# Patient Record
Sex: FEMALE | Race: WHITE | Employment: OTHER | URBAN - METROPOLITAN AREA
[De-identification: names, ages, dates, MRNs, and addresses within clinical notes are randomized per-mention and may not be internally consistent; named-entity substitution may affect disease eponyms.]

---

## 2021-01-01 ENCOUNTER — APPOINTMENT (OUTPATIENT)
Dept: GENERAL RADIOLOGY | Age: 71
End: 2021-01-01
Payer: MEDICARE

## 2021-01-01 ENCOUNTER — APPOINTMENT (OUTPATIENT)
Dept: CT IMAGING | Age: 71
DRG: 853 | End: 2021-01-01
Payer: MEDICARE

## 2021-01-01 ENCOUNTER — APPOINTMENT (OUTPATIENT)
Dept: GENERAL RADIOLOGY | Age: 71
DRG: 853 | End: 2021-01-01
Payer: MEDICARE

## 2021-01-01 ENCOUNTER — APPOINTMENT (OUTPATIENT)
Dept: CT IMAGING | Age: 71
End: 2021-01-01
Payer: MEDICARE

## 2021-01-01 ENCOUNTER — HOSPITAL ENCOUNTER (INPATIENT)
Age: 71
LOS: 6 days | Discharge: ANOTHER HOSPITAL | DRG: 872 | End: 2021-05-25
Attending: EMERGENCY MEDICINE | Admitting: INTERNAL MEDICINE
Payer: MEDICARE

## 2021-01-01 ENCOUNTER — HOSPITAL ENCOUNTER (INPATIENT)
Age: 71
LOS: 18 days | DRG: 853 | End: 2021-07-05
Attending: EMERGENCY MEDICINE | Admitting: SURGERY
Payer: MEDICARE

## 2021-01-01 ENCOUNTER — HOSPITAL ENCOUNTER (EMERGENCY)
Age: 71
Discharge: NURSING, REHAB OR LONG-TERM CARE | End: 2021-05-26
Attending: EMERGENCY MEDICINE
Payer: MEDICARE

## 2021-01-01 ENCOUNTER — APPOINTMENT (OUTPATIENT)
Dept: GENERAL RADIOLOGY | Age: 71
DRG: 872 | End: 2021-01-01
Payer: MEDICARE

## 2021-01-01 ENCOUNTER — HOSPITAL ENCOUNTER (EMERGENCY)
Age: 71
Discharge: HOME | End: 2021-05-26
Payer: MEDICARE

## 2021-01-01 ENCOUNTER — HOSPITAL ENCOUNTER (INPATIENT)
Age: 71
LOS: 7 days | Discharge: NURSING, REHAB OR LONG-TERM CARE | DRG: 871 | End: 2021-05-05
Attending: EMERGENCY MEDICINE | Admitting: INTERNAL MEDICINE
Payer: MEDICARE

## 2021-01-01 ENCOUNTER — ANESTHESIA (OUTPATIENT)
Dept: OPERATING ROOM | Age: 71
DRG: 853 | End: 2021-01-01
Payer: MEDICARE

## 2021-01-01 ENCOUNTER — APPOINTMENT (OUTPATIENT)
Dept: CT IMAGING | Age: 71
DRG: 871 | End: 2021-01-01
Payer: MEDICARE

## 2021-01-01 ENCOUNTER — ANESTHESIA EVENT (OUTPATIENT)
Dept: OPERATING ROOM | Age: 71
DRG: 853 | End: 2021-01-01
Payer: MEDICARE

## 2021-01-01 VITALS
OXYGEN SATURATION: 95 % | WEIGHT: 260 LBS | HEIGHT: 66 IN | RESPIRATION RATE: 26 BRPM | HEART RATE: 112 BPM | SYSTOLIC BLOOD PRESSURE: 138 MMHG | BODY MASS INDEX: 41.78 KG/M2 | TEMPERATURE: 98.4 F | DIASTOLIC BLOOD PRESSURE: 60 MMHG

## 2021-01-01 VITALS
OXYGEN SATURATION: 98 % | HEART RATE: 80 BPM | WEIGHT: 283.95 LBS | DIASTOLIC BLOOD PRESSURE: 62 MMHG | HEIGHT: 67 IN | SYSTOLIC BLOOD PRESSURE: 104 MMHG | TEMPERATURE: 98 F | RESPIRATION RATE: 16 BRPM | BODY MASS INDEX: 44.57 KG/M2

## 2021-01-01 VITALS
BODY MASS INDEX: 46 KG/M2 | TEMPERATURE: 98 F | SYSTOLIC BLOOD PRESSURE: 149 MMHG | RESPIRATION RATE: 16 BRPM | WEIGHT: 285 LBS | HEART RATE: 92 BPM | DIASTOLIC BLOOD PRESSURE: 77 MMHG | OXYGEN SATURATION: 98 %

## 2021-01-01 VITALS
OXYGEN SATURATION: 92 % | RESPIRATION RATE: 16 BRPM | HEART RATE: 84 BPM | WEIGHT: 267 LBS | HEIGHT: 67 IN | BODY MASS INDEX: 41.91 KG/M2 | SYSTOLIC BLOOD PRESSURE: 132 MMHG | TEMPERATURE: 98 F | DIASTOLIC BLOOD PRESSURE: 83 MMHG

## 2021-01-01 VITALS — SYSTOLIC BLOOD PRESSURE: 138 MMHG | DIASTOLIC BLOOD PRESSURE: 65 MMHG | TEMPERATURE: 99 F | OXYGEN SATURATION: 97 %

## 2021-01-01 DIAGNOSIS — K63.1 BOWEL PERFORATION (HCC): Primary | ICD-10-CM

## 2021-01-01 DIAGNOSIS — R65.20 SEVERE SEPSIS WITHOUT SEPTIC SHOCK (HCC): ICD-10-CM

## 2021-01-01 DIAGNOSIS — M54.6 ACUTE RIGHT-SIDED THORACIC BACK PAIN: ICD-10-CM

## 2021-01-01 DIAGNOSIS — Z76.5 DRUG-SEEKING BEHAVIOR: ICD-10-CM

## 2021-01-01 DIAGNOSIS — L03.116 CELLULITIS OF LEFT LOWER EXTREMITY: Primary | ICD-10-CM

## 2021-01-01 DIAGNOSIS — K56.609 SMALL BOWEL OBSTRUCTION (HCC): ICD-10-CM

## 2021-01-01 DIAGNOSIS — R07.89 CHEST WALL PAIN: Primary | ICD-10-CM

## 2021-01-01 DIAGNOSIS — Z76.0 ENCOUNTER FOR MEDICATION REFILL: Primary | ICD-10-CM

## 2021-01-01 DIAGNOSIS — R65.21 SEPTIC SHOCK (HCC): ICD-10-CM

## 2021-01-01 DIAGNOSIS — N17.9 ACUTE RENAL FAILURE, UNSPECIFIED ACUTE RENAL FAILURE TYPE (HCC): ICD-10-CM

## 2021-01-01 DIAGNOSIS — A41.9 SEPTIC SHOCK (HCC): ICD-10-CM

## 2021-01-01 DIAGNOSIS — A41.9 SEVERE SEPSIS WITHOUT SEPTIC SHOCK (HCC): ICD-10-CM

## 2021-01-01 LAB
A/G RATIO: 0.5 (ref 1.1–2.2)
A/G RATIO: 0.9 (ref 1.1–2.2)
ABO/RH: NORMAL
ABO/RH: NORMAL
ALBUMIN SERPL-MCNC: 1.6 G/DL (ref 3.4–5)
ALBUMIN SERPL-MCNC: 1.8 G/DL (ref 3.4–5)
ALBUMIN SERPL-MCNC: 1.9 G/DL (ref 3.4–5)
ALBUMIN SERPL-MCNC: 2 G/DL (ref 3.4–5)
ALBUMIN SERPL-MCNC: 2.1 G/DL (ref 3.4–5)
ALBUMIN SERPL-MCNC: 2.2 G/DL (ref 3.4–5)
ALBUMIN SERPL-MCNC: 2.3 G/DL (ref 3.4–5)
ALBUMIN SERPL-MCNC: 2.4 G/DL (ref 3.4–5)
ALBUMIN SERPL-MCNC: 2.5 G/DL (ref 3.4–5)
ALBUMIN SERPL-MCNC: 2.5 G/DL (ref 3.4–5)
ALBUMIN SERPL-MCNC: 2.6 G/DL (ref 3.4–5)
ALBUMIN SERPL-MCNC: 2.6 G/DL (ref 3.4–5)
ALBUMIN SERPL-MCNC: 2.7 G/DL (ref 3.4–5)
ALBUMIN SERPL-MCNC: 2.8 G/DL (ref 3.4–5)
ALP BLD-CCNC: 103 U/L (ref 40–129)
ALP BLD-CCNC: 104 U/L (ref 40–129)
ALP BLD-CCNC: 108 U/L (ref 40–129)
ALP BLD-CCNC: 111 U/L (ref 40–129)
ALP BLD-CCNC: 114 U/L (ref 40–129)
ALP BLD-CCNC: 116 U/L (ref 40–129)
ALP BLD-CCNC: 121 U/L (ref 40–129)
ALP BLD-CCNC: 121 U/L (ref 40–129)
ALP BLD-CCNC: 127 U/L (ref 40–129)
ALP BLD-CCNC: 131 U/L (ref 40–129)
ALP BLD-CCNC: 131 U/L (ref 40–129)
ALP BLD-CCNC: 132 U/L (ref 40–129)
ALP BLD-CCNC: 139 U/L (ref 40–129)
ALP BLD-CCNC: 164 U/L (ref 40–129)
ALP BLD-CCNC: 177 U/L (ref 40–129)
ALP BLD-CCNC: 232 U/L (ref 40–129)
ALP BLD-CCNC: 280 U/L (ref 40–129)
ALP BLD-CCNC: 87 U/L (ref 40–129)
ALT SERPL-CCNC: 109 U/L (ref 10–40)
ALT SERPL-CCNC: 11 U/L (ref 10–40)
ALT SERPL-CCNC: 115 U/L (ref 10–40)
ALT SERPL-CCNC: 14 U/L (ref 10–40)
ALT SERPL-CCNC: 15 U/L (ref 10–40)
ALT SERPL-CCNC: 15 U/L (ref 10–40)
ALT SERPL-CCNC: 17 U/L (ref 10–40)
ALT SERPL-CCNC: 18 U/L (ref 10–40)
ALT SERPL-CCNC: 22 U/L (ref 10–40)
ALT SERPL-CCNC: 22 U/L (ref 10–40)
ALT SERPL-CCNC: 24 U/L (ref 10–40)
ALT SERPL-CCNC: 26 U/L (ref 10–40)
ALT SERPL-CCNC: 34 U/L (ref 10–40)
ALT SERPL-CCNC: 66 U/L (ref 10–40)
ALT SERPL-CCNC: 71 U/L (ref 10–40)
ALT SERPL-CCNC: 71 U/L (ref 10–40)
ALT SERPL-CCNC: 91 U/L (ref 10–40)
ALT SERPL-CCNC: 97 U/L (ref 10–40)
AMMONIA: 40 UMOL/L (ref 11–51)
AMMONIA: 62 UMOL/L (ref 11–51)
ANION GAP SERPL CALCULATED.3IONS-SCNC: 10 MMOL/L (ref 3–16)
ANION GAP SERPL CALCULATED.3IONS-SCNC: 10 MMOL/L (ref 3–16)
ANION GAP SERPL CALCULATED.3IONS-SCNC: 11 MMOL/L (ref 3–16)
ANION GAP SERPL CALCULATED.3IONS-SCNC: 12 MMOL/L (ref 3–16)
ANION GAP SERPL CALCULATED.3IONS-SCNC: 13 MMOL/L (ref 3–16)
ANION GAP SERPL CALCULATED.3IONS-SCNC: 14 MMOL/L (ref 3–16)
ANION GAP SERPL CALCULATED.3IONS-SCNC: 15 MMOL/L (ref 3–16)
ANION GAP SERPL CALCULATED.3IONS-SCNC: 16 MMOL/L (ref 3–16)
ANION GAP SERPL CALCULATED.3IONS-SCNC: 17 MMOL/L (ref 3–16)
ANION GAP SERPL CALCULATED.3IONS-SCNC: 21 MMOL/L (ref 3–16)
ANION GAP SERPL CALCULATED.3IONS-SCNC: 21 MMOL/L (ref 3–16)
ANION GAP SERPL CALCULATED.3IONS-SCNC: 22 MMOL/L (ref 3–16)
ANION GAP SERPL CALCULATED.3IONS-SCNC: 5 MMOL/L (ref 3–16)
ANION GAP SERPL CALCULATED.3IONS-SCNC: 6 MMOL/L (ref 3–16)
ANION GAP SERPL CALCULATED.3IONS-SCNC: 7 MMOL/L (ref 3–16)
ANION GAP SERPL CALCULATED.3IONS-SCNC: 8 MMOL/L (ref 3–16)
ANION GAP SERPL CALCULATED.3IONS-SCNC: 9 MMOL/L (ref 3–16)
ANISOCYTOSIS: ABNORMAL
ANTIBODY SCREEN: NORMAL
ANTIBODY SCREEN: NORMAL
APTT: 35.8 SEC (ref 24.2–36.2)
APTT: 46.6 SEC (ref 26.2–38.6)
APTT: 53.8 SEC (ref 26.2–38.6)
APTT: 60.2 SEC (ref 26.2–38.6)
APTT: 61.6 SEC (ref 26.2–38.6)
APTT: 62.8 SEC (ref 24.2–36.2)
APTT: 68.1 SEC (ref 26.2–38.6)
AST SERPL-CCNC: 108 U/L (ref 15–37)
AST SERPL-CCNC: 133 U/L (ref 15–37)
AST SERPL-CCNC: 133 U/L (ref 15–37)
AST SERPL-CCNC: 135 U/L (ref 15–37)
AST SERPL-CCNC: 174 U/L (ref 15–37)
AST SERPL-CCNC: 181 U/L (ref 15–37)
AST SERPL-CCNC: 209 U/L (ref 15–37)
AST SERPL-CCNC: 268 U/L (ref 15–37)
AST SERPL-CCNC: 38 U/L (ref 15–37)
AST SERPL-CCNC: 48 U/L (ref 15–37)
AST SERPL-CCNC: 49 U/L (ref 15–37)
AST SERPL-CCNC: 50 U/L (ref 15–37)
AST SERPL-CCNC: 56 U/L (ref 15–37)
AST SERPL-CCNC: 64 U/L (ref 15–37)
AST SERPL-CCNC: 64 U/L (ref 15–37)
AST SERPL-CCNC: 65 U/L (ref 15–37)
AST SERPL-CCNC: 77 U/L (ref 15–37)
AST SERPL-CCNC: 88 U/L (ref 15–37)
ATYPICAL LYMPHOCYTE RELATIVE PERCENT: 2 % (ref 0–6)
ATYPICAL LYMPHOCYTE RELATIVE PERCENT: 3 % (ref 0–6)
BACTERIA: ABNORMAL /HPF
BACTERIA: ABNORMAL /HPF
BANDED NEUTROPHILS RELATIVE PERCENT: 1 % (ref 0–7)
BANDED NEUTROPHILS RELATIVE PERCENT: 10 % (ref 0–7)
BANDED NEUTROPHILS RELATIVE PERCENT: 14 % (ref 0–7)
BANDED NEUTROPHILS RELATIVE PERCENT: 17 % (ref 0–7)
BANDED NEUTROPHILS RELATIVE PERCENT: 2 % (ref 0–7)
BANDED NEUTROPHILS RELATIVE PERCENT: 2 % (ref 0–7)
BANDED NEUTROPHILS RELATIVE PERCENT: 3 % (ref 0–7)
BANDED NEUTROPHILS RELATIVE PERCENT: 5 % (ref 0–7)
BANDED NEUTROPHILS RELATIVE PERCENT: 5 % (ref 0–7)
BANDED NEUTROPHILS RELATIVE PERCENT: 6 % (ref 0–7)
BANDED NEUTROPHILS RELATIVE PERCENT: 6 % (ref 0–7)
BANDED NEUTROPHILS RELATIVE PERCENT: 7 % (ref 0–7)
BANDED NEUTROPHILS RELATIVE PERCENT: 8 % (ref 0–7)
BASE EXCESS ARTERIAL: -2 (ref -3–3)
BASE EXCESS ARTERIAL: -3 (ref -3–3)
BASE EXCESS ARTERIAL: -4 (ref -3–3)
BASE EXCESS ARTERIAL: -7.8 MMOL/L (ref -3–3)
BASE EXCESS VENOUS: -2.1 MMOL/L (ref -2–3)
BASOPHILS ABSOLUTE: 0 K/UL (ref 0–0.2)
BASOPHILS ABSOLUTE: 0.1 K/UL (ref 0–0.2)
BASOPHILS RELATIVE PERCENT: 0 %
BASOPHILS RELATIVE PERCENT: 0.1 %
BASOPHILS RELATIVE PERCENT: 0.3 %
BASOPHILS RELATIVE PERCENT: 0.4 %
BASOPHILS RELATIVE PERCENT: 0.5 %
BASOPHILS RELATIVE PERCENT: 0.5 %
BILIRUB SERPL-MCNC: 1.5 MG/DL (ref 0–1)
BILIRUB SERPL-MCNC: 1.7 MG/DL (ref 0–1)
BILIRUB SERPL-MCNC: 1.7 MG/DL (ref 0–1)
BILIRUB SERPL-MCNC: 1.8 MG/DL (ref 0–1)
BILIRUB SERPL-MCNC: 2.3 MG/DL (ref 0–1)
BILIRUB SERPL-MCNC: 2.3 MG/DL (ref 0–1)
BILIRUB SERPL-MCNC: 2.5 MG/DL (ref 0–1)
BILIRUB SERPL-MCNC: 2.5 MG/DL (ref 0–1)
BILIRUB SERPL-MCNC: 2.6 MG/DL (ref 0–1)
BILIRUB SERPL-MCNC: 2.6 MG/DL (ref 0–1)
BILIRUB SERPL-MCNC: 3 MG/DL (ref 0–1)
BILIRUB SERPL-MCNC: 3.1 MG/DL (ref 0–1)
BILIRUB SERPL-MCNC: 3.3 MG/DL (ref 0–1)
BILIRUB SERPL-MCNC: 3.9 MG/DL (ref 0–1)
BILIRUB SERPL-MCNC: 4.2 MG/DL (ref 0–1)
BILIRUB SERPL-MCNC: 5.6 MG/DL (ref 0–1)
BILIRUB SERPL-MCNC: 6.2 MG/DL (ref 0–1)
BILIRUB SERPL-MCNC: 6.7 MG/DL (ref 0–1)
BILIRUBIN DIRECT: 0.7 MG/DL (ref 0–0.3)
BILIRUBIN DIRECT: 0.7 MG/DL (ref 0–0.3)
BILIRUBIN DIRECT: 0.9 MG/DL (ref 0–0.3)
BILIRUBIN DIRECT: 1 MG/DL (ref 0–0.3)
BILIRUBIN DIRECT: 1.2 MG/DL (ref 0–0.3)
BILIRUBIN DIRECT: 1.2 MG/DL (ref 0–0.3)
BILIRUBIN DIRECT: 1.3 MG/DL (ref 0–0.3)
BILIRUBIN DIRECT: 1.4 MG/DL (ref 0–0.3)
BILIRUBIN DIRECT: 1.4 MG/DL (ref 0–0.3)
BILIRUBIN DIRECT: 1.5 MG/DL (ref 0–0.3)
BILIRUBIN DIRECT: 1.6 MG/DL (ref 0–0.3)
BILIRUBIN DIRECT: 1.7 MG/DL (ref 0–0.3)
BILIRUBIN DIRECT: 2.4 MG/DL (ref 0–0.3)
BILIRUBIN DIRECT: 3.4 MG/DL (ref 0–0.3)
BILIRUBIN DIRECT: 3.9 MG/DL (ref 0–0.3)
BILIRUBIN DIRECT: 4.2 MG/DL (ref 0–0.3)
BILIRUBIN URINE: ABNORMAL
BILIRUBIN URINE: NEGATIVE
BILIRUBIN, INDIRECT: 0.7 MG/DL (ref 0–1)
BILIRUBIN, INDIRECT: 0.8 MG/DL (ref 0–1)
BILIRUBIN, INDIRECT: 0.9 MG/DL (ref 0–1)
BILIRUBIN, INDIRECT: 0.9 MG/DL (ref 0–1)
BILIRUBIN, INDIRECT: 1 MG/DL (ref 0–1)
BILIRUBIN, INDIRECT: 1.1 MG/DL (ref 0–1)
BILIRUBIN, INDIRECT: 1.1 MG/DL (ref 0–1)
BILIRUBIN, INDIRECT: 1.3 MG/DL (ref 0–1)
BILIRUBIN, INDIRECT: 1.3 MG/DL (ref 0–1)
BILIRUBIN, INDIRECT: 1.8 MG/DL (ref 0–1)
BILIRUBIN, INDIRECT: 1.9 MG/DL (ref 0–1)
BILIRUBIN, INDIRECT: 2.2 MG/DL (ref 0–1)
BILIRUBIN, INDIRECT: 2.3 MG/DL (ref 0–1)
BILIRUBIN, INDIRECT: 2.5 MG/DL (ref 0–1)
BLOOD BANK DISPENSE STATUS: NORMAL
BLOOD BANK PRODUCT CODE: NORMAL
BLOOD CULTURE, ROUTINE: ABNORMAL
BLOOD CULTURE, ROUTINE: NORMAL
BLOOD CULTURE, ROUTINE: NORMAL
BLOOD, URINE: ABNORMAL
BLOOD, URINE: ABNORMAL
BPU ID: NORMAL
BUN BLDV-MCNC: 10 MG/DL (ref 7–20)
BUN BLDV-MCNC: 10 MG/DL (ref 7–20)
BUN BLDV-MCNC: 12 MG/DL (ref 7–20)
BUN BLDV-MCNC: 14 MG/DL (ref 7–20)
BUN BLDV-MCNC: 16 MG/DL (ref 7–20)
BUN BLDV-MCNC: 17 MG/DL (ref 7–20)
BUN BLDV-MCNC: 18 MG/DL (ref 7–20)
BUN BLDV-MCNC: 18 MG/DL (ref 7–20)
BUN BLDV-MCNC: 19 MG/DL (ref 7–20)
BUN BLDV-MCNC: 20 MG/DL (ref 7–20)
BUN BLDV-MCNC: 22 MG/DL (ref 7–20)
BUN BLDV-MCNC: 23 MG/DL (ref 7–20)
BUN BLDV-MCNC: 24 MG/DL (ref 7–20)
BUN BLDV-MCNC: 26 MG/DL (ref 7–20)
BUN BLDV-MCNC: 27 MG/DL (ref 7–20)
BUN BLDV-MCNC: 27 MG/DL (ref 7–20)
BUN BLDV-MCNC: 28 MG/DL (ref 7–20)
BUN BLDV-MCNC: 29 MG/DL (ref 7–20)
BUN BLDV-MCNC: 34 MG/DL (ref 7–20)
BUN BLDV-MCNC: 34 MG/DL (ref 7–20)
BUN BLDV-MCNC: 39 MG/DL (ref 7–20)
BUN BLDV-MCNC: 39 MG/DL (ref 7–20)
BUN BLDV-MCNC: 42 MG/DL (ref 7–20)
BUN BLDV-MCNC: 43 MG/DL (ref 7–20)
BUN BLDV-MCNC: 44 MG/DL (ref 7–20)
BUN BLDV-MCNC: 46 MG/DL (ref 7–20)
BUN BLDV-MCNC: 48 MG/DL (ref 7–20)
BUN BLDV-MCNC: 49 MG/DL (ref 7–20)
BUN BLDV-MCNC: 50 MG/DL (ref 7–20)
BUN BLDV-MCNC: 51 MG/DL (ref 7–20)
BUN BLDV-MCNC: 52 MG/DL (ref 7–20)
BUN BLDV-MCNC: 52 MG/DL (ref 7–20)
BUN BLDV-MCNC: 54 MG/DL (ref 7–20)
BUN BLDV-MCNC: 55 MG/DL (ref 7–20)
BUN BLDV-MCNC: 55 MG/DL (ref 7–20)
BUN BLDV-MCNC: 56 MG/DL (ref 7–20)
BUN BLDV-MCNC: 56 MG/DL (ref 7–20)
BUN BLDV-MCNC: 57 MG/DL (ref 7–20)
BUN BLDV-MCNC: 58 MG/DL (ref 7–20)
BUN BLDV-MCNC: 62 MG/DL (ref 7–20)
BUN BLDV-MCNC: 64 MG/DL (ref 7–20)
BUN BLDV-MCNC: 72 MG/DL (ref 7–20)
BUN BLDV-MCNC: 74 MG/DL (ref 7–20)
BUN BLDV-MCNC: 75 MG/DL (ref 7–20)
BUN BLDV-MCNC: 77 MG/DL (ref 7–20)
C DIFF TOXIN/ANTIGEN: NORMAL
C-REACTIVE PROTEIN: 17.6 MG/L (ref 0–5.1)
C. DIFFICILE TOXIN MOLECULAR: ABNORMAL
CALCIUM IONIZED: 1.04 MMOL/L (ref 1.12–1.32)
CALCIUM IONIZED: 1.06 MMOL/L (ref 1.12–1.32)
CALCIUM IONIZED: 1.06 MMOL/L (ref 1.12–1.32)
CALCIUM IONIZED: 1.07 MMOL/L (ref 1.12–1.32)
CALCIUM IONIZED: 1.08 MMOL/L (ref 1.12–1.32)
CALCIUM IONIZED: 1.09 MMOL/L (ref 1.12–1.32)
CALCIUM IONIZED: 1.09 MMOL/L (ref 1.12–1.32)
CALCIUM IONIZED: 1.1 MMOL/L (ref 1.12–1.32)
CALCIUM IONIZED: 1.11 MMOL/L (ref 1.12–1.32)
CALCIUM IONIZED: 1.22 MMOL/L (ref 1.12–1.32)
CALCIUM IONIZED: 1.25 MMOL/L (ref 1.12–1.32)
CALCIUM SERPL-MCNC: 6.9 MG/DL (ref 8.3–10.6)
CALCIUM SERPL-MCNC: 7.1 MG/DL (ref 8.3–10.6)
CALCIUM SERPL-MCNC: 7.2 MG/DL (ref 8.3–10.6)
CALCIUM SERPL-MCNC: 7.2 MG/DL (ref 8.3–10.6)
CALCIUM SERPL-MCNC: 7.3 MG/DL (ref 8.3–10.6)
CALCIUM SERPL-MCNC: 7.4 MG/DL (ref 8.3–10.6)
CALCIUM SERPL-MCNC: 7.5 MG/DL (ref 8.3–10.6)
CALCIUM SERPL-MCNC: 7.5 MG/DL (ref 8.3–10.6)
CALCIUM SERPL-MCNC: 7.6 MG/DL (ref 8.3–10.6)
CALCIUM SERPL-MCNC: 7.7 MG/DL (ref 8.3–10.6)
CALCIUM SERPL-MCNC: 7.7 MG/DL (ref 8.3–10.6)
CALCIUM SERPL-MCNC: 7.8 MG/DL (ref 8.3–10.6)
CALCIUM SERPL-MCNC: 7.9 MG/DL (ref 8.3–10.6)
CALCIUM SERPL-MCNC: 8 MG/DL (ref 8.3–10.6)
CALCIUM SERPL-MCNC: 8.1 MG/DL (ref 8.3–10.6)
CALCIUM SERPL-MCNC: 8.2 MG/DL (ref 8.3–10.6)
CALCIUM SERPL-MCNC: 8.3 MG/DL (ref 8.3–10.6)
CALCIUM SERPL-MCNC: 8.7 MG/DL (ref 8.3–10.6)
CALCIUM SERPL-MCNC: 8.8 MG/DL (ref 8.3–10.6)
CALCIUM SERPL-MCNC: 9.2 MG/DL (ref 8.3–10.6)
CARBOXYHEMOGLOBIN ARTERIAL: 1.5 % (ref 0–1.5)
CARBOXYHEMOGLOBIN: 1.1 % (ref 0–1.5)
CHLORIDE BLD-SCNC: 100 MMOL/L (ref 99–110)
CHLORIDE BLD-SCNC: 101 MMOL/L (ref 99–110)
CHLORIDE BLD-SCNC: 102 MMOL/L (ref 99–110)
CHLORIDE BLD-SCNC: 103 MMOL/L (ref 99–110)
CHLORIDE BLD-SCNC: 104 MMOL/L (ref 99–110)
CHLORIDE BLD-SCNC: 104 MMOL/L (ref 99–110)
CHLORIDE BLD-SCNC: 105 MMOL/L (ref 99–110)
CHLORIDE BLD-SCNC: 106 MMOL/L (ref 99–110)
CHLORIDE BLD-SCNC: 107 MMOL/L (ref 99–110)
CHLORIDE BLD-SCNC: 108 MMOL/L (ref 99–110)
CHLORIDE BLD-SCNC: 110 MMOL/L (ref 99–110)
CHLORIDE BLD-SCNC: 110 MMOL/L (ref 99–110)
CHLORIDE BLD-SCNC: 91 MMOL/L (ref 99–110)
CHLORIDE BLD-SCNC: 93 MMOL/L (ref 99–110)
CHLORIDE BLD-SCNC: 93 MMOL/L (ref 99–110)
CHLORIDE BLD-SCNC: 94 MMOL/L (ref 99–110)
CHLORIDE BLD-SCNC: 95 MMOL/L (ref 99–110)
CHLORIDE BLD-SCNC: 96 MMOL/L (ref 99–110)
CHLORIDE BLD-SCNC: 97 MMOL/L (ref 99–110)
CHLORIDE BLD-SCNC: 97 MMOL/L (ref 99–110)
CHLORIDE BLD-SCNC: 98 MMOL/L (ref 99–110)
CHLORIDE BLD-SCNC: 98 MMOL/L (ref 99–110)
CHLORIDE BLD-SCNC: 99 MMOL/L (ref 99–110)
CHLORIDE URINE RANDOM: <20 MMOL/L
CLARITY: CLEAR
CLARITY: CLEAR
CO2: 14 MMOL/L (ref 21–32)
CO2: 15 MMOL/L (ref 21–32)
CO2: 16 MMOL/L (ref 21–32)
CO2: 17 MMOL/L (ref 21–32)
CO2: 18 MMOL/L (ref 21–32)
CO2: 19 MMOL/L (ref 21–32)
CO2: 20 MMOL/L (ref 21–32)
CO2: 21 MMOL/L (ref 21–32)
CO2: 22 MMOL/L (ref 21–32)
CO2: 23 MMOL/L (ref 21–32)
CO2: 24 MMOL/L (ref 21–32)
CO2: 25 MMOL/L (ref 21–32)
CO2: 25 MMOL/L (ref 21–32)
COLOR: YELLOW
COLOR: YELLOW
CORTISOL TOTAL: 20.8 UG/DL
CREAT SERPL-MCNC: 0.5 MG/DL (ref 0.6–1.2)
CREAT SERPL-MCNC: 0.6 MG/DL (ref 0.6–1.2)
CREAT SERPL-MCNC: 0.6 MG/DL (ref 0.6–1.2)
CREAT SERPL-MCNC: 0.7 MG/DL (ref 0.6–1.2)
CREAT SERPL-MCNC: 0.8 MG/DL (ref 0.6–1.2)
CREAT SERPL-MCNC: 0.9 MG/DL (ref 0.6–1.2)
CREAT SERPL-MCNC: 1 MG/DL (ref 0.6–1.2)
CREAT SERPL-MCNC: 1.1 MG/DL (ref 0.6–1.2)
CREAT SERPL-MCNC: 1.2 MG/DL (ref 0.6–1.2)
CREAT SERPL-MCNC: 1.3 MG/DL (ref 0.6–1.2)
CREAT SERPL-MCNC: 1.3 MG/DL (ref 0.6–1.2)
CREAT SERPL-MCNC: 1.4 MG/DL (ref 0.6–1.2)
CREAT SERPL-MCNC: 1.5 MG/DL (ref 0.6–1.2)
CREAT SERPL-MCNC: 1.5 MG/DL (ref 0.6–1.2)
CREAT SERPL-MCNC: 1.6 MG/DL (ref 0.6–1.2)
CREAT SERPL-MCNC: 1.7 MG/DL (ref 0.6–1.2)
CREAT SERPL-MCNC: 1.8 MG/DL (ref 0.6–1.2)
CREAT SERPL-MCNC: 1.8 MG/DL (ref 0.6–1.2)
CREAT SERPL-MCNC: 2 MG/DL (ref 0.6–1.2)
CREAT SERPL-MCNC: 2.2 MG/DL (ref 0.6–1.2)
CREAT SERPL-MCNC: 2.3 MG/DL (ref 0.6–1.2)
CREAT SERPL-MCNC: 2.3 MG/DL (ref 0.6–1.2)
CREAT SERPL-MCNC: 2.4 MG/DL (ref 0.6–1.2)
CREAT SERPL-MCNC: 2.5 MG/DL (ref 0.6–1.2)
CREAT SERPL-MCNC: 2.6 MG/DL (ref 0.6–1.2)
CREAT SERPL-MCNC: 2.6 MG/DL (ref 0.6–1.2)
CREAT SERPL-MCNC: 2.8 MG/DL (ref 0.6–1.2)
CREAT SERPL-MCNC: 2.9 MG/DL (ref 0.6–1.2)
CREAT SERPL-MCNC: 3 MG/DL (ref 0.6–1.2)
CREAT SERPL-MCNC: 3.1 MG/DL (ref 0.6–1.2)
CREAT SERPL-MCNC: 3.9 MG/DL (ref 0.6–1.2)
CREAT SERPL-MCNC: <0.5 MG/DL (ref 0.6–1.2)
CREAT SERPL-MCNC: <0.5 MG/DL (ref 0.6–1.2)
CREATININE URINE: 121.8 MG/DL (ref 28–259)
CREATININE URINE: 154.9 MG/DL (ref 28–259)
CREATININE URINE: 81.1 MG/DL (ref 28–259)
CREATININE URINE: 95.5 MG/DL (ref 28–259)
CRYOGLOBULIN, QUALITATIVE: ABNORMAL
CULTURE, BLOOD 2: ABNORMAL
CULTURE, BLOOD 2: ABNORMAL
CULTURE, BLOOD 2: NORMAL
CULTURE, BLOOD 2: NORMAL
D DIMER: 1588 NG/ML DDU (ref 0–229)
D DIMER: 2466 NG/ML DDU (ref 0–229)
D DIMER: 2775 NG/ML DDU (ref 0–229)
DESCRIPTION BLOOD BANK: NORMAL
DOHLE BODIES: PRESENT
EKG ATRIAL RATE: 103 BPM
EKG ATRIAL RATE: 110 BPM
EKG ATRIAL RATE: 141 BPM
EKG ATRIAL RATE: 143 BPM
EKG ATRIAL RATE: 96 BPM
EKG DIAGNOSIS: NORMAL
EKG P AXIS: 62 DEGREES
EKG P AXIS: 70 DEGREES
EKG P AXIS: 77 DEGREES
EKG P-R INTERVAL: 128 MS
EKG P-R INTERVAL: 128 MS
EKG P-R INTERVAL: 140 MS
EKG P-R INTERVAL: 144 MS
EKG P-R INTERVAL: 148 MS
EKG Q-T INTERVAL: 302 MS
EKG Q-T INTERVAL: 314 MS
EKG Q-T INTERVAL: 356 MS
EKG Q-T INTERVAL: 368 MS
EKG Q-T INTERVAL: 408 MS
EKG QRS DURATION: 80 MS
EKG QRS DURATION: 84 MS
EKG QRS DURATION: 96 MS
EKG QTC CALCULATION (BAZETT): 449 MS
EKG QTC CALCULATION (BAZETT): 466 MS
EKG QTC CALCULATION (BAZETT): 480 MS
EKG QTC CALCULATION (BAZETT): 498 MS
EKG QTC CALCULATION (BAZETT): 534 MS
EKG R AXIS: 12 DEGREES
EKG R AXIS: 13 DEGREES
EKG R AXIS: 23 DEGREES
EKG R AXIS: 3 DEGREES
EKG R AXIS: 4 DEGREES
EKG T AXIS: -35 DEGREES
EKG T AXIS: -40 DEGREES
EKG T AXIS: 38 DEGREES
EKG T AXIS: 51 DEGREES
EKG T AXIS: 9 DEGREES
EKG VENTRICULAR RATE: 103 BPM
EKG VENTRICULAR RATE: 110 BPM
EKG VENTRICULAR RATE: 141 BPM
EKG VENTRICULAR RATE: 143 BPM
EKG VENTRICULAR RATE: 96 BPM
EOSINOPHILS ABSOLUTE: 0 K/UL (ref 0–0.6)
EOSINOPHILS ABSOLUTE: 0.1 K/UL (ref 0–0.6)
EOSINOPHILS ABSOLUTE: 0.2 K/UL (ref 0–0.6)
EOSINOPHILS ABSOLUTE: 0.3 K/UL (ref 0–0.6)
EOSINOPHILS ABSOLUTE: 0.5 K/UL (ref 0–0.6)
EOSINOPHILS ABSOLUTE: 0.6 K/UL (ref 0–0.6)
EOSINOPHILS ABSOLUTE: 0.6 K/UL (ref 0–0.6)
EOSINOPHILS RELATIVE PERCENT: 0 %
EOSINOPHILS RELATIVE PERCENT: 0.1 %
EOSINOPHILS RELATIVE PERCENT: 0.2 %
EOSINOPHILS RELATIVE PERCENT: 0.2 %
EOSINOPHILS RELATIVE PERCENT: 0.3 %
EOSINOPHILS RELATIVE PERCENT: 0.4 %
EOSINOPHILS RELATIVE PERCENT: 0.7 %
EOSINOPHILS RELATIVE PERCENT: 1 %
EOSINOPHILS RELATIVE PERCENT: 2 %
EOSINOPHILS RELATIVE PERCENT: 2.2 %
EOSINOPHILS RELATIVE PERCENT: 2.5 %
EOSINOPHILS RELATIVE PERCENT: 3 %
EOSINOPHILS RELATIVE PERCENT: 4 %
EPITHELIAL CELLS, UA: ABNORMAL /HPF (ref 0–5)
EPITHELIAL CELLS, UA: ABNORMAL /HPF (ref 0–5)
ESTIMATED AVERAGE GLUCOSE: 105.4 MG/DL
ESTIMATED AVERAGE GLUCOSE: 134.1 MG/DL
FACTOR V ACTIVITY: 17 % (ref 62–140)
FACTOR V ACTIVITY: 19 % (ref 62–140)
FACTOR VII ACTIVITY: 8 % (ref 80–181)
FACTOR VIII ACTIVITY: 438 % (ref 50–150)
FACTOR X ACTIVITY: 47 % (ref 81–157)
FACTOR X ACTIVITY: 49 % (ref 81–157)
FIBRINOGEN: 170 MG/DL (ref 200–397)
FIBRINOGEN: 172 MG/DL (ref 200–397)
FIBRINOGEN: 201 MG/DL (ref 200–397)
GFR AFRICAN AMERICAN: 14
GFR AFRICAN AMERICAN: 18
GFR AFRICAN AMERICAN: 19
GFR AFRICAN AMERICAN: 20
GFR AFRICAN AMERICAN: 22
GFR AFRICAN AMERICAN: 22
GFR AFRICAN AMERICAN: 23
GFR AFRICAN AMERICAN: 24
GFR AFRICAN AMERICAN: 25
GFR AFRICAN AMERICAN: 25
GFR AFRICAN AMERICAN: 27
GFR AFRICAN AMERICAN: 30
GFR AFRICAN AMERICAN: 33
GFR AFRICAN AMERICAN: 33
GFR AFRICAN AMERICAN: 36
GFR AFRICAN AMERICAN: 38
GFR AFRICAN AMERICAN: 41
GFR AFRICAN AMERICAN: 41
GFR AFRICAN AMERICAN: 45
GFR AFRICAN AMERICAN: 49
GFR AFRICAN AMERICAN: 49
GFR AFRICAN AMERICAN: 53
GFR AFRICAN AMERICAN: 59
GFR AFRICAN AMERICAN: >60
GFR NON-AFRICAN AMERICAN: 11
GFR NON-AFRICAN AMERICAN: 15
GFR NON-AFRICAN AMERICAN: 15
GFR NON-AFRICAN AMERICAN: 16
GFR NON-AFRICAN AMERICAN: 17
GFR NON-AFRICAN AMERICAN: 18
GFR NON-AFRICAN AMERICAN: 18
GFR NON-AFRICAN AMERICAN: 19
GFR NON-AFRICAN AMERICAN: 20
GFR NON-AFRICAN AMERICAN: 21
GFR NON-AFRICAN AMERICAN: 21
GFR NON-AFRICAN AMERICAN: 22
GFR NON-AFRICAN AMERICAN: 24
GFR NON-AFRICAN AMERICAN: 28
GFR NON-AFRICAN AMERICAN: 28
GFR NON-AFRICAN AMERICAN: 29
GFR NON-AFRICAN AMERICAN: 32
GFR NON-AFRICAN AMERICAN: 34
GFR NON-AFRICAN AMERICAN: 34
GFR NON-AFRICAN AMERICAN: 37
GFR NON-AFRICAN AMERICAN: 40
GFR NON-AFRICAN AMERICAN: 40
GFR NON-AFRICAN AMERICAN: 44
GFR NON-AFRICAN AMERICAN: 49
GFR NON-AFRICAN AMERICAN: 54
GFR NON-AFRICAN AMERICAN: >60
GLOBULIN: 2.7 G/DL
GLOBULIN: 5.3 G/DL
GLUCOSE BLD-MCNC: 106 MG/DL (ref 70–99)
GLUCOSE BLD-MCNC: 107 MG/DL (ref 70–99)
GLUCOSE BLD-MCNC: 113 MG/DL (ref 70–99)
GLUCOSE BLD-MCNC: 113 MG/DL (ref 70–99)
GLUCOSE BLD-MCNC: 118 MG/DL (ref 70–99)
GLUCOSE BLD-MCNC: 120 MG/DL (ref 70–99)
GLUCOSE BLD-MCNC: 121 MG/DL (ref 70–99)
GLUCOSE BLD-MCNC: 121 MG/DL (ref 70–99)
GLUCOSE BLD-MCNC: 125 MG/DL (ref 70–99)
GLUCOSE BLD-MCNC: 129 MG/DL (ref 70–99)
GLUCOSE BLD-MCNC: 130 MG/DL (ref 70–99)
GLUCOSE BLD-MCNC: 131 MG/DL (ref 70–99)
GLUCOSE BLD-MCNC: 133 MG/DL (ref 70–99)
GLUCOSE BLD-MCNC: 133 MG/DL (ref 70–99)
GLUCOSE BLD-MCNC: 135 MG/DL (ref 70–99)
GLUCOSE BLD-MCNC: 136 MG/DL (ref 70–99)
GLUCOSE BLD-MCNC: 137 MG/DL (ref 70–99)
GLUCOSE BLD-MCNC: 139 MG/DL (ref 70–99)
GLUCOSE BLD-MCNC: 139 MG/DL (ref 70–99)
GLUCOSE BLD-MCNC: 140 MG/DL (ref 70–99)
GLUCOSE BLD-MCNC: 140 MG/DL (ref 70–99)
GLUCOSE BLD-MCNC: 141 MG/DL (ref 70–99)
GLUCOSE BLD-MCNC: 141 MG/DL (ref 70–99)
GLUCOSE BLD-MCNC: 143 MG/DL (ref 70–99)
GLUCOSE BLD-MCNC: 143 MG/DL (ref 70–99)
GLUCOSE BLD-MCNC: 146 MG/DL (ref 70–99)
GLUCOSE BLD-MCNC: 147 MG/DL (ref 70–99)
GLUCOSE BLD-MCNC: 148 MG/DL (ref 70–99)
GLUCOSE BLD-MCNC: 149 MG/DL (ref 70–99)
GLUCOSE BLD-MCNC: 149 MG/DL (ref 70–99)
GLUCOSE BLD-MCNC: 150 MG/DL (ref 70–99)
GLUCOSE BLD-MCNC: 151 MG/DL (ref 70–99)
GLUCOSE BLD-MCNC: 152 MG/DL (ref 70–99)
GLUCOSE BLD-MCNC: 152 MG/DL (ref 70–99)
GLUCOSE BLD-MCNC: 153 MG/DL (ref 70–99)
GLUCOSE BLD-MCNC: 157 MG/DL (ref 70–99)
GLUCOSE BLD-MCNC: 158 MG/DL (ref 70–99)
GLUCOSE BLD-MCNC: 159 MG/DL (ref 70–99)
GLUCOSE BLD-MCNC: 160 MG/DL (ref 70–99)
GLUCOSE BLD-MCNC: 160 MG/DL (ref 70–99)
GLUCOSE BLD-MCNC: 161 MG/DL (ref 70–99)
GLUCOSE BLD-MCNC: 162 MG/DL (ref 70–99)
GLUCOSE BLD-MCNC: 163 MG/DL (ref 70–99)
GLUCOSE BLD-MCNC: 163 MG/DL (ref 70–99)
GLUCOSE BLD-MCNC: 164 MG/DL (ref 70–99)
GLUCOSE BLD-MCNC: 165 MG/DL (ref 70–99)
GLUCOSE BLD-MCNC: 166 MG/DL (ref 70–99)
GLUCOSE BLD-MCNC: 167 MG/DL (ref 70–99)
GLUCOSE BLD-MCNC: 167 MG/DL (ref 70–99)
GLUCOSE BLD-MCNC: 169 MG/DL (ref 70–99)
GLUCOSE BLD-MCNC: 169 MG/DL (ref 70–99)
GLUCOSE BLD-MCNC: 170 MG/DL (ref 70–99)
GLUCOSE BLD-MCNC: 171 MG/DL (ref 70–99)
GLUCOSE BLD-MCNC: 175 MG/DL (ref 70–99)
GLUCOSE BLD-MCNC: 176 MG/DL (ref 70–99)
GLUCOSE BLD-MCNC: 177 MG/DL (ref 70–99)
GLUCOSE BLD-MCNC: 178 MG/DL (ref 70–99)
GLUCOSE BLD-MCNC: 178 MG/DL (ref 70–99)
GLUCOSE BLD-MCNC: 179 MG/DL (ref 70–99)
GLUCOSE BLD-MCNC: 179 MG/DL (ref 70–99)
GLUCOSE BLD-MCNC: 180 MG/DL (ref 70–99)
GLUCOSE BLD-MCNC: 180 MG/DL (ref 70–99)
GLUCOSE BLD-MCNC: 182 MG/DL (ref 70–99)
GLUCOSE BLD-MCNC: 183 MG/DL (ref 70–99)
GLUCOSE BLD-MCNC: 187 MG/DL (ref 70–99)
GLUCOSE BLD-MCNC: 190 MG/DL (ref 70–99)
GLUCOSE BLD-MCNC: 193 MG/DL (ref 70–99)
GLUCOSE BLD-MCNC: 193 MG/DL (ref 70–99)
GLUCOSE BLD-MCNC: 194 MG/DL (ref 70–99)
GLUCOSE BLD-MCNC: 195 MG/DL (ref 70–99)
GLUCOSE BLD-MCNC: 202 MG/DL (ref 70–99)
GLUCOSE BLD-MCNC: 203 MG/DL (ref 70–99)
GLUCOSE BLD-MCNC: 205 MG/DL (ref 70–99)
GLUCOSE BLD-MCNC: 207 MG/DL (ref 70–99)
GLUCOSE BLD-MCNC: 208 MG/DL (ref 70–99)
GLUCOSE BLD-MCNC: 208 MG/DL (ref 70–99)
GLUCOSE BLD-MCNC: 209 MG/DL (ref 70–99)
GLUCOSE BLD-MCNC: 211 MG/DL (ref 70–99)
GLUCOSE BLD-MCNC: 213 MG/DL (ref 70–99)
GLUCOSE BLD-MCNC: 217 MG/DL (ref 70–99)
GLUCOSE BLD-MCNC: 218 MG/DL (ref 70–99)
GLUCOSE BLD-MCNC: 219 MG/DL (ref 70–99)
GLUCOSE BLD-MCNC: 221 MG/DL (ref 70–99)
GLUCOSE BLD-MCNC: 225 MG/DL (ref 70–99)
GLUCOSE BLD-MCNC: 226 MG/DL (ref 70–99)
GLUCOSE BLD-MCNC: 230 MG/DL (ref 70–99)
GLUCOSE BLD-MCNC: 230 MG/DL (ref 70–99)
GLUCOSE BLD-MCNC: 232 MG/DL (ref 70–99)
GLUCOSE BLD-MCNC: 233 MG/DL (ref 70–99)
GLUCOSE BLD-MCNC: 234 MG/DL (ref 70–99)
GLUCOSE BLD-MCNC: 236 MG/DL (ref 70–99)
GLUCOSE BLD-MCNC: 239 MG/DL (ref 70–99)
GLUCOSE BLD-MCNC: 241 MG/DL (ref 70–99)
GLUCOSE BLD-MCNC: 245 MG/DL (ref 70–99)
GLUCOSE BLD-MCNC: 246 MG/DL (ref 70–99)
GLUCOSE BLD-MCNC: 249 MG/DL (ref 70–99)
GLUCOSE BLD-MCNC: 251 MG/DL (ref 70–99)
GLUCOSE BLD-MCNC: 254 MG/DL (ref 70–99)
GLUCOSE BLD-MCNC: 258 MG/DL (ref 70–99)
GLUCOSE BLD-MCNC: 262 MG/DL (ref 70–99)
GLUCOSE BLD-MCNC: 267 MG/DL (ref 70–99)
GLUCOSE BLD-MCNC: 268 MG/DL (ref 70–99)
GLUCOSE BLD-MCNC: 291 MG/DL (ref 70–99)
GLUCOSE BLD-MCNC: 301 MG/DL (ref 70–99)
GLUCOSE BLD-MCNC: 95 MG/DL (ref 70–99)
GLUCOSE URINE: NEGATIVE MG/DL
GLUCOSE URINE: NEGATIVE MG/DL
GRAM STAIN RESULT: ABNORMAL
GRAM STAIN RESULT: ABNORMAL
HBA1C MFR BLD: 5.3 %
HBA1C MFR BLD: 6.3 %
HCO3 ARTERIAL: 15 MMOL/L (ref 21–29)
HCO3 ARTERIAL: 20.5 MMOL/L (ref 21–29)
HCO3 ARTERIAL: 21.5 MMOL/L (ref 21–29)
HCO3 ARTERIAL: 21.6 MMOL/L (ref 21–29)
HCO3 VENOUS: 23.6 MMOL/L (ref 24–28)
HCT VFR BLD CALC: 25.4 % (ref 36–48)
HCT VFR BLD CALC: 25.7 % (ref 36–48)
HCT VFR BLD CALC: 26.1 % (ref 36–48)
HCT VFR BLD CALC: 26.2 % (ref 36–48)
HCT VFR BLD CALC: 26.6 % (ref 36–48)
HCT VFR BLD CALC: 26.6 % (ref 36–48)
HCT VFR BLD CALC: 26.7 % (ref 36–48)
HCT VFR BLD CALC: 26.8 % (ref 36–48)
HCT VFR BLD CALC: 27.3 % (ref 36–48)
HCT VFR BLD CALC: 27.6 % (ref 36–48)
HCT VFR BLD CALC: 27.7 % (ref 36–48)
HCT VFR BLD CALC: 27.8 % (ref 36–48)
HCT VFR BLD CALC: 28.6 % (ref 36–48)
HCT VFR BLD CALC: 28.7 % (ref 36–48)
HCT VFR BLD CALC: 28.8 % (ref 36–48)
HCT VFR BLD CALC: 29 % (ref 36–48)
HCT VFR BLD CALC: 29.6 % (ref 36–48)
HCT VFR BLD CALC: 29.9 % (ref 36–48)
HCT VFR BLD CALC: 30.4 % (ref 36–48)
HCT VFR BLD CALC: 30.5 % (ref 36–48)
HCT VFR BLD CALC: 30.6 % (ref 36–48)
HCT VFR BLD CALC: 30.8 % (ref 36–48)
HCT VFR BLD CALC: 31.7 % (ref 36–48)
HCT VFR BLD CALC: 32.3 % (ref 36–48)
HCT VFR BLD CALC: 32.8 % (ref 36–48)
HCT VFR BLD CALC: 33.6 % (ref 36–48)
HCT VFR BLD CALC: 33.8 % (ref 36–48)
HCT VFR BLD CALC: 34.1 % (ref 36–48)
HCT VFR BLD CALC: 34.7 % (ref 36–48)
HCT VFR BLD CALC: 35.3 % (ref 36–48)
HCT VFR BLD CALC: 35.5 % (ref 36–48)
HCT VFR BLD CALC: 36.1 % (ref 36–48)
HCT VFR BLD CALC: 36.2 % (ref 36–48)
HCT VFR BLD CALC: 36.2 % (ref 36–48)
HCT VFR BLD CALC: 43 % (ref 36–48)
HCT VFR BLD CALC: 46.4 % (ref 36–48)
HCV QNT BY NAAT IU/ML: ABNORMAL
HCV QNT BY NAAT LOG IU/ML: 5.66 LOG IU/ML
HEMOGLOBIN, ART, EXTENDED: 11.4 G/DL
HEMOGLOBIN, VEN, REDUCED: 55.6 %
HEMOGLOBIN: 10.2 G/DL (ref 12–16)
HEMOGLOBIN: 10.4 G/DL (ref 12–16)
HEMOGLOBIN: 10.4 G/DL (ref 12–16)
HEMOGLOBIN: 10.5 G/DL (ref 12–16)
HEMOGLOBIN: 10.9 G/DL (ref 12–16)
HEMOGLOBIN: 11 G/DL (ref 12–16)
HEMOGLOBIN: 11.4 G/DL (ref 12–16)
HEMOGLOBIN: 11.4 G/DL (ref 12–16)
HEMOGLOBIN: 11.5 G/DL (ref 12–16)
HEMOGLOBIN: 11.7 G/DL (ref 12–16)
HEMOGLOBIN: 11.8 G/DL (ref 12–16)
HEMOGLOBIN: 12 G/DL (ref 12–16)
HEMOGLOBIN: 12.3 G/DL (ref 12–16)
HEMOGLOBIN: 14.6 G/DL (ref 12–16)
HEMOGLOBIN: 15.3 G/DL (ref 12–16)
HEMOGLOBIN: 8.5 G/DL (ref 12–16)
HEMOGLOBIN: 8.7 G/DL (ref 12–16)
HEMOGLOBIN: 8.8 G/DL (ref 12–16)
HEMOGLOBIN: 8.9 G/DL (ref 12–16)
HEMOGLOBIN: 8.9 G/DL (ref 12–16)
HEMOGLOBIN: 9 G/DL (ref 12–16)
HEMOGLOBIN: 9.1 G/DL (ref 12–16)
HEMOGLOBIN: 9.2 G/DL (ref 12–16)
HEMOGLOBIN: 9.2 G/DL (ref 12–16)
HEMOGLOBIN: 9.3 G/DL (ref 12–16)
HEMOGLOBIN: 9.5 G/DL (ref 12–16)
HEMOGLOBIN: 9.8 G/DL (ref 12–16)
HEMOGLOBIN: 9.8 G/DL (ref 12–16)
HEMOGLOBIN: 9.9 G/DL (ref 12–16)
HEMOGLOBIN: 9.9 G/DL (ref 12–16)
HEPARIN INDUCED PLATELET ANTIBODY: NEGATIVE
HIV AG/AB: NORMAL
HIV ANTIGEN: NORMAL
HIV-1 ANTIBODY: NORMAL
HIV-2 AB: NORMAL
HYALINE CASTS: ABNORMAL /LPF (ref 0–2)
HYPOCHROMIA: ABNORMAL
INR BLD: 1.4 (ref 0.86–1.14)
INR BLD: 1.81 (ref 0.88–1.12)
INR BLD: 1.87 (ref 0.86–1.14)
INR BLD: 1.88 (ref 0.86–1.14)
INR BLD: 2.05 (ref 0.86–1.14)
INR BLD: 2.06 (ref 0.86–1.14)
INR BLD: 2.11 (ref 0.86–1.14)
INR BLD: 2.44 (ref 0.88–1.12)
INR BLD: 2.5 (ref 0.88–1.12)
INR BLD: 2.55 (ref 0.88–1.12)
INR BLD: 2.97 (ref 0.86–1.14)
INR BLD: 3.33 (ref 0.88–1.12)
INR BLD: 3.4 (ref 0.88–1.12)
INR BLD: 3.62 (ref 0.86–1.14)
INTERPRETATION: DETECTED
KETONES, URINE: NEGATIVE MG/DL
KETONES, URINE: NEGATIVE MG/DL
LACTATE: 2.51 MMOL/L (ref 0.4–2)
LACTATE: 5.29 MMOL/L (ref 0.4–2)
LACTATE: 6.04 MMOL/L (ref 0.4–2)
LACTIC ACID, SEPSIS: 2.9 MMOL/L (ref 0.4–1.9)
LACTIC ACID: 1.8 MMOL/L (ref 0.4–2)
LACTIC ACID: 1.9 MMOL/L (ref 0.4–2)
LACTIC ACID: 10.7 MMOL/L (ref 0.4–2)
LACTIC ACID: 2 MMOL/L (ref 0.4–2)
LACTIC ACID: 2 MMOL/L (ref 0.4–2)
LACTIC ACID: 2.2 MMOL/L (ref 0.4–2)
LACTIC ACID: 2.3 MMOL/L (ref 0.4–2)
LACTIC ACID: 2.5 MMOL/L (ref 0.4–2)
LACTIC ACID: 2.6 MMOL/L (ref 0.4–2)
LACTIC ACID: 2.6 MMOL/L (ref 0.4–2)
LACTIC ACID: 2.9 MMOL/L (ref 0.4–2)
LACTIC ACID: 3 MMOL/L (ref 0.4–2)
LACTIC ACID: 3.3 MMOL/L (ref 0.4–2)
LACTIC ACID: 3.3 MMOL/L (ref 0.4–2)
LACTIC ACID: 3.6 MMOL/L (ref 0.4–2)
LACTIC ACID: 3.9 MMOL/L (ref 0.4–2)
LACTIC ACID: 4.1 MMOL/L (ref 0.4–2)
LACTIC ACID: 4.2 MMOL/L (ref 0.4–2)
LACTIC ACID: 4.2 MMOL/L (ref 0.4–2)
LACTIC ACID: 4.3 MMOL/L (ref 0.4–2)
LACTIC ACID: 4.3 MMOL/L (ref 0.4–2)
LACTIC ACID: 4.4 MMOL/L (ref 0.4–2)
LACTIC ACID: 4.7 MMOL/L (ref 0.4–2)
LACTIC ACID: 4.9 MMOL/L (ref 0.4–2)
LACTIC ACID: 5 MMOL/L (ref 0.4–2)
LACTIC ACID: 5.1 MMOL/L (ref 0.4–2)
LACTIC ACID: 5.5 MMOL/L (ref 0.4–2)
LACTIC ACID: 5.6 MMOL/L (ref 0.4–2)
LACTIC ACID: 5.7 MMOL/L (ref 0.4–2)
LACTIC ACID: 5.7 MMOL/L (ref 0.4–2)
LACTIC ACID: 6 MMOL/L (ref 0.4–2)
LACTIC ACID: 6.2 MMOL/L (ref 0.4–2)
LACTIC ACID: 6.6 MMOL/L (ref 0.4–2)
LACTIC ACID: 7.4 MMOL/L (ref 0.4–2)
LACTIC ACID: 7.7 MMOL/L (ref 0.4–2)
LACTIC ACID: 7.8 MMOL/L (ref 0.4–2)
LACTIC ACID: 7.9 MMOL/L (ref 0.4–2)
LACTIC ACID: 8 MMOL/L (ref 0.4–2)
LEUKOCYTE ESTERASE, URINE: ABNORMAL
LEUKOCYTE ESTERASE, URINE: ABNORMAL
LIPASE: 48 U/L (ref 13–60)
LV EF: 65 %
LVEF MODALITY: NORMAL
LYMPHOCYTES ABSOLUTE: 0 K/UL (ref 1–5.1)
LYMPHOCYTES ABSOLUTE: 0.4 K/UL (ref 1–5.1)
LYMPHOCYTES ABSOLUTE: 0.4 K/UL (ref 1–5.1)
LYMPHOCYTES ABSOLUTE: 0.6 K/UL (ref 1–5.1)
LYMPHOCYTES ABSOLUTE: 0.7 K/UL (ref 1–5.1)
LYMPHOCYTES ABSOLUTE: 0.8 K/UL (ref 1–5.1)
LYMPHOCYTES ABSOLUTE: 0.9 K/UL (ref 1–5.1)
LYMPHOCYTES ABSOLUTE: 1 K/UL (ref 1–5.1)
LYMPHOCYTES ABSOLUTE: 1.1 K/UL (ref 1–5.1)
LYMPHOCYTES ABSOLUTE: 1.2 K/UL (ref 1–5.1)
LYMPHOCYTES ABSOLUTE: 1.2 K/UL (ref 1–5.1)
LYMPHOCYTES ABSOLUTE: 1.3 K/UL (ref 1–5.1)
LYMPHOCYTES ABSOLUTE: 1.4 K/UL (ref 1–5.1)
LYMPHOCYTES ABSOLUTE: 1.5 K/UL (ref 1–5.1)
LYMPHOCYTES ABSOLUTE: 1.7 K/UL (ref 1–5.1)
LYMPHOCYTES ABSOLUTE: 1.9 K/UL (ref 1–5.1)
LYMPHOCYTES ABSOLUTE: 2 K/UL (ref 1–5.1)
LYMPHOCYTES ABSOLUTE: 2.1 K/UL (ref 1–5.1)
LYMPHOCYTES ABSOLUTE: 2.1 K/UL (ref 1–5.1)
LYMPHOCYTES ABSOLUTE: 2.3 K/UL (ref 1–5.1)
LYMPHOCYTES ABSOLUTE: 2.5 K/UL (ref 1–5.1)
LYMPHOCYTES ABSOLUTE: 2.5 K/UL (ref 1–5.1)
LYMPHOCYTES ABSOLUTE: 2.6 K/UL (ref 1–5.1)
LYMPHOCYTES ABSOLUTE: 3.1 K/UL (ref 1–5.1)
LYMPHOCYTES ABSOLUTE: 5 K/UL (ref 1–5.1)
LYMPHOCYTES RELATIVE PERCENT: 0 %
LYMPHOCYTES RELATIVE PERCENT: 1 %
LYMPHOCYTES RELATIVE PERCENT: 10 %
LYMPHOCYTES RELATIVE PERCENT: 10.1 %
LYMPHOCYTES RELATIVE PERCENT: 11.7 %
LYMPHOCYTES RELATIVE PERCENT: 12.5 %
LYMPHOCYTES RELATIVE PERCENT: 14 %
LYMPHOCYTES RELATIVE PERCENT: 14.5 %
LYMPHOCYTES RELATIVE PERCENT: 15 %
LYMPHOCYTES RELATIVE PERCENT: 16 %
LYMPHOCYTES RELATIVE PERCENT: 16.2 %
LYMPHOCYTES RELATIVE PERCENT: 17 %
LYMPHOCYTES RELATIVE PERCENT: 18 %
LYMPHOCYTES RELATIVE PERCENT: 2 %
LYMPHOCYTES RELATIVE PERCENT: 2 %
LYMPHOCYTES RELATIVE PERCENT: 20.5 %
LYMPHOCYTES RELATIVE PERCENT: 3 %
LYMPHOCYTES RELATIVE PERCENT: 3 %
LYMPHOCYTES RELATIVE PERCENT: 3.1 %
LYMPHOCYTES RELATIVE PERCENT: 4 %
LYMPHOCYTES RELATIVE PERCENT: 4.9 %
LYMPHOCYTES RELATIVE PERCENT: 5 %
LYMPHOCYTES RELATIVE PERCENT: 5 %
LYMPHOCYTES RELATIVE PERCENT: 5.5 %
LYMPHOCYTES RELATIVE PERCENT: 6 %
LYMPHOCYTES RELATIVE PERCENT: 7 %
LYMPHOCYTES RELATIVE PERCENT: 7 %
LYMPHOCYTES RELATIVE PERCENT: 7.9 %
LYMPHOCYTES RELATIVE PERCENT: 9 %
Lab: NORMAL
MACROCYTES: ABNORMAL
MAGNESIUM: 1.5 MG/DL (ref 1.8–2.4)
MAGNESIUM: 1.8 MG/DL (ref 1.8–2.4)
MAGNESIUM: 1.8 MG/DL (ref 1.8–2.4)
MAGNESIUM: 2 MG/DL (ref 1.8–2.4)
MAGNESIUM: 2.1 MG/DL (ref 1.8–2.4)
MAGNESIUM: 2.1 MG/DL (ref 1.8–2.4)
MAGNESIUM: 2.2 MG/DL (ref 1.8–2.4)
MAGNESIUM: 2.3 MG/DL (ref 1.8–2.4)
MAGNESIUM: 2.4 MG/DL (ref 1.8–2.4)
MAGNESIUM: 2.5 MG/DL (ref 1.8–2.4)
MCH RBC QN AUTO: 29.8 PG (ref 26–34)
MCH RBC QN AUTO: 30.1 PG (ref 26–34)
MCH RBC QN AUTO: 30.2 PG (ref 26–34)
MCH RBC QN AUTO: 30.3 PG (ref 26–34)
MCH RBC QN AUTO: 30.3 PG (ref 26–34)
MCH RBC QN AUTO: 30.4 PG (ref 26–34)
MCH RBC QN AUTO: 30.6 PG (ref 26–34)
MCH RBC QN AUTO: 30.7 PG (ref 26–34)
MCH RBC QN AUTO: 30.8 PG (ref 26–34)
MCH RBC QN AUTO: 30.9 PG (ref 26–34)
MCH RBC QN AUTO: 31 PG (ref 26–34)
MCH RBC QN AUTO: 31.1 PG (ref 26–34)
MCH RBC QN AUTO: 31.3 PG (ref 26–34)
MCH RBC QN AUTO: 31.4 PG (ref 26–34)
MCHC RBC AUTO-ENTMCNC: 32.3 G/DL (ref 31–36)
MCHC RBC AUTO-ENTMCNC: 32.4 G/DL (ref 31–36)
MCHC RBC AUTO-ENTMCNC: 32.5 G/DL (ref 31–36)
MCHC RBC AUTO-ENTMCNC: 32.6 G/DL (ref 31–36)
MCHC RBC AUTO-ENTMCNC: 32.6 G/DL (ref 31–36)
MCHC RBC AUTO-ENTMCNC: 32.9 G/DL (ref 31–36)
MCHC RBC AUTO-ENTMCNC: 32.9 G/DL (ref 31–36)
MCHC RBC AUTO-ENTMCNC: 33 G/DL (ref 31–36)
MCHC RBC AUTO-ENTMCNC: 33.1 G/DL (ref 31–36)
MCHC RBC AUTO-ENTMCNC: 33.1 G/DL (ref 31–36)
MCHC RBC AUTO-ENTMCNC: 33.2 G/DL (ref 31–36)
MCHC RBC AUTO-ENTMCNC: 33.3 G/DL (ref 31–36)
MCHC RBC AUTO-ENTMCNC: 33.3 G/DL (ref 31–36)
MCHC RBC AUTO-ENTMCNC: 33.4 G/DL (ref 31–36)
MCHC RBC AUTO-ENTMCNC: 33.5 G/DL (ref 31–36)
MCHC RBC AUTO-ENTMCNC: 33.5 G/DL (ref 31–36)
MCHC RBC AUTO-ENTMCNC: 33.6 G/DL (ref 31–36)
MCHC RBC AUTO-ENTMCNC: 33.7 G/DL (ref 31–36)
MCHC RBC AUTO-ENTMCNC: 33.9 G/DL (ref 31–36)
MCHC RBC AUTO-ENTMCNC: 34 G/DL (ref 31–36)
MCHC RBC AUTO-ENTMCNC: 34.1 G/DL (ref 31–36)
MCHC RBC AUTO-ENTMCNC: 34.2 G/DL (ref 31–36)
MCHC RBC AUTO-ENTMCNC: 34.2 G/DL (ref 31–36)
MCHC RBC AUTO-ENTMCNC: 34.4 G/DL (ref 31–36)
MCHC RBC AUTO-ENTMCNC: 34.5 G/DL (ref 31–36)
MCHC RBC AUTO-ENTMCNC: 34.6 G/DL (ref 31–36)
MCHC RBC AUTO-ENTMCNC: 34.6 G/DL (ref 31–36)
MCV RBC AUTO: 88.6 FL (ref 80–100)
MCV RBC AUTO: 89 FL (ref 80–100)
MCV RBC AUTO: 89.5 FL (ref 80–100)
MCV RBC AUTO: 89.6 FL (ref 80–100)
MCV RBC AUTO: 89.8 FL (ref 80–100)
MCV RBC AUTO: 90.4 FL (ref 80–100)
MCV RBC AUTO: 90.6 FL (ref 80–100)
MCV RBC AUTO: 90.8 FL (ref 80–100)
MCV RBC AUTO: 90.9 FL (ref 80–100)
MCV RBC AUTO: 91 FL (ref 80–100)
MCV RBC AUTO: 91.1 FL (ref 80–100)
MCV RBC AUTO: 91.3 FL (ref 80–100)
MCV RBC AUTO: 91.4 FL (ref 80–100)
MCV RBC AUTO: 91.6 FL (ref 80–100)
MCV RBC AUTO: 91.8 FL (ref 80–100)
MCV RBC AUTO: 91.9 FL (ref 80–100)
MCV RBC AUTO: 91.9 FL (ref 80–100)
MCV RBC AUTO: 92 FL (ref 80–100)
MCV RBC AUTO: 92.1 FL (ref 80–100)
MCV RBC AUTO: 92.3 FL (ref 80–100)
MCV RBC AUTO: 92.4 FL (ref 80–100)
MCV RBC AUTO: 92.6 FL (ref 80–100)
MCV RBC AUTO: 92.7 FL (ref 80–100)
MCV RBC AUTO: 92.8 FL (ref 80–100)
MCV RBC AUTO: 92.9 FL (ref 80–100)
MCV RBC AUTO: 93 FL (ref 80–100)
MCV RBC AUTO: 93 FL (ref 80–100)
MCV RBC AUTO: 93.1 FL (ref 80–100)
MCV RBC AUTO: 93.3 FL (ref 80–100)
MCV RBC AUTO: 93.5 FL (ref 80–100)
MCV RBC AUTO: 93.8 FL (ref 80–100)
MCV RBC AUTO: 93.9 FL (ref 80–100)
MCV RBC AUTO: 94 FL (ref 80–100)
MCV RBC AUTO: 94.1 FL (ref 80–100)
METAMYELOCYTES RELATIVE PERCENT: 1 %
METAMYELOCYTES RELATIVE PERCENT: 3 %
METAMYELOCYTES RELATIVE PERCENT: 4 %
METAMYELOCYTES RELATIVE PERCENT: 4 %
METHEMOGLOBIN ARTERIAL: 0 % (ref 0–1.4)
METHEMOGLOBIN VENOUS: 0.2 % (ref 0–1.5)
MICROCYTES: ABNORMAL
MICROCYTES: ABNORMAL
MICROSCOPIC EXAMINATION: YES
MICROSCOPIC EXAMINATION: YES
MONOCYTES ABSOLUTE: 0.5 K/UL (ref 0–1.3)
MONOCYTES ABSOLUTE: 0.6 K/UL (ref 0–1.3)
MONOCYTES ABSOLUTE: 0.8 K/UL (ref 0–1.3)
MONOCYTES ABSOLUTE: 0.9 K/UL (ref 0–1.3)
MONOCYTES ABSOLUTE: 0.9 K/UL (ref 0–1.3)
MONOCYTES ABSOLUTE: 1 K/UL (ref 0–1.3)
MONOCYTES ABSOLUTE: 1 K/UL (ref 0–1.3)
MONOCYTES ABSOLUTE: 1.1 K/UL (ref 0–1.3)
MONOCYTES ABSOLUTE: 1.2 K/UL (ref 0–1.3)
MONOCYTES ABSOLUTE: 1.3 K/UL (ref 0–1.3)
MONOCYTES ABSOLUTE: 1.4 K/UL (ref 0–1.3)
MONOCYTES ABSOLUTE: 1.4 K/UL (ref 0–1.3)
MONOCYTES ABSOLUTE: 1.5 K/UL (ref 0–1.3)
MONOCYTES ABSOLUTE: 1.5 K/UL (ref 0–1.3)
MONOCYTES ABSOLUTE: 1.7 K/UL (ref 0–1.3)
MONOCYTES ABSOLUTE: 1.7 K/UL (ref 0–1.3)
MONOCYTES ABSOLUTE: 1.8 K/UL (ref 0–1.3)
MONOCYTES ABSOLUTE: 1.9 K/UL (ref 0–1.3)
MONOCYTES ABSOLUTE: 2.3 K/UL (ref 0–1.3)
MONOCYTES ABSOLUTE: 2.3 K/UL (ref 0–1.3)
MONOCYTES ABSOLUTE: 2.4 K/UL (ref 0–1.3)
MONOCYTES ABSOLUTE: 2.5 K/UL (ref 0–1.3)
MONOCYTES ABSOLUTE: 2.5 K/UL (ref 0–1.3)
MONOCYTES ABSOLUTE: 2.6 K/UL (ref 0–1.3)
MONOCYTES ABSOLUTE: 2.7 K/UL (ref 0–1.3)
MONOCYTES ABSOLUTE: 3.2 K/UL (ref 0–1.3)
MONOCYTES ABSOLUTE: 3.2 K/UL (ref 0–1.3)
MONOCYTES ABSOLUTE: 3.5 K/UL (ref 0–1.3)
MONOCYTES ABSOLUTE: 4.3 K/UL (ref 0–1.3)
MONOCYTES ABSOLUTE: 4.6 K/UL (ref 0–1.3)
MONOCYTES ABSOLUTE: 5.6 K/UL (ref 0–1.3)
MONOCYTES ABSOLUTE: 5.7 K/UL (ref 0–1.3)
MONOCYTES RELATIVE PERCENT: 10 %
MONOCYTES RELATIVE PERCENT: 10 %
MONOCYTES RELATIVE PERCENT: 11 %
MONOCYTES RELATIVE PERCENT: 12 %
MONOCYTES RELATIVE PERCENT: 13 %
MONOCYTES RELATIVE PERCENT: 13 %
MONOCYTES RELATIVE PERCENT: 15 %
MONOCYTES RELATIVE PERCENT: 15.1 %
MONOCYTES RELATIVE PERCENT: 16 %
MONOCYTES RELATIVE PERCENT: 17 %
MONOCYTES RELATIVE PERCENT: 17.3 %
MONOCYTES RELATIVE PERCENT: 18.8 %
MONOCYTES RELATIVE PERCENT: 19.9 %
MONOCYTES RELATIVE PERCENT: 22 %
MONOCYTES RELATIVE PERCENT: 3 %
MONOCYTES RELATIVE PERCENT: 4 %
MONOCYTES RELATIVE PERCENT: 5 %
MONOCYTES RELATIVE PERCENT: 6 %
MONOCYTES RELATIVE PERCENT: 6.1 %
MONOCYTES RELATIVE PERCENT: 7 %
MONOCYTES RELATIVE PERCENT: 7 %
MONOCYTES RELATIVE PERCENT: 7.3 %
MONOCYTES RELATIVE PERCENT: 7.5 %
MONOCYTES RELATIVE PERCENT: 7.7 %
MONOCYTES RELATIVE PERCENT: 7.7 %
MONOCYTES RELATIVE PERCENT: 9 %
MONOCYTES RELATIVE PERCENT: 9 %
MONOCYTES RELATIVE PERCENT: 9.2 %
MYELOCYTE PERCENT: 1 %
MYELOCYTE PERCENT: 2 %
NEUTROPHILS ABSOLUTE: 10.6 K/UL (ref 1.7–7.7)
NEUTROPHILS ABSOLUTE: 11.5 K/UL (ref 1.7–7.7)
NEUTROPHILS ABSOLUTE: 12.3 K/UL (ref 1.7–7.7)
NEUTROPHILS ABSOLUTE: 12.6 K/UL (ref 1.7–7.7)
NEUTROPHILS ABSOLUTE: 12.6 K/UL (ref 1.7–7.7)
NEUTROPHILS ABSOLUTE: 12.7 K/UL (ref 1.7–7.7)
NEUTROPHILS ABSOLUTE: 12.7 K/UL (ref 1.7–7.7)
NEUTROPHILS ABSOLUTE: 12.8 K/UL (ref 1.7–7.7)
NEUTROPHILS ABSOLUTE: 12.8 K/UL (ref 1.7–7.7)
NEUTROPHILS ABSOLUTE: 13.2 K/UL (ref 1.7–7.7)
NEUTROPHILS ABSOLUTE: 14.5 K/UL (ref 1.7–7.7)
NEUTROPHILS ABSOLUTE: 17.1 K/UL (ref 1.7–7.7)
NEUTROPHILS ABSOLUTE: 17.2 K/UL (ref 1.7–7.7)
NEUTROPHILS ABSOLUTE: 17.2 K/UL (ref 1.7–7.7)
NEUTROPHILS ABSOLUTE: 17.7 K/UL (ref 1.7–7.7)
NEUTROPHILS ABSOLUTE: 20.5 K/UL (ref 1.7–7.7)
NEUTROPHILS ABSOLUTE: 23.6 K/UL (ref 1.7–7.7)
NEUTROPHILS ABSOLUTE: 26.1 K/UL (ref 1.7–7.7)
NEUTROPHILS ABSOLUTE: 27.1 K/UL (ref 1.7–7.7)
NEUTROPHILS ABSOLUTE: 27.3 K/UL (ref 1.7–7.7)
NEUTROPHILS ABSOLUTE: 27.9 K/UL (ref 1.7–7.7)
NEUTROPHILS ABSOLUTE: 29.4 K/UL (ref 1.7–7.7)
NEUTROPHILS ABSOLUTE: 29.8 K/UL (ref 1.7–7.7)
NEUTROPHILS ABSOLUTE: 29.8 K/UL (ref 1.7–7.7)
NEUTROPHILS ABSOLUTE: 3.2 K/UL (ref 1.7–7.7)
NEUTROPHILS ABSOLUTE: 3.5 K/UL (ref 1.7–7.7)
NEUTROPHILS ABSOLUTE: 3.9 K/UL (ref 1.7–7.7)
NEUTROPHILS ABSOLUTE: 32 K/UL (ref 1.7–7.7)
NEUTROPHILS ABSOLUTE: 34.1 K/UL (ref 1.7–7.7)
NEUTROPHILS ABSOLUTE: 35.2 K/UL (ref 1.7–7.7)
NEUTROPHILS ABSOLUTE: 5.5 K/UL (ref 1.7–7.7)
NEUTROPHILS ABSOLUTE: 6.2 K/UL (ref 1.7–7.7)
NEUTROPHILS ABSOLUTE: 7.2 K/UL (ref 1.7–7.7)
NEUTROPHILS ABSOLUTE: 7.3 K/UL (ref 1.7–7.7)
NEUTROPHILS ABSOLUTE: 7.7 K/UL (ref 1.7–7.7)
NEUTROPHILS ABSOLUTE: 8.6 K/UL (ref 1.7–7.7)
NEUTROPHILS ABSOLUTE: 9.6 K/UL (ref 1.7–7.7)
NEUTROPHILS RELATIVE PERCENT: 56.9 %
NEUTROPHILS RELATIVE PERCENT: 59 %
NEUTROPHILS RELATIVE PERCENT: 60 %
NEUTROPHILS RELATIVE PERCENT: 62.9 %
NEUTROPHILS RELATIVE PERCENT: 66.2 %
NEUTROPHILS RELATIVE PERCENT: 67 %
NEUTROPHILS RELATIVE PERCENT: 68 %
NEUTROPHILS RELATIVE PERCENT: 69 %
NEUTROPHILS RELATIVE PERCENT: 72 %
NEUTROPHILS RELATIVE PERCENT: 73 %
NEUTROPHILS RELATIVE PERCENT: 74 %
NEUTROPHILS RELATIVE PERCENT: 74.5 %
NEUTROPHILS RELATIVE PERCENT: 75 %
NEUTROPHILS RELATIVE PERCENT: 75.7 %
NEUTROPHILS RELATIVE PERCENT: 77 %
NEUTROPHILS RELATIVE PERCENT: 79 %
NEUTROPHILS RELATIVE PERCENT: 79.2 %
NEUTROPHILS RELATIVE PERCENT: 79.4 %
NEUTROPHILS RELATIVE PERCENT: 79.8 %
NEUTROPHILS RELATIVE PERCENT: 80 %
NEUTROPHILS RELATIVE PERCENT: 82 %
NEUTROPHILS RELATIVE PERCENT: 83 %
NEUTROPHILS RELATIVE PERCENT: 83 %
NEUTROPHILS RELATIVE PERCENT: 84 %
NEUTROPHILS RELATIVE PERCENT: 84 %
NEUTROPHILS RELATIVE PERCENT: 85.9 %
NEUTROPHILS RELATIVE PERCENT: 86 %
NEUTROPHILS RELATIVE PERCENT: 86 %
NEUTROPHILS RELATIVE PERCENT: 87 %
NEUTROPHILS RELATIVE PERCENT: 87.1 %
NEUTROPHILS RELATIVE PERCENT: 88 %
NITRITE, URINE: NEGATIVE
NITRITE, URINE: NEGATIVE
NUCLEATED RED BLOOD CELLS: 1 /100 WBC
O2 SAT, ARTERIAL: 92 % (ref 93–100)
O2 SAT, ARTERIAL: 94 % (ref 93–100)
O2 SAT, ARTERIAL: 94 % (ref 93–100)
O2 SAT, ARTERIAL: 96 % (ref 93–100)
O2 SAT, VEN: 44 %
ORGANISM: ABNORMAL
OSMOLALITY URINE: 341 MOSM/KG (ref 390–1070)
OSMOLALITY URINE: 423 MOSM/KG (ref 390–1070)
OSMOLALITY: 293 MOSM/KG (ref 278–305)
OVALOCYTES: ABNORMAL
PCO2 ARTERIAL: 24.2 MMHG (ref 35–45)
PCO2 ARTERIAL: 30.4 MM HG (ref 35–45)
PCO2 ARTERIAL: 31 MM HG (ref 35–45)
PCO2 ARTERIAL: 35.1 MM HG (ref 35–45)
PCO2, VEN: 42.9 MMHG (ref 41–51)
PDW BLD-RTO: 14.4 % (ref 12.4–15.4)
PDW BLD-RTO: 14.6 % (ref 12.4–15.4)
PDW BLD-RTO: 15.4 % (ref 12.4–15.4)
PDW BLD-RTO: 15.5 % (ref 12.4–15.4)
PDW BLD-RTO: 15.6 % (ref 12.4–15.4)
PDW BLD-RTO: 15.6 % (ref 12.4–15.4)
PDW BLD-RTO: 15.7 % (ref 12.4–15.4)
PDW BLD-RTO: 15.8 % (ref 12.4–15.4)
PDW BLD-RTO: 15.9 % (ref 12.4–15.4)
PDW BLD-RTO: 15.9 % (ref 12.4–15.4)
PDW BLD-RTO: 16 % (ref 12.4–15.4)
PDW BLD-RTO: 16 % (ref 12.4–15.4)
PDW BLD-RTO: 16.1 % (ref 12.4–15.4)
PDW BLD-RTO: 16.2 % (ref 12.4–15.4)
PDW BLD-RTO: 16.3 % (ref 12.4–15.4)
PDW BLD-RTO: 16.3 % (ref 12.4–15.4)
PDW BLD-RTO: 16.4 % (ref 12.4–15.4)
PDW BLD-RTO: 16.5 % (ref 12.4–15.4)
PDW BLD-RTO: 16.6 % (ref 12.4–15.4)
PDW BLD-RTO: 16.7 % (ref 12.4–15.4)
PDW BLD-RTO: 16.7 % (ref 12.4–15.4)
PDW BLD-RTO: 17 % (ref 12.4–15.4)
PERFORMED ON: ABNORMAL
PERFORMED ON: NORMAL
PH ARTERIAL: 7.39 (ref 7.35–7.45)
PH ARTERIAL: 7.41 (ref 7.35–7.45)
PH ARTERIAL: 7.43 (ref 7.35–7.45)
PH ARTERIAL: 7.46 (ref 7.35–7.45)
PH UA: 6 (ref 5–8)
PH UA: 6 (ref 5–8)
PH VENOUS: 7.35 (ref 7.35–7.45)
PH VENOUS: 7.4 (ref 7.35–7.45)
PH VENOUS: 7.41 (ref 7.35–7.45)
PH VENOUS: 7.43 (ref 7.35–7.45)
PH VENOUS: 7.44 (ref 7.35–7.45)
PH VENOUS: 7.45 (ref 7.35–7.45)
PH VENOUS: 7.45 (ref 7.35–7.45)
PH VENOUS: 7.5 (ref 7.35–7.45)
PHOSPHORUS: 1.9 MG/DL (ref 2.5–4.9)
PHOSPHORUS: 2.1 MG/DL (ref 2.5–4.9)
PHOSPHORUS: 2.3 MG/DL (ref 2.5–4.9)
PHOSPHORUS: 2.4 MG/DL (ref 2.5–4.9)
PHOSPHORUS: 2.7 MG/DL (ref 2.5–4.9)
PHOSPHORUS: 2.7 MG/DL (ref 2.5–4.9)
PHOSPHORUS: 2.8 MG/DL (ref 2.5–4.9)
PHOSPHORUS: 3 MG/DL (ref 2.5–4.9)
PHOSPHORUS: 3 MG/DL (ref 2.5–4.9)
PHOSPHORUS: 3.1 MG/DL (ref 2.5–4.9)
PHOSPHORUS: 3.2 MG/DL (ref 2.5–4.9)
PHOSPHORUS: 3.2 MG/DL (ref 2.5–4.9)
PHOSPHORUS: 3.3 MG/DL (ref 2.5–4.9)
PHOSPHORUS: 3.3 MG/DL (ref 2.5–4.9)
PHOSPHORUS: 3.4 MG/DL (ref 2.5–4.9)
PHOSPHORUS: 3.4 MG/DL (ref 2.5–4.9)
PHOSPHORUS: 3.5 MG/DL (ref 2.5–4.9)
PHOSPHORUS: 3.6 MG/DL (ref 2.5–4.9)
PHOSPHORUS: 3.7 MG/DL (ref 2.5–4.9)
PHOSPHORUS: 3.8 MG/DL (ref 2.5–4.9)
PHOSPHORUS: 3.8 MG/DL (ref 2.5–4.9)
PHOSPHORUS: 3.9 MG/DL (ref 2.5–4.9)
PHOSPHORUS: 3.9 MG/DL (ref 2.5–4.9)
PHOSPHORUS: 4 MG/DL (ref 2.5–4.9)
PHOSPHORUS: 4 MG/DL (ref 2.5–4.9)
PHOSPHORUS: 4.2 MG/DL (ref 2.5–4.9)
PHOSPHORUS: 4.2 MG/DL (ref 2.5–4.9)
PHOSPHORUS: 4.3 MG/DL (ref 2.5–4.9)
PHOSPHORUS: 4.4 MG/DL (ref 2.5–4.9)
PHOSPHORUS: 4.5 MG/DL (ref 2.5–4.9)
PHOSPHORUS: 4.6 MG/DL (ref 2.5–4.9)
PHOSPHORUS: 4.7 MG/DL (ref 2.5–4.9)
PHOSPHORUS: 4.8 MG/DL (ref 2.5–4.9)
PHOSPHORUS: 4.8 MG/DL (ref 2.5–4.9)
PHOSPHORUS: 5 MG/DL (ref 2.5–4.9)
PHOSPHORUS: 5.3 MG/DL (ref 2.5–4.9)
PHOSPHORUS: 5.3 MG/DL (ref 2.5–4.9)
PHOSPHORUS: 5.4 MG/DL (ref 2.5–4.9)
PLATELET # BLD: 101 K/UL (ref 135–450)
PLATELET # BLD: 104 K/UL (ref 135–450)
PLATELET # BLD: 129 K/UL (ref 135–450)
PLATELET # BLD: 139 K/UL (ref 135–450)
PLATELET # BLD: 145 K/UL (ref 135–450)
PLATELET # BLD: 147 K/UL (ref 135–450)
PLATELET # BLD: 150 K/UL (ref 135–450)
PLATELET # BLD: 154 K/UL (ref 135–450)
PLATELET # BLD: 157 K/UL (ref 135–450)
PLATELET # BLD: 158 K/UL (ref 135–450)
PLATELET # BLD: 159 K/UL (ref 135–450)
PLATELET # BLD: 160 K/UL (ref 135–450)
PLATELET # BLD: 164 K/UL (ref 135–450)
PLATELET # BLD: 164 K/UL (ref 135–450)
PLATELET # BLD: 169 K/UL (ref 135–450)
PLATELET # BLD: 173 K/UL (ref 135–450)
PLATELET # BLD: 174 K/UL (ref 135–450)
PLATELET # BLD: 175 K/UL (ref 135–450)
PLATELET # BLD: 179 K/UL (ref 135–450)
PLATELET # BLD: 182 K/UL (ref 135–450)
PLATELET # BLD: 199 K/UL (ref 135–450)
PLATELET # BLD: 204 K/UL (ref 135–450)
PLATELET # BLD: 205 K/UL (ref 135–450)
PLATELET # BLD: 214 K/UL (ref 135–450)
PLATELET # BLD: 215 K/UL (ref 135–450)
PLATELET # BLD: 216 K/UL (ref 135–450)
PLATELET # BLD: 216 K/UL (ref 135–450)
PLATELET # BLD: 219 K/UL (ref 135–450)
PLATELET # BLD: 227 K/UL (ref 135–450)
PLATELET # BLD: 237 K/UL (ref 135–450)
PLATELET # BLD: 327 K/UL (ref 135–450)
PLATELET # BLD: 330 K/UL (ref 135–450)
PLATELET # BLD: 347 K/UL (ref 135–450)
PLATELET # BLD: 69 K/UL (ref 135–450)
PLATELET # BLD: 76 K/UL (ref 135–450)
PLATELET # BLD: 77 K/UL (ref 135–450)
PLATELET # BLD: 77 K/UL (ref 135–450)
PLATELET # BLD: 82 K/UL (ref 135–450)
PLATELET SLIDE REVIEW: ABNORMAL
PLATELET SLIDE REVIEW: ADEQUATE
PMV BLD AUTO: 7.4 FL (ref 5–10.5)
PMV BLD AUTO: 7.4 FL (ref 5–10.5)
PMV BLD AUTO: 7.5 FL (ref 5–10.5)
PMV BLD AUTO: 7.5 FL (ref 5–10.5)
PMV BLD AUTO: 7.6 FL (ref 5–10.5)
PMV BLD AUTO: 7.7 FL (ref 5–10.5)
PMV BLD AUTO: 7.8 FL (ref 5–10.5)
PMV BLD AUTO: 7.9 FL (ref 5–10.5)
PMV BLD AUTO: 8 FL (ref 5–10.5)
PMV BLD AUTO: 8.1 FL (ref 5–10.5)
PMV BLD AUTO: 8.2 FL (ref 5–10.5)
PMV BLD AUTO: 8.3 FL (ref 5–10.5)
PMV BLD AUTO: 8.9 FL (ref 5–10.5)
PMV BLD AUTO: 8.9 FL (ref 5–10.5)
PMV BLD AUTO: 9.1 FL (ref 5–10.5)
PMV BLD AUTO: 9.4 FL (ref 5–10.5)
PMV BLD AUTO: 9.4 FL (ref 5–10.5)
PMV BLD AUTO: 9.5 FL (ref 5–10.5)
PMV BLD AUTO: 9.6 FL (ref 5–10.5)
PO2 ARTERIAL: 63.8 MM HG (ref 75–108)
PO2 ARTERIAL: 65.4 MMHG (ref 75–108)
PO2 ARTERIAL: 67.5 MM HG (ref 75–108)
PO2 ARTERIAL: 82.5 MM HG (ref 75–108)
PO2, VEN: ABNORMAL MMHG (ref 25–40)
POC POTASSIUM: 3.5 MMOL/L (ref 3.5–5.1)
POC POTASSIUM: 4.6 MMOL/L (ref 3.5–5.1)
POC POTASSIUM: 4.8 MMOL/L (ref 3.5–5.1)
POC SAMPLE TYPE: ABNORMAL
POC SODIUM: 132 MMOL/L (ref 136–145)
POC SODIUM: 137 MMOL/L (ref 136–145)
POC SODIUM: 138 MMOL/L (ref 136–145)
POLYCHROMASIA: ABNORMAL
POTASSIUM REFLEX MAGNESIUM: 3.5 MMOL/L (ref 3.5–5.1)
POTASSIUM REFLEX MAGNESIUM: 4 MMOL/L (ref 3.5–5.1)
POTASSIUM REFLEX MAGNESIUM: 4.2 MMOL/L (ref 3.5–5.1)
POTASSIUM REFLEX MAGNESIUM: 4.3 MMOL/L (ref 3.5–5.1)
POTASSIUM REFLEX MAGNESIUM: 4.4 MMOL/L (ref 3.5–5.1)
POTASSIUM REFLEX MAGNESIUM: 4.5 MMOL/L (ref 3.5–5.1)
POTASSIUM REFLEX MAGNESIUM: 4.6 MMOL/L (ref 3.5–5.1)
POTASSIUM REFLEX MAGNESIUM: 4.6 MMOL/L (ref 3.5–5.1)
POTASSIUM REFLEX MAGNESIUM: 4.7 MMOL/L (ref 3.5–5.1)
POTASSIUM SERPL-SCNC: 3.3 MMOL/L (ref 3.5–5.1)
POTASSIUM SERPL-SCNC: 3.5 MMOL/L (ref 3.5–5.1)
POTASSIUM SERPL-SCNC: 3.6 MMOL/L (ref 3.5–5.1)
POTASSIUM SERPL-SCNC: 3.7 MMOL/L (ref 3.5–5.1)
POTASSIUM SERPL-SCNC: 3.8 MMOL/L (ref 3.5–5.1)
POTASSIUM SERPL-SCNC: 3.9 MMOL/L (ref 3.5–5.1)
POTASSIUM SERPL-SCNC: 3.9 MMOL/L (ref 3.5–5.1)
POTASSIUM SERPL-SCNC: 4 MMOL/L (ref 3.5–5.1)
POTASSIUM SERPL-SCNC: 4.1 MMOL/L (ref 3.5–5.1)
POTASSIUM SERPL-SCNC: 4.2 MMOL/L (ref 3.5–5.1)
POTASSIUM SERPL-SCNC: 4.3 MMOL/L (ref 3.5–5.1)
POTASSIUM SERPL-SCNC: 4.3 MMOL/L (ref 3.5–5.1)
POTASSIUM SERPL-SCNC: 4.4 MMOL/L (ref 3.5–5.1)
POTASSIUM SERPL-SCNC: 4.5 MMOL/L (ref 3.5–5.1)
POTASSIUM SERPL-SCNC: 4.5 MMOL/L (ref 3.5–5.1)
POTASSIUM SERPL-SCNC: 4.6 MMOL/L (ref 3.5–5.1)
POTASSIUM SERPL-SCNC: 4.7 MMOL/L (ref 3.5–5.1)
POTASSIUM SERPL-SCNC: 4.8 MMOL/L (ref 3.5–5.1)
POTASSIUM SERPL-SCNC: 4.8 MMOL/L (ref 3.5–5.1)
POTASSIUM SERPL-SCNC: 4.9 MMOL/L (ref 3.5–5.1)
POTASSIUM SERPL-SCNC: 5 MMOL/L (ref 3.5–5.1)
POTASSIUM, UR: 45.4 MMOL/L
PREALBUMIN: 3.6 MG/DL (ref 20–40)
PREALBUMIN: 5.5 MG/DL (ref 20–40)
PRO-BNP: 313 PG/ML (ref 0–124)
PRO-BNP: 471 PG/ML (ref 0–124)
PRO-BNP: 480 PG/ML (ref 0–124)
PRO-BNP: 557 PG/ML (ref 0–124)
PROCALCITONIN: 0.78 NG/ML (ref 0–0.15)
PROMYELOCYTES PERCENT: 1 %
PROTEIN PROTEIN: 20.9 MG/DL
PROTEIN PROTEIN: 27 MG/DL
PROTEIN UA: ABNORMAL MG/DL
PROTEIN UA: NEGATIVE MG/DL
PROTEIN/CREAT RATIO: 0.2 MG/DL
PROTHROMBIN TIME: 16.3 SEC (ref 10–13.2)
PROTHROMBIN TIME: 21 SEC (ref 9.9–12.7)
PROTHROMBIN TIME: 21.8 SEC (ref 10–13.2)
PROTHROMBIN TIME: 21.9 SEC (ref 10–13.2)
PROTHROMBIN TIME: 24 SEC (ref 10–13.2)
PROTHROMBIN TIME: 24.1 SEC (ref 10–13.2)
PROTHROMBIN TIME: 24.7 SEC (ref 10–13.2)
PROTHROMBIN TIME: 28.6 SEC (ref 9.9–12.7)
PROTHROMBIN TIME: 29.4 SEC (ref 9.9–12.7)
PROTHROMBIN TIME: 30 SEC (ref 9.9–12.7)
PROTHROMBIN TIME: 34.8 SEC (ref 10–13.2)
PROTHROMBIN TIME: 39.6 SEC (ref 9.9–12.7)
PROTHROMBIN TIME: 40.4 SEC (ref 9.9–12.7)
PROTHROMBIN TIME: 42.6 SEC (ref 10–13.2)
RBC # BLD: 2.77 M/UL (ref 4–5.2)
RBC # BLD: 2.79 M/UL (ref 4–5.2)
RBC # BLD: 2.83 M/UL (ref 4–5.2)
RBC # BLD: 2.83 M/UL (ref 4–5.2)
RBC # BLD: 2.88 M/UL (ref 4–5.2)
RBC # BLD: 2.9 M/UL (ref 4–5.2)
RBC # BLD: 2.92 M/UL (ref 4–5.2)
RBC # BLD: 2.92 M/UL (ref 4–5.2)
RBC # BLD: 2.94 M/UL (ref 4–5.2)
RBC # BLD: 2.95 M/UL (ref 4–5.2)
RBC # BLD: 2.95 M/UL (ref 4–5.2)
RBC # BLD: 2.96 M/UL (ref 4–5.2)
RBC # BLD: 2.97 M/UL (ref 4–5.2)
RBC # BLD: 2.98 M/UL (ref 4–5.2)
RBC # BLD: 3.05 M/UL (ref 4–5.2)
RBC # BLD: 3.1 M/UL (ref 4–5.2)
RBC # BLD: 3.13 M/UL (ref 4–5.2)
RBC # BLD: 3.19 M/UL (ref 4–5.2)
RBC # BLD: 3.22 M/UL (ref 4–5.2)
RBC # BLD: 3.25 M/UL (ref 4–5.2)
RBC # BLD: 3.3 M/UL (ref 4–5.2)
RBC # BLD: 3.35 M/UL (ref 4–5.2)
RBC # BLD: 3.37 M/UL (ref 4–5.2)
RBC # BLD: 3.39 M/UL (ref 4–5.2)
RBC # BLD: 3.51 M/UL (ref 4–5.2)
RBC # BLD: 3.52 M/UL (ref 4–5.2)
RBC # BLD: 3.7 M/UL (ref 4–5.2)
RBC # BLD: 3.71 M/UL (ref 4–5.2)
RBC # BLD: 3.72 M/UL (ref 4–5.2)
RBC # BLD: 3.74 M/UL (ref 4–5.2)
RBC # BLD: 3.79 M/UL (ref 4–5.2)
RBC # BLD: 3.81 M/UL (ref 4–5.2)
RBC # BLD: 3.81 M/UL (ref 4–5.2)
RBC # BLD: 3.89 M/UL (ref 4–5.2)
RBC # BLD: 4.03 M/UL (ref 4–5.2)
RBC # BLD: 4.03 M/UL (ref 4–5.2)
RBC # BLD: 4.83 M/UL (ref 4–5.2)
RBC # BLD: 5.05 M/UL (ref 4–5.2)
RBC # BLD: NORMAL 10*6/UL
RBC UA: ABNORMAL /HPF (ref 0–4)
RBC UA: ABNORMAL /HPF (ref 0–4)
REPORT: NORMAL
RHEUMATOID FACTOR: 35 IU/ML
SARS-COV-2: NOT DETECTED
SCHISTOCYTES: ABNORMAL
SCHISTOCYTES: ABNORMAL
SEDIMENTATION RATE, ERYTHROCYTE: 70 MM/HR (ref 0–30)
SLIDE REVIEW: ABNORMAL
SMUDGE CELLS: PRESENT
SODIUM BLD-SCNC: 127 MMOL/L (ref 136–145)
SODIUM BLD-SCNC: 128 MMOL/L (ref 136–145)
SODIUM BLD-SCNC: 129 MMOL/L (ref 136–145)
SODIUM BLD-SCNC: 130 MMOL/L (ref 136–145)
SODIUM BLD-SCNC: 131 MMOL/L (ref 136–145)
SODIUM BLD-SCNC: 132 MMOL/L (ref 136–145)
SODIUM BLD-SCNC: 133 MMOL/L (ref 136–145)
SODIUM BLD-SCNC: 134 MMOL/L (ref 136–145)
SODIUM BLD-SCNC: 135 MMOL/L (ref 136–145)
SODIUM BLD-SCNC: 136 MMOL/L (ref 136–145)
SODIUM BLD-SCNC: 136 MMOL/L (ref 136–145)
SODIUM BLD-SCNC: 137 MMOL/L (ref 136–145)
SODIUM BLD-SCNC: 138 MMOL/L (ref 136–145)
SODIUM BLD-SCNC: 138 MMOL/L (ref 136–145)
SODIUM BLD-SCNC: 139 MMOL/L (ref 136–145)
SODIUM URINE: 53 MMOL/L
SODIUM URINE: <20 MMOL/L
SODIUM URINE: <20 MMOL/L
SPECIFIC GRAVITY UA: 1.01 (ref 1–1.03)
SPECIFIC GRAVITY UA: >=1.03 (ref 1–1.03)
STOMATOCYTES: ABNORMAL
TCO2 ARTERIAL: 16 MMOL/L
TCO2 ARTERIAL: 22 MMOL/L
TCO2 ARTERIAL: 23 MMOL/L
TCO2 ARTERIAL: 23 MMOL/L
TCO2 CALC VENOUS: 25 MMOL/L
TEAR DROP CELLS: ABNORMAL
TEAR DROP CELLS: ABNORMAL
THIS TEST SENT TO: NORMAL
THROMBIN TIME: 17.7 SECS (ref 12.1–17.5)
TOTAL PROTEIN: 4.5 G/DL (ref 6.4–8.2)
TOTAL PROTEIN: 4.6 G/DL (ref 6.4–8.2)
TOTAL PROTEIN: 4.6 G/DL (ref 6.4–8.2)
TOTAL PROTEIN: 4.7 G/DL (ref 6.4–8.2)
TOTAL PROTEIN: 4.8 G/DL (ref 6.4–8.2)
TOTAL PROTEIN: 4.9 G/DL (ref 6.4–8.2)
TOTAL PROTEIN: 4.9 G/DL (ref 6.4–8.2)
TOTAL PROTEIN: 5.1 G/DL (ref 6.4–8.2)
TOTAL PROTEIN: 5.1 G/DL (ref 6.4–8.2)
TOTAL PROTEIN: 5.8 G/DL (ref 6.4–8.2)
TOTAL PROTEIN: 6.1 G/DL (ref 6.4–8.2)
TOTAL PROTEIN: 6.1 G/DL (ref 6.4–8.2)
TOTAL PROTEIN: 6.5 G/DL (ref 6.4–8.2)
TOTAL PROTEIN: 6.8 G/DL (ref 6.4–8.2)
TOTAL PROTEIN: 7.3 G/DL (ref 6.4–8.2)
TOTAL PROTEIN: 8.1 G/DL (ref 6.4–8.2)
TOXIC GRANULATION: PRESENT
TRIGL SERPL-MCNC: 113 MG/DL (ref 0–150)
TROPONIN: 0.01 NG/ML
TROPONIN: 0.01 NG/ML
TROPONIN: <0.01 NG/ML
TSH REFLEX: 1.48 UIU/ML (ref 0.27–4.2)
TSH REFLEX: 3.44 UIU/ML (ref 0.27–4.2)
URINE CULTURE, ROUTINE: ABNORMAL
URINE REFLEX TO CULTURE: ABNORMAL
URINE REFLEX TO CULTURE: YES
URINE TYPE: ABNORMAL
URINE TYPE: ABNORMAL
UROBILINOGEN, URINE: 0.2 E.U./DL
UROBILINOGEN, URINE: 1 E.U./DL
VACUOLATED NEUTROPHILS: PRESENT
VACUOLATED NEUTROPHILS: PRESENT
VANCOMYCIN TROUGH: 8.9 UG/ML (ref 10–20)
VON WILLEBRAND AG: 587 % (ref 52–214)
WBC # BLD: 10 K/UL (ref 4–11)
WBC # BLD: 10.3 K/UL (ref 4–11)
WBC # BLD: 11.7 K/UL (ref 4–11)
WBC # BLD: 12 K/UL (ref 4–11)
WBC # BLD: 13.2 K/UL (ref 4–11)
WBC # BLD: 14.1 K/UL (ref 4–11)
WBC # BLD: 14.1 K/UL (ref 4–11)
WBC # BLD: 14.5 K/UL (ref 4–11)
WBC # BLD: 14.8 K/UL (ref 4–11)
WBC # BLD: 15.1 K/UL (ref 4–11)
WBC # BLD: 15.8 K/UL (ref 4–11)
WBC # BLD: 16 K/UL (ref 4–11)
WBC # BLD: 16.1 K/UL (ref 4–11)
WBC # BLD: 16.7 K/UL (ref 4–11)
WBC # BLD: 16.9 K/UL (ref 4–11)
WBC # BLD: 17.2 K/UL (ref 4–11)
WBC # BLD: 20.1 K/UL (ref 4–11)
WBC # BLD: 20.6 K/UL (ref 4–11)
WBC # BLD: 21.5 K/UL (ref 4–11)
WBC # BLD: 21.6 K/UL (ref 4–11)
WBC # BLD: 24.4 K/UL (ref 4–11)
WBC # BLD: 28.4 K/UL (ref 4–11)
WBC # BLD: 31.6 K/UL (ref 4–11)
WBC # BLD: 33 K/UL (ref 4–11)
WBC # BLD: 33.7 K/UL (ref 4–11)
WBC # BLD: 34.7 K/UL (ref 4–11)
WBC # BLD: 34.8 K/UL (ref 4–11)
WBC # BLD: 35 K/UL (ref 4–11)
WBC # BLD: 35.5 K/UL (ref 4–11)
WBC # BLD: 35.8 K/UL (ref 4–11)
WBC # BLD: 37.1 K/UL (ref 4–11)
WBC # BLD: 37.8 K/UL (ref 4–11)
WBC # BLD: 5 K/UL (ref 4–11)
WBC # BLD: 5.4 K/UL (ref 4–11)
WBC # BLD: 6.8 K/UL (ref 4–11)
WBC # BLD: 8 K/UL (ref 4–11)
WBC # BLD: 8.8 K/UL (ref 4–11)
WBC # BLD: 9 K/UL (ref 4–11)
WBC UA: ABNORMAL /HPF (ref 0–5)
WBC UA: ABNORMAL /HPF (ref 0–5)
WOUND/ABSCESS: ABNORMAL

## 2021-01-01 PROCEDURE — 97530 THERAPEUTIC ACTIVITIES: CPT

## 2021-01-01 PROCEDURE — 99232 SBSQ HOSP IP/OBS MODERATE 35: CPT | Performed by: INTERNAL MEDICINE

## 2021-01-01 PROCEDURE — 6360000002 HC RX W HCPCS: Performed by: INTERNAL MEDICINE

## 2021-01-01 PROCEDURE — 80069 RENAL FUNCTION PANEL: CPT

## 2021-01-01 PROCEDURE — 80048 BASIC METABOLIC PNL TOTAL CA: CPT

## 2021-01-01 PROCEDURE — 36415 COLL VENOUS BLD VENIPUNCTURE: CPT

## 2021-01-01 PROCEDURE — 82595 ASSAY OF CRYOGLOBULIN: CPT

## 2021-01-01 PROCEDURE — 2500000003 HC RX 250 WO HCPCS: Performed by: SURGERY

## 2021-01-01 PROCEDURE — 6360000002 HC RX W HCPCS: Performed by: STUDENT IN AN ORGANIZED HEALTH CARE EDUCATION/TRAINING PROGRAM

## 2021-01-01 PROCEDURE — 2580000003 HC RX 258: Performed by: INTERNAL MEDICINE

## 2021-01-01 PROCEDURE — 6370000000 HC RX 637 (ALT 250 FOR IP): Performed by: INTERNAL MEDICINE

## 2021-01-01 PROCEDURE — 87522 HEPATITIS C REVRS TRNSCRPJ: CPT

## 2021-01-01 PROCEDURE — 2720000010 HC SURG SUPPLY STERILE: Performed by: SURGERY

## 2021-01-01 PROCEDURE — 99222 1ST HOSP IP/OBS MODERATE 55: CPT | Performed by: INTERNAL MEDICINE

## 2021-01-01 PROCEDURE — 85025 COMPLETE CBC W/AUTO DIFF WBC: CPT

## 2021-01-01 PROCEDURE — 36592 COLLECT BLOOD FROM PICC: CPT

## 2021-01-01 PROCEDURE — 90945 DIALYSIS ONE EVALUATION: CPT

## 2021-01-01 PROCEDURE — 1200000000 HC SEMI PRIVATE

## 2021-01-01 PROCEDURE — 87186 SC STD MICRODIL/AGAR DIL: CPT

## 2021-01-01 PROCEDURE — 02HV33Z INSERTION OF INFUSION DEVICE INTO SUPERIOR VENA CAVA, PERCUTANEOUS APPROACH: ICD-10-PCS | Performed by: STUDENT IN AN ORGANIZED HEALTH CARE EDUCATION/TRAINING PROGRAM

## 2021-01-01 PROCEDURE — 6370000000 HC RX 637 (ALT 250 FOR IP): Performed by: STUDENT IN AN ORGANIZED HEALTH CARE EDUCATION/TRAINING PROGRAM

## 2021-01-01 PROCEDURE — 2580000003 HC RX 258: Performed by: STUDENT IN AN ORGANIZED HEALTH CARE EDUCATION/TRAINING PROGRAM

## 2021-01-01 PROCEDURE — 82570 ASSAY OF URINE CREATININE: CPT

## 2021-01-01 PROCEDURE — 6360000004 HC RX CONTRAST MEDICATION: Performed by: STUDENT IN AN ORGANIZED HEALTH CARE EDUCATION/TRAINING PROGRAM

## 2021-01-01 PROCEDURE — 2500000003 HC RX 250 WO HCPCS: Performed by: STUDENT IN AN ORGANIZED HEALTH CARE EDUCATION/TRAINING PROGRAM

## 2021-01-01 PROCEDURE — 82803 BLOOD GASES ANY COMBINATION: CPT

## 2021-01-01 PROCEDURE — C9113 INJ PANTOPRAZOLE SODIUM, VIA: HCPCS | Performed by: SURGERY

## 2021-01-01 PROCEDURE — 83880 ASSAY OF NATRIURETIC PEPTIDE: CPT

## 2021-01-01 PROCEDURE — 86140 C-REACTIVE PROTEIN: CPT

## 2021-01-01 PROCEDURE — 80076 HEPATIC FUNCTION PANEL: CPT

## 2021-01-01 PROCEDURE — 94669 MECHANICAL CHEST WALL OSCILL: CPT

## 2021-01-01 PROCEDURE — 94640 AIRWAY INHALATION TREATMENT: CPT

## 2021-01-01 PROCEDURE — 87184 SC STD DISK METHOD PER PLATE: CPT

## 2021-01-01 PROCEDURE — C9113 INJ PANTOPRAZOLE SODIUM, VIA: HCPCS | Performed by: STUDENT IN AN ORGANIZED HEALTH CARE EDUCATION/TRAINING PROGRAM

## 2021-01-01 PROCEDURE — 0D1N0Z4 BYPASS SIGMOID COLON TO CUTANEOUS, OPEN APPROACH: ICD-10-PCS | Performed by: SURGERY

## 2021-01-01 PROCEDURE — 2580000003 HC RX 258: Performed by: EMERGENCY MEDICINE

## 2021-01-01 PROCEDURE — 2709999900 HC NON-CHARGEABLE SUPPLY: Performed by: SURGERY

## 2021-01-01 PROCEDURE — 83735 ASSAY OF MAGNESIUM: CPT

## 2021-01-01 PROCEDURE — 85730 THROMBOPLASTIN TIME PARTIAL: CPT

## 2021-01-01 PROCEDURE — 99233 SBSQ HOSP IP/OBS HIGH 50: CPT | Performed by: INTERNAL MEDICINE

## 2021-01-01 PROCEDURE — 2580000003 HC RX 258: Performed by: NURSE PRACTITIONER

## 2021-01-01 PROCEDURE — 85240 CLOT FACTOR VIII AHG 1 STAGE: CPT

## 2021-01-01 PROCEDURE — 6360000002 HC RX W HCPCS: Performed by: SURGERY

## 2021-01-01 PROCEDURE — 84156 ASSAY OF PROTEIN URINE: CPT

## 2021-01-01 PROCEDURE — 2700000000 HC OXYGEN THERAPY PER DAY

## 2021-01-01 PROCEDURE — 71045 X-RAY EXAM CHEST 1 VIEW: CPT

## 2021-01-01 PROCEDURE — 99223 1ST HOSP IP/OBS HIGH 75: CPT | Performed by: INTERNAL MEDICINE

## 2021-01-01 PROCEDURE — 87449 NOS EACH ORGANISM AG IA: CPT

## 2021-01-01 PROCEDURE — 85610 PROTHROMBIN TIME: CPT

## 2021-01-01 PROCEDURE — 87077 CULTURE AEROBIC IDENTIFY: CPT

## 2021-01-01 PROCEDURE — 94761 N-INVAS EAR/PLS OXIMETRY MLT: CPT

## 2021-01-01 PROCEDURE — 73630 X-RAY EXAM OF FOOT: CPT

## 2021-01-01 PROCEDURE — 6370000000 HC RX 637 (ALT 250 FOR IP): Performed by: SURGERY

## 2021-01-01 PROCEDURE — 73700 CT LOWER EXTREMITY W/O DYE: CPT

## 2021-01-01 PROCEDURE — 83605 ASSAY OF LACTIC ACID: CPT

## 2021-01-01 PROCEDURE — 2500000003 HC RX 250 WO HCPCS: Performed by: INTERNAL MEDICINE

## 2021-01-01 PROCEDURE — 71260 CT THORAX DX C+: CPT

## 2021-01-01 PROCEDURE — U0003 INFECTIOUS AGENT DETECTION BY NUCLEIC ACID (DNA OR RNA); SEVERE ACUTE RESPIRATORY SYNDROME CORONAVIRUS 2 (SARS-COV-2) (CORONAVIRUS DISEASE [COVID-19]), AMPLIFIED PROBE TECHNIQUE, MAKING USE OF HIGH THROUGHPUT TECHNOLOGIES AS DESCRIBED BY CMS-2020-01-R: HCPCS

## 2021-01-01 PROCEDURE — 6360000002 HC RX W HCPCS

## 2021-01-01 PROCEDURE — 80202 ASSAY OF VANCOMYCIN: CPT

## 2021-01-01 PROCEDURE — 6360000004 HC RX CONTRAST MEDICATION: Performed by: PHYSICIAN ASSISTANT

## 2021-01-01 PROCEDURE — 85379 FIBRIN DEGRADATION QUANT: CPT

## 2021-01-01 PROCEDURE — 93010 ELECTROCARDIOGRAM REPORT: CPT | Performed by: INTERNAL MEDICINE

## 2021-01-01 PROCEDURE — 84478 ASSAY OF TRIGLYCERIDES: CPT

## 2021-01-01 PROCEDURE — 86900 BLOOD TYPING SEROLOGIC ABO: CPT

## 2021-01-01 PROCEDURE — 99024 POSTOP FOLLOW-UP VISIT: CPT | Performed by: SURGERY

## 2021-01-01 PROCEDURE — 2000000000 HC ICU R&B

## 2021-01-01 PROCEDURE — 85260 CLOT FACTOR X STUART-POWER: CPT

## 2021-01-01 PROCEDURE — 87324 CLOSTRIDIUM AG IA: CPT

## 2021-01-01 PROCEDURE — 2580000003 HC RX 258: Performed by: SURGERY

## 2021-01-01 PROCEDURE — 37799 UNLISTED PX VASCULAR SURGERY: CPT

## 2021-01-01 PROCEDURE — 97166 OT EVAL MOD COMPLEX 45 MIN: CPT

## 2021-01-01 PROCEDURE — 84484 ASSAY OF TROPONIN QUANT: CPT

## 2021-01-01 PROCEDURE — 84132 ASSAY OF SERUM POTASSIUM: CPT

## 2021-01-01 PROCEDURE — 36556 INSERT NON-TUNNEL CV CATH: CPT

## 2021-01-01 PROCEDURE — 2500000003 HC RX 250 WO HCPCS

## 2021-01-01 PROCEDURE — 87040 BLOOD CULTURE FOR BACTERIA: CPT

## 2021-01-01 PROCEDURE — 2500000003 HC RX 250 WO HCPCS: Performed by: ANESTHESIOLOGY

## 2021-01-01 PROCEDURE — 3700000000 HC ANESTHESIA ATTENDED CARE: Performed by: SURGERY

## 2021-01-01 PROCEDURE — 99291 CRITICAL CARE FIRST HOUR: CPT | Performed by: INTERNAL MEDICINE

## 2021-01-01 PROCEDURE — 6360000004 HC RX CONTRAST MEDICATION: Performed by: EMERGENCY MEDICINE

## 2021-01-01 PROCEDURE — C8929 TTE W OR WO FOL WCON,DOPPLER: HCPCS

## 2021-01-01 PROCEDURE — 36569 INSJ PICC 5 YR+ W/O IMAGING: CPT

## 2021-01-01 PROCEDURE — 86022 PLATELET ANTIBODIES: CPT

## 2021-01-01 PROCEDURE — 84300 ASSAY OF URINE SODIUM: CPT

## 2021-01-01 PROCEDURE — 85210 CLOT FACTOR II PROTHROM SPEC: CPT

## 2021-01-01 PROCEDURE — 85230 CLOT FACTOR VII PROCONVERTIN: CPT

## 2021-01-01 PROCEDURE — P9047 ALBUMIN (HUMAN), 25%, 50ML: HCPCS | Performed by: STUDENT IN AN ORGANIZED HEALTH CARE EDUCATION/TRAINING PROGRAM

## 2021-01-01 PROCEDURE — 86850 RBC ANTIBODY SCREEN: CPT

## 2021-01-01 PROCEDURE — 94150 VITAL CAPACITY TEST: CPT

## 2021-01-01 PROCEDURE — 97162 PT EVAL MOD COMPLEX 30 MIN: CPT

## 2021-01-01 PROCEDURE — 99284 EMERGENCY DEPT VISIT MOD MDM: CPT

## 2021-01-01 PROCEDURE — 82330 ASSAY OF CALCIUM: CPT

## 2021-01-01 PROCEDURE — 87205 SMEAR GRAM STAIN: CPT

## 2021-01-01 PROCEDURE — 3700000001 HC ADD 15 MINUTES (ANESTHESIA): Performed by: SURGERY

## 2021-01-01 PROCEDURE — 36600 WITHDRAWAL OF ARTERIAL BLOOD: CPT

## 2021-01-01 PROCEDURE — 73590 X-RAY EXAM OF LOWER LEG: CPT

## 2021-01-01 PROCEDURE — 84443 ASSAY THYROID STIM HORMONE: CPT

## 2021-01-01 PROCEDURE — 36620 INSERTION CATHETER ARTERY: CPT

## 2021-01-01 PROCEDURE — C1751 CATH, INF, PER/CENT/MIDLINE: HCPCS

## 2021-01-01 PROCEDURE — 74176 CT ABD & PELVIS W/O CONTRAST: CPT

## 2021-01-01 PROCEDURE — 86923 COMPATIBILITY TEST ELECTRIC: CPT

## 2021-01-01 PROCEDURE — 0DTN0ZZ RESECTION OF SIGMOID COLON, OPEN APPROACH: ICD-10-PCS | Performed by: SURGERY

## 2021-01-01 PROCEDURE — 96376 TX/PRO/DX INJ SAME DRUG ADON: CPT

## 2021-01-01 PROCEDURE — 80053 COMPREHEN METABOLIC PANEL: CPT

## 2021-01-01 PROCEDURE — 7100000000 HC PACU RECOVERY - FIRST 15 MIN: Performed by: SURGERY

## 2021-01-01 PROCEDURE — 6360000002 HC RX W HCPCS: Performed by: ANESTHESIOLOGY

## 2021-01-01 PROCEDURE — 93005 ELECTROCARDIOGRAM TRACING: CPT | Performed by: PHYSICIAN ASSISTANT

## 2021-01-01 PROCEDURE — 82140 ASSAY OF AMMONIA: CPT

## 2021-01-01 PROCEDURE — 87390 HIV-1 AG IA: CPT

## 2021-01-01 PROCEDURE — 5A1D70Z PERFORMANCE OF URINARY FILTRATION, INTERMITTENT, LESS THAN 6 HOURS PER DAY: ICD-10-PCS | Performed by: STUDENT IN AN ORGANIZED HEALTH CARE EDUCATION/TRAINING PROGRAM

## 2021-01-01 PROCEDURE — 99223 1ST HOSP IP/OBS HIGH 75: CPT | Performed by: SURGERY

## 2021-01-01 PROCEDURE — 88307 TISSUE EXAM BY PATHOLOGIST: CPT

## 2021-01-01 PROCEDURE — 0DJD0ZZ INSPECTION OF LOWER INTESTINAL TRACT, OPEN APPROACH: ICD-10-PCS | Performed by: SURGERY

## 2021-01-01 PROCEDURE — 84295 ASSAY OF SERUM SODIUM: CPT

## 2021-01-01 PROCEDURE — 74177 CT ABD & PELVIS W/CONTRAST: CPT

## 2021-01-01 PROCEDURE — 83036 HEMOGLOBIN GLYCOSYLATED A1C: CPT

## 2021-01-01 PROCEDURE — 83935 ASSAY OF URINE OSMOLALITY: CPT

## 2021-01-01 PROCEDURE — 82947 ASSAY GLUCOSE BLOOD QUANT: CPT

## 2021-01-01 PROCEDURE — 90947 DIALYSIS REPEATED EVAL: CPT | Performed by: INTERNAL MEDICINE

## 2021-01-01 PROCEDURE — 83690 ASSAY OF LIPASE: CPT

## 2021-01-01 PROCEDURE — 85652 RBC SED RATE AUTOMATED: CPT

## 2021-01-01 PROCEDURE — 3600000014 HC SURGERY LEVEL 4 ADDTL 15MIN: Performed by: SURGERY

## 2021-01-01 PROCEDURE — 85246 CLOT FACTOR VIII VW ANTIGEN: CPT

## 2021-01-01 PROCEDURE — 6360000002 HC RX W HCPCS: Performed by: EMERGENCY MEDICINE

## 2021-01-01 PROCEDURE — 96375 TX/PRO/DX INJ NEW DRUG ADDON: CPT

## 2021-01-01 PROCEDURE — 2580000003 HC RX 258: Performed by: ANESTHESIOLOGY

## 2021-01-01 PROCEDURE — 87086 URINE CULTURE/COLONY COUNT: CPT

## 2021-01-01 PROCEDURE — 85670 THROMBIN TIME PLASMA: CPT

## 2021-01-01 PROCEDURE — 84145 PROCALCITONIN (PCT): CPT

## 2021-01-01 PROCEDURE — 85027 COMPLETE CBC AUTOMATED: CPT

## 2021-01-01 PROCEDURE — 74018 RADEX ABDOMEN 1 VIEW: CPT

## 2021-01-01 PROCEDURE — 84134 ASSAY OF PREALBUMIN: CPT

## 2021-01-01 PROCEDURE — 3600000004 HC SURGERY LEVEL 4 BASE: Performed by: SURGERY

## 2021-01-01 PROCEDURE — 81001 URINALYSIS AUTO W/SCOPE: CPT

## 2021-01-01 PROCEDURE — P9041 ALBUMIN (HUMAN),5%, 50ML: HCPCS | Performed by: SURGERY

## 2021-01-01 PROCEDURE — 86901 BLOOD TYPING SEROLOGIC RH(D): CPT

## 2021-01-01 PROCEDURE — 6360000002 HC RX W HCPCS: Performed by: PHYSICIAN ASSISTANT

## 2021-01-01 PROCEDURE — 87493 C DIFF AMPLIFIED PROBE: CPT

## 2021-01-01 PROCEDURE — 96361 HYDRATE IV INFUSION ADD-ON: CPT

## 2021-01-01 PROCEDURE — 93005 ELECTROCARDIOGRAM TRACING: CPT | Performed by: STUDENT IN AN ORGANIZED HEALTH CARE EDUCATION/TRAINING PROGRAM

## 2021-01-01 PROCEDURE — 36430 TRANSFUSION BLD/BLD COMPNT: CPT

## 2021-01-01 PROCEDURE — 87150 DNA/RNA AMPLIFIED PROBE: CPT

## 2021-01-01 PROCEDURE — 82436 ASSAY OF URINE CHLORIDE: CPT

## 2021-01-01 PROCEDURE — 87181 SC STD AGAR DILUTION PER AGT: CPT

## 2021-01-01 PROCEDURE — 83930 ASSAY OF BLOOD OSMOLALITY: CPT

## 2021-01-01 PROCEDURE — 82533 TOTAL CORTISOL: CPT

## 2021-01-01 PROCEDURE — 85384 FIBRINOGEN ACTIVITY: CPT

## 2021-01-01 PROCEDURE — 87070 CULTURE OTHR SPECIMN AEROBIC: CPT

## 2021-01-01 PROCEDURE — 93005 ELECTROCARDIOGRAM TRACING: CPT | Performed by: SURGERY

## 2021-01-01 PROCEDURE — 86701 HIV-1ANTIBODY: CPT

## 2021-01-01 PROCEDURE — 2500000003 HC RX 250 WO HCPCS: Performed by: NURSE PRACTITIONER

## 2021-01-01 PROCEDURE — 96365 THER/PROPH/DIAG IV INF INIT: CPT

## 2021-01-01 PROCEDURE — P9047 ALBUMIN (HUMAN), 25%, 50ML: HCPCS | Performed by: INTERNAL MEDICINE

## 2021-01-01 PROCEDURE — U0005 INFEC AGEN DETEC AMPLI PROBE: HCPCS

## 2021-01-01 PROCEDURE — 99222 1ST HOSP IP/OBS MODERATE 55: CPT | Performed by: NURSE PRACTITIONER

## 2021-01-01 PROCEDURE — 99282 EMERGENCY DEPT VISIT SF MDM: CPT

## 2021-01-01 PROCEDURE — 84100 ASSAY OF PHOSPHORUS: CPT

## 2021-01-01 PROCEDURE — 2580000003 HC RX 258: Performed by: PHYSICIAN ASSISTANT

## 2021-01-01 PROCEDURE — P9017 PLASMA 1 DONOR FRZ W/IN 8 HR: HCPCS

## 2021-01-01 PROCEDURE — 96367 TX/PROPH/DG ADDL SEQ IV INF: CPT

## 2021-01-01 PROCEDURE — 96372 THER/PROPH/DIAG INJ SC/IM: CPT

## 2021-01-01 PROCEDURE — 86431 RHEUMATOID FACTOR QUANT: CPT

## 2021-01-01 PROCEDURE — 84133 ASSAY OF URINE POTASSIUM: CPT

## 2021-01-01 PROCEDURE — 7100000001 HC PACU RECOVERY - ADDTL 15 MIN: Performed by: SURGERY

## 2021-01-01 PROCEDURE — 85220 BLOOC CLOT FACTOR V TEST: CPT

## 2021-01-01 PROCEDURE — 44143 PARTIAL REMOVAL OF COLON: CPT | Performed by: SURGERY

## 2021-01-01 PROCEDURE — 86702 HIV-2 ANTIBODY: CPT

## 2021-01-01 PROCEDURE — 82040 ASSAY OF SERUM ALBUMIN: CPT

## 2021-01-01 PROCEDURE — 6370000000 HC RX 637 (ALT 250 FOR IP): Performed by: EMERGENCY MEDICINE

## 2021-01-01 RX ORDER — ALBUMIN (HUMAN) 12.5 G/50ML
25 SOLUTION INTRAVENOUS ONCE
Status: COMPLETED | OUTPATIENT
Start: 2021-01-01 | End: 2021-01-01

## 2021-01-01 RX ORDER — HEPARIN SODIUM 1000 [USP'U]/ML
1000 INJECTION, SOLUTION INTRAVENOUS; SUBCUTANEOUS ONCE
Status: DISCONTINUED | OUTPATIENT
Start: 2021-01-01 | End: 2021-07-06 | Stop reason: HOSPADM

## 2021-01-01 RX ORDER — LIDOCAINE HCL/PF 100 MG/5ML
SYRINGE (ML) INJECTION PRN
Status: DISCONTINUED | OUTPATIENT
Start: 2021-01-01 | End: 2021-01-01 | Stop reason: SDUPTHER

## 2021-01-01 RX ORDER — LACTULOSE 10 G/15ML
30 SOLUTION ORAL DAILY
Status: DISCONTINUED | OUTPATIENT
Start: 2021-01-01 | End: 2021-01-01 | Stop reason: HOSPADM

## 2021-01-01 RX ORDER — SODIUM CHLORIDE, SODIUM GLUCONATE, SODIUM ACETATE, POTASSIUM CHLORIDE AND MAGNESIUM CHLORIDE 526; 502; 368; 37; 30 MG/100ML; MG/100ML; MG/100ML; MG/100ML; MG/100ML
INJECTION, SOLUTION INTRAVENOUS ONCE
Status: COMPLETED | OUTPATIENT
Start: 2021-01-01 | End: 2021-01-01

## 2021-01-01 RX ORDER — SODIUM CHLORIDE 9 MG/ML
INJECTION, SOLUTION INTRAVENOUS CONTINUOUS
Status: DISCONTINUED | OUTPATIENT
Start: 2021-01-01 | End: 2021-01-01

## 2021-01-01 RX ORDER — SODIUM CHLORIDE 9 MG/ML
INJECTION, SOLUTION INTRAVENOUS CONTINUOUS PRN
Status: DISCONTINUED | OUTPATIENT
Start: 2021-01-01 | End: 2021-01-01 | Stop reason: SDUPTHER

## 2021-01-01 RX ORDER — 0.9 % SODIUM CHLORIDE 0.9 %
500 INTRAVENOUS SOLUTION INTRAVENOUS ONCE
Status: COMPLETED | OUTPATIENT
Start: 2021-01-01 | End: 2021-01-01

## 2021-01-01 RX ORDER — FAMOTIDINE 20 MG/1
40 TABLET, FILM COATED ORAL DAILY
Status: DISCONTINUED | OUTPATIENT
Start: 2021-01-01 | End: 2021-01-01 | Stop reason: HOSPADM

## 2021-01-01 RX ORDER — ONDANSETRON 2 MG/ML
4 INJECTION INTRAMUSCULAR; INTRAVENOUS ONCE
Status: COMPLETED | OUTPATIENT
Start: 2021-01-01 | End: 2021-01-01

## 2021-01-01 RX ORDER — POLYETHYLENE GLYCOL 3350 17 G/17G
17 POWDER, FOR SOLUTION ORAL
Status: ON HOLD | COMMUNITY
End: 2021-01-01 | Stop reason: HOSPADM

## 2021-01-01 RX ORDER — TRAMADOL HYDROCHLORIDE 50 MG/1
50 TABLET ORAL 4 TIMES DAILY
Qty: 12 TABLET | Refills: 0 | Status: SHIPPED | OUTPATIENT
Start: 2021-01-01 | End: 2021-01-01

## 2021-01-01 RX ORDER — LACTULOSE 10 G/15ML
30 SOLUTION ORAL DAILY
Status: ON HOLD | COMMUNITY
End: 2021-01-01 | Stop reason: HOSPADM

## 2021-01-01 RX ORDER — NOREPINEPHRINE BITARTRATE 1 MG/ML
INJECTION, SOLUTION INTRAVENOUS
Status: COMPLETED
Start: 2021-01-01 | End: 2021-01-01

## 2021-01-01 RX ORDER — POTASSIUM CHLORIDE 7.45 MG/ML
10 INJECTION INTRAVENOUS
Status: COMPLETED | OUTPATIENT
Start: 2021-01-01 | End: 2021-01-01

## 2021-01-01 RX ORDER — SODIUM CHLORIDE 9 MG/ML
INJECTION, SOLUTION INTRAVENOUS PRN
Status: DISCONTINUED | OUTPATIENT
Start: 2021-01-01 | End: 2021-01-01

## 2021-01-01 RX ORDER — LANOLIN ALCOHOL/MO/W.PET/CERES
1000 CREAM (GRAM) TOPICAL DAILY
Status: DISCONTINUED | OUTPATIENT
Start: 2021-01-01 | End: 2021-01-01 | Stop reason: HOSPADM

## 2021-01-01 RX ORDER — MORPHINE SULFATE 4 MG/ML
4 INJECTION, SOLUTION INTRAMUSCULAR; INTRAVENOUS
Status: COMPLETED | OUTPATIENT
Start: 2021-01-01 | End: 2021-01-01

## 2021-01-01 RX ORDER — METOPROLOL TARTRATE 5 MG/5ML
5 INJECTION INTRAVENOUS ONCE
Status: COMPLETED | OUTPATIENT
Start: 2021-01-01 | End: 2021-01-01

## 2021-01-01 RX ORDER — HEPARIN SODIUM 5000 [USP'U]/ML
5000 INJECTION, SOLUTION INTRAVENOUS; SUBCUTANEOUS EVERY 8 HOURS SCHEDULED
Status: DISCONTINUED | OUTPATIENT
Start: 2021-01-01 | End: 2021-01-01 | Stop reason: HOSPADM

## 2021-01-01 RX ORDER — SODIUM CHLORIDE 0.9 % (FLUSH) 0.9 %
5-40 SYRINGE (ML) INJECTION PRN
Status: DISCONTINUED | OUTPATIENT
Start: 2021-01-01 | End: 2021-07-06 | Stop reason: HOSPADM

## 2021-01-01 RX ORDER — NICOTINE POLACRILEX 4 MG
15 LOZENGE BUCCAL PRN
Status: DISCONTINUED | OUTPATIENT
Start: 2021-01-01 | End: 2021-01-01

## 2021-01-01 RX ORDER — OXYCODONE HYDROCHLORIDE AND ACETAMINOPHEN 5; 325 MG/1; MG/1
1 TABLET ORAL EVERY 6 HOURS PRN
Status: DISCONTINUED | OUTPATIENT
Start: 2021-01-01 | End: 2021-01-01 | Stop reason: HOSPADM

## 2021-01-01 RX ORDER — TRAMADOL HYDROCHLORIDE 50 MG/1
50 TABLET ORAL 4 TIMES DAILY
Status: ON HOLD | COMMUNITY
End: 2021-01-01 | Stop reason: HOSPADM

## 2021-01-01 RX ORDER — HEPARIN SODIUM 5000 [USP'U]/ML
5000 INJECTION, SOLUTION INTRAVENOUS; SUBCUTANEOUS EVERY 8 HOURS SCHEDULED
Status: DISCONTINUED | OUTPATIENT
Start: 2021-01-01 | End: 2021-01-01

## 2021-01-01 RX ORDER — MORPHINE SULFATE 4 MG/ML
4 INJECTION, SOLUTION INTRAMUSCULAR; INTRAVENOUS ONCE
Status: COMPLETED | OUTPATIENT
Start: 2021-01-01 | End: 2021-01-01

## 2021-01-01 RX ORDER — SENNA AND DOCUSATE SODIUM 50; 8.6 MG/1; MG/1
1 TABLET, FILM COATED ORAL DAILY
Status: DISCONTINUED | OUTPATIENT
Start: 2021-01-01 | End: 2021-01-01 | Stop reason: HOSPADM

## 2021-01-01 RX ORDER — TRAMADOL HYDROCHLORIDE 50 MG/1
50 TABLET ORAL 4 TIMES DAILY
Status: ON HOLD | COMMUNITY
End: 2021-01-01 | Stop reason: SDUPTHER

## 2021-01-01 RX ORDER — CIPROFLOXACIN 500 MG/1
500 TABLET, FILM COATED ORAL 2 TIMES DAILY
Qty: 20 TABLET | Refills: 0 | Status: SHIPPED | OUTPATIENT
Start: 2021-01-01 | End: 2021-01-01 | Stop reason: SDUPTHER

## 2021-01-01 RX ORDER — PHYTONADIONE 5 MG/1
5 TABLET ORAL DAILY
Status: COMPLETED | OUTPATIENT
Start: 2021-01-01 | End: 2021-01-01

## 2021-01-01 RX ORDER — AMOXICILLIN 250 MG
2 CAPSULE ORAL DAILY PRN
Status: ON HOLD | COMMUNITY
End: 2021-01-01 | Stop reason: HOSPADM

## 2021-01-01 RX ORDER — SODIUM CHLORIDE, SODIUM GLUCONATE, SODIUM ACETATE, POTASSIUM CHLORIDE AND MAGNESIUM CHLORIDE 526; 502; 368; 37; 30 MG/100ML; MG/100ML; MG/100ML; MG/100ML; MG/100ML
INJECTION, SOLUTION INTRAVENOUS CONTINUOUS
Status: DISPENSED | OUTPATIENT
Start: 2021-01-01 | End: 2021-01-01

## 2021-01-01 RX ORDER — POTASSIUM CHLORIDE 29.8 MG/ML
20 INJECTION INTRAVENOUS
Status: COMPLETED | OUTPATIENT
Start: 2021-01-01 | End: 2021-01-01

## 2021-01-01 RX ORDER — PROMETHAZINE HYDROCHLORIDE 12.5 MG/1
12.5 TABLET ORAL EVERY 6 HOURS PRN
Status: DISCONTINUED | OUTPATIENT
Start: 2021-01-01 | End: 2021-01-01 | Stop reason: HOSPADM

## 2021-01-01 RX ORDER — METOPROLOL TARTRATE 5 MG/5ML
5 INJECTION INTRAVENOUS EVERY 6 HOURS
Status: DISCONTINUED | OUTPATIENT
Start: 2021-01-01 | End: 2021-01-01

## 2021-01-01 RX ORDER — ATROPINE SULFATE 10 MG/ML
2 SOLUTION/ DROPS OPHTHALMIC EVERY 4 HOURS PRN
Status: DISCONTINUED | OUTPATIENT
Start: 2021-01-01 | End: 2021-07-06 | Stop reason: HOSPADM

## 2021-01-01 RX ORDER — CHOLECALCIFEROL (VITAMIN D3) 1250 MCG
1 CAPSULE ORAL
Status: ON HOLD | COMMUNITY
End: 2021-01-01 | Stop reason: HOSPADM

## 2021-01-01 RX ORDER — HYDROCODONE BITARTRATE AND ACETAMINOPHEN 5; 325 MG/1; MG/1
1 TABLET ORAL ONCE
Status: COMPLETED | OUTPATIENT
Start: 2021-01-01 | End: 2021-01-01

## 2021-01-01 RX ORDER — IPRATROPIUM BROMIDE AND ALBUTEROL SULFATE 2.5; .5 MG/3ML; MG/3ML
1 SOLUTION RESPIRATORY (INHALATION) 2 TIMES DAILY
Status: DISCONTINUED | OUTPATIENT
Start: 2021-01-01 | End: 2021-01-01

## 2021-01-01 RX ORDER — POTASSIUM CHLORIDE 20 MEQ/1
1 TABLET, EXTENDED RELEASE ORAL DAILY
Status: ON HOLD | COMMUNITY
Start: 2021-01-01 | End: 2021-01-01 | Stop reason: HOSPADM

## 2021-01-01 RX ORDER — INSULIN LISPRO 100 [IU]/ML
0-3 INJECTION, SOLUTION INTRAVENOUS; SUBCUTANEOUS NIGHTLY
Status: DISCONTINUED | OUTPATIENT
Start: 2021-01-01 | End: 2021-01-01 | Stop reason: HOSPADM

## 2021-01-01 RX ORDER — SODIUM CHLORIDE 9 MG/ML
25 INJECTION, SOLUTION INTRAVENOUS PRN
Status: DISCONTINUED | OUTPATIENT
Start: 2021-01-01 | End: 2021-01-01 | Stop reason: HOSPADM

## 2021-01-01 RX ORDER — ONDANSETRON 2 MG/ML
INJECTION INTRAMUSCULAR; INTRAVENOUS
Status: COMPLETED
Start: 2021-01-01 | End: 2021-01-01

## 2021-01-01 RX ORDER — INSULIN LISPRO 100 [IU]/ML
0-6 INJECTION, SOLUTION INTRAVENOUS; SUBCUTANEOUS
Status: DISCONTINUED | OUTPATIENT
Start: 2021-01-01 | End: 2021-01-01 | Stop reason: HOSPADM

## 2021-01-01 RX ORDER — M-VIT,TX,IRON,MINS/CALC/FOLIC 27MG-0.4MG
1 TABLET ORAL DAILY
Status: DISCONTINUED | OUTPATIENT
Start: 2021-01-01 | End: 2021-01-01 | Stop reason: HOSPADM

## 2021-01-01 RX ORDER — LORAZEPAM 2 MG/ML
2 INJECTION INTRAMUSCULAR
Status: DISCONTINUED | OUTPATIENT
Start: 2021-01-01 | End: 2021-07-06 | Stop reason: HOSPADM

## 2021-01-01 RX ORDER — ONDANSETRON 2 MG/ML
4 INJECTION INTRAMUSCULAR; INTRAVENOUS EVERY 6 HOURS PRN
Status: DISCONTINUED | OUTPATIENT
Start: 2021-01-01 | End: 2021-01-01 | Stop reason: HOSPADM

## 2021-01-01 RX ORDER — SPIRONOLACTONE 50 MG/1
50 TABLET, FILM COATED ORAL DAILY
Status: ON HOLD | COMMUNITY
End: 2021-01-01 | Stop reason: HOSPADM

## 2021-01-01 RX ORDER — MORPHINE SULFATE 2 MG/ML
2 INJECTION, SOLUTION INTRAMUSCULAR; INTRAVENOUS ONCE
Status: COMPLETED | OUTPATIENT
Start: 2021-01-01 | End: 2021-01-01

## 2021-01-01 RX ORDER — IPRATROPIUM BROMIDE AND ALBUTEROL SULFATE 2.5; .5 MG/3ML; MG/3ML
1 SOLUTION RESPIRATORY (INHALATION) EVERY 4 HOURS PRN
Status: DISCONTINUED | OUTPATIENT
Start: 2021-01-01 | End: 2021-01-01

## 2021-01-01 RX ORDER — CIPROFLOXACIN 500 MG/1
500 TABLET, FILM COATED ORAL 2 TIMES DAILY
Qty: 20 TABLET | Refills: 0 | Status: SHIPPED | OUTPATIENT
Start: 2021-01-01 | End: 2021-01-01

## 2021-01-01 RX ORDER — FUROSEMIDE 10 MG/ML
40 INJECTION INTRAMUSCULAR; INTRAVENOUS ONCE
Status: COMPLETED | OUTPATIENT
Start: 2021-01-01 | End: 2021-01-01

## 2021-01-01 RX ORDER — MORPHINE SULFATE 2 MG/ML
2 INJECTION, SOLUTION INTRAMUSCULAR; INTRAVENOUS
Status: DISCONTINUED | OUTPATIENT
Start: 2021-01-01 | End: 2021-07-06 | Stop reason: HOSPADM

## 2021-01-01 RX ORDER — OXYCODONE HYDROCHLORIDE 5 MG/1
10 TABLET ORAL EVERY 4 HOURS PRN
Status: DISCONTINUED | OUTPATIENT
Start: 2021-01-01 | End: 2021-01-01

## 2021-01-01 RX ORDER — SODIUM CHLORIDE, SODIUM GLUCONATE, SODIUM ACETATE, POTASSIUM CHLORIDE AND MAGNESIUM CHLORIDE 526; 502; 368; 37; 30 MG/100ML; MG/100ML; MG/100ML; MG/100ML; MG/100ML
INJECTION, SOLUTION INTRAVENOUS ONCE
Status: DISCONTINUED | OUTPATIENT
Start: 2021-01-01 | End: 2021-01-01

## 2021-01-01 RX ORDER — CHOLECALCIFEROL (VITAMIN D3) 125 MCG
5 CAPSULE ORAL NIGHTLY PRN
Status: ON HOLD | COMMUNITY
End: 2021-01-01 | Stop reason: HOSPADM

## 2021-01-01 RX ORDER — DEXTROSE MONOHYDRATE 50 MG/ML
100 INJECTION, SOLUTION INTRAVENOUS PRN
Status: DISCONTINUED | OUTPATIENT
Start: 2021-01-01 | End: 2021-01-01 | Stop reason: HOSPADM

## 2021-01-01 RX ORDER — ALBUMIN, HUMAN INJ 5% 5 %
25 SOLUTION INTRAVENOUS ONCE
Status: COMPLETED | OUTPATIENT
Start: 2021-01-01 | End: 2021-01-01

## 2021-01-01 RX ORDER — FUROSEMIDE 40 MG/1
40 TABLET ORAL 2 TIMES DAILY
Status: ON HOLD | COMMUNITY
End: 2021-01-01 | Stop reason: HOSPADM

## 2021-01-01 RX ORDER — PROMETHAZINE HYDROCHLORIDE 25 MG/1
12.5 TABLET ORAL EVERY 6 HOURS PRN
Status: DISCONTINUED | OUTPATIENT
Start: 2021-01-01 | End: 2021-01-01 | Stop reason: HOSPADM

## 2021-01-01 RX ORDER — SODIUM CHLORIDE 0.9 % (FLUSH) 0.9 %
5-40 SYRINGE (ML) INJECTION PRN
Status: DISCONTINUED | OUTPATIENT
Start: 2021-01-01 | End: 2021-01-01 | Stop reason: HOSPADM

## 2021-01-01 RX ORDER — LANOLIN ALCOHOL/MO/W.PET/CERES
1000 CREAM (GRAM) TOPICAL DAILY
Status: ON HOLD | COMMUNITY
End: 2021-01-01

## 2021-01-01 RX ORDER — LIDOCAINE HYDROCHLORIDE 10 MG/ML
5 INJECTION, SOLUTION EPIDURAL; INFILTRATION; INTRACAUDAL; PERINEURAL ONCE
Status: COMPLETED | OUTPATIENT
Start: 2021-01-01 | End: 2021-01-01

## 2021-01-01 RX ORDER — DEXAMETHASONE SODIUM PHOSPHATE 4 MG/ML
INJECTION, SOLUTION INTRA-ARTICULAR; INTRALESIONAL; INTRAMUSCULAR; INTRAVENOUS; SOFT TISSUE PRN
Status: DISCONTINUED | OUTPATIENT
Start: 2021-01-01 | End: 2021-01-01 | Stop reason: SDUPTHER

## 2021-01-01 RX ORDER — SODIUM CHLORIDE, SODIUM LACTATE, POTASSIUM CHLORIDE, AND CALCIUM CHLORIDE .6; .31; .03; .02 G/100ML; G/100ML; G/100ML; G/100ML
1000 INJECTION, SOLUTION INTRAVENOUS ONCE
Status: COMPLETED | OUTPATIENT
Start: 2021-01-01 | End: 2021-01-01

## 2021-01-01 RX ORDER — SENNA PLUS 8.6 MG/1
1 TABLET ORAL DAILY PRN
Status: DISCONTINUED | OUTPATIENT
Start: 2021-01-01 | End: 2021-01-01 | Stop reason: HOSPADM

## 2021-01-01 RX ORDER — POLYETHYLENE GLYCOL 3350 17 G/17G
17 POWDER, FOR SOLUTION ORAL DAILY PRN
Status: DISCONTINUED | OUTPATIENT
Start: 2021-01-01 | End: 2021-01-01 | Stop reason: HOSPADM

## 2021-01-01 RX ORDER — SODIUM CHLORIDE, SODIUM GLUCONATE, SODIUM ACETATE, POTASSIUM CHLORIDE AND MAGNESIUM CHLORIDE 526; 502; 368; 37; 30 MG/100ML; MG/100ML; MG/100ML; MG/100ML; MG/100ML
INJECTION, SOLUTION INTRAVENOUS CONTINUOUS
Status: DISCONTINUED | OUTPATIENT
Start: 2021-01-01 | End: 2021-01-01

## 2021-01-01 RX ORDER — HYDROMORPHONE HCL 110MG/55ML
PATIENT CONTROLLED ANALGESIA SYRINGE INTRAVENOUS PRN
Status: DISCONTINUED | OUTPATIENT
Start: 2021-01-01 | End: 2021-01-01 | Stop reason: SDUPTHER

## 2021-01-01 RX ORDER — OXYCODONE HYDROCHLORIDE AND ACETAMINOPHEN 5; 325 MG/1; MG/1
1 TABLET ORAL EVERY 6 HOURS PRN
Qty: 12 TABLET | Refills: 0 | Status: SHIPPED | OUTPATIENT
Start: 2021-01-01 | End: 2021-01-01

## 2021-01-01 RX ORDER — NICOTINE 21 MG/24HR
1 PATCH, TRANSDERMAL 24 HOURS TRANSDERMAL DAILY
Status: DISCONTINUED | OUTPATIENT
Start: 2021-01-01 | End: 2021-01-01 | Stop reason: HOSPADM

## 2021-01-01 RX ORDER — 0.9 % SODIUM CHLORIDE 0.9 %
1000 INTRAVENOUS SOLUTION INTRAVENOUS ONCE
Status: COMPLETED | OUTPATIENT
Start: 2021-01-01 | End: 2021-01-01

## 2021-01-01 RX ORDER — M-VIT,TX,IRON,MINS/CALC/FOLIC 27MG-0.4MG
1 TABLET ORAL DAILY
Status: ON HOLD | COMMUNITY
End: 2021-01-01 | Stop reason: HOSPADM

## 2021-01-01 RX ORDER — NALOXONE HYDROCHLORIDE 0.4 MG/ML
0.4 INJECTION, SOLUTION INTRAMUSCULAR; INTRAVENOUS; SUBCUTANEOUS PRN
Status: DISCONTINUED | OUTPATIENT
Start: 2021-01-01 | End: 2021-01-01

## 2021-01-01 RX ORDER — POTASSIUM CHLORIDE 29.8 MG/ML
20 INJECTION INTRAVENOUS PRN
Status: DISCONTINUED | OUTPATIENT
Start: 2021-01-01 | End: 2021-01-01 | Stop reason: SDUPTHER

## 2021-01-01 RX ORDER — POTASSIUM CHLORIDE 20 MEQ/1
20 TABLET, EXTENDED RELEASE ORAL ONCE
Status: COMPLETED | OUTPATIENT
Start: 2021-01-01 | End: 2021-01-01

## 2021-01-01 RX ORDER — CEFAZOLIN SODIUM 2 G/50ML
2000 SOLUTION INTRAVENOUS EVERY 12 HOURS
Status: DISCONTINUED | OUTPATIENT
Start: 2021-01-01 | End: 2021-01-01

## 2021-01-01 RX ORDER — SODIUM CHLORIDE 9 MG/ML
25 INJECTION, SOLUTION INTRAVENOUS PRN
Status: DISCONTINUED | OUTPATIENT
Start: 2021-01-01 | End: 2021-01-01 | Stop reason: SDUPTHER

## 2021-01-01 RX ORDER — HYDROXYZINE HYDROCHLORIDE 25 MG/1
25 TABLET, FILM COATED ORAL DAILY
Status: DISCONTINUED | OUTPATIENT
Start: 2021-01-01 | End: 2021-01-01

## 2021-01-01 RX ORDER — SODIUM CHLORIDE 0.9 % (FLUSH) 0.9 %
5-40 SYRINGE (ML) INJECTION EVERY 12 HOURS SCHEDULED
Status: DISCONTINUED | OUTPATIENT
Start: 2021-01-01 | End: 2021-01-01 | Stop reason: HOSPADM

## 2021-01-01 RX ORDER — HYDROXYZINE HYDROCHLORIDE 25 MG/1
25 TABLET, FILM COATED ORAL DAILY
Status: ON HOLD | COMMUNITY
End: 2021-01-01 | Stop reason: HOSPADM

## 2021-01-01 RX ORDER — METOPROLOL TARTRATE 5 MG/5ML
INJECTION INTRAVENOUS PRN
Status: DISCONTINUED | OUTPATIENT
Start: 2021-01-01 | End: 2021-01-01 | Stop reason: SDUPTHER

## 2021-01-01 RX ORDER — LORAZEPAM 2 MG/ML
1 INJECTION INTRAMUSCULAR
Status: DISCONTINUED | OUTPATIENT
Start: 2021-01-01 | End: 2021-01-01

## 2021-01-01 RX ORDER — SODIUM CHLORIDE 9 MG/ML
25 INJECTION, SOLUTION INTRAVENOUS PRN
Status: DISCONTINUED | OUTPATIENT
Start: 2021-01-01 | End: 2021-07-06 | Stop reason: HOSPADM

## 2021-01-01 RX ORDER — 0.9 % SODIUM CHLORIDE 0.9 %
1000 INTRAVENOUS SOLUTION INTRAVENOUS ONCE
Status: DISCONTINUED | OUTPATIENT
Start: 2021-01-01 | End: 2021-01-01

## 2021-01-01 RX ORDER — PANTOPRAZOLE SODIUM 40 MG/1
40 TABLET, DELAYED RELEASE ORAL
Status: DISCONTINUED | OUTPATIENT
Start: 2021-01-01 | End: 2021-01-01 | Stop reason: HOSPADM

## 2021-01-01 RX ORDER — CARBOXYMETHYLCELLULOSE SODIUM 5 MG/ML
1 SOLUTION/ DROPS OPHTHALMIC EVERY 6 HOURS PRN
Status: ON HOLD | COMMUNITY
End: 2021-01-01 | Stop reason: HOSPADM

## 2021-01-01 RX ORDER — SENNA AND DOCUSATE SODIUM 50; 8.6 MG/1; MG/1
2 TABLET, FILM COATED ORAL DAILY
Status: DISCONTINUED | OUTPATIENT
Start: 2021-01-01 | End: 2021-01-01

## 2021-01-01 RX ORDER — DICYCLOMINE HYDROCHLORIDE 10 MG/ML
20 INJECTION INTRAMUSCULAR ONCE
Status: COMPLETED | OUTPATIENT
Start: 2021-01-01 | End: 2021-01-01

## 2021-01-01 RX ORDER — LACTULOSE 10 G/15ML
30 SOLUTION ORAL DAILY
Status: DISCONTINUED | OUTPATIENT
Start: 2021-01-01 | End: 2021-01-01

## 2021-01-01 RX ORDER — PANTOPRAZOLE SODIUM 40 MG/10ML
40 INJECTION, POWDER, LYOPHILIZED, FOR SOLUTION INTRAVENOUS DAILY
Status: DISCONTINUED | OUTPATIENT
Start: 2021-01-01 | End: 2021-01-01

## 2021-01-01 RX ORDER — FUROSEMIDE 10 MG/ML
80 INJECTION INTRAMUSCULAR; INTRAVENOUS ONCE
Status: COMPLETED | OUTPATIENT
Start: 2021-01-01 | End: 2021-01-01

## 2021-01-01 RX ORDER — OLANZAPINE 5 MG/1
5 TABLET ORAL NIGHTLY
Status: DISCONTINUED | OUTPATIENT
Start: 2021-01-01 | End: 2021-01-01 | Stop reason: HOSPADM

## 2021-01-01 RX ORDER — POTASSIUM CHLORIDE 7.45 MG/ML
10 INJECTION INTRAVENOUS
Status: DISCONTINUED | OUTPATIENT
Start: 2021-01-01 | End: 2021-01-01

## 2021-01-01 RX ORDER — MAGNESIUM SULFATE IN WATER 40 MG/ML
2000 INJECTION, SOLUTION INTRAVENOUS ONCE
Status: COMPLETED | OUTPATIENT
Start: 2021-01-01 | End: 2021-01-01

## 2021-01-01 RX ORDER — OSTOMY ADHESIVE
2 STRIP MISCELLANEOUS ONCE
Status: DISCONTINUED | OUTPATIENT
Start: 2021-01-01 | End: 2021-01-01

## 2021-01-01 RX ORDER — CEFAZOLIN SODIUM 2 G/50ML
2000 SOLUTION INTRAVENOUS EVERY 8 HOURS
Status: DISCONTINUED | OUTPATIENT
Start: 2021-01-01 | End: 2021-01-01 | Stop reason: HOSPADM

## 2021-01-01 RX ORDER — CEFAZOLIN SODIUM 2 G/50ML
2000 SOLUTION INTRAVENOUS EVERY 8 HOURS
Status: DISCONTINUED | OUTPATIENT
Start: 2021-01-01 | End: 2021-01-01

## 2021-01-01 RX ORDER — SENNA PLUS 8.6 MG/1
1 TABLET ORAL 2 TIMES DAILY
Status: DISCONTINUED | OUTPATIENT
Start: 2021-01-01 | End: 2021-01-01 | Stop reason: HOSPADM

## 2021-01-01 RX ORDER — GABAPENTIN 100 MG/1
100 CAPSULE ORAL 3 TIMES DAILY
Status: ON HOLD | COMMUNITY
End: 2021-01-01 | Stop reason: HOSPADM

## 2021-01-01 RX ORDER — SUCCINYLCHOLINE/SOD CL,ISO/PF 100 MG/5ML
SYRINGE (ML) INTRAVENOUS PRN
Status: DISCONTINUED | OUTPATIENT
Start: 2021-01-01 | End: 2021-01-01 | Stop reason: SDUPTHER

## 2021-01-01 RX ORDER — ALBUMIN, HUMAN INJ 5% 5 %
25 SOLUTION INTRAVENOUS ONCE
Status: DISCONTINUED | OUTPATIENT
Start: 2021-01-01 | End: 2021-01-01

## 2021-01-01 RX ORDER — SODIUM CHLORIDE 0.9 % (FLUSH) 0.9 %
5-40 SYRINGE (ML) INJECTION EVERY 12 HOURS SCHEDULED
Status: DISCONTINUED | OUTPATIENT
Start: 2021-01-01 | End: 2021-07-06 | Stop reason: HOSPADM

## 2021-01-01 RX ORDER — TRAMADOL HYDROCHLORIDE 50 MG/1
25 TABLET ORAL EVERY 6 HOURS PRN
Status: DISCONTINUED | OUTPATIENT
Start: 2021-01-01 | End: 2021-01-01

## 2021-01-01 RX ORDER — CIPROFLOXACIN 2 MG/ML
400 INJECTION, SOLUTION INTRAVENOUS EVERY 12 HOURS
Status: DISCONTINUED | OUTPATIENT
Start: 2021-01-01 | End: 2021-01-01

## 2021-01-01 RX ORDER — MECOBALAMIN 5000 MCG
5 TABLET,DISINTEGRATING ORAL NIGHTLY
Status: DISCONTINUED | OUTPATIENT
Start: 2021-01-01 | End: 2021-01-01

## 2021-01-01 RX ORDER — FUROSEMIDE 20 MG/1
20 TABLET ORAL DAILY
Status: DISCONTINUED | OUTPATIENT
Start: 2021-01-01 | End: 2021-01-01

## 2021-01-01 RX ORDER — GABAPENTIN 100 MG/1
100 CAPSULE ORAL 3 TIMES DAILY
Status: DISCONTINUED | OUTPATIENT
Start: 2021-01-01 | End: 2021-01-01

## 2021-01-01 RX ORDER — 0.9 % SODIUM CHLORIDE 0.9 %
2000 INTRAVENOUS SOLUTION INTRAVENOUS ONCE
Status: COMPLETED | OUTPATIENT
Start: 2021-01-01 | End: 2021-01-01

## 2021-01-01 RX ORDER — CEPHALEXIN 500 MG/1
1000 CAPSULE ORAL 3 TIMES DAILY
Qty: 60 CAPSULE | Refills: 0 | Status: SHIPPED | OUTPATIENT
Start: 2021-01-01 | End: 2021-01-01

## 2021-01-01 RX ORDER — FAMOTIDINE 20 MG/1
40 TABLET, FILM COATED ORAL DAILY
Status: ON HOLD | COMMUNITY
End: 2021-01-01 | Stop reason: HOSPADM

## 2021-01-01 RX ORDER — OXYCODONE HYDROCHLORIDE 5 MG/1
5 TABLET ORAL EVERY 4 HOURS PRN
Status: DISCONTINUED | OUTPATIENT
Start: 2021-01-01 | End: 2021-01-01

## 2021-01-01 RX ORDER — FUROSEMIDE 20 MG/1
20 TABLET ORAL DAILY
Qty: 30 TABLET | Refills: 0 | Status: SHIPPED | OUTPATIENT
Start: 2021-01-01 | End: 2021-01-01 | Stop reason: DRUGHIGH

## 2021-01-01 RX ORDER — MORPHINE SULFATE 4 MG/ML
4 INJECTION, SOLUTION INTRAMUSCULAR; INTRAVENOUS
Status: DISCONTINUED | OUTPATIENT
Start: 2021-01-01 | End: 2021-07-06 | Stop reason: HOSPADM

## 2021-01-01 RX ORDER — FENTANYL CITRATE 50 UG/ML
INJECTION, SOLUTION INTRAMUSCULAR; INTRAVENOUS PRN
Status: DISCONTINUED | OUTPATIENT
Start: 2021-01-01 | End: 2021-01-01 | Stop reason: SDUPTHER

## 2021-01-01 RX ORDER — OLANZAPINE 5 MG/1
5 TABLET ORAL NIGHTLY
Status: ON HOLD | COMMUNITY
End: 2021-01-01 | Stop reason: HOSPADM

## 2021-01-01 RX ORDER — MECOBALAMIN 5000 MCG
5 TABLET,DISINTEGRATING ORAL NIGHTLY PRN
Status: DISCONTINUED | OUTPATIENT
Start: 2021-01-01 | End: 2021-01-01 | Stop reason: HOSPADM

## 2021-01-01 RX ORDER — FAMOTIDINE 20 MG/1
40 TABLET, FILM COATED ORAL DAILY
Status: DISCONTINUED | OUTPATIENT
Start: 2021-01-01 | End: 2021-01-01

## 2021-01-01 RX ORDER — TRAMADOL HYDROCHLORIDE 50 MG/1
50 TABLET ORAL EVERY 6 HOURS PRN
Status: DISCONTINUED | OUTPATIENT
Start: 2021-01-01 | End: 2021-01-01 | Stop reason: HOSPADM

## 2021-01-01 RX ORDER — SODIUM CHLORIDE, SODIUM LACTATE, POTASSIUM CHLORIDE, AND CALCIUM CHLORIDE .6; .31; .03; .02 G/100ML; G/100ML; G/100ML; G/100ML
30 INJECTION, SOLUTION INTRAVENOUS ONCE
Status: COMPLETED | OUTPATIENT
Start: 2021-01-01 | End: 2021-01-01

## 2021-01-01 RX ORDER — SPIRONOLACTONE 50 MG/1
50 TABLET, FILM COATED ORAL DAILY
Status: DISCONTINUED | OUTPATIENT
Start: 2021-01-01 | End: 2021-01-01

## 2021-01-01 RX ORDER — DEXTROSE MONOHYDRATE 25 G/50ML
12.5 INJECTION, SOLUTION INTRAVENOUS PRN
Status: DISCONTINUED | OUTPATIENT
Start: 2021-01-01 | End: 2021-01-01

## 2021-01-01 RX ORDER — PANTOPRAZOLE SODIUM 40 MG/10ML
40 INJECTION, POWDER, LYOPHILIZED, FOR SOLUTION INTRAVENOUS 2 TIMES DAILY
Status: DISCONTINUED | OUTPATIENT
Start: 2021-01-01 | End: 2021-01-01

## 2021-01-01 RX ORDER — POLYVINYL ALCOHOL 14 MG/ML
1 SOLUTION/ DROPS OPHTHALMIC EVERY 6 HOURS PRN
Status: DISCONTINUED | OUTPATIENT
Start: 2021-01-01 | End: 2021-01-01 | Stop reason: HOSPADM

## 2021-01-01 RX ORDER — TRAMADOL HYDROCHLORIDE 50 MG/1
50 TABLET ORAL EVERY 6 HOURS
Status: ON HOLD | COMMUNITY
End: 2021-01-01 | Stop reason: HOSPADM

## 2021-01-01 RX ORDER — DEXTROSE MONOHYDRATE 50 MG/ML
100 INJECTION, SOLUTION INTRAVENOUS PRN
Status: DISCONTINUED | OUTPATIENT
Start: 2021-01-01 | End: 2021-01-01

## 2021-01-01 RX ORDER — TRAMADOL HYDROCHLORIDE 50 MG/1
50 TABLET ORAL EVERY 6 HOURS PRN
Status: DISCONTINUED | OUTPATIENT
Start: 2021-01-01 | End: 2021-01-01

## 2021-01-01 RX ORDER — FUROSEMIDE 20 MG/1
20 TABLET ORAL DAILY
Status: DISCONTINUED | OUTPATIENT
Start: 2021-01-01 | End: 2021-01-01 | Stop reason: HOSPADM

## 2021-01-01 RX ORDER — OSTOMY ADHESIVE
2 STRIP MISCELLANEOUS ONCE
Status: DISCONTINUED | OUTPATIENT
Start: 2021-01-01 | End: 2021-01-01 | Stop reason: HOSPADM

## 2021-01-01 RX ORDER — PROPOFOL 10 MG/ML
INJECTION, EMULSION INTRAVENOUS PRN
Status: DISCONTINUED | OUTPATIENT
Start: 2021-01-01 | End: 2021-01-01 | Stop reason: SDUPTHER

## 2021-01-01 RX ORDER — BISACODYL 10 MG
10 SUPPOSITORY, RECTAL RECTAL DAILY PRN
Status: DISCONTINUED | OUTPATIENT
Start: 2021-01-01 | End: 2021-01-01 | Stop reason: HOSPADM

## 2021-01-01 RX ORDER — TRAMADOL HYDROCHLORIDE 50 MG/1
50 TABLET ORAL EVERY 6 HOURS
Status: DISCONTINUED | OUTPATIENT
Start: 2021-01-01 | End: 2021-01-01

## 2021-01-01 RX ORDER — OLANZAPINE 5 MG/1
5 TABLET, ORALLY DISINTEGRATING ORAL NIGHTLY
Status: DISCONTINUED | OUTPATIENT
Start: 2021-01-01 | End: 2021-07-06 | Stop reason: HOSPADM

## 2021-01-01 RX ORDER — MAGNESIUM SULFATE IN WATER 40 MG/ML
2000 INJECTION, SOLUTION INTRAVENOUS ONCE
Status: DISCONTINUED | OUTPATIENT
Start: 2021-01-01 | End: 2021-01-01 | Stop reason: ALTCHOICE

## 2021-01-01 RX ORDER — POTASSIUM CHLORIDE 1500 MG/1
20 TABLET, FILM COATED, EXTENDED RELEASE ORAL
Status: ON HOLD | COMMUNITY
End: 2021-01-01 | Stop reason: HOSPADM

## 2021-01-01 RX ORDER — ONDANSETRON 2 MG/ML
4 INJECTION INTRAMUSCULAR; INTRAVENOUS EVERY 6 HOURS PRN
Status: DISCONTINUED | OUTPATIENT
Start: 2021-01-01 | End: 2021-07-06 | Stop reason: HOSPADM

## 2021-01-01 RX ORDER — ROCURONIUM BROMIDE 10 MG/ML
INJECTION, SOLUTION INTRAVENOUS PRN
Status: DISCONTINUED | OUTPATIENT
Start: 2021-01-01 | End: 2021-01-01 | Stop reason: SDUPTHER

## 2021-01-01 RX ORDER — MAGNESIUM SULFATE 1 G/100ML
1000 INJECTION INTRAVENOUS ONCE
Status: COMPLETED | OUTPATIENT
Start: 2021-01-01 | End: 2021-01-01

## 2021-01-01 RX ORDER — ACETAMINOPHEN 325 MG/1
650 TABLET ORAL EVERY 4 HOURS PRN
Status: DISCONTINUED | OUTPATIENT
Start: 2021-01-01 | End: 2021-01-01

## 2021-01-01 RX ORDER — DEXTROSE MONOHYDRATE 25 G/50ML
12.5 INJECTION, SOLUTION INTRAVENOUS PRN
Status: DISCONTINUED | OUTPATIENT
Start: 2021-01-01 | End: 2021-01-01 | Stop reason: HOSPADM

## 2021-01-01 RX ORDER — CASTOR OIL AND BALSAM, PERU 788; 87 MG/G; MG/G
OINTMENT TOPICAL 2 TIMES DAILY
Status: DISCONTINUED | OUTPATIENT
Start: 2021-01-01 | End: 2021-01-01 | Stop reason: HOSPADM

## 2021-01-01 RX ORDER — CASTOR OIL AND BALSAM, PERU 788; 87 MG/G; MG/G
OINTMENT TOPICAL 2 TIMES DAILY
Qty: 1 TUBE | Refills: 0 | Status: ON HOLD | OUTPATIENT
Start: 2021-01-01 | End: 2021-01-01 | Stop reason: HOSPADM

## 2021-01-01 RX ORDER — MAGNESIUM HYDROXIDE 1200 MG/15ML
LIQUID ORAL CONTINUOUS PRN
Status: COMPLETED | OUTPATIENT
Start: 2021-01-01 | End: 2021-01-01

## 2021-01-01 RX ORDER — ONDANSETRON 2 MG/ML
INJECTION INTRAMUSCULAR; INTRAVENOUS PRN
Status: DISCONTINUED | OUTPATIENT
Start: 2021-01-01 | End: 2021-01-01 | Stop reason: SDUPTHER

## 2021-01-01 RX ORDER — FUROSEMIDE 80 MG
80 TABLET ORAL DAILY
Status: ON HOLD | COMMUNITY
End: 2021-01-01 | Stop reason: SDUPTHER

## 2021-01-01 RX ORDER — NICOTINE POLACRILEX 4 MG
15 LOZENGE BUCCAL PRN
Status: DISCONTINUED | OUTPATIENT
Start: 2021-01-01 | End: 2021-01-01 | Stop reason: HOSPADM

## 2021-01-01 RX ORDER — ONDANSETRON 4 MG/1
4 TABLET, ORALLY DISINTEGRATING ORAL EVERY 8 HOURS PRN
Status: DISCONTINUED | OUTPATIENT
Start: 2021-01-01 | End: 2021-07-06 | Stop reason: HOSPADM

## 2021-01-01 RX ORDER — SPIRONOLACTONE 50 MG/1
50 TABLET, FILM COATED ORAL DAILY
Status: DISCONTINUED | OUTPATIENT
Start: 2021-01-01 | End: 2021-01-01 | Stop reason: HOSPADM

## 2021-01-01 RX ADMIN — SODIUM CHLORIDE: 9 INJECTION, SOLUTION INTRAVENOUS at 10:42

## 2021-01-01 RX ADMIN — CEFEPIME HYDROCHLORIDE 2000 MG: 2 INJECTION, POWDER, FOR SOLUTION INTRAVENOUS at 11:19

## 2021-01-01 RX ADMIN — CEFAZOLIN SODIUM 2000 MG: 2 SOLUTION INTRAVENOUS at 21:28

## 2021-01-01 RX ADMIN — HYDROMORPHONE HYDROCHLORIDE 1 MG: 1 INJECTION, SOLUTION INTRAMUSCULAR; INTRAVENOUS; SUBCUTANEOUS at 12:09

## 2021-01-01 RX ADMIN — CEFEPIME HYDROCHLORIDE 2000 MG: 2 INJECTION, POWDER, FOR SOLUTION INTRAVENOUS at 19:59

## 2021-01-01 RX ADMIN — CIPROFLOXACIN 400 MG: 2 INJECTION, SOLUTION INTRAVENOUS at 22:52

## 2021-01-01 RX ADMIN — OXYCODONE HYDROCHLORIDE AND ACETAMINOPHEN 1 TABLET: 5; 325 TABLET ORAL at 16:54

## 2021-01-01 RX ADMIN — Medication 10 ML: at 20:39

## 2021-01-01 RX ADMIN — Medication 10 ML: at 08:38

## 2021-01-01 RX ADMIN — Medication 10 ML: at 09:09

## 2021-01-01 RX ADMIN — Medication: at 04:33

## 2021-01-01 RX ADMIN — INSULIN HUMAN 2 UNITS: 100 INJECTION, SOLUTION PARENTERAL at 05:37

## 2021-01-01 RX ADMIN — METRONIDAZOLE 500 MG: 500 INJECTION, SOLUTION INTRAVENOUS at 23:07

## 2021-01-01 RX ADMIN — POTASSIUM CHLORIDE 20 MEQ: 1500 TABLET, EXTENDED RELEASE ORAL at 08:27

## 2021-01-01 RX ADMIN — Medication 10 ML: at 20:00

## 2021-01-01 RX ADMIN — VANCOMYCIN HYDROCHLORIDE 500 MG: 10 INJECTION, POWDER, LYOPHILIZED, FOR SOLUTION INTRAVENOUS at 18:00

## 2021-01-01 RX ADMIN — CALCIUM GLUCONATE 1000 MG: 98 INJECTION, SOLUTION INTRAVENOUS at 18:18

## 2021-01-01 RX ADMIN — HEPARIN SODIUM 5000 UNITS: 5000 INJECTION INTRAVENOUS; SUBCUTANEOUS at 05:50

## 2021-01-01 RX ADMIN — CIPROFLOXACIN 400 MG: 2 INJECTION, SOLUTION INTRAVENOUS at 00:07

## 2021-01-01 RX ADMIN — SODIUM CHLORIDE, POTASSIUM CHLORIDE, SODIUM LACTATE AND CALCIUM CHLORIDE 1000 ML: 600; 310; 30; 20 INJECTION, SOLUTION INTRAVENOUS at 05:46

## 2021-01-01 RX ADMIN — PHENYLEPHRINE HYDROCHLORIDE 100 MCG: 10 INJECTION, SOLUTION INTRAMUSCULAR; INTRAVENOUS; SUBCUTANEOUS at 23:59

## 2021-01-01 RX ADMIN — Medication 10 ML: at 09:30

## 2021-01-01 RX ADMIN — POTASSIUM CHLORIDE 10 MEQ: 10 INJECTION, SOLUTION INTRAVENOUS at 10:16

## 2021-01-01 RX ADMIN — CIPROFLOXACIN 400 MG: 2 INJECTION, SOLUTION INTRAVENOUS at 11:23

## 2021-01-01 RX ADMIN — CEFTAZIDIME, AVIBACTAM 0.94 G: 2; .5 POWDER, FOR SOLUTION INTRAVENOUS at 04:29

## 2021-01-01 RX ADMIN — INSULIN HUMAN 9 UNITS: 100 INJECTION, SOLUTION PARENTERAL at 22:07

## 2021-01-01 RX ADMIN — CYANOCOBALAMIN TAB 1000 MCG 1000 MCG: 1000 TAB at 08:35

## 2021-01-01 RX ADMIN — ROCURONIUM BROMIDE 30 MG: 10 INJECTION INTRAVENOUS at 00:48

## 2021-01-01 RX ADMIN — HYDROMORPHONE HYDROCHLORIDE 1 MG: 1 INJECTION, SOLUTION INTRAMUSCULAR; INTRAVENOUS; SUBCUTANEOUS at 05:18

## 2021-01-01 RX ADMIN — HYDROMORPHONE HYDROCHLORIDE 0.5 MG: 1 INJECTION, SOLUTION INTRAMUSCULAR; INTRAVENOUS; SUBCUTANEOUS at 18:31

## 2021-01-01 RX ADMIN — Medication 5 MG: at 20:58

## 2021-01-01 RX ADMIN — Medication: at 17:07

## 2021-01-01 RX ADMIN — METOPROLOL TARTRATE 12.5 MG: 25 TABLET, FILM COATED ORAL at 09:09

## 2021-01-01 RX ADMIN — METHOCARBAMOL 750 MG: 100 INJECTION, SOLUTION INTRAMUSCULAR; INTRAVENOUS at 15:47

## 2021-01-01 RX ADMIN — INSULIN HUMAN 5 UNITS: 100 INJECTION, SOLUTION PARENTERAL at 10:24

## 2021-01-01 RX ADMIN — Medication 10 ML: at 08:10

## 2021-01-01 RX ADMIN — HYDROMORPHONE HYDROCHLORIDE 0.25 MG: 1 INJECTION, SOLUTION INTRAMUSCULAR; INTRAVENOUS; SUBCUTANEOUS at 22:31

## 2021-01-01 RX ADMIN — BISACODYL 10 MG: 10 SUPPOSITORY RECTAL at 10:47

## 2021-01-01 RX ADMIN — Medication 5 MG: at 20:39

## 2021-01-01 RX ADMIN — SODIUM CHLORIDE 100 MG: 9 INJECTION, SOLUTION INTRAVENOUS at 09:15

## 2021-01-01 RX ADMIN — CIPROFLOXACIN 400 MG: 2 INJECTION, SOLUTION INTRAVENOUS at 23:43

## 2021-01-01 RX ADMIN — HEPARIN SODIUM 5000 UNITS: 5000 INJECTION INTRAVENOUS; SUBCUTANEOUS at 14:09

## 2021-01-01 RX ADMIN — CALCIUM GLUCONATE 1000 MG: 98 INJECTION, SOLUTION INTRAVENOUS at 17:58

## 2021-01-01 RX ADMIN — TRAMADOL HYDROCHLORIDE 50 MG: 50 TABLET ORAL at 00:32

## 2021-01-01 RX ADMIN — HYDROMORPHONE HYDROCHLORIDE 0.5 MG: 1 INJECTION, SOLUTION INTRAMUSCULAR; INTRAVENOUS; SUBCUTANEOUS at 14:19

## 2021-01-01 RX ADMIN — Medication: at 19:32

## 2021-01-01 RX ADMIN — METOPROLOL TARTRATE 12.5 MG: 25 TABLET, FILM COATED ORAL at 21:11

## 2021-01-01 RX ADMIN — TRAMADOL HYDROCHLORIDE 50 MG: 50 TABLET ORAL at 12:47

## 2021-01-01 RX ADMIN — Medication 5 MG: at 22:20

## 2021-01-01 RX ADMIN — Medication 10 ML: at 20:36

## 2021-01-01 RX ADMIN — CEFAZOLIN SODIUM 2000 MG: 2 SOLUTION INTRAVENOUS at 01:02

## 2021-01-01 RX ADMIN — OLANZAPINE 5 MG: 5 TABLET, FILM COATED ORAL at 20:28

## 2021-01-01 RX ADMIN — TRAMADOL HYDROCHLORIDE 50 MG: 50 TABLET ORAL at 17:13

## 2021-01-01 RX ADMIN — POTASSIUM PHOSPHATE, MONOBASIC AND POTASSIUM PHOSPHATE, DIBASIC 15 MMOL: 224; 236 INJECTION, SOLUTION, CONCENTRATE INTRAVENOUS at 10:50

## 2021-01-01 RX ADMIN — METOPROLOL TARTRATE 12.5 MG: 25 TABLET, FILM COATED ORAL at 08:51

## 2021-01-01 RX ADMIN — ONDANSETRON 4 MG: 2 INJECTION INTRAMUSCULAR; INTRAVENOUS at 15:41

## 2021-01-01 RX ADMIN — CEFAZOLIN SODIUM 2000 MG: 2 SOLUTION INTRAVENOUS at 19:57

## 2021-01-01 RX ADMIN — TRAMADOL HYDROCHLORIDE 50 MG: 50 TABLET ORAL at 18:46

## 2021-01-01 RX ADMIN — SODIUM CHLORIDE 1000 ML: 9 INJECTION, SOLUTION INTRAVENOUS at 22:20

## 2021-01-01 RX ADMIN — CYANOCOBALAMIN TAB 1000 MCG 1000 MCG: 1000 TAB at 09:15

## 2021-01-01 RX ADMIN — Medication: at 07:40

## 2021-01-01 RX ADMIN — INSULIN HUMAN 2 UNITS: 100 INJECTION, SOLUTION PARENTERAL at 22:09

## 2021-01-01 RX ADMIN — SODIUM CHLORIDE, SODIUM GLUCONATE, SODIUM ACETATE, POTASSIUM CHLORIDE AND MAGNESIUM CHLORIDE: 526; 502; 368; 37; 30 INJECTION, SOLUTION INTRAVENOUS at 21:32

## 2021-01-01 RX ADMIN — ENOXAPARIN SODIUM 40 MG: 40 INJECTION SUBCUTANEOUS at 09:03

## 2021-01-01 RX ADMIN — METRONIDAZOLE 500 MG: 500 INJECTION, SOLUTION INTRAVENOUS at 08:57

## 2021-01-01 RX ADMIN — Medication: at 20:12

## 2021-01-01 RX ADMIN — DEXAMETHASONE SODIUM PHOSPHATE 4 MG: 4 INJECTION, SOLUTION INTRAMUSCULAR; INTRAVENOUS at 01:50

## 2021-01-01 RX ADMIN — HYDROMORPHONE HYDROCHLORIDE 0.25 MG: 1 INJECTION, SOLUTION INTRAMUSCULAR; INTRAVENOUS; SUBCUTANEOUS at 02:38

## 2021-01-01 RX ADMIN — SPIRONOLACTONE 50 MG: 50 TABLET ORAL at 09:26

## 2021-01-01 RX ADMIN — TRAMADOL HYDROCHLORIDE 25 MG: 50 TABLET, FILM COATED ORAL at 09:22

## 2021-01-01 RX ADMIN — Medication: at 09:48

## 2021-01-01 RX ADMIN — HYDROMORPHONE HYDROCHLORIDE 0.5 MG: 1 INJECTION, SOLUTION INTRAMUSCULAR; INTRAVENOUS; SUBCUTANEOUS at 09:06

## 2021-01-01 RX ADMIN — VANCOMYCIN HYDROCHLORIDE 1000 MG: 1 INJECTION, POWDER, LYOPHILIZED, FOR SOLUTION INTRAVENOUS at 03:37

## 2021-01-01 RX ADMIN — FUROSEMIDE 20 MG: 20 TABLET ORAL at 13:01

## 2021-01-01 RX ADMIN — METHOCARBAMOL 500 MG: 100 INJECTION, SOLUTION INTRAMUSCULAR; INTRAVENOUS at 22:23

## 2021-01-01 RX ADMIN — Medication 10 ML: at 08:01

## 2021-01-01 RX ADMIN — HYDROMORPHONE HYDROCHLORIDE 0.5 MG: 1 INJECTION, SOLUTION INTRAMUSCULAR; INTRAVENOUS; SUBCUTANEOUS at 09:36

## 2021-01-01 RX ADMIN — Medication 10 ML: at 09:47

## 2021-01-01 RX ADMIN — ROCURONIUM BROMIDE 20 MG: 10 INJECTION INTRAVENOUS at 01:38

## 2021-01-01 RX ADMIN — PANTOPRAZOLE SODIUM 40 MG: 40 TABLET, DELAYED RELEASE ORAL at 05:50

## 2021-01-01 RX ADMIN — Medication 10 ML: at 08:53

## 2021-01-01 RX ADMIN — TRAMADOL HYDROCHLORIDE 50 MG: 50 TABLET ORAL at 10:58

## 2021-01-01 RX ADMIN — HYDROMORPHONE HYDROCHLORIDE 1 MG: 1 INJECTION, SOLUTION INTRAMUSCULAR; INTRAVENOUS; SUBCUTANEOUS at 11:33

## 2021-01-01 RX ADMIN — FAMOTIDINE 40 MG: 20 TABLET ORAL at 07:54

## 2021-01-01 RX ADMIN — ASCORBIC ACID, VITAMIN A PALMITATE, CHOLECALCIFEROL, THIAMINE HYDROCHLORIDE, RIBOFLAVIN-5 PHOSPHATE SODIUM, PYRIDOXINE HYDROCHLORIDE, NIACINAMIDE, DEXPANTHENOL, ALPHA-TOCOPHEROL ACETATE, VITAMIN K1, FOLIC ACID, BIOTIN, CYANOCOBALAMIN: 200; 3300; 200; 6; 3.6; 6; 40; 15; 10; 150; 600; 60; 5 INJECTION, SOLUTION INTRAVENOUS at 17:57

## 2021-01-01 RX ADMIN — POLYETHYLENE GLYCOL 3350 17 G: 17 POWDER, FOR SOLUTION ORAL at 08:56

## 2021-01-01 RX ADMIN — HEPARIN SODIUM 5000 UNITS: 5000 INJECTION INTRAVENOUS; SUBCUTANEOUS at 14:48

## 2021-01-01 RX ADMIN — NOREPINEPHRINE BITARTRATE 10 MCG/MIN: 1 INJECTION, SOLUTION, CONCENTRATE INTRAVENOUS at 18:32

## 2021-01-01 RX ADMIN — Medication 10 ML: at 09:02

## 2021-01-01 RX ADMIN — FAMOTIDINE 40 MG: 20 TABLET ORAL at 09:09

## 2021-01-01 RX ADMIN — OXYCODONE HYDROCHLORIDE AND ACETAMINOPHEN 1 TABLET: 5; 325 TABLET ORAL at 04:32

## 2021-01-01 RX ADMIN — CEFAZOLIN SODIUM 2000 MG: 2 SOLUTION INTRAVENOUS at 11:45

## 2021-01-01 RX ADMIN — NOREPINEPHRINE BITARTRATE 28 MCG/MIN: 1 INJECTION, SOLUTION, CONCENTRATE INTRAVENOUS at 10:08

## 2021-01-01 RX ADMIN — ENOXAPARIN SODIUM 40 MG: 40 INJECTION SUBCUTANEOUS at 09:54

## 2021-01-01 RX ADMIN — TRAMADOL HYDROCHLORIDE 50 MG: 50 TABLET ORAL at 01:14

## 2021-01-01 RX ADMIN — ALBUMIN (HUMAN) 25 G: 0.25 INJECTION, SOLUTION INTRAVENOUS at 18:07

## 2021-01-01 RX ADMIN — METOPROLOL TARTRATE 12.5 MG: 25 TABLET, FILM COATED ORAL at 07:55

## 2021-01-01 RX ADMIN — TRAMADOL HYDROCHLORIDE 50 MG: 50 TABLET ORAL at 06:53

## 2021-01-01 RX ADMIN — GABAPENTIN 100 MG: 100 CAPSULE ORAL at 07:54

## 2021-01-01 RX ADMIN — OLANZAPINE 5 MG: 5 TABLET, ORALLY DISINTEGRATING ORAL at 00:33

## 2021-01-01 RX ADMIN — OXYCODONE HYDROCHLORIDE AND ACETAMINOPHEN 1 TABLET: 5; 325 TABLET ORAL at 23:05

## 2021-01-01 RX ADMIN — SENNOSIDES 8.6 MG: 8.6 TABLET, FILM COATED ORAL at 15:35

## 2021-01-01 RX ADMIN — METRONIDAZOLE 500 MG: 500 INJECTION, SOLUTION INTRAVENOUS at 00:04

## 2021-01-01 RX ADMIN — METRONIDAZOLE 500 MG: 500 INJECTION, SOLUTION INTRAVENOUS at 08:28

## 2021-01-01 RX ADMIN — TRACE ELEMENTS INJECTION 4: 7.4; .75; 98; 151 INJECTION, SOLUTION INTRAVENOUS at 18:17

## 2021-01-01 RX ADMIN — HYDROMORPHONE HYDROCHLORIDE 0.25 MG: 1 INJECTION, SOLUTION INTRAMUSCULAR; INTRAVENOUS; SUBCUTANEOUS at 10:26

## 2021-01-01 RX ADMIN — INSULIN LISPRO 1 UNITS: 100 INJECTION, SOLUTION INTRAVENOUS; SUBCUTANEOUS at 17:53

## 2021-01-01 RX ADMIN — OXYCODONE HYDROCHLORIDE AND ACETAMINOPHEN 1 TABLET: 5; 325 TABLET ORAL at 23:11

## 2021-01-01 RX ADMIN — OXYCODONE 10 MG: 5 TABLET ORAL at 04:34

## 2021-01-01 RX ADMIN — HYDROMORPHONE HYDROCHLORIDE 1 MG: 1 INJECTION, SOLUTION INTRAMUSCULAR; INTRAVENOUS; SUBCUTANEOUS at 03:16

## 2021-01-01 RX ADMIN — IOHEXOL 50 ML: 240 INJECTION, SOLUTION INTRATHECAL; INTRAVASCULAR; INTRAVENOUS; ORAL at 16:06

## 2021-01-01 RX ADMIN — CALCIUM GLUCONATE 1000 MG: 98 INJECTION, SOLUTION INTRAVENOUS at 06:57

## 2021-01-01 RX ADMIN — METOPROLOL TARTRATE 25 MG: 25 TABLET, FILM COATED ORAL at 19:47

## 2021-01-01 RX ADMIN — HYDROMORPHONE HYDROCHLORIDE 0.25 MG: 1 INJECTION, SOLUTION INTRAMUSCULAR; INTRAVENOUS; SUBCUTANEOUS at 00:16

## 2021-01-01 RX ADMIN — FUROSEMIDE 20 MG: 20 TABLET ORAL at 08:35

## 2021-01-01 RX ADMIN — METRONIDAZOLE 500 MG: 500 INJECTION, SOLUTION INTRAVENOUS at 08:00

## 2021-01-01 RX ADMIN — CIPROFLOXACIN 400 MG: 2 INJECTION, SOLUTION INTRAVENOUS at 22:20

## 2021-01-01 RX ADMIN — Medication 10 ML: at 22:02

## 2021-01-01 RX ADMIN — Medication: at 14:31

## 2021-01-01 RX ADMIN — HEPARIN SODIUM 5000 UNITS: 5000 INJECTION INTRAVENOUS; SUBCUTANEOUS at 13:54

## 2021-01-01 RX ADMIN — SODIUM CHLORIDE 100 MG: 9 INJECTION, SOLUTION INTRAVENOUS at 15:02

## 2021-01-01 RX ADMIN — HYDROMORPHONE HYDROCHLORIDE 1 MG: 1 INJECTION, SOLUTION INTRAMUSCULAR; INTRAVENOUS; SUBCUTANEOUS at 00:14

## 2021-01-01 RX ADMIN — TRAMADOL HYDROCHLORIDE 50 MG: 50 TABLET ORAL at 07:54

## 2021-01-01 RX ADMIN — TRAMADOL HYDROCHLORIDE 50 MG: 50 TABLET ORAL at 19:43

## 2021-01-01 RX ADMIN — METOPROLOL TARTRATE 5 MG: 5 INJECTION INTRAVENOUS at 09:49

## 2021-01-01 RX ADMIN — INSULIN LISPRO 1 UNITS: 100 INJECTION, SOLUTION INTRAVENOUS; SUBCUTANEOUS at 21:53

## 2021-01-01 RX ADMIN — OLANZAPINE 5 MG: 5 TABLET, ORALLY DISINTEGRATING ORAL at 22:03

## 2021-01-01 RX ADMIN — Medication 10 ML: at 20:47

## 2021-01-01 RX ADMIN — INSULIN HUMAN 9 UNITS: 100 INJECTION, SOLUTION PARENTERAL at 02:18

## 2021-01-01 RX ADMIN — SODIUM CHLORIDE: 9 INJECTION, SOLUTION INTRAVENOUS at 13:34

## 2021-01-01 RX ADMIN — TRAMADOL HYDROCHLORIDE 50 MG: 50 TABLET ORAL at 18:58

## 2021-01-01 RX ADMIN — POTASSIUM CHLORIDE 10 MEQ: 10 INJECTION, SOLUTION INTRAVENOUS at 09:10

## 2021-01-01 RX ADMIN — CEFEPIME HYDROCHLORIDE 2000 MG: 2 INJECTION, POWDER, FOR SOLUTION INTRAVENOUS at 13:06

## 2021-01-01 RX ADMIN — TRAMADOL HYDROCHLORIDE 50 MG: 50 TABLET ORAL at 02:35

## 2021-01-01 RX ADMIN — NOREPINEPHRINE BITARTRATE 38 MCG/MIN: 1 INJECTION, SOLUTION, CONCENTRATE INTRAVENOUS at 11:57

## 2021-01-01 RX ADMIN — INSULIN HUMAN 5 UNITS: 100 INJECTION, SOLUTION PARENTERAL at 02:28

## 2021-01-01 RX ADMIN — METOPROLOL TARTRATE 5 MG: 5 INJECTION INTRAVENOUS at 13:04

## 2021-01-01 RX ADMIN — SODIUM CHLORIDE 100 MG: 9 INJECTION, SOLUTION INTRAVENOUS at 09:47

## 2021-01-01 RX ADMIN — Medication 10 ML: at 09:34

## 2021-01-01 RX ADMIN — Medication 10 ML: at 08:25

## 2021-01-01 RX ADMIN — SODIUM CHLORIDE, SODIUM GLUCONATE, SODIUM ACETATE, POTASSIUM CHLORIDE AND MAGNESIUM CHLORIDE: 526; 502; 368; 37; 30 INJECTION, SOLUTION INTRAVENOUS at 22:50

## 2021-01-01 RX ADMIN — PANTOPRAZOLE SODIUM 40 MG: 40 INJECTION, POWDER, FOR SOLUTION INTRAVENOUS at 08:32

## 2021-01-01 RX ADMIN — HYDROMORPHONE HYDROCHLORIDE 0.5 MG: 1 INJECTION, SOLUTION INTRAMUSCULAR; INTRAVENOUS; SUBCUTANEOUS at 21:03

## 2021-01-01 RX ADMIN — Medication: at 13:21

## 2021-01-01 RX ADMIN — HEPARIN SODIUM 5000 UNITS: 5000 INJECTION INTRAVENOUS; SUBCUTANEOUS at 13:01

## 2021-01-01 RX ADMIN — TRAMADOL HYDROCHLORIDE 50 MG: 50 TABLET ORAL at 17:26

## 2021-01-01 RX ADMIN — FAMOTIDINE 40 MG: 20 TABLET ORAL at 09:25

## 2021-01-01 RX ADMIN — Medication: at 18:35

## 2021-01-01 RX ADMIN — CYANOCOBALAMIN TAB 1000 MCG 1000 MCG: 1000 TAB at 07:40

## 2021-01-01 RX ADMIN — METRONIDAZOLE 500 MG: 500 INJECTION, SOLUTION INTRAVENOUS at 07:51

## 2021-01-01 RX ADMIN — SODIUM CHLORIDE, SODIUM GLUCONATE, SODIUM ACETATE, POTASSIUM CHLORIDE AND MAGNESIUM CHLORIDE: 526; 502; 368; 37; 30 INJECTION, SOLUTION INTRAVENOUS at 13:23

## 2021-01-01 RX ADMIN — CEFTAZIDIME, AVIBACTAM 0.94 G: 2; .5 POWDER, FOR SOLUTION INTRAVENOUS at 04:19

## 2021-01-01 RX ADMIN — METHOCARBAMOL 500 MG: 100 INJECTION, SOLUTION INTRAMUSCULAR; INTRAVENOUS at 20:12

## 2021-01-01 RX ADMIN — LACTULOSE 20 G: 10 SOLUTION ORAL at 09:07

## 2021-01-01 RX ADMIN — INSULIN HUMAN 12 UNITS: 100 INJECTION, SOLUTION PARENTERAL at 12:33

## 2021-01-01 RX ADMIN — SODIUM PHOSPHATE, MONOBASIC, MONOHYDRATE AND SODIUM PHOSPHATE, DIBASIC, ANHYDROUS 20 MMOL: 276; 142 INJECTION, SOLUTION INTRAVENOUS at 17:18

## 2021-01-01 RX ADMIN — LORAZEPAM 2 MG: 2 INJECTION INTRAMUSCULAR; INTRAVENOUS at 21:01

## 2021-01-01 RX ADMIN — Medication: at 00:39

## 2021-01-01 RX ADMIN — INSULIN LISPRO 1 UNITS: 100 INJECTION, SOLUTION INTRAVENOUS; SUBCUTANEOUS at 16:37

## 2021-01-01 RX ADMIN — HYDROXYZINE HYDROCHLORIDE 25 MG: 25 TABLET ORAL at 08:28

## 2021-01-01 RX ADMIN — METOPROLOL TARTRATE 25 MG: 25 TABLET, FILM COATED ORAL at 08:37

## 2021-01-01 RX ADMIN — TRAMADOL HYDROCHLORIDE 50 MG: 50 TABLET ORAL at 01:19

## 2021-01-01 RX ADMIN — I.V. FAT EMULSION 250 ML: 20 EMULSION INTRAVENOUS at 18:18

## 2021-01-01 RX ADMIN — PANTOPRAZOLE SODIUM 40 MG: 40 INJECTION, POWDER, FOR SOLUTION INTRAVENOUS at 22:01

## 2021-01-01 RX ADMIN — FENTANYL CITRATE 100 MCG: 50 INJECTION, SOLUTION INTRAMUSCULAR; INTRAVENOUS at 23:53

## 2021-01-01 RX ADMIN — HYDROMORPHONE HYDROCHLORIDE 0.25 MG: 1 INJECTION, SOLUTION INTRAMUSCULAR; INTRAVENOUS; SUBCUTANEOUS at 16:44

## 2021-01-01 RX ADMIN — METHOCARBAMOL 500 MG: 100 INJECTION, SOLUTION INTRAMUSCULAR; INTRAVENOUS at 06:00

## 2021-01-01 RX ADMIN — Medication 10 ML: at 21:11

## 2021-01-01 RX ADMIN — TRAMADOL HYDROCHLORIDE 50 MG: 50 TABLET ORAL at 23:39

## 2021-01-01 RX ADMIN — MORPHINE SULFATE 4 MG: 4 INJECTION INTRAVENOUS at 20:12

## 2021-01-01 RX ADMIN — METRONIDAZOLE 500 MG: 500 INJECTION, SOLUTION INTRAVENOUS at 08:02

## 2021-01-01 RX ADMIN — OLANZAPINE 5 MG: 5 TABLET, FILM COATED ORAL at 20:00

## 2021-01-01 RX ADMIN — DOCUSATE SODIUM 50 MG AND SENNOSIDES 8.6 MG 1 TABLET: 8.6; 5 TABLET, FILM COATED ORAL at 08:41

## 2021-01-01 RX ADMIN — PHYTONADIONE 5 MG: 5 TABLET ORAL at 14:23

## 2021-01-01 RX ADMIN — METOPROLOL TARTRATE 25 MG: 25 TABLET, FILM COATED ORAL at 09:35

## 2021-01-01 RX ADMIN — METRONIDAZOLE 500 MG: 500 INJECTION, SOLUTION INTRAVENOUS at 15:03

## 2021-01-01 RX ADMIN — OLANZAPINE 5 MG: 5 TABLET, FILM COATED ORAL at 20:47

## 2021-01-01 RX ADMIN — EPINEPHRINE 0.3 MCG/KG/MIN: 1 INJECTION INTRAMUSCULAR; INTRAVENOUS; SUBCUTANEOUS at 09:14

## 2021-01-01 RX ADMIN — ONDANSETRON 4 MG: 2 INJECTION INTRAMUSCULAR; INTRAVENOUS at 22:20

## 2021-01-01 RX ADMIN — METHOCARBAMOL 750 MG: 100 INJECTION, SOLUTION INTRAMUSCULAR; INTRAVENOUS at 14:09

## 2021-01-01 RX ADMIN — Medication 10 ML: at 08:58

## 2021-01-01 RX ADMIN — CEFAZOLIN SODIUM 2000 MG: 2 SOLUTION INTRAVENOUS at 23:39

## 2021-01-01 RX ADMIN — HEPARIN SODIUM 5000 UNITS: 5000 INJECTION INTRAVENOUS; SUBCUTANEOUS at 14:22

## 2021-01-01 RX ADMIN — HEPARIN SODIUM 5000 UNITS: 5000 INJECTION INTRAVENOUS; SUBCUTANEOUS at 05:14

## 2021-01-01 RX ADMIN — TRAMADOL HYDROCHLORIDE 50 MG: 50 TABLET ORAL at 23:20

## 2021-01-01 RX ADMIN — SPIRONOLACTONE 50 MG: 50 TABLET ORAL at 09:54

## 2021-01-01 RX ADMIN — DOCUSATE SODIUM 50 MG AND SENNOSIDES 8.6 MG 1 TABLET: 8.6; 5 TABLET, FILM COATED ORAL at 08:30

## 2021-01-01 RX ADMIN — ENOXAPARIN SODIUM 30 MG: 30 INJECTION SUBCUTANEOUS at 08:37

## 2021-01-01 RX ADMIN — CEFTAZIDIME, AVIBACTAM 0.94 G: 2; .5 POWDER, FOR SOLUTION INTRAVENOUS at 16:47

## 2021-01-01 RX ADMIN — VASOPRESSIN 0.03 UNITS/MIN: 20 INJECTION INTRAVENOUS at 12:10

## 2021-01-01 RX ADMIN — POTASSIUM CHLORIDE 20 MEQ: 400 INJECTION, SOLUTION INTRAVENOUS at 13:39

## 2021-01-01 RX ADMIN — PANTOPRAZOLE SODIUM 40 MG: 40 INJECTION, POWDER, FOR SOLUTION INTRAVENOUS at 20:24

## 2021-01-01 RX ADMIN — INSULIN HUMAN 9 UNITS: 100 INJECTION, SOLUTION PARENTERAL at 08:10

## 2021-01-01 RX ADMIN — HEPARIN SODIUM 5000 UNITS: 5000 INJECTION INTRAVENOUS; SUBCUTANEOUS at 20:10

## 2021-01-01 RX ADMIN — SODIUM CHLORIDE: 9 INJECTION INTRAMUSCULAR; INTRAVENOUS; SUBCUTANEOUS at 04:01

## 2021-01-01 RX ADMIN — CEFAZOLIN SODIUM 2000 MG: 2 SOLUTION INTRAVENOUS at 23:55

## 2021-01-01 RX ADMIN — OXYCODONE 10 MG: 5 TABLET ORAL at 22:58

## 2021-01-01 RX ADMIN — FENTANYL CITRATE 100 MCG: 50 INJECTION, SOLUTION INTRAMUSCULAR; INTRAVENOUS at 00:17

## 2021-01-01 RX ADMIN — HYDROMORPHONE HYDROCHLORIDE 0.5 MG: 1 INJECTION, SOLUTION INTRAMUSCULAR; INTRAVENOUS; SUBCUTANEOUS at 09:16

## 2021-01-01 RX ADMIN — METOPROLOL TARTRATE 12.5 MG: 25 TABLET, FILM COATED ORAL at 20:44

## 2021-01-01 RX ADMIN — METOPROLOL TARTRATE 12.5 MG: 25 TABLET, FILM COATED ORAL at 20:58

## 2021-01-01 RX ADMIN — Medication: at 08:28

## 2021-01-01 RX ADMIN — HYDROMORPHONE HYDROCHLORIDE 1 MG: 1 INJECTION, SOLUTION INTRAMUSCULAR; INTRAVENOUS; SUBCUTANEOUS at 19:56

## 2021-01-01 RX ADMIN — POTASSIUM CHLORIDE 20 MEQ: 29.8 INJECTION, SOLUTION INTRAVENOUS at 06:54

## 2021-01-01 RX ADMIN — CEFEPIME HYDROCHLORIDE 1000 MG: 1 INJECTION, POWDER, FOR SOLUTION INTRAMUSCULAR; INTRAVENOUS at 23:37

## 2021-01-01 RX ADMIN — INSULIN HUMAN 6 UNITS: 100 INJECTION, SOLUTION PARENTERAL at 03:58

## 2021-01-01 RX ADMIN — METRONIDAZOLE 500 MG: 500 INJECTION, SOLUTION INTRAVENOUS at 15:54

## 2021-01-01 RX ADMIN — INSULIN HUMAN 5 UNITS: 100 INJECTION, SOLUTION PARENTERAL at 19:13

## 2021-01-01 RX ADMIN — ENOXAPARIN SODIUM 40 MG: 40 INJECTION SUBCUTANEOUS at 09:07

## 2021-01-01 RX ADMIN — Medication 250 MG: at 20:10

## 2021-01-01 RX ADMIN — TRACE ELEMENTS INJECTION 4: 7.4; .75; 98; 151 INJECTION, SOLUTION INTRAVENOUS at 18:18

## 2021-01-01 RX ADMIN — TRAMADOL HYDROCHLORIDE 50 MG: 50 TABLET ORAL at 06:45

## 2021-01-01 RX ADMIN — Medication 5 MG: at 21:11

## 2021-01-01 RX ADMIN — TRAMADOL HYDROCHLORIDE 50 MG: 50 TABLET ORAL at 06:28

## 2021-01-01 RX ADMIN — PANTOPRAZOLE SODIUM 40 MG: 40 TABLET, DELAYED RELEASE ORAL at 06:45

## 2021-01-01 RX ADMIN — Medication: at 19:51

## 2021-01-01 RX ADMIN — CYANOCOBALAMIN TAB 1000 MCG 1000 MCG: 1000 TAB at 08:56

## 2021-01-01 RX ADMIN — OLANZAPINE 5 MG: 5 TABLET, FILM COATED ORAL at 00:59

## 2021-01-01 RX ADMIN — Medication 10 ML: at 08:29

## 2021-01-01 RX ADMIN — CEFEPIME HYDROCHLORIDE 2000 MG: 2 INJECTION, POWDER, FOR SOLUTION INTRAVENOUS at 23:07

## 2021-01-01 RX ADMIN — INSULIN HUMAN 5 UNITS: 100 INJECTION, SOLUTION PARENTERAL at 11:06

## 2021-01-01 RX ADMIN — GABAPENTIN 100 MG: 100 CAPSULE ORAL at 14:23

## 2021-01-01 RX ADMIN — METRONIDAZOLE 500 MG: 500 INJECTION, SOLUTION INTRAVENOUS at 00:22

## 2021-01-01 RX ADMIN — METOPROLOL TARTRATE 12.5 MG: 25 TABLET, FILM COATED ORAL at 08:41

## 2021-01-01 RX ADMIN — HYDROMORPHONE HYDROCHLORIDE 1 MG: 1 INJECTION, SOLUTION INTRAMUSCULAR; INTRAVENOUS; SUBCUTANEOUS at 22:21

## 2021-01-01 RX ADMIN — CEFEPIME HYDROCHLORIDE 2000 MG: 2 INJECTION, POWDER, FOR SOLUTION INTRAVENOUS at 10:55

## 2021-01-01 RX ADMIN — Medication: at 13:17

## 2021-01-01 RX ADMIN — HEPARIN SODIUM 5000 UNITS: 5000 INJECTION INTRAVENOUS; SUBCUTANEOUS at 05:42

## 2021-01-01 RX ADMIN — NOREPINEPHRINE BITARTRATE 38 MCG/MIN: 1 INJECTION, SOLUTION, CONCENTRATE INTRAVENOUS at 18:08

## 2021-01-01 RX ADMIN — INSULIN HUMAN 8 UNITS: 100 INJECTION, SOLUTION PARENTERAL at 16:45

## 2021-01-01 RX ADMIN — OLANZAPINE 5 MG: 5 TABLET, FILM COATED ORAL at 19:59

## 2021-01-01 RX ADMIN — METRONIDAZOLE 500 MG: 500 INJECTION, SOLUTION INTRAVENOUS at 15:00

## 2021-01-01 RX ADMIN — ENOXAPARIN SODIUM 40 MG: 40 INJECTION SUBCUTANEOUS at 08:36

## 2021-01-01 RX ADMIN — SODIUM BICARBONATE: 84 INJECTION, SOLUTION INTRAVENOUS at 09:24

## 2021-01-01 RX ADMIN — HEPARIN SODIUM 5000 UNITS: 5000 INJECTION INTRAVENOUS; SUBCUTANEOUS at 20:14

## 2021-01-01 RX ADMIN — CIPROFLOXACIN 400 MG: 2 INJECTION, SOLUTION INTRAVENOUS at 12:43

## 2021-01-01 RX ADMIN — Medication: at 08:56

## 2021-01-01 RX ADMIN — METRONIDAZOLE 500 MG: 500 INJECTION, SOLUTION INTRAVENOUS at 23:58

## 2021-01-01 RX ADMIN — METOPROLOL TARTRATE 12.5 MG: 25 TABLET, FILM COATED ORAL at 09:30

## 2021-01-01 RX ADMIN — ENOXAPARIN SODIUM 40 MG: 40 INJECTION SUBCUTANEOUS at 08:51

## 2021-01-01 RX ADMIN — Medication: at 18:33

## 2021-01-01 RX ADMIN — HEPARIN SODIUM 5000 UNITS: 5000 INJECTION INTRAVENOUS; SUBCUTANEOUS at 22:02

## 2021-01-01 RX ADMIN — SODIUM CHLORIDE 1000 ML: 9 INJECTION, SOLUTION INTRAVENOUS at 15:12

## 2021-01-01 RX ADMIN — INSULIN LISPRO 1 UNITS: 100 INJECTION, SOLUTION INTRAVENOUS; SUBCUTANEOUS at 16:50

## 2021-01-01 RX ADMIN — OXYCODONE HYDROCHLORIDE AND ACETAMINOPHEN 1 TABLET: 5; 325 TABLET ORAL at 05:41

## 2021-01-01 RX ADMIN — TRAMADOL HYDROCHLORIDE 50 MG: 50 TABLET ORAL at 09:30

## 2021-01-01 RX ADMIN — INSULIN HUMAN 8 UNITS: 100 INJECTION, SOLUTION PARENTERAL at 20:39

## 2021-01-01 RX ADMIN — EPINEPHRINE 0.3 MCG/KG/MIN: 1 INJECTION INTRAMUSCULAR; INTRAVENOUS; SUBCUTANEOUS at 11:17

## 2021-01-01 RX ADMIN — SODIUM CHLORIDE, SODIUM GLUCONATE, SODIUM ACETATE, POTASSIUM CHLORIDE AND MAGNESIUM CHLORIDE: 526; 502; 368; 37; 30 INJECTION, SOLUTION INTRAVENOUS at 08:43

## 2021-01-01 RX ADMIN — CYANOCOBALAMIN TAB 1000 MCG 1000 MCG: 1000 TAB at 09:46

## 2021-01-01 RX ADMIN — Medication 10 ML: at 09:15

## 2021-01-01 RX ADMIN — CIPROFLOXACIN 400 MG: 2 INJECTION, SOLUTION INTRAVENOUS at 11:36

## 2021-01-01 RX ADMIN — Medication 10 ML: at 20:20

## 2021-01-01 RX ADMIN — ENOXAPARIN SODIUM 40 MG: 40 INJECTION SUBCUTANEOUS at 08:25

## 2021-01-01 RX ADMIN — HEPARIN SODIUM 5000 UNITS: 5000 INJECTION INTRAVENOUS; SUBCUTANEOUS at 14:19

## 2021-01-01 RX ADMIN — Medication 10 ML: at 20:11

## 2021-01-01 RX ADMIN — Medication 10 ML: at 22:53

## 2021-01-01 RX ADMIN — METRONIDAZOLE 500 MG: 500 INJECTION, SOLUTION INTRAVENOUS at 16:47

## 2021-01-01 RX ADMIN — OLANZAPINE 5 MG: 5 TABLET, ORALLY DISINTEGRATING ORAL at 22:23

## 2021-01-01 RX ADMIN — CIPROFLOXACIN 400 MG: 2 INJECTION, SOLUTION INTRAVENOUS at 00:52

## 2021-01-01 RX ADMIN — HEPARIN SODIUM 5000 UNITS: 5000 INJECTION INTRAVENOUS; SUBCUTANEOUS at 20:28

## 2021-01-01 RX ADMIN — CEFAZOLIN SODIUM 2000 MG: 2 SOLUTION INTRAVENOUS at 12:05

## 2021-01-01 RX ADMIN — CIPROFLOXACIN 400 MG: 2 INJECTION, SOLUTION INTRAVENOUS at 00:04

## 2021-01-01 RX ADMIN — METOPROLOL TARTRATE 12.5 MG: 25 TABLET, FILM COATED ORAL at 09:54

## 2021-01-01 RX ADMIN — Medication 10 ML: at 22:01

## 2021-01-01 RX ADMIN — HYDROMORPHONE HYDROCHLORIDE 0.75 MG: 2 INJECTION, SOLUTION INTRAMUSCULAR; INTRAVENOUS; SUBCUTANEOUS at 02:46

## 2021-01-01 RX ADMIN — HYDROMORPHONE HYDROCHLORIDE 0.5 MG: 1 INJECTION, SOLUTION INTRAMUSCULAR; INTRAVENOUS; SUBCUTANEOUS at 12:55

## 2021-01-01 RX ADMIN — METOPROLOL TARTRATE 12.5 MG: 25 TABLET, FILM COATED ORAL at 01:38

## 2021-01-01 RX ADMIN — HEPARIN SODIUM 5000 UNITS: 5000 INJECTION INTRAVENOUS; SUBCUTANEOUS at 14:21

## 2021-01-01 RX ADMIN — Medication 1 TABLET: at 08:35

## 2021-01-01 RX ADMIN — HYDROMORPHONE HYDROCHLORIDE 0.25 MG: 1 INJECTION, SOLUTION INTRAMUSCULAR; INTRAVENOUS; SUBCUTANEOUS at 03:56

## 2021-01-01 RX ADMIN — Medication 10 ML: at 08:32

## 2021-01-01 RX ADMIN — HEPARIN SODIUM 5000 UNITS: 5000 INJECTION INTRAVENOUS; SUBCUTANEOUS at 05:57

## 2021-01-01 RX ADMIN — INSULIN LISPRO 1 UNITS: 100 INJECTION, SOLUTION INTRAVENOUS; SUBCUTANEOUS at 20:47

## 2021-01-01 RX ADMIN — Medication 10 ML: at 20:37

## 2021-01-01 RX ADMIN — Medication 10 ML: at 21:44

## 2021-01-01 RX ADMIN — HEPARIN SODIUM 5000 UNITS: 5000 INJECTION INTRAVENOUS; SUBCUTANEOUS at 06:26

## 2021-01-01 RX ADMIN — INSULIN LISPRO 1 UNITS: 100 INJECTION, SOLUTION INTRAVENOUS; SUBCUTANEOUS at 09:10

## 2021-01-01 RX ADMIN — INSULIN HUMAN 9 UNITS: 100 INJECTION, SOLUTION PARENTERAL at 18:36

## 2021-01-01 RX ADMIN — Medication: at 10:16

## 2021-01-01 RX ADMIN — HEPARIN SODIUM 5000 UNITS: 5000 INJECTION INTRAVENOUS; SUBCUTANEOUS at 05:07

## 2021-01-01 RX ADMIN — MEROPENEM 1000 MG: 1 INJECTION, POWDER, FOR SOLUTION INTRAVENOUS at 10:44

## 2021-01-01 RX ADMIN — HYDROMORPHONE HYDROCHLORIDE 1 MG: 1 INJECTION, SOLUTION INTRAMUSCULAR; INTRAVENOUS; SUBCUTANEOUS at 20:51

## 2021-01-01 RX ADMIN — Medication: at 03:12

## 2021-01-01 RX ADMIN — HYDROMORPHONE HYDROCHLORIDE 1 MG: 1 INJECTION, SOLUTION INTRAMUSCULAR; INTRAVENOUS; SUBCUTANEOUS at 17:27

## 2021-01-01 RX ADMIN — PANTOPRAZOLE SODIUM 40 MG: 40 INJECTION, POWDER, FOR SOLUTION INTRAVENOUS at 20:37

## 2021-01-01 RX ADMIN — Medication: at 12:13

## 2021-01-01 RX ADMIN — ASCORBIC ACID, VITAMIN A PALMITATE, CHOLECALCIFEROL, THIAMINE HYDROCHLORIDE, RIBOFLAVIN-5 PHOSPHATE SODIUM, PYRIDOXINE HYDROCHLORIDE, NIACINAMIDE, DEXPANTHENOL, ALPHA-TOCOPHEROL ACETATE, VITAMIN K1, FOLIC ACID, BIOTIN, CYANOCOBALAMIN: 200; 3300; 200; 6; 3.6; 6; 40; 15; 10; 150; 600; 60; 5 INJECTION, SOLUTION INTRAVENOUS at 18:52

## 2021-01-01 RX ADMIN — ALBUMIN (HUMAN) 25 G: 0.25 INJECTION, SOLUTION INTRAVENOUS at 11:26

## 2021-01-01 RX ADMIN — METRONIDAZOLE 500 MG: 500 INJECTION, SOLUTION INTRAVENOUS at 00:07

## 2021-01-01 RX ADMIN — OLANZAPINE 5 MG: 5 TABLET, FILM COATED ORAL at 21:34

## 2021-01-01 RX ADMIN — ENOXAPARIN SODIUM 40 MG: 40 INJECTION SUBCUTANEOUS at 08:26

## 2021-01-01 RX ADMIN — CIPROFLOXACIN 400 MG: 2 INJECTION, SOLUTION INTRAVENOUS at 11:46

## 2021-01-01 RX ADMIN — SODIUM BICARBONATE 50 MEQ: 84 INJECTION, SOLUTION INTRAVENOUS at 17:13

## 2021-01-01 RX ADMIN — CEFEPIME HYDROCHLORIDE 2000 MG: 2 INJECTION, POWDER, FOR SOLUTION INTRAVENOUS at 10:13

## 2021-01-01 RX ADMIN — SODIUM CHLORIDE 1000 ML: 0.9 INJECTION, SOLUTION INTRAVENOUS at 05:13

## 2021-01-01 RX ADMIN — HYDROMORPHONE HYDROCHLORIDE 0.5 MG: 1 INJECTION, SOLUTION INTRAMUSCULAR; INTRAVENOUS; SUBCUTANEOUS at 00:51

## 2021-01-01 RX ADMIN — METOPROLOL TARTRATE 25 MG: 25 TABLET, FILM COATED ORAL at 20:06

## 2021-01-01 RX ADMIN — METHOCARBAMOL 500 MG: 100 INJECTION, SOLUTION INTRAMUSCULAR; INTRAVENOUS at 04:57

## 2021-01-01 RX ADMIN — INSULIN HUMAN 9 UNITS: 100 INJECTION, SOLUTION PARENTERAL at 16:57

## 2021-01-01 RX ADMIN — POTASSIUM PHOSPHATE, MONOBASIC AND POTASSIUM PHOSPHATE, DIBASIC 20 MMOL: 224; 236 INJECTION, SOLUTION, CONCENTRATE INTRAVENOUS at 12:38

## 2021-01-01 RX ADMIN — METRONIDAZOLE 500 MG: 500 INJECTION, SOLUTION INTRAVENOUS at 16:26

## 2021-01-01 RX ADMIN — METOPROLOL TARTRATE 12.5 MG: 25 TABLET, FILM COATED ORAL at 08:27

## 2021-01-01 RX ADMIN — POTASSIUM CHLORIDE 20 MEQ: 400 INJECTION, SOLUTION INTRAVENOUS at 11:53

## 2021-01-01 RX ADMIN — DEXTROSE MONOHYDRATE 100 MG: 50 INJECTION, SOLUTION INTRAVENOUS at 11:55

## 2021-01-01 RX ADMIN — HEPARIN SODIUM 5000 UNITS: 5000 INJECTION INTRAVENOUS; SUBCUTANEOUS at 15:29

## 2021-01-01 RX ADMIN — METHOCARBAMOL 500 MG: 100 INJECTION, SOLUTION INTRAMUSCULAR; INTRAVENOUS at 20:34

## 2021-01-01 RX ADMIN — CEFEPIME HYDROCHLORIDE 1000 MG: 1 INJECTION, POWDER, FOR SOLUTION INTRAMUSCULAR; INTRAVENOUS at 15:25

## 2021-01-01 RX ADMIN — SODIUM CHLORIDE, SODIUM GLUCONATE, SODIUM ACETATE, POTASSIUM CHLORIDE AND MAGNESIUM CHLORIDE: 526; 502; 368; 37; 30 INJECTION, SOLUTION INTRAVENOUS at 17:31

## 2021-01-01 RX ADMIN — METRONIDAZOLE 500 MG: 500 INJECTION, SOLUTION INTRAVENOUS at 01:01

## 2021-01-01 RX ADMIN — CALCIUM GLUCONATE 1000 MG: 98 INJECTION, SOLUTION INTRAVENOUS at 21:51

## 2021-01-01 RX ADMIN — HEPARIN SODIUM 5000 UNITS: 5000 INJECTION INTRAVENOUS; SUBCUTANEOUS at 22:34

## 2021-01-01 RX ADMIN — HYDROMORPHONE HYDROCHLORIDE 1 MG: 1 INJECTION, SOLUTION INTRAMUSCULAR; INTRAVENOUS; SUBCUTANEOUS at 20:31

## 2021-01-01 RX ADMIN — ONDANSETRON 4 MG: 2 INJECTION INTRAMUSCULAR; INTRAVENOUS at 10:52

## 2021-01-01 RX ADMIN — CEFTAZIDIME, AVIBACTAM 0.94 G: 2; .5 POWDER, FOR SOLUTION INTRAVENOUS at 03:46

## 2021-01-01 RX ADMIN — HYDROMORPHONE HYDROCHLORIDE 1 MG: 1 INJECTION, SOLUTION INTRAMUSCULAR; INTRAVENOUS; SUBCUTANEOUS at 05:59

## 2021-01-01 RX ADMIN — Medication: at 20:28

## 2021-01-01 RX ADMIN — HYDROMORPHONE HYDROCHLORIDE 0.5 MG: 1 INJECTION, SOLUTION INTRAMUSCULAR; INTRAVENOUS; SUBCUTANEOUS at 06:59

## 2021-01-01 RX ADMIN — HYDROMORPHONE HYDROCHLORIDE 0.25 MG: 1 INJECTION, SOLUTION INTRAMUSCULAR; INTRAVENOUS; SUBCUTANEOUS at 12:54

## 2021-01-01 RX ADMIN — Medication: at 08:02

## 2021-01-01 RX ADMIN — PHYTONADIONE 5 MG: 5 TABLET ORAL at 08:26

## 2021-01-01 RX ADMIN — CEFEPIME HYDROCHLORIDE 2000 MG: 2 INJECTION, POWDER, FOR SOLUTION INTRAVENOUS at 11:50

## 2021-01-01 RX ADMIN — METHOCARBAMOL 500 MG: 100 INJECTION, SOLUTION INTRAMUSCULAR; INTRAVENOUS at 14:18

## 2021-01-01 RX ADMIN — TRAMADOL HYDROCHLORIDE 50 MG: 50 TABLET ORAL at 15:01

## 2021-01-01 RX ADMIN — SPIRONOLACTONE 50 MG: 50 TABLET ORAL at 09:30

## 2021-01-01 RX ADMIN — METRONIDAZOLE 500 MG: 500 INJECTION, SOLUTION INTRAVENOUS at 14:20

## 2021-01-01 RX ADMIN — SODIUM PHOSPHATE, MONOBASIC, MONOHYDRATE 30 MMOL: 276; 142 INJECTION, SOLUTION INTRAVENOUS at 07:54

## 2021-01-01 RX ADMIN — Medication 10 ML: at 22:03

## 2021-01-01 RX ADMIN — LACTULOSE 20 G: 20 SOLUTION ORAL at 13:01

## 2021-01-01 RX ADMIN — CIPROFLOXACIN 400 MG: 2 INJECTION, SOLUTION INTRAVENOUS at 12:36

## 2021-01-01 RX ADMIN — INSULIN HUMAN 2 UNITS: 100 INJECTION, SOLUTION PARENTERAL at 07:16

## 2021-01-01 RX ADMIN — INSULIN HUMAN 9 UNITS: 100 INJECTION, SOLUTION PARENTERAL at 05:50

## 2021-01-01 RX ADMIN — TRAMADOL HYDROCHLORIDE 50 MG: 50 TABLET ORAL at 14:00

## 2021-01-01 RX ADMIN — PANTOPRAZOLE SODIUM 40 MG: 40 INJECTION, POWDER, FOR SOLUTION INTRAVENOUS at 07:52

## 2021-01-01 RX ADMIN — Medication 10 ML: at 19:56

## 2021-01-01 RX ADMIN — METRONIDAZOLE 500 MG: 500 INJECTION, SOLUTION INTRAVENOUS at 16:05

## 2021-01-01 RX ADMIN — PANTOPRAZOLE SODIUM 40 MG: 40 INJECTION, POWDER, FOR SOLUTION INTRAVENOUS at 08:13

## 2021-01-01 RX ADMIN — HYDROMORPHONE HYDROCHLORIDE 1 MG: 1 INJECTION, SOLUTION INTRAMUSCULAR; INTRAVENOUS; SUBCUTANEOUS at 11:11

## 2021-01-01 RX ADMIN — CALCIUM GLUCONATE 1000 MG: 98 INJECTION, SOLUTION INTRAVENOUS at 02:13

## 2021-01-01 RX ADMIN — EPINEPHRINE 0.3 MCG/KG/MIN: 1 INJECTION INTRAMUSCULAR; INTRAVENOUS; SUBCUTANEOUS at 14:46

## 2021-01-01 RX ADMIN — INSULIN HUMAN 5 UNITS: 100 INJECTION, SOLUTION PARENTERAL at 04:26

## 2021-01-01 RX ADMIN — LACTULOSE 20 G: 10 SOLUTION ORAL at 07:54

## 2021-01-01 RX ADMIN — HEPARIN SODIUM 5000 UNITS: 5000 INJECTION INTRAVENOUS; SUBCUTANEOUS at 22:07

## 2021-01-01 RX ADMIN — CEFAZOLIN SODIUM 2000 MG: 2 SOLUTION INTRAVENOUS at 03:07

## 2021-01-01 RX ADMIN — METRONIDAZOLE 500 MG: 500 INJECTION, SOLUTION INTRAVENOUS at 16:12

## 2021-01-01 RX ADMIN — Medication: at 08:20

## 2021-01-01 RX ADMIN — GABAPENTIN 100 MG: 100 CAPSULE ORAL at 13:55

## 2021-01-01 RX ADMIN — ALBUMIN (HUMAN) 25 G: 0.25 INJECTION, SOLUTION INTRAVENOUS at 05:29

## 2021-01-01 RX ADMIN — INSULIN HUMAN 2 UNITS: 100 INJECTION, SOLUTION PARENTERAL at 22:03

## 2021-01-01 RX ADMIN — Medication 1 TABLET: at 07:40

## 2021-01-01 RX ADMIN — SODIUM CHLORIDE: 9 INJECTION, SOLUTION INTRAVENOUS at 10:22

## 2021-01-01 RX ADMIN — METHOCARBAMOL 500 MG: 100 INJECTION, SOLUTION INTRAMUSCULAR; INTRAVENOUS at 14:05

## 2021-01-01 RX ADMIN — TRAMADOL HYDROCHLORIDE 50 MG: 50 TABLET ORAL at 12:54

## 2021-01-01 RX ADMIN — DEXTROSE MONOHYDRATE 100 MG: 50 INJECTION, SOLUTION INTRAVENOUS at 12:01

## 2021-01-01 RX ADMIN — Medication 100 MG: at 23:53

## 2021-01-01 RX ADMIN — METRONIDAZOLE 500 MG: 500 INJECTION, SOLUTION INTRAVENOUS at 08:15

## 2021-01-01 RX ADMIN — Medication 10 ML: at 08:28

## 2021-01-01 RX ADMIN — INSULIN HUMAN 5 UNITS: 100 INJECTION, SOLUTION PARENTERAL at 16:05

## 2021-01-01 RX ADMIN — CEFTAZIDIME, AVIBACTAM 0.94 G: 2; .5 POWDER, FOR SOLUTION INTRAVENOUS at 04:11

## 2021-01-01 RX ADMIN — CEFAZOLIN SODIUM 2000 MG: 2 SOLUTION INTRAVENOUS at 12:00

## 2021-01-01 RX ADMIN — HYDROXYZINE HYDROCHLORIDE 25 MG: 25 TABLET ORAL at 08:54

## 2021-01-01 RX ADMIN — ONDANSETRON 4 MG: 2 INJECTION INTRAMUSCULAR; INTRAVENOUS at 19:48

## 2021-01-01 RX ADMIN — HYDROMORPHONE HYDROCHLORIDE 0.25 MG: 1 INJECTION, SOLUTION INTRAMUSCULAR; INTRAVENOUS; SUBCUTANEOUS at 05:13

## 2021-01-01 RX ADMIN — POTASSIUM CHLORIDE 10 MEQ: 10 INJECTION, SOLUTION INTRAVENOUS at 11:00

## 2021-01-01 RX ADMIN — INSULIN HUMAN 5 UNITS: 100 INJECTION, SOLUTION PARENTERAL at 05:26

## 2021-01-01 RX ADMIN — Medication 10 ML: at 08:51

## 2021-01-01 RX ADMIN — HYDROMORPHONE HYDROCHLORIDE 1 MG: 1 INJECTION, SOLUTION INTRAMUSCULAR; INTRAVENOUS; SUBCUTANEOUS at 14:23

## 2021-01-01 RX ADMIN — TRAMADOL HYDROCHLORIDE 50 MG: 50 TABLET ORAL at 12:33

## 2021-01-01 RX ADMIN — Medication 500 MG: at 13:09

## 2021-01-01 RX ADMIN — MAGNESIUM SULFATE HEPTAHYDRATE 1000 MG: 1 INJECTION, SOLUTION INTRAVENOUS at 10:39

## 2021-01-01 RX ADMIN — HEPARIN SODIUM 5000 UNITS: 5000 INJECTION INTRAVENOUS; SUBCUTANEOUS at 05:41

## 2021-01-01 RX ADMIN — NOREPINEPHRINE BITARTRATE 12 MCG/MIN: 1 INJECTION, SOLUTION, CONCENTRATE INTRAVENOUS at 22:03

## 2021-01-01 RX ADMIN — HYDROMORPHONE HYDROCHLORIDE 1 MG: 1 INJECTION, SOLUTION INTRAMUSCULAR; INTRAVENOUS; SUBCUTANEOUS at 07:33

## 2021-01-01 RX ADMIN — OLANZAPINE 5 MG: 5 TABLET, ORALLY DISINTEGRATING ORAL at 22:19

## 2021-01-01 RX ADMIN — Medication 10 ML: at 09:08

## 2021-01-01 RX ADMIN — DICYCLOMINE HYDROCHLORIDE 20 MG: 20 INJECTION, SOLUTION INTRAMUSCULAR at 21:04

## 2021-01-01 RX ADMIN — NOREPINEPHRINE BITARTRATE: 1 INJECTION, SOLUTION, CONCENTRATE INTRAVENOUS at 19:17

## 2021-01-01 RX ADMIN — FAMOTIDINE 40 MG: 20 TABLET ORAL at 08:29

## 2021-01-01 RX ADMIN — Medication 10 ML: at 08:56

## 2021-01-01 RX ADMIN — INSULIN LISPRO 1 UNITS: 100 INJECTION, SOLUTION INTRAVENOUS; SUBCUTANEOUS at 12:23

## 2021-01-01 RX ADMIN — PANTOPRAZOLE SODIUM 40 MG: 40 INJECTION, POWDER, FOR SOLUTION INTRAVENOUS at 20:00

## 2021-01-01 RX ADMIN — HYDROMORPHONE HYDROCHLORIDE 0.25 MG: 1 INJECTION, SOLUTION INTRAMUSCULAR; INTRAVENOUS; SUBCUTANEOUS at 22:02

## 2021-01-01 RX ADMIN — HEPARIN SODIUM 5000 UNITS: 5000 INJECTION INTRAVENOUS; SUBCUTANEOUS at 16:32

## 2021-01-01 RX ADMIN — HYDROMORPHONE HYDROCHLORIDE 0.5 MG: 1 INJECTION, SOLUTION INTRAMUSCULAR; INTRAVENOUS; SUBCUTANEOUS at 03:03

## 2021-01-01 RX ADMIN — HYDROMORPHONE HYDROCHLORIDE 1 MG: 1 INJECTION, SOLUTION INTRAMUSCULAR; INTRAVENOUS; SUBCUTANEOUS at 15:45

## 2021-01-01 RX ADMIN — HEPARIN SODIUM 5000 UNITS: 5000 INJECTION INTRAVENOUS; SUBCUTANEOUS at 05:46

## 2021-01-01 RX ADMIN — INSULIN LISPRO 1 UNITS: 100 INJECTION, SOLUTION INTRAVENOUS; SUBCUTANEOUS at 20:07

## 2021-01-01 RX ADMIN — METRONIDAZOLE 500 MG: 500 INJECTION, SOLUTION INTRAVENOUS at 01:14

## 2021-01-01 RX ADMIN — OXYCODONE HYDROCHLORIDE AND ACETAMINOPHEN 1 TABLET: 5; 325 TABLET ORAL at 11:29

## 2021-01-01 RX ADMIN — CALCIUM GLUCONATE 1000 MG: 98 INJECTION, SOLUTION INTRAVENOUS at 01:03

## 2021-01-01 RX ADMIN — METRONIDAZOLE 500 MG: 500 INJECTION, SOLUTION INTRAVENOUS at 06:11

## 2021-01-01 RX ADMIN — Medication 10 ML: at 08:12

## 2021-01-01 RX ADMIN — INSULIN HUMAN 9 UNITS: 100 INJECTION, SOLUTION PARENTERAL at 05:57

## 2021-01-01 RX ADMIN — HEPARIN SODIUM 5000 UNITS: 5000 INJECTION INTRAVENOUS; SUBCUTANEOUS at 22:28

## 2021-01-01 RX ADMIN — INSULIN HUMAN 2 UNITS: 100 INJECTION, SOLUTION PARENTERAL at 02:25

## 2021-01-01 RX ADMIN — LACTULOSE 20 G: 10 SOLUTION ORAL at 09:30

## 2021-01-01 RX ADMIN — HYDROMORPHONE HYDROCHLORIDE 0.5 MG: 1 INJECTION, SOLUTION INTRAMUSCULAR; INTRAVENOUS; SUBCUTANEOUS at 03:46

## 2021-01-01 RX ADMIN — INSULIN HUMAN 5 UNITS: 100 INJECTION, SOLUTION PARENTERAL at 22:29

## 2021-01-01 RX ADMIN — OLANZAPINE 5 MG: 5 TABLET, FILM COATED ORAL at 21:48

## 2021-01-01 RX ADMIN — METHOCARBAMOL 500 MG: 100 INJECTION, SOLUTION INTRAMUSCULAR; INTRAVENOUS at 05:44

## 2021-01-01 RX ADMIN — CEFEPIME HYDROCHLORIDE 1000 MG: 1 INJECTION, POWDER, FOR SOLUTION INTRAMUSCULAR; INTRAVENOUS at 01:32

## 2021-01-01 RX ADMIN — ONDANSETRON 4 MG: 2 INJECTION INTRAMUSCULAR; INTRAVENOUS at 20:58

## 2021-01-01 RX ADMIN — NOREPINEPHRINE BITARTRATE 12 MCG/MIN: 1 INJECTION, SOLUTION, CONCENTRATE INTRAVENOUS at 13:04

## 2021-01-01 RX ADMIN — METOPROLOL TARTRATE 12.5 MG: 25 TABLET, FILM COATED ORAL at 21:34

## 2021-01-01 RX ADMIN — PROPOFOL 100 MG: 10 INJECTION, EMULSION INTRAVENOUS at 23:53

## 2021-01-01 RX ADMIN — HYDROMORPHONE HYDROCHLORIDE 1 MG: 1 INJECTION, SOLUTION INTRAMUSCULAR; INTRAVENOUS; SUBCUTANEOUS at 20:25

## 2021-01-01 RX ADMIN — TRACE ELEMENTS INJECTION 4: 7.4; .75; 98; 151 INJECTION, SOLUTION INTRAVENOUS at 17:49

## 2021-01-01 RX ADMIN — ALBUMIN (HUMAN) 25 G: 12.5 INJECTION, SOLUTION INTRAVENOUS at 06:18

## 2021-01-01 RX ADMIN — CEFAZOLIN SODIUM 2000 MG: 2 SOLUTION INTRAVENOUS at 14:47

## 2021-01-01 RX ADMIN — METOPROLOL TARTRATE 12.5 MG: 25 TABLET, FILM COATED ORAL at 22:07

## 2021-01-01 RX ADMIN — TRAMADOL HYDROCHLORIDE 50 MG: 50 TABLET ORAL at 18:30

## 2021-01-01 RX ADMIN — HEPARIN SODIUM 5000 UNITS: 5000 INJECTION INTRAVENOUS; SUBCUTANEOUS at 20:23

## 2021-01-01 RX ADMIN — ONDANSETRON 4 MG: 2 INJECTION INTRAMUSCULAR; INTRAVENOUS at 06:53

## 2021-01-01 RX ADMIN — CEFAZOLIN SODIUM 2000 MG: 2 SOLUTION INTRAVENOUS at 19:54

## 2021-01-01 RX ADMIN — SPIRONOLACTONE 50 MG: 50 TABLET ORAL at 08:51

## 2021-01-01 RX ADMIN — HYDROMORPHONE HYDROCHLORIDE 0.25 MG: 2 INJECTION, SOLUTION INTRAMUSCULAR; INTRAVENOUS; SUBCUTANEOUS at 01:41

## 2021-01-01 RX ADMIN — POTASSIUM CHLORIDE 10 MEQ: 7.46 INJECTION, SOLUTION INTRAVENOUS at 10:39

## 2021-01-01 RX ADMIN — METRONIDAZOLE 500 MG: 500 INJECTION, SOLUTION INTRAVENOUS at 16:13

## 2021-01-01 RX ADMIN — CEFTAZIDIME, AVIBACTAM 0.94 G: 2; .5 POWDER, FOR SOLUTION INTRAVENOUS at 17:48

## 2021-01-01 RX ADMIN — PANTOPRAZOLE SODIUM 40 MG: 40 INJECTION, POWDER, FOR SOLUTION INTRAVENOUS at 20:52

## 2021-01-01 RX ADMIN — SPIRONOLACTONE 50 MG: 50 TABLET ORAL at 09:06

## 2021-01-01 RX ADMIN — Medication 10 ML: at 20:49

## 2021-01-01 RX ADMIN — HYDROMORPHONE HYDROCHLORIDE 0.25 MG: 1 INJECTION, SOLUTION INTRAMUSCULAR; INTRAVENOUS; SUBCUTANEOUS at 03:15

## 2021-01-01 RX ADMIN — CEFEPIME HYDROCHLORIDE 2000 MG: 2 INJECTION, POWDER, FOR SOLUTION INTRAVENOUS at 22:09

## 2021-01-01 RX ADMIN — HYDROMORPHONE HYDROCHLORIDE 1 MG: 1 INJECTION, SOLUTION INTRAMUSCULAR; INTRAVENOUS; SUBCUTANEOUS at 20:38

## 2021-01-01 RX ADMIN — OLANZAPINE 5 MG: 5 TABLET, FILM COATED ORAL at 20:22

## 2021-01-01 RX ADMIN — HEPARIN SODIUM 5000 UNITS: 5000 INJECTION INTRAVENOUS; SUBCUTANEOUS at 05:00

## 2021-01-01 RX ADMIN — Medication 1 TABLET: at 08:37

## 2021-01-01 RX ADMIN — SODIUM CHLORIDE 100 MG: 9 INJECTION, SOLUTION INTRAVENOUS at 10:12

## 2021-01-01 RX ADMIN — TRAMADOL HYDROCHLORIDE 50 MG: 50 TABLET ORAL at 08:27

## 2021-01-01 RX ADMIN — HEPARIN SODIUM 5000 UNITS: 5000 INJECTION INTRAVENOUS; SUBCUTANEOUS at 05:25

## 2021-01-01 RX ADMIN — HYDROMORPHONE HYDROCHLORIDE 0.25 MG: 1 INJECTION, SOLUTION INTRAMUSCULAR; INTRAVENOUS; SUBCUTANEOUS at 11:43

## 2021-01-01 RX ADMIN — SODIUM CHLORIDE: 9 INJECTION, SOLUTION INTRAVENOUS at 23:43

## 2021-01-01 RX ADMIN — HEPARIN SODIUM 5000 UNITS: 5000 INJECTION INTRAVENOUS; SUBCUTANEOUS at 14:47

## 2021-01-01 RX ADMIN — VANCOMYCIN HYDROCHLORIDE 500 MG: 10 INJECTION, POWDER, LYOPHILIZED, FOR SOLUTION INTRAVENOUS at 23:57

## 2021-01-01 RX ADMIN — CEFAZOLIN SODIUM 2000 MG: 2 SOLUTION INTRAVENOUS at 13:08

## 2021-01-01 RX ADMIN — ONDANSETRON 4 MG: 2 INJECTION INTRAMUSCULAR; INTRAVENOUS at 04:39

## 2021-01-01 RX ADMIN — PANTOPRAZOLE SODIUM 40 MG: 40 INJECTION, POWDER, FOR SOLUTION INTRAVENOUS at 08:25

## 2021-01-01 RX ADMIN — HEPARIN SODIUM 5000 UNITS: 5000 INJECTION INTRAVENOUS; SUBCUTANEOUS at 22:18

## 2021-01-01 RX ADMIN — METRONIDAZOLE 500 MG: 500 INJECTION, SOLUTION INTRAVENOUS at 07:00

## 2021-01-01 RX ADMIN — METOPROLOL TARTRATE 12.5 MG: 25 TABLET, FILM COATED ORAL at 09:07

## 2021-01-01 RX ADMIN — LORAZEPAM 2 MG: 2 INJECTION INTRAMUSCULAR; INTRAVENOUS at 20:23

## 2021-01-01 RX ADMIN — PANTOPRAZOLE SODIUM 40 MG: 40 INJECTION, POWDER, FOR SOLUTION INTRAVENOUS at 08:10

## 2021-01-01 RX ADMIN — PHYTONADIONE 5 MG: 5 TABLET ORAL at 09:30

## 2021-01-01 RX ADMIN — HYDROCODONE BITARTRATE AND ACETAMINOPHEN 1 TABLET: 5; 325 TABLET ORAL at 19:15

## 2021-01-01 RX ADMIN — HEPARIN SODIUM 5000 UNITS: 5000 INJECTION INTRAVENOUS; SUBCUTANEOUS at 22:10

## 2021-01-01 RX ADMIN — INSULIN HUMAN 2 UNITS: 100 INJECTION, SOLUTION PARENTERAL at 18:20

## 2021-01-01 RX ADMIN — Medication 180 MG: at 23:53

## 2021-01-01 RX ADMIN — SPIRONOLACTONE 50 MG: 50 TABLET ORAL at 08:28

## 2021-01-01 RX ADMIN — Medication 250 MG: at 18:00

## 2021-01-01 RX ADMIN — CEFAZOLIN SODIUM 2000 MG: 2 SOLUTION INTRAVENOUS at 02:48

## 2021-01-01 RX ADMIN — MORPHINE SULFATE 4 MG: 4 INJECTION INTRAVENOUS at 22:02

## 2021-01-01 RX ADMIN — METRONIDAZOLE 500 MG: 500 INJECTION, SOLUTION INTRAVENOUS at 08:37

## 2021-01-01 RX ADMIN — METRONIDAZOLE 500 MG: 500 INJECTION, SOLUTION INTRAVENOUS at 23:43

## 2021-01-01 RX ADMIN — SODIUM CHLORIDE, SODIUM GLUCONATE, SODIUM ACETATE, POTASSIUM CHLORIDE AND MAGNESIUM CHLORIDE: 526; 502; 368; 37; 30 INJECTION, SOLUTION INTRAVENOUS at 02:30

## 2021-01-01 RX ADMIN — GABAPENTIN 100 MG: 100 CAPSULE ORAL at 20:22

## 2021-01-01 RX ADMIN — NOREPINEPHRINE BITARTRATE 28 MCG/MIN: 1 INJECTION, SOLUTION, CONCENTRATE INTRAVENOUS at 19:32

## 2021-01-01 RX ADMIN — SODIUM CHLORIDE, POTASSIUM CHLORIDE, SODIUM LACTATE AND CALCIUM CHLORIDE 2859 ML: 600; 310; 30; 20 INJECTION, SOLUTION INTRAVENOUS at 03:37

## 2021-01-01 RX ADMIN — OLANZAPINE 5 MG: 5 TABLET, ORALLY DISINTEGRATING ORAL at 22:01

## 2021-01-01 RX ADMIN — HYDROMORPHONE HYDROCHLORIDE 0.5 MG: 1 INJECTION, SOLUTION INTRAMUSCULAR; INTRAVENOUS; SUBCUTANEOUS at 00:03

## 2021-01-01 RX ADMIN — SODIUM CHLORIDE 100 MG: 9 INJECTION, SOLUTION INTRAVENOUS at 10:17

## 2021-01-01 RX ADMIN — OXYCODONE 10 MG: 5 TABLET ORAL at 10:44

## 2021-01-01 RX ADMIN — CEFEPIME HYDROCHLORIDE 2000 MG: 2 INJECTION, POWDER, FOR SOLUTION INTRAVENOUS at 20:00

## 2021-01-01 RX ADMIN — HEPARIN SODIUM 5000 UNITS: 5000 INJECTION INTRAVENOUS; SUBCUTANEOUS at 05:39

## 2021-01-01 RX ADMIN — CALCIUM GLUCONATE 1000 MG: 98 INJECTION, SOLUTION INTRAVENOUS at 06:19

## 2021-01-01 RX ADMIN — TRAMADOL HYDROCHLORIDE 50 MG: 50 TABLET ORAL at 07:12

## 2021-01-01 RX ADMIN — PANTOPRAZOLE SODIUM 40 MG: 40 INJECTION, POWDER, FOR SOLUTION INTRAVENOUS at 08:58

## 2021-01-01 RX ADMIN — CEFAZOLIN SODIUM 2000 MG: 2 SOLUTION INTRAVENOUS at 10:47

## 2021-01-01 RX ADMIN — Medication: at 20:25

## 2021-01-01 RX ADMIN — METOPROLOL TARTRATE 25 MG: 25 TABLET, FILM COATED ORAL at 08:59

## 2021-01-01 RX ADMIN — INSULIN HUMAN 6 UNITS: 100 INJECTION, SOLUTION PARENTERAL at 00:00

## 2021-01-01 RX ADMIN — METRONIDAZOLE 500 MG: 500 INJECTION, SOLUTION INTRAVENOUS at 09:49

## 2021-01-01 RX ADMIN — SODIUM CHLORIDE, SODIUM GLUCONATE, SODIUM ACETATE, POTASSIUM CHLORIDE AND MAGNESIUM CHLORIDE: 526; 502; 368; 37; 30 INJECTION, SOLUTION INTRAVENOUS at 10:15

## 2021-01-01 RX ADMIN — POLYETHYLENE GLYCOL 3350 17 G: 17 POWDER, FOR SOLUTION ORAL at 15:35

## 2021-01-01 RX ADMIN — HEPARIN SODIUM 5000 UNITS: 5000 INJECTION INTRAVENOUS; SUBCUTANEOUS at 14:25

## 2021-01-01 RX ADMIN — Medication 250 MG: at 00:19

## 2021-01-01 RX ADMIN — PANTOPRAZOLE SODIUM 40 MG: 40 TABLET, DELAYED RELEASE ORAL at 06:35

## 2021-01-01 RX ADMIN — Medication: at 22:12

## 2021-01-01 RX ADMIN — HYDROMORPHONE HYDROCHLORIDE 1 MG: 1 INJECTION, SOLUTION INTRAMUSCULAR; INTRAVENOUS; SUBCUTANEOUS at 04:15

## 2021-01-01 RX ADMIN — PANTOPRAZOLE SODIUM 40 MG: 40 INJECTION, POWDER, FOR SOLUTION INTRAVENOUS at 20:38

## 2021-01-01 RX ADMIN — OLANZAPINE 5 MG: 5 TABLET, ORALLY DISINTEGRATING ORAL at 20:37

## 2021-01-01 RX ADMIN — PANTOPRAZOLE SODIUM 40 MG: 40 TABLET, DELAYED RELEASE ORAL at 07:40

## 2021-01-01 RX ADMIN — HYDROMORPHONE HYDROCHLORIDE 0.25 MG: 1 INJECTION, SOLUTION INTRAMUSCULAR; INTRAVENOUS; SUBCUTANEOUS at 04:30

## 2021-01-01 RX ADMIN — MEROPENEM 1000 MG: 1 INJECTION, POWDER, FOR SOLUTION INTRAVENOUS at 21:05

## 2021-01-01 RX ADMIN — HYDROMORPHONE HYDROCHLORIDE 0.5 MG: 2 INJECTION, SOLUTION INTRAMUSCULAR; INTRAVENOUS; SUBCUTANEOUS at 00:25

## 2021-01-01 RX ADMIN — NOREPINEPHRINE BITARTRATE 8 MCG/MIN: 1 INJECTION, SOLUTION, CONCENTRATE INTRAVENOUS at 07:58

## 2021-01-01 RX ADMIN — HYDROMORPHONE HYDROCHLORIDE 1 MG: 1 INJECTION, SOLUTION INTRAMUSCULAR; INTRAVENOUS; SUBCUTANEOUS at 09:00

## 2021-01-01 RX ADMIN — OLANZAPINE 5 MG: 5 TABLET, ORALLY DISINTEGRATING ORAL at 23:39

## 2021-01-01 RX ADMIN — ENOXAPARIN SODIUM 40 MG: 40 INJECTION SUBCUTANEOUS at 09:30

## 2021-01-01 RX ADMIN — I.V. FAT EMULSION 250 ML: 20 EMULSION INTRAVENOUS at 18:57

## 2021-01-01 RX ADMIN — Medication 1 TABLET: at 08:56

## 2021-01-01 RX ADMIN — SODIUM CHLORIDE, SODIUM GLUCONATE, SODIUM ACETATE, POTASSIUM CHLORIDE AND MAGNESIUM CHLORIDE: 526; 502; 368; 37; 30 INJECTION, SOLUTION INTRAVENOUS at 01:56

## 2021-01-01 RX ADMIN — METRONIDAZOLE 500 MG: 500 INJECTION, SOLUTION INTRAVENOUS at 23:59

## 2021-01-01 RX ADMIN — HYDROMORPHONE HYDROCHLORIDE 0.5 MG: 1 INJECTION, SOLUTION INTRAMUSCULAR; INTRAVENOUS; SUBCUTANEOUS at 08:11

## 2021-01-01 RX ADMIN — Medication: at 16:40

## 2021-01-01 RX ADMIN — HEPARIN SODIUM 5000 UNITS: 5000 INJECTION INTRAVENOUS; SUBCUTANEOUS at 14:13

## 2021-01-01 RX ADMIN — Medication: at 14:19

## 2021-01-01 RX ADMIN — OXYCODONE HYDROCHLORIDE AND ACETAMINOPHEN 1 TABLET: 5; 325 TABLET ORAL at 16:36

## 2021-01-01 RX ADMIN — CYANOCOBALAMIN TAB 1000 MCG 1000 MCG: 1000 TAB at 08:59

## 2021-01-01 RX ADMIN — OLANZAPINE 5 MG: 5 TABLET, ORALLY DISINTEGRATING ORAL at 00:26

## 2021-01-01 RX ADMIN — Medication 10 ML: at 20:40

## 2021-01-01 RX ADMIN — METHOCARBAMOL 750 MG: 100 INJECTION, SOLUTION INTRAMUSCULAR; INTRAVENOUS at 22:21

## 2021-01-01 RX ADMIN — Medication 250 MG: at 06:02

## 2021-01-01 RX ADMIN — GABAPENTIN 100 MG: 100 CAPSULE ORAL at 21:41

## 2021-01-01 RX ADMIN — METRONIDAZOLE 500 MG: 500 INJECTION, SOLUTION INTRAVENOUS at 00:28

## 2021-01-01 RX ADMIN — POTASSIUM CHLORIDE 10 MEQ: 10 INJECTION, SOLUTION INTRAVENOUS at 10:00

## 2021-01-01 RX ADMIN — INSULIN LISPRO 1 UNITS: 100 INJECTION, SOLUTION INTRAVENOUS; SUBCUTANEOUS at 00:59

## 2021-01-01 RX ADMIN — HYDROXYZINE HYDROCHLORIDE 25 MG: 25 TABLET ORAL at 09:08

## 2021-01-01 RX ADMIN — NOREPINEPHRINE BITARTRATE 38 MCG/MIN: 1 INJECTION, SOLUTION, CONCENTRATE INTRAVENOUS at 05:16

## 2021-01-01 RX ADMIN — ROCURONIUM BROMIDE 40 MG: 10 INJECTION INTRAVENOUS at 00:01

## 2021-01-01 RX ADMIN — METRONIDAZOLE 500 MG: 500 INJECTION, SOLUTION INTRAVENOUS at 16:30

## 2021-01-01 RX ADMIN — MORPHINE SULFATE 2 MG: 2 INJECTION, SOLUTION INTRAMUSCULAR; INTRAVENOUS at 21:27

## 2021-01-01 RX ADMIN — CIPROFLOXACIN 400 MG: 2 INJECTION, SOLUTION INTRAVENOUS at 10:43

## 2021-01-01 RX ADMIN — VANCOMYCIN HYDROCHLORIDE 500 MG: 10 INJECTION, POWDER, LYOPHILIZED, FOR SOLUTION INTRAVENOUS at 13:16

## 2021-01-01 RX ADMIN — PANTOPRAZOLE SODIUM 40 MG: 40 INJECTION, POWDER, FOR SOLUTION INTRAVENOUS at 09:34

## 2021-01-01 RX ADMIN — CEFEPIME HYDROCHLORIDE 2000 MG: 2 INJECTION, POWDER, FOR SOLUTION INTRAVENOUS at 21:56

## 2021-01-01 RX ADMIN — SPIRONOLACTONE 50 MG: 50 TABLET ORAL at 07:54

## 2021-01-01 RX ADMIN — METOPROLOL TARTRATE 5 MG: 5 INJECTION INTRAVENOUS at 23:25

## 2021-01-01 RX ADMIN — LORAZEPAM 2 MG: 2 INJECTION INTRAMUSCULAR; INTRAVENOUS at 20:42

## 2021-01-01 RX ADMIN — INSULIN HUMAN 2 UNITS: 100 INJECTION, SOLUTION PARENTERAL at 14:14

## 2021-01-01 RX ADMIN — CIPROFLOXACIN 400 MG: 2 INJECTION, SOLUTION INTRAVENOUS at 10:51

## 2021-01-01 RX ADMIN — Medication 10 ML: at 00:51

## 2021-01-01 RX ADMIN — CALCIUM GLUCONATE 1000 MG: 98 INJECTION, SOLUTION INTRAVENOUS at 11:57

## 2021-01-01 RX ADMIN — LACTULOSE 20 G: 10 SOLUTION ORAL at 08:52

## 2021-01-01 RX ADMIN — METOPROLOL TARTRATE 25 MG: 25 TABLET, FILM COATED ORAL at 07:40

## 2021-01-01 RX ADMIN — VANCOMYCIN HYDROCHLORIDE 500 MG: 10 INJECTION, POWDER, LYOPHILIZED, FOR SOLUTION INTRAVENOUS at 18:18

## 2021-01-01 RX ADMIN — FUROSEMIDE 40 MG: 10 INJECTION, SOLUTION INTRAMUSCULAR; INTRAVENOUS at 12:19

## 2021-01-01 RX ADMIN — IOPAMIDOL 80 ML: 755 INJECTION, SOLUTION INTRAVENOUS at 21:27

## 2021-01-01 RX ADMIN — ALBUMIN (HUMAN) 25 G: 0.25 INJECTION, SOLUTION INTRAVENOUS at 14:16

## 2021-01-01 RX ADMIN — Medication 10 ML: at 08:37

## 2021-01-01 RX ADMIN — PANTOPRAZOLE SODIUM 40 MG: 40 INJECTION, POWDER, FOR SOLUTION INTRAVENOUS at 08:36

## 2021-01-01 RX ADMIN — Medication 1 TABLET: at 08:02

## 2021-01-01 RX ADMIN — SODIUM CHLORIDE 500 ML: 9 INJECTION, SOLUTION INTRAVENOUS at 15:31

## 2021-01-01 RX ADMIN — SODIUM BICARBONATE 50 MEQ: 84 INJECTION, SOLUTION INTRAVENOUS at 23:43

## 2021-01-01 RX ADMIN — CEFEPIME HYDROCHLORIDE 1000 MG: 1 INJECTION, POWDER, FOR SOLUTION INTRAMUSCULAR; INTRAVENOUS at 02:44

## 2021-01-01 RX ADMIN — MORPHINE SULFATE 2 MG: 2 INJECTION, SOLUTION INTRAMUSCULAR; INTRAVENOUS at 00:23

## 2021-01-01 RX ADMIN — I.V. FAT EMULSION 250 ML: 20 EMULSION INTRAVENOUS at 18:53

## 2021-01-01 RX ADMIN — EPINEPHRINE 0.01 MCG/KG/MIN: 1 INJECTION INTRAMUSCULAR; INTRAVENOUS; SUBCUTANEOUS at 03:13

## 2021-01-01 RX ADMIN — TRAMADOL HYDROCHLORIDE 50 MG: 50 TABLET ORAL at 01:10

## 2021-01-01 RX ADMIN — SODIUM CHLORIDE: 9 INJECTION, SOLUTION INTRAVENOUS at 08:38

## 2021-01-01 RX ADMIN — SODIUM CHLORIDE 1000 ML: 9 INJECTION, SOLUTION INTRAVENOUS at 10:28

## 2021-01-01 RX ADMIN — IOPAMIDOL 80 ML: 755 INJECTION, SOLUTION INTRAVENOUS at 12:31

## 2021-01-01 RX ADMIN — Medication: at 09:25

## 2021-01-01 RX ADMIN — Medication 10 ML: at 22:07

## 2021-01-01 RX ADMIN — ONDANSETRON 4 MG: 2 INJECTION INTRAMUSCULAR; INTRAVENOUS at 21:03

## 2021-01-01 RX ADMIN — Medication 250 MG: at 05:57

## 2021-01-01 RX ADMIN — PANTOPRAZOLE SODIUM 40 MG: 40 TABLET, DELAYED RELEASE ORAL at 06:43

## 2021-01-01 RX ADMIN — METRONIDAZOLE 500 MG: 500 INJECTION, SOLUTION INTRAVENOUS at 00:19

## 2021-01-01 RX ADMIN — METHOCARBAMOL 750 MG: 100 INJECTION, SOLUTION INTRAMUSCULAR; INTRAVENOUS at 20:38

## 2021-01-01 RX ADMIN — METOPROLOL TARTRATE 2.5 MG: 5 INJECTION INTRAVENOUS at 02:26

## 2021-01-01 RX ADMIN — OLANZAPINE 5 MG: 5 TABLET, ORALLY DISINTEGRATING ORAL at 22:07

## 2021-01-01 RX ADMIN — OXYCODONE HYDROCHLORIDE AND ACETAMINOPHEN 1 TABLET: 5; 325 TABLET ORAL at 10:26

## 2021-01-01 RX ADMIN — LACTULOSE 20 G: 10 SOLUTION ORAL at 08:28

## 2021-01-01 RX ADMIN — Medication 10 ML: at 22:08

## 2021-01-01 RX ADMIN — HYDROMORPHONE HYDROCHLORIDE 0.25 MG: 1 INJECTION, SOLUTION INTRAMUSCULAR; INTRAVENOUS; SUBCUTANEOUS at 15:32

## 2021-01-01 RX ADMIN — SUGAMMADEX 200 MG: 100 INJECTION, SOLUTION INTRAVENOUS at 02:32

## 2021-01-01 RX ADMIN — HEPARIN SODIUM 5000 UNITS: 5000 INJECTION INTRAVENOUS; SUBCUTANEOUS at 05:13

## 2021-01-01 RX ADMIN — CEFTAZIDIME, AVIBACTAM 0.94 G: 2; .5 POWDER, FOR SOLUTION INTRAVENOUS at 16:39

## 2021-01-01 RX ADMIN — HYDROMORPHONE HYDROCHLORIDE 0.25 MG: 1 INJECTION, SOLUTION INTRAMUSCULAR; INTRAVENOUS; SUBCUTANEOUS at 16:58

## 2021-01-01 RX ADMIN — CEFAZOLIN SODIUM 2000 MG: 2 SOLUTION INTRAVENOUS at 12:24

## 2021-01-01 RX ADMIN — CIPROFLOXACIN 400 MG: 2 INJECTION, SOLUTION INTRAVENOUS at 00:32

## 2021-01-01 RX ADMIN — METRONIDAZOLE 500 MG: 500 INJECTION, SOLUTION INTRAVENOUS at 05:52

## 2021-01-01 RX ADMIN — HEPARIN SODIUM 5000 UNITS: 5000 INJECTION INTRAVENOUS; SUBCUTANEOUS at 06:35

## 2021-01-01 RX ADMIN — OXYCODONE HYDROCHLORIDE AND ACETAMINOPHEN 1 TABLET: 5; 325 TABLET ORAL at 10:55

## 2021-01-01 RX ADMIN — CIPROFLOXACIN 400 MG: 2 INJECTION, SOLUTION INTRAVENOUS at 23:08

## 2021-01-01 RX ADMIN — ALBUMIN (HUMAN) 25 G: 0.25 INJECTION, SOLUTION INTRAVENOUS at 11:50

## 2021-01-01 RX ADMIN — FAMOTIDINE 40 MG: 20 TABLET ORAL at 09:54

## 2021-01-01 RX ADMIN — TRAMADOL HYDROCHLORIDE 50 MG: 50 TABLET ORAL at 18:59

## 2021-01-01 RX ADMIN — PANTOPRAZOLE SODIUM 40 MG: 40 INJECTION, POWDER, FOR SOLUTION INTRAVENOUS at 08:34

## 2021-01-01 RX ADMIN — VASOPRESSIN 0.04 UNITS/MIN: 20 INJECTION INTRAVENOUS at 16:57

## 2021-01-01 RX ADMIN — VANCOMYCIN HYDROCHLORIDE 1750 MG: 10 INJECTION, POWDER, LYOPHILIZED, FOR SOLUTION INTRAVENOUS at 01:58

## 2021-01-01 RX ADMIN — CEFEPIME HYDROCHLORIDE 2000 MG: 2 INJECTION, POWDER, FOR SOLUTION INTRAVENOUS at 13:04

## 2021-01-01 RX ADMIN — MORPHINE SULFATE 2 MG: 2 INJECTION, SOLUTION INTRAMUSCULAR; INTRAVENOUS at 17:27

## 2021-01-01 RX ADMIN — SODIUM CHLORIDE, SODIUM GLUCONATE, SODIUM ACETATE, POTASSIUM CHLORIDE AND MAGNESIUM CHLORIDE: 526; 502; 368; 37; 30 INJECTION, SOLUTION INTRAVENOUS at 04:03

## 2021-01-01 RX ADMIN — SODIUM CHLORIDE, SODIUM GLUCONATE, SODIUM ACETATE, POTASSIUM CHLORIDE AND MAGNESIUM CHLORIDE: 526; 502; 368; 37; 30 INJECTION, SOLUTION INTRAVENOUS at 10:40

## 2021-01-01 RX ADMIN — TRAMADOL HYDROCHLORIDE 50 MG: 50 TABLET ORAL at 01:47

## 2021-01-01 RX ADMIN — CYANOCOBALAMIN TAB 1000 MCG 1000 MCG: 1000 TAB at 08:02

## 2021-01-01 RX ADMIN — TRAMADOL HYDROCHLORIDE 25 MG: 50 TABLET, FILM COATED ORAL at 04:36

## 2021-01-01 RX ADMIN — Medication 10 ML: at 09:01

## 2021-01-01 RX ADMIN — INSULIN LISPRO 1 UNITS: 100 INJECTION, SOLUTION INTRAVENOUS; SUBCUTANEOUS at 13:55

## 2021-01-01 RX ADMIN — CEFEPIME HYDROCHLORIDE 2000 MG: 2 INJECTION, POWDER, FOR SOLUTION INTRAVENOUS at 10:26

## 2021-01-01 RX ADMIN — METRONIDAZOLE 500 MG: 500 INJECTION, SOLUTION INTRAVENOUS at 08:30

## 2021-01-01 RX ADMIN — HYDROMORPHONE HYDROCHLORIDE 0.25 MG: 1 INJECTION, SOLUTION INTRAMUSCULAR; INTRAVENOUS; SUBCUTANEOUS at 18:31

## 2021-01-01 RX ADMIN — VANCOMYCIN HYDROCHLORIDE 1750 MG: 10 INJECTION, POWDER, LYOPHILIZED, FOR SOLUTION INTRAVENOUS at 10:22

## 2021-01-01 RX ADMIN — IPRATROPIUM BROMIDE AND ALBUTEROL SULFATE 1 AMPULE: .5; 3 SOLUTION RESPIRATORY (INHALATION) at 14:25

## 2021-01-01 RX ADMIN — NOREPINEPHRINE BITARTRATE 18 MCG/MIN: 1 INJECTION, SOLUTION, CONCENTRATE INTRAVENOUS at 20:44

## 2021-01-01 RX ADMIN — Medication 10 ML: at 08:00

## 2021-01-01 RX ADMIN — VANCOMYCIN HYDROCHLORIDE 500 MG: 10 INJECTION, POWDER, LYOPHILIZED, FOR SOLUTION INTRAVENOUS at 05:26

## 2021-01-01 RX ADMIN — INSULIN LISPRO 1 UNITS: 100 INJECTION, SOLUTION INTRAVENOUS; SUBCUTANEOUS at 17:02

## 2021-01-01 RX ADMIN — TRAMADOL HYDROCHLORIDE 50 MG: 50 TABLET ORAL at 00:33

## 2021-01-01 RX ADMIN — TRAMADOL HYDROCHLORIDE 50 MG: 50 TABLET ORAL at 00:42

## 2021-01-01 RX ADMIN — METRONIDAZOLE 500 MG: 500 INJECTION, SOLUTION INTRAVENOUS at 15:10

## 2021-01-01 RX ADMIN — INSULIN HUMAN 8 UNITS: 100 INJECTION, SOLUTION PARENTERAL at 13:00

## 2021-01-01 RX ADMIN — HEPARIN SODIUM 5000 UNITS: 5000 INJECTION INTRAVENOUS; SUBCUTANEOUS at 06:28

## 2021-01-01 RX ADMIN — Medication 250 MG: at 05:36

## 2021-01-01 RX ADMIN — METOPROLOL TARTRATE 12.5 MG: 25 TABLET, FILM COATED ORAL at 21:48

## 2021-01-01 RX ADMIN — METRONIDAZOLE 500 MG: 500 INJECTION, SOLUTION INTRAVENOUS at 23:01

## 2021-01-01 RX ADMIN — POTASSIUM CHLORIDE 10 MEQ: 7.46 INJECTION, SOLUTION INTRAVENOUS at 08:35

## 2021-01-01 RX ADMIN — CEFAZOLIN SODIUM 2000 MG: 2 SOLUTION INTRAVENOUS at 12:33

## 2021-01-01 RX ADMIN — SODIUM BICARBONATE: 84 INJECTION, SOLUTION INTRAVENOUS at 00:41

## 2021-01-01 RX ADMIN — ENOXAPARIN SODIUM 40 MG: 40 INJECTION SUBCUTANEOUS at 09:21

## 2021-01-01 RX ADMIN — MAGNESIUM SULFATE HEPTAHYDRATE 2000 MG: 2 INJECTION, SOLUTION INTRAVENOUS at 22:02

## 2021-01-01 RX ADMIN — IOPAMIDOL 80 ML: 755 INJECTION, SOLUTION INTRAVENOUS at 05:42

## 2021-01-01 RX ADMIN — SODIUM CHLORIDE: 9 INJECTION, SOLUTION INTRAVENOUS at 20:35

## 2021-01-01 RX ADMIN — Medication: at 06:53

## 2021-01-01 RX ADMIN — Medication 5 MG: at 00:56

## 2021-01-01 RX ADMIN — THIAMINE HYDROCHLORIDE 200 MG: 100 INJECTION, SOLUTION INTRAMUSCULAR; INTRAVENOUS at 01:20

## 2021-01-01 RX ADMIN — OLANZAPINE 5 MG: 5 TABLET, ORALLY DISINTEGRATING ORAL at 21:43

## 2021-01-01 RX ADMIN — METRONIDAZOLE 500 MG: 500 INJECTION, SOLUTION INTRAVENOUS at 07:11

## 2021-01-01 RX ADMIN — MORPHINE SULFATE 4 MG: 4 INJECTION INTRAVENOUS at 02:43

## 2021-01-01 RX ADMIN — ONDANSETRON 4 MG: 2 INJECTION INTRAMUSCULAR; INTRAVENOUS at 03:00

## 2021-01-01 RX ADMIN — FUROSEMIDE 20 MG: 20 TABLET ORAL at 09:26

## 2021-01-01 RX ADMIN — INSULIN HUMAN 9 UNITS: 100 INJECTION, SOLUTION PARENTERAL at 22:18

## 2021-01-01 RX ADMIN — HYDROMORPHONE HYDROCHLORIDE 0.25 MG: 1 INJECTION, SOLUTION INTRAMUSCULAR; INTRAVENOUS; SUBCUTANEOUS at 16:47

## 2021-01-01 RX ADMIN — ENOXAPARIN SODIUM 40 MG: 40 INJECTION SUBCUTANEOUS at 07:53

## 2021-01-01 RX ADMIN — NOREPINEPHRINE BITARTRATE 24 MCG/MIN: 1 INJECTION, SOLUTION, CONCENTRATE INTRAVENOUS at 06:57

## 2021-01-01 RX ADMIN — SODIUM CHLORIDE, SODIUM GLUCONATE, SODIUM ACETATE, POTASSIUM CHLORIDE AND MAGNESIUM CHLORIDE: 526; 502; 368; 37; 30 INJECTION, SOLUTION INTRAVENOUS at 21:27

## 2021-01-01 RX ADMIN — TRAMADOL HYDROCHLORIDE 50 MG: 50 TABLET ORAL at 21:05

## 2021-01-01 RX ADMIN — HEPARIN SODIUM 5000 UNITS: 5000 INJECTION INTRAVENOUS; SUBCUTANEOUS at 05:37

## 2021-01-01 RX ADMIN — Medication 5 MG: at 20:06

## 2021-01-01 RX ADMIN — METOPROLOL TARTRATE 12.5 MG: 25 TABLET, FILM COATED ORAL at 09:23

## 2021-01-01 RX ADMIN — DOCUSATE SODIUM 50 MG AND SENNOSIDES 8.6 MG 1 TABLET: 8.6; 5 TABLET, FILM COATED ORAL at 17:43

## 2021-01-01 RX ADMIN — SODIUM CHLORIDE, SODIUM GLUCONATE, SODIUM ACETATE, POTASSIUM CHLORIDE AND MAGNESIUM CHLORIDE: 526; 502; 368; 37; 30 INJECTION, SOLUTION INTRAVENOUS at 17:06

## 2021-01-01 RX ADMIN — GABAPENTIN 100 MG: 100 CAPSULE ORAL at 15:01

## 2021-01-01 RX ADMIN — Medication 250 MG: at 12:46

## 2021-01-01 RX ADMIN — HYDROMORPHONE HYDROCHLORIDE 1 MG: 1 INJECTION, SOLUTION INTRAMUSCULAR; INTRAVENOUS; SUBCUTANEOUS at 21:11

## 2021-01-01 RX ADMIN — GABAPENTIN 100 MG: 100 CAPSULE ORAL at 20:58

## 2021-01-01 RX ADMIN — HYDROMORPHONE HYDROCHLORIDE 0.25 MG: 1 INJECTION, SOLUTION INTRAMUSCULAR; INTRAVENOUS; SUBCUTANEOUS at 11:41

## 2021-01-01 RX ADMIN — OXYCODONE HYDROCHLORIDE AND ACETAMINOPHEN 1 TABLET: 5; 325 TABLET ORAL at 11:50

## 2021-01-01 RX ADMIN — POTASSIUM CHLORIDE 20 MEQ: 400 INJECTION, SOLUTION INTRAVENOUS at 10:17

## 2021-01-01 RX ADMIN — METOPROLOL TARTRATE 25 MG: 25 TABLET, FILM COATED ORAL at 08:02

## 2021-01-01 RX ADMIN — SODIUM CHLORIDE, SODIUM GLUCONATE, SODIUM ACETATE, POTASSIUM CHLORIDE AND MAGNESIUM CHLORIDE: 526; 502; 368; 37; 30 INJECTION, SOLUTION INTRAVENOUS at 03:49

## 2021-01-01 RX ADMIN — TRAMADOL HYDROCHLORIDE 50 MG: 50 TABLET ORAL at 13:01

## 2021-01-01 RX ADMIN — METOPROLOL TARTRATE 25 MG: 25 TABLET, FILM COATED ORAL at 20:27

## 2021-01-01 RX ADMIN — OXYCODONE 10 MG: 5 TABLET ORAL at 18:38

## 2021-01-01 RX ADMIN — I.V. FAT EMULSION 250 ML: 20 EMULSION INTRAVENOUS at 18:17

## 2021-01-01 RX ADMIN — PANTOPRAZOLE SODIUM 40 MG: 40 INJECTION, POWDER, FOR SOLUTION INTRAVENOUS at 22:02

## 2021-01-01 RX ADMIN — HYDROMORPHONE HYDROCHLORIDE 0.5 MG: 1 INJECTION, SOLUTION INTRAMUSCULAR; INTRAVENOUS; SUBCUTANEOUS at 22:41

## 2021-01-01 RX ADMIN — MORPHINE SULFATE 2 MG: 2 INJECTION, SOLUTION INTRAMUSCULAR; INTRAVENOUS at 08:26

## 2021-01-01 RX ADMIN — HEPARIN SODIUM 5000 UNITS: 5000 INJECTION INTRAVENOUS; SUBCUTANEOUS at 05:36

## 2021-01-01 RX ADMIN — HEPARIN SODIUM 5000 UNITS: 5000 INJECTION INTRAVENOUS; SUBCUTANEOUS at 14:15

## 2021-01-01 RX ADMIN — TRAMADOL HYDROCHLORIDE 50 MG: 50 TABLET ORAL at 06:35

## 2021-01-01 RX ADMIN — CEFTAZIDIME, AVIBACTAM 0.94 G: 2; .5 POWDER, FOR SOLUTION INTRAVENOUS at 17:07

## 2021-01-01 RX ADMIN — INSULIN LISPRO 1 UNITS: 100 INJECTION, SOLUTION INTRAVENOUS; SUBCUTANEOUS at 12:03

## 2021-01-01 RX ADMIN — HYDROMORPHONE HYDROCHLORIDE 1 MG: 1 INJECTION, SOLUTION INTRAMUSCULAR; INTRAVENOUS; SUBCUTANEOUS at 04:53

## 2021-01-01 RX ADMIN — CEFTAZIDIME, AVIBACTAM 0.94 G: 2; .5 POWDER, FOR SOLUTION INTRAVENOUS at 04:30

## 2021-01-01 RX ADMIN — GABAPENTIN 100 MG: 100 CAPSULE ORAL at 21:11

## 2021-01-01 RX ADMIN — TRAMADOL HYDROCHLORIDE 50 MG: 50 TABLET ORAL at 13:55

## 2021-01-01 RX ADMIN — MORPHINE SULFATE 4 MG: 4 INJECTION INTRAVENOUS at 04:46

## 2021-01-01 RX ADMIN — METOPROLOL TARTRATE 25 MG: 25 TABLET, FILM COATED ORAL at 20:10

## 2021-01-01 RX ADMIN — OLANZAPINE 5 MG: 5 TABLET, ORALLY DISINTEGRATING ORAL at 21:16

## 2021-01-01 RX ADMIN — METOPROLOL TARTRATE 25 MG: 25 TABLET, FILM COATED ORAL at 08:35

## 2021-01-01 RX ADMIN — CEFTAZIDIME, AVIBACTAM 0.94 G: 2; .5 POWDER, FOR SOLUTION INTRAVENOUS at 16:34

## 2021-01-01 RX ADMIN — Medication 10 ML: at 21:38

## 2021-01-01 RX ADMIN — PANTOPRAZOLE SODIUM 40 MG: 40 INJECTION, POWDER, FOR SOLUTION INTRAVENOUS at 08:02

## 2021-01-01 RX ADMIN — Medication 10 ML: at 20:58

## 2021-01-01 RX ADMIN — Medication 5 MG: at 19:56

## 2021-01-01 RX ADMIN — HYDROMORPHONE HYDROCHLORIDE 0.5 MG: 1 INJECTION, SOLUTION INTRAMUSCULAR; INTRAVENOUS; SUBCUTANEOUS at 16:19

## 2021-01-01 RX ADMIN — TRAMADOL HYDROCHLORIDE 25 MG: 50 TABLET, FILM COATED ORAL at 21:48

## 2021-01-01 RX ADMIN — Medication 1 TABLET: at 08:59

## 2021-01-01 RX ADMIN — METHOCARBAMOL 750 MG: 100 INJECTION, SOLUTION INTRAMUSCULAR; INTRAVENOUS at 04:57

## 2021-01-01 RX ADMIN — LORAZEPAM 2 MG: 2 INJECTION INTRAMUSCULAR; INTRAVENOUS at 21:21

## 2021-01-01 RX ADMIN — VANCOMYCIN HYDROCHLORIDE 500 MG: 10 INJECTION, POWDER, LYOPHILIZED, FOR SOLUTION INTRAVENOUS at 23:32

## 2021-01-01 RX ADMIN — HYDROXYZINE HYDROCHLORIDE 25 MG: 25 TABLET ORAL at 09:30

## 2021-01-01 RX ADMIN — Medication 10 ML: at 20:21

## 2021-01-01 RX ADMIN — Medication 250 MG: at 12:24

## 2021-01-01 RX ADMIN — PANTOPRAZOLE SODIUM 40 MG: 40 TABLET, DELAYED RELEASE ORAL at 06:28

## 2021-01-01 RX ADMIN — SODIUM CHLORIDE 1000 ML: 9 INJECTION, SOLUTION INTRAVENOUS at 15:41

## 2021-01-01 RX ADMIN — OLANZAPINE 5 MG: 5 TABLET, FILM COATED ORAL at 20:06

## 2021-01-01 RX ADMIN — PERFLUTREN 1.65 MG: 6.52 INJECTION, SUSPENSION INTRAVENOUS at 09:16

## 2021-01-01 RX ADMIN — SODIUM CHLORIDE 2000 ML: 9 INJECTION, SOLUTION INTRAVENOUS at 22:09

## 2021-01-01 RX ADMIN — HEPARIN SODIUM 5000 UNITS: 5000 INJECTION INTRAVENOUS; SUBCUTANEOUS at 04:33

## 2021-01-01 RX ADMIN — POTASSIUM CHLORIDE: 2 INJECTION, SOLUTION, CONCENTRATE INTRAVENOUS at 18:06

## 2021-01-01 RX ADMIN — CEFEPIME HYDROCHLORIDE 1000 MG: 1 INJECTION, POWDER, FOR SOLUTION INTRAMUSCULAR; INTRAVENOUS at 10:57

## 2021-01-01 RX ADMIN — INSULIN HUMAN 5 UNITS: 100 INJECTION, SOLUTION PARENTERAL at 14:15

## 2021-01-01 RX ADMIN — METOPROLOL TARTRATE 25 MG: 25 TABLET, FILM COATED ORAL at 20:46

## 2021-01-01 RX ADMIN — METRONIDAZOLE 500 MG: 500 INJECTION, SOLUTION INTRAVENOUS at 23:10

## 2021-01-01 RX ADMIN — CEFEPIME HYDROCHLORIDE 2000 MG: 2 INJECTION, POWDER, FOR SOLUTION INTRAVENOUS at 22:07

## 2021-01-01 RX ADMIN — METHOCARBAMOL 500 MG: 100 INJECTION, SOLUTION INTRAMUSCULAR; INTRAVENOUS at 14:07

## 2021-01-01 RX ADMIN — Medication: at 23:04

## 2021-01-01 RX ADMIN — Medication 250 MG: at 05:50

## 2021-01-01 RX ADMIN — HYDROMORPHONE HYDROCHLORIDE 0.25 MG: 1 INJECTION, SOLUTION INTRAMUSCULAR; INTRAVENOUS; SUBCUTANEOUS at 07:26

## 2021-01-01 RX ADMIN — FUROSEMIDE 40 MG: 10 INJECTION, SOLUTION INTRAMUSCULAR; INTRAVENOUS at 23:16

## 2021-01-01 RX ADMIN — Medication 10 ML: at 09:54

## 2021-01-01 RX ADMIN — HYDROMORPHONE HYDROCHLORIDE 0.5 MG: 1 INJECTION, SOLUTION INTRAMUSCULAR; INTRAVENOUS; SUBCUTANEOUS at 10:44

## 2021-01-01 RX ADMIN — METRONIDAZOLE 500 MG: 500 INJECTION, SOLUTION INTRAVENOUS at 13:56

## 2021-01-01 RX ADMIN — METOPROLOL TARTRATE 5 MG: 5 INJECTION INTRAVENOUS at 06:57

## 2021-01-01 RX ADMIN — METOPROLOL TARTRATE 25 MG: 25 TABLET, FILM COATED ORAL at 08:55

## 2021-01-01 RX ADMIN — Medication 10 ML: at 19:55

## 2021-01-01 RX ADMIN — ENOXAPARIN SODIUM 40 MG: 40 INJECTION SUBCUTANEOUS at 08:33

## 2021-01-01 RX ADMIN — LIDOCAINE HYDROCHLORIDE 5 ML: 10 INJECTION, SOLUTION EPIDURAL; INFILTRATION; INTRACAUDAL; PERINEURAL at 16:14

## 2021-01-01 RX ADMIN — HYDROMORPHONE HYDROCHLORIDE 1 MG: 1 INJECTION, SOLUTION INTRAMUSCULAR; INTRAVENOUS; SUBCUTANEOUS at 21:44

## 2021-01-01 RX ADMIN — HYDROMORPHONE HYDROCHLORIDE 0.5 MG: 1 INJECTION, SOLUTION INTRAMUSCULAR; INTRAVENOUS; SUBCUTANEOUS at 17:42

## 2021-01-01 RX ADMIN — TRACE ELEMENTS INJECTION 4: 7.4; .75; 98; 151 INJECTION, SOLUTION INTRAVENOUS at 18:57

## 2021-01-01 RX ADMIN — TRAMADOL HYDROCHLORIDE 50 MG: 50 TABLET ORAL at 11:00

## 2021-01-01 RX ADMIN — NOREPINEPHRINE BITARTRATE 10 MCG/MIN: 1 INJECTION, SOLUTION, CONCENTRATE INTRAVENOUS at 05:31

## 2021-01-01 RX ADMIN — POTASSIUM PHOSPHATE, MONOBASIC AND POTASSIUM PHOSPHATE, DIBASIC 20 MMOL: 224; 236 INJECTION, SOLUTION, CONCENTRATE INTRAVENOUS at 20:28

## 2021-01-01 RX ADMIN — METOPROLOL TARTRATE 25 MG: 25 TABLET, FILM COATED ORAL at 09:45

## 2021-01-01 RX ADMIN — METRONIDAZOLE 500 MG: 500 INJECTION, SOLUTION INTRAVENOUS at 22:20

## 2021-01-01 RX ADMIN — CEFEPIME HYDROCHLORIDE 2000 MG: 2 INJECTION, POWDER, FOR SOLUTION INTRAVENOUS at 03:25

## 2021-01-01 RX ADMIN — PANTOPRAZOLE SODIUM 40 MG: 40 INJECTION, POWDER, FOR SOLUTION INTRAVENOUS at 20:39

## 2021-01-01 RX ADMIN — IPRATROPIUM BROMIDE AND ALBUTEROL SULFATE 1 AMPULE: .5; 3 SOLUTION RESPIRATORY (INHALATION) at 16:51

## 2021-01-01 RX ADMIN — OLANZAPINE 5 MG: 5 TABLET, ORALLY DISINTEGRATING ORAL at 19:55

## 2021-01-01 RX ADMIN — CEFEPIME HYDROCHLORIDE 2000 MG: 2 INJECTION, POWDER, FOR SOLUTION INTRAVENOUS at 11:59

## 2021-01-01 RX ADMIN — FAMOTIDINE 40 MG: 20 TABLET ORAL at 08:27

## 2021-01-01 RX ADMIN — OLANZAPINE 5 MG: 5 TABLET, FILM COATED ORAL at 20:43

## 2021-01-01 RX ADMIN — Medication 1 TABLET: at 09:45

## 2021-01-01 RX ADMIN — SODIUM CHLORIDE, SODIUM GLUCONATE, SODIUM ACETATE, POTASSIUM CHLORIDE AND MAGNESIUM CHLORIDE: 526; 502; 368; 37; 30 INJECTION, SOLUTION INTRAVENOUS at 04:34

## 2021-01-01 RX ADMIN — POTASSIUM CHLORIDE 10 MEQ: 7.46 INJECTION, SOLUTION INTRAVENOUS at 09:40

## 2021-01-01 RX ADMIN — OXYCODONE HYDROCHLORIDE AND ACETAMINOPHEN 1 TABLET: 5; 325 TABLET ORAL at 05:05

## 2021-01-01 RX ADMIN — HEPARIN SODIUM 5000 UNITS: 5000 INJECTION INTRAVENOUS; SUBCUTANEOUS at 22:52

## 2021-01-01 RX ADMIN — Medication 250 MG: at 00:14

## 2021-01-01 RX ADMIN — Medication 10 ML: at 08:30

## 2021-01-01 RX ADMIN — INSULIN HUMAN 9 UNITS: 100 INJECTION, SOLUTION PARENTERAL at 10:03

## 2021-01-01 RX ADMIN — Medication 10 ML: at 20:52

## 2021-01-01 RX ADMIN — NOREPINEPHRINE BITARTRATE 10 MCG/MIN: 1 INJECTION, SOLUTION, CONCENTRATE INTRAVENOUS at 19:16

## 2021-01-01 RX ADMIN — SODIUM CHLORIDE 500 ML: 9 INJECTION, SOLUTION INTRAVENOUS at 00:46

## 2021-01-01 RX ADMIN — PANTOPRAZOLE SODIUM 40 MG: 40 TABLET, DELAYED RELEASE ORAL at 05:37

## 2021-01-01 RX ADMIN — VANCOMYCIN HYDROCHLORIDE 500 MG: 10 INJECTION, POWDER, LYOPHILIZED, FOR SOLUTION INTRAVENOUS at 00:14

## 2021-01-01 RX ADMIN — VANCOMYCIN HYDROCHLORIDE 500 MG: 10 INJECTION, POWDER, LYOPHILIZED, FOR SOLUTION INTRAVENOUS at 06:31

## 2021-01-01 RX ADMIN — HYDROMORPHONE HYDROCHLORIDE 0.5 MG: 1 INJECTION, SOLUTION INTRAMUSCULAR; INTRAVENOUS; SUBCUTANEOUS at 13:08

## 2021-01-01 RX ADMIN — Medication: at 19:55

## 2021-01-01 RX ADMIN — SODIUM CHLORIDE 1000 ML: 9 INJECTION, SOLUTION INTRAVENOUS at 21:03

## 2021-01-01 RX ADMIN — SODIUM CHLORIDE, SODIUM GLUCONATE, SODIUM ACETATE, POTASSIUM CHLORIDE AND MAGNESIUM CHLORIDE: 526; 502; 368; 37; 30 INJECTION, SOLUTION INTRAVENOUS at 03:34

## 2021-01-01 RX ADMIN — SODIUM CHLORIDE 100 MG: 9 INJECTION, SOLUTION INTRAVENOUS at 08:35

## 2021-01-01 RX ADMIN — VANCOMYCIN HYDROCHLORIDE 750 MG: 10 INJECTION, POWDER, LYOPHILIZED, FOR SOLUTION INTRAVENOUS at 10:06

## 2021-01-01 RX ADMIN — LORAZEPAM 1 MG: 2 INJECTION INTRAMUSCULAR; INTRAVENOUS at 19:52

## 2021-01-01 RX ADMIN — I.V. FAT EMULSION 250 ML: 20 EMULSION INTRAVENOUS at 17:48

## 2021-01-01 RX ADMIN — OLANZAPINE 5 MG: 5 TABLET, ORALLY DISINTEGRATING ORAL at 22:21

## 2021-01-01 RX ADMIN — SODIUM BICARBONATE 50 MEQ: 84 INJECTION, SOLUTION INTRAVENOUS at 10:32

## 2021-01-01 RX ADMIN — HYDROMORPHONE HYDROCHLORIDE 1 MG: 1 INJECTION, SOLUTION INTRAMUSCULAR; INTRAVENOUS; SUBCUTANEOUS at 12:43

## 2021-01-01 RX ADMIN — VASOPRESSIN 0.04 UNITS/MIN: 20 INJECTION INTRAVENOUS at 08:35

## 2021-01-01 RX ADMIN — ONDANSETRON 4 MG: 2 INJECTION INTRAMUSCULAR; INTRAVENOUS at 11:44

## 2021-01-01 RX ADMIN — POTASSIUM CHLORIDE 10 MEQ: 10 INJECTION, SOLUTION INTRAVENOUS at 07:55

## 2021-01-01 RX ADMIN — GABAPENTIN 100 MG: 100 CAPSULE ORAL at 15:00

## 2021-01-01 RX ADMIN — VASOPRESSIN 0.03 UNITS/MIN: 20 INJECTION INTRAVENOUS at 23:58

## 2021-01-01 RX ADMIN — INSULIN HUMAN 2 UNITS: 100 INJECTION, SOLUTION PARENTERAL at 10:30

## 2021-01-01 RX ADMIN — POTASSIUM CHLORIDE 20 MEQ: 400 INJECTION, SOLUTION INTRAVENOUS at 05:46

## 2021-01-01 RX ADMIN — OLANZAPINE 5 MG: 5 TABLET, ORALLY DISINTEGRATING ORAL at 21:07

## 2021-01-01 RX ADMIN — Medication 250 MG: at 00:01

## 2021-01-01 RX ADMIN — VANCOMYCIN HYDROCHLORIDE 500 MG: 10 INJECTION, POWDER, LYOPHILIZED, FOR SOLUTION INTRAVENOUS at 12:25

## 2021-01-01 RX ADMIN — METOPROLOL TARTRATE 12.5 MG: 25 TABLET, FILM COATED ORAL at 20:00

## 2021-01-01 RX ADMIN — Medication 10 ML: at 08:41

## 2021-01-01 RX ADMIN — LACTULOSE 20 G: 20 SOLUTION ORAL at 08:35

## 2021-01-01 RX ADMIN — HEPARIN SODIUM 5000 UNITS: 5000 INJECTION INTRAVENOUS; SUBCUTANEOUS at 14:29

## 2021-01-01 RX ADMIN — HYDROMORPHONE HYDROCHLORIDE 0.5 MG: 1 INJECTION, SOLUTION INTRAMUSCULAR; INTRAVENOUS; SUBCUTANEOUS at 10:13

## 2021-01-01 RX ADMIN — MORPHINE SULFATE 2 MG: 2 INJECTION, SOLUTION INTRAMUSCULAR; INTRAVENOUS at 11:07

## 2021-01-01 RX ADMIN — FUROSEMIDE 80 MG: 10 INJECTION, SOLUTION INTRAMUSCULAR; INTRAVENOUS at 18:07

## 2021-01-01 RX ADMIN — CALCIUM GLUCONATE 1000 MG: 98 INJECTION, SOLUTION INTRAVENOUS at 13:11

## 2021-01-01 RX ADMIN — POTASSIUM CHLORIDE 20 MEQ: 29.8 INJECTION, SOLUTION INTRAVENOUS at 05:51

## 2021-01-01 RX ADMIN — CEFEPIME HYDROCHLORIDE 2000 MG: 2 INJECTION, POWDER, FOR SOLUTION INTRAVENOUS at 03:42

## 2021-01-01 RX ADMIN — METOPROLOL TARTRATE 25 MG: 25 TABLET, FILM COATED ORAL at 20:22

## 2021-01-01 RX ADMIN — METRONIDAZOLE 500 MG: 500 INJECTION, SOLUTION INTRAVENOUS at 22:58

## 2021-01-01 RX ADMIN — HEPARIN SODIUM 5000 UNITS: 5000 INJECTION INTRAVENOUS; SUBCUTANEOUS at 21:23

## 2021-01-01 RX ADMIN — TRAMADOL HYDROCHLORIDE 25 MG: 50 TABLET, FILM COATED ORAL at 00:38

## 2021-01-01 RX ADMIN — GABAPENTIN 100 MG: 100 CAPSULE ORAL at 08:27

## 2021-01-01 RX ADMIN — SODIUM BICARBONATE: 84 INJECTION, SOLUTION INTRAVENOUS at 22:36

## 2021-01-01 RX ADMIN — CEFEPIME HYDROCHLORIDE 2000 MG: 2 INJECTION, POWDER, FOR SOLUTION INTRAVENOUS at 01:12

## 2021-01-01 RX ADMIN — ENOXAPARIN SODIUM 40 MG: 40 INJECTION SUBCUTANEOUS at 23:55

## 2021-01-01 RX ADMIN — HEPARIN SODIUM 5000 UNITS: 5000 INJECTION INTRAVENOUS; SUBCUTANEOUS at 15:02

## 2021-01-01 RX ADMIN — HYDROMORPHONE HYDROCHLORIDE 0.25 MG/ML: 1 INJECTION, SOLUTION INTRAMUSCULAR; INTRAVENOUS; SUBCUTANEOUS at 12:30

## 2021-01-01 RX ADMIN — GABAPENTIN 100 MG: 100 CAPSULE ORAL at 08:51

## 2021-01-01 RX ADMIN — ONDANSETRON 4 MG: 2 INJECTION INTRAMUSCULAR; INTRAVENOUS at 18:58

## 2021-01-01 RX ADMIN — METHOCARBAMOL 750 MG: 100 INJECTION, SOLUTION INTRAMUSCULAR; INTRAVENOUS at 20:32

## 2021-01-01 RX ADMIN — HEPARIN SODIUM 5000 UNITS: 5000 INJECTION INTRAVENOUS; SUBCUTANEOUS at 21:43

## 2021-01-01 RX ADMIN — Medication 250 MG: at 11:54

## 2021-01-01 RX ADMIN — Medication: at 08:26

## 2021-01-01 RX ADMIN — FAMOTIDINE 40 MG: 20 TABLET ORAL at 09:06

## 2021-01-01 RX ADMIN — HEPARIN SODIUM 5000 UNITS: 5000 INJECTION INTRAVENOUS; SUBCUTANEOUS at 20:47

## 2021-01-01 RX ADMIN — SODIUM CHLORIDE, SODIUM GLUCONATE, SODIUM ACETATE, POTASSIUM CHLORIDE AND MAGNESIUM CHLORIDE: 526; 502; 368; 37; 30 INJECTION, SOLUTION INTRAVENOUS at 15:46

## 2021-01-01 RX ADMIN — HYDROMORPHONE HYDROCHLORIDE 1 MG: 1 INJECTION, SOLUTION INTRAMUSCULAR; INTRAVENOUS; SUBCUTANEOUS at 23:41

## 2021-01-01 RX ADMIN — GABAPENTIN 100 MG: 100 CAPSULE ORAL at 09:06

## 2021-01-01 RX ADMIN — METRONIDAZOLE 500 MG: 500 INJECTION, SOLUTION INTRAVENOUS at 07:13

## 2021-01-01 RX ADMIN — TRAMADOL HYDROCHLORIDE 50 MG: 50 TABLET ORAL at 19:47

## 2021-01-01 RX ADMIN — METRONIDAZOLE 500 MG: 500 INJECTION, SOLUTION INTRAVENOUS at 14:29

## 2021-01-01 RX ADMIN — Medication 10 ML: at 08:35

## 2021-01-01 RX ADMIN — HYDROMORPHONE HYDROCHLORIDE 1 MG: 1 INJECTION, SOLUTION INTRAMUSCULAR; INTRAVENOUS; SUBCUTANEOUS at 11:29

## 2021-01-01 RX ADMIN — OLANZAPINE 5 MG: 5 TABLET, FILM COATED ORAL at 20:10

## 2021-01-01 RX ADMIN — OXYCODONE HYDROCHLORIDE AND ACETAMINOPHEN 1 TABLET: 5; 325 TABLET ORAL at 22:33

## 2021-01-01 RX ADMIN — Medication 250 MG: at 14:30

## 2021-01-01 RX ADMIN — HYDROMORPHONE HYDROCHLORIDE 0.5 MG: 1 INJECTION, SOLUTION INTRAMUSCULAR; INTRAVENOUS; SUBCUTANEOUS at 05:17

## 2021-01-01 RX ADMIN — FUROSEMIDE 40 MG: 10 INJECTION, SOLUTION INTRAMUSCULAR; INTRAVENOUS at 13:53

## 2021-01-01 RX ADMIN — METRONIDAZOLE 500 MG: 500 INJECTION, SOLUTION INTRAVENOUS at 06:54

## 2021-01-01 RX ADMIN — Medication 10 ML: at 09:52

## 2021-01-01 RX ADMIN — METHOCARBAMOL 500 MG: 100 INJECTION, SOLUTION INTRAMUSCULAR; INTRAVENOUS at 05:48

## 2021-01-01 RX ADMIN — MEROPENEM 1000 MG: 1 INJECTION, POWDER, FOR SOLUTION INTRAVENOUS at 08:59

## 2021-01-01 RX ADMIN — SENNOSIDES 8.6 MG: 8.6 TABLET, FILM COATED ORAL at 13:01

## 2021-01-01 RX ADMIN — Medication: at 09:01

## 2021-01-01 RX ADMIN — HYDROMORPHONE HYDROCHLORIDE 0.5 MG: 1 INJECTION, SOLUTION INTRAMUSCULAR; INTRAVENOUS; SUBCUTANEOUS at 02:28

## 2021-01-01 RX ADMIN — SODIUM CHLORIDE, POTASSIUM CHLORIDE, SODIUM LACTATE AND CALCIUM CHLORIDE 1000 ML: 600; 310; 30; 20 INJECTION, SOLUTION INTRAVENOUS at 21:43

## 2021-01-01 RX ADMIN — SODIUM CHLORIDE, SODIUM GLUCONATE, SODIUM ACETATE, POTASSIUM CHLORIDE AND MAGNESIUM CHLORIDE: 526; 502; 368; 37; 30 INJECTION, SOLUTION INTRAVENOUS at 16:14

## 2021-01-01 RX ADMIN — Medication 10 ML: at 10:38

## 2021-01-01 RX ADMIN — CYANOCOBALAMIN TAB 1000 MCG 1000 MCG: 1000 TAB at 08:37

## 2021-01-01 RX ADMIN — HYDROMORPHONE HYDROCHLORIDE 1 MG: 1 INJECTION, SOLUTION INTRAMUSCULAR; INTRAVENOUS; SUBCUTANEOUS at 14:16

## 2021-01-01 RX ADMIN — FAMOTIDINE 40 MG: 20 TABLET ORAL at 08:41

## 2021-01-01 RX ADMIN — FUROSEMIDE 40 MG: 10 INJECTION, SOLUTION INTRAMUSCULAR; INTRAVENOUS at 11:26

## 2021-01-01 RX ADMIN — VASOPRESSIN 0.03 UNITS/MIN: 20 INJECTION INTRAVENOUS at 12:11

## 2021-01-01 RX ADMIN — METOPROLOL TARTRATE 5 MG: 5 INJECTION INTRAVENOUS at 22:12

## 2021-01-01 RX ADMIN — INSULIN HUMAN 9 UNITS: 100 INJECTION, SOLUTION PARENTERAL at 15:32

## 2021-01-01 RX ADMIN — IOHEXOL 50 ML: 240 INJECTION, SOLUTION INTRATHECAL; INTRAVASCULAR; INTRAVENOUS; ORAL at 12:31

## 2021-01-01 RX ADMIN — METOPROLOL TARTRATE 2.5 MG: 5 INJECTION INTRAVENOUS at 00:26

## 2021-01-01 RX ADMIN — TRAMADOL HYDROCHLORIDE 25 MG: 50 TABLET, FILM COATED ORAL at 15:07

## 2021-01-01 RX ADMIN — Medication: at 09:15

## 2021-01-01 RX ADMIN — Medication 250 MG: at 12:05

## 2021-01-01 RX ADMIN — VANCOMYCIN HYDROCHLORIDE 500 MG: 10 INJECTION, POWDER, LYOPHILIZED, FOR SOLUTION INTRAVENOUS at 05:36

## 2021-01-01 RX ADMIN — HYDROMORPHONE HYDROCHLORIDE 0.5 MG: 1 INJECTION, SOLUTION INTRAMUSCULAR; INTRAVENOUS; SUBCUTANEOUS at 00:52

## 2021-01-01 RX ADMIN — INSULIN LISPRO 1 UNITS: 100 INJECTION, SOLUTION INTRAVENOUS; SUBCUTANEOUS at 12:17

## 2021-01-01 RX ADMIN — Medication 10 ML: at 20:24

## 2021-01-01 RX ADMIN — HYDROXYZINE HYDROCHLORIDE 25 MG: 25 TABLET ORAL at 10:24

## 2021-01-01 RX ADMIN — POTASSIUM CHLORIDE 10 MEQ: 10 INJECTION, SOLUTION INTRAVENOUS at 12:56

## 2021-01-01 RX ADMIN — METHOCARBAMOL 750 MG: 100 INJECTION, SOLUTION INTRAMUSCULAR; INTRAVENOUS at 05:06

## 2021-01-01 RX ADMIN — OLANZAPINE 5 MG: 5 TABLET, ORALLY DISINTEGRATING ORAL at 21:49

## 2021-01-01 RX ADMIN — SODIUM CHLORIDE, SODIUM GLUCONATE, SODIUM ACETATE, POTASSIUM CHLORIDE AND MAGNESIUM CHLORIDE: 526; 502; 368; 37; 30 INJECTION, SOLUTION INTRAVENOUS at 03:23

## 2021-01-01 RX ADMIN — VASOPRESSIN 0.03 UNITS/MIN: 20 INJECTION INTRAVENOUS at 02:13

## 2021-01-01 RX ADMIN — CIPROFLOXACIN 400 MG: 2 INJECTION, SOLUTION INTRAVENOUS at 23:42

## 2021-01-01 RX ADMIN — ONDANSETRON 4 MG: 2 INJECTION INTRAMUSCULAR; INTRAVENOUS at 12:09

## 2021-01-01 RX ADMIN — METRONIDAZOLE 500 MG: 500 INJECTION, SOLUTION INTRAVENOUS at 06:48

## 2021-01-01 RX ADMIN — OLANZAPINE 5 MG: 5 TABLET, FILM COATED ORAL at 22:07

## 2021-01-01 RX ADMIN — SENNOSIDES 8.6 MG: 8.6 TABLET, FILM COATED ORAL at 08:35

## 2021-01-01 RX ADMIN — SODIUM CHLORIDE: 9 INJECTION, SOLUTION INTRAVENOUS at 19:01

## 2021-01-01 RX ADMIN — DEXTROSE MONOHYDRATE 200 MG: 50 INJECTION, SOLUTION INTRAVENOUS at 13:26

## 2021-01-01 RX ADMIN — PANTOPRAZOLE SODIUM 40 MG: 40 INJECTION, POWDER, FOR SOLUTION INTRAVENOUS at 21:43

## 2021-01-01 RX ADMIN — CIPROFLOXACIN 400 MG: 2 INJECTION, SOLUTION INTRAVENOUS at 14:11

## 2021-01-01 RX ADMIN — Medication 5 MG: at 21:41

## 2021-01-01 RX ADMIN — NOREPINEPHRINE BITARTRATE 17 MCG/MIN: 1 INJECTION, SOLUTION, CONCENTRATE INTRAVENOUS at 04:57

## 2021-01-01 RX ADMIN — METHOCARBAMOL 750 MG: 100 INJECTION, SOLUTION INTRAMUSCULAR; INTRAVENOUS at 13:45

## 2021-01-01 RX ADMIN — SENNOSIDES 8.6 MG: 8.6 TABLET, FILM COATED ORAL at 19:47

## 2021-01-01 RX ADMIN — TRAMADOL HYDROCHLORIDE 50 MG: 50 TABLET ORAL at 06:56

## 2021-01-01 RX ADMIN — Medication 10 ML: at 20:44

## 2021-01-01 RX ADMIN — HEPARIN SODIUM 5000 UNITS: 5000 INJECTION INTRAVENOUS; SUBCUTANEOUS at 21:54

## 2021-01-01 RX ADMIN — HEPARIN SODIUM 5000 UNITS: 5000 INJECTION INTRAVENOUS; SUBCUTANEOUS at 06:31

## 2021-01-01 RX ADMIN — CEFAZOLIN SODIUM 2000 MG: 2 SOLUTION INTRAVENOUS at 05:16

## 2021-01-01 RX ADMIN — METRONIDAZOLE 500 MG: 500 INJECTION, SOLUTION INTRAVENOUS at 15:01

## 2021-01-01 RX ADMIN — I.V. FAT EMULSION 250 ML: 20 EMULSION INTRAVENOUS at 18:06

## 2021-01-01 RX ADMIN — HYDROMORPHONE HYDROCHLORIDE 0.5 MG: 1 INJECTION, SOLUTION INTRAMUSCULAR; INTRAVENOUS; SUBCUTANEOUS at 15:10

## 2021-01-01 RX ADMIN — PANTOPRAZOLE SODIUM 40 MG: 40 TABLET, DELAYED RELEASE ORAL at 06:26

## 2021-01-01 RX ADMIN — OLANZAPINE 5 MG: 5 TABLET, ORALLY DISINTEGRATING ORAL at 22:15

## 2021-01-01 RX ADMIN — ROCURONIUM BROMIDE 10 MG: 10 INJECTION INTRAVENOUS at 23:53

## 2021-01-01 RX ADMIN — CEFEPIME HYDROCHLORIDE 2000 MG: 2 INJECTION, POWDER, FOR SOLUTION INTRAVENOUS at 03:48

## 2021-01-01 RX ADMIN — METRONIDAZOLE 500 MG: 500 INJECTION, SOLUTION INTRAVENOUS at 16:20

## 2021-01-01 RX ADMIN — SODIUM CHLORIDE, SODIUM GLUCONATE, SODIUM ACETATE, POTASSIUM CHLORIDE AND MAGNESIUM CHLORIDE: 526; 502; 368; 37; 30 INJECTION, SOLUTION INTRAVENOUS at 08:37

## 2021-01-01 RX ADMIN — FUROSEMIDE 20 MG: 20 TABLET ORAL at 09:54

## 2021-01-01 RX ADMIN — METRONIDAZOLE 500 MG: 500 INJECTION, SOLUTION INTRAVENOUS at 16:34

## 2021-01-01 RX ADMIN — OXYCODONE HYDROCHLORIDE AND ACETAMINOPHEN 1 TABLET: 5; 325 TABLET ORAL at 17:50

## 2021-01-01 RX ADMIN — CEFAZOLIN SODIUM 2000 MG: 2 SOLUTION INTRAVENOUS at 23:19

## 2021-01-01 RX ADMIN — SODIUM CHLORIDE: 9 INJECTION, SOLUTION INTRAVENOUS at 01:54

## 2021-01-01 RX ADMIN — Medication 10 ML: at 09:53

## 2021-01-01 RX ADMIN — ONDANSETRON 4 MG: 2 INJECTION INTRAMUSCULAR; INTRAVENOUS at 15:00

## 2021-01-01 RX ADMIN — HYDROMORPHONE HYDROCHLORIDE 0.5 MG: 1 INJECTION, SOLUTION INTRAMUSCULAR; INTRAVENOUS; SUBCUTANEOUS at 12:32

## 2021-01-01 RX ADMIN — SODIUM CHLORIDE, SODIUM GLUCONATE, SODIUM ACETATE, POTASSIUM CHLORIDE AND MAGNESIUM CHLORIDE: 526; 502; 368; 37; 30 INJECTION, SOLUTION INTRAVENOUS at 18:09

## 2021-01-01 RX ADMIN — ONDANSETRON 4 MG: 2 INJECTION INTRAMUSCULAR; INTRAVENOUS at 01:50

## 2021-01-01 RX ADMIN — TRAMADOL HYDROCHLORIDE 50 MG: 50 TABLET ORAL at 08:35

## 2021-01-01 RX ADMIN — METRONIDAZOLE 500 MG: 500 INJECTION, SOLUTION INTRAVENOUS at 00:08

## 2021-01-01 RX ADMIN — OXYCODONE HYDROCHLORIDE AND ACETAMINOPHEN 1 TABLET: 5; 325 TABLET ORAL at 17:15

## 2021-01-01 RX ADMIN — SODIUM CHLORIDE 500 ML: 900 INJECTION, SOLUTION INTRAVENOUS at 16:30

## 2021-01-01 RX ADMIN — Medication 10 ML: at 22:05

## 2021-01-01 RX ADMIN — PANTOPRAZOLE SODIUM 40 MG: 40 INJECTION, POWDER, FOR SOLUTION INTRAVENOUS at 08:00

## 2021-01-01 RX ADMIN — PANTOPRAZOLE SODIUM 40 MG: 40 INJECTION, POWDER, FOR SOLUTION INTRAVENOUS at 19:57

## 2021-01-01 RX ADMIN — SODIUM CHLORIDE, SODIUM GLUCONATE, SODIUM ACETATE, POTASSIUM CHLORIDE AND MAGNESIUM CHLORIDE: 526; 502; 368; 37; 30 INJECTION, SOLUTION INTRAVENOUS at 01:30

## 2021-01-01 RX ADMIN — HYDROMORPHONE HYDROCHLORIDE 0.25 MG: 1 INJECTION, SOLUTION INTRAMUSCULAR; INTRAVENOUS; SUBCUTANEOUS at 20:39

## 2021-01-01 RX ADMIN — INSULIN HUMAN 9 UNITS: 100 INJECTION, SOLUTION PARENTERAL at 01:57

## 2021-01-01 RX ADMIN — HYDROMORPHONE HYDROCHLORIDE 0.5 MG: 1 INJECTION, SOLUTION INTRAMUSCULAR; INTRAVENOUS; SUBCUTANEOUS at 19:31

## 2021-01-01 RX ADMIN — GABAPENTIN 100 MG: 100 CAPSULE ORAL at 09:30

## 2021-01-01 RX ADMIN — HYDROMORPHONE HYDROCHLORIDE 1 MG: 1 INJECTION, SOLUTION INTRAMUSCULAR; INTRAVENOUS; SUBCUTANEOUS at 15:00

## 2021-01-01 RX ADMIN — FAMOTIDINE 40 MG: 20 TABLET ORAL at 09:30

## 2021-01-01 RX ADMIN — Medication 1 TABLET: at 09:15

## 2021-01-01 RX ADMIN — CEFEPIME HYDROCHLORIDE 2000 MG: 2 INJECTION, POWDER, FOR SOLUTION INTRAVENOUS at 14:09

## 2021-01-01 RX ADMIN — VANCOMYCIN HYDROCHLORIDE 500 MG: 10 INJECTION, POWDER, LYOPHILIZED, FOR SOLUTION INTRAVENOUS at 13:06

## 2021-01-01 RX ADMIN — METOPROLOL TARTRATE 12.5 MG: 25 TABLET, FILM COATED ORAL at 08:29

## 2021-01-01 RX ADMIN — FAMOTIDINE 40 MG: 20 TABLET ORAL at 08:52

## 2021-01-01 RX ADMIN — TRAMADOL HYDROCHLORIDE 50 MG: 50 TABLET ORAL at 20:12

## 2021-01-01 RX ADMIN — METRONIDAZOLE 500 MG: 500 INJECTION, SOLUTION INTRAVENOUS at 00:14

## 2021-01-01 RX ADMIN — Medication 10 ML: at 08:03

## 2021-01-01 RX ADMIN — POTASSIUM CHLORIDE 10 MEQ: 7.46 INJECTION, SOLUTION INTRAVENOUS at 10:07

## 2021-01-01 RX ADMIN — POTASSIUM CHLORIDE 10 MEQ: 7.46 INJECTION, SOLUTION INTRAVENOUS at 08:37

## 2021-01-01 RX ADMIN — METRONIDAZOLE 500 MG: 500 INJECTION, SOLUTION INTRAVENOUS at 15:41

## 2021-01-01 RX ADMIN — SODIUM CHLORIDE: 9 INJECTION, SOLUTION INTRAVENOUS at 22:58

## 2021-01-01 ASSESSMENT — PULMONARY FUNCTION TESTS
PIF_VALUE: 28
PIF_VALUE: 26
PIF_VALUE: 26
PIF_VALUE: 30
PIF_VALUE: 15
PIF_VALUE: 28
PIF_VALUE: 5
PIF_VALUE: 2
PIF_VALUE: 27
PIF_VALUE: 4
PIF_VALUE: 28
PIF_VALUE: 27
PIF_VALUE: 28
PIF_VALUE: 26
PIF_VALUE: 27
PIF_VALUE: 26
PIF_VALUE: 27
PIF_VALUE: 27
PIF_VALUE: 28
PIF_VALUE: 27
PIF_VALUE: 27
PIF_VALUE: 25
PIF_VALUE: 27
PIF_VALUE: 27
PIF_VALUE: 30
PIF_VALUE: 26
PIF_VALUE: 29
PIF_VALUE: 0
PIF_VALUE: 26
PIF_VALUE: 29
PIF_VALUE: 14
PIF_VALUE: 27
PIF_VALUE: 26
PIF_VALUE: 27
PIF_VALUE: 0
PIF_VALUE: 27
PIF_VALUE: 0
PIF_VALUE: 26
PIF_VALUE: 27
PIF_VALUE: 26
PIF_VALUE: 27
PIF_VALUE: 0
PIF_VALUE: 26
PIF_VALUE: 28
PIF_VALUE: 31
PIF_VALUE: 27
PIF_VALUE: 27
PIF_VALUE: 29
PIF_VALUE: 27
PIF_VALUE: 26
PIF_VALUE: 27
PIF_VALUE: 26
PIF_VALUE: 0
PIF_VALUE: 26
PIF_VALUE: 25
PIF_VALUE: 27
PIF_VALUE: 26
PIF_VALUE: 14
PIF_VALUE: 28
PIF_VALUE: 26
PIF_VALUE: 27
PIF_VALUE: 1
PIF_VALUE: 26
PIF_VALUE: 29
PIF_VALUE: 26
PIF_VALUE: 27
PIF_VALUE: 29
PIF_VALUE: 26
PIF_VALUE: 29
PIF_VALUE: 28
PIF_VALUE: 30
PIF_VALUE: 29
PIF_VALUE: 29
PIF_VALUE: 26
PIF_VALUE: 30
PIF_VALUE: 26
PIF_VALUE: 27
PIF_VALUE: 29
PIF_VALUE: 28
PIF_VALUE: 26
PIF_VALUE: 28
PIF_VALUE: 27
PIF_VALUE: 27
PIF_VALUE: 26
PIF_VALUE: 28
PIF_VALUE: 33
PIF_VALUE: 30
PIF_VALUE: 27
PIF_VALUE: 27
PIF_VALUE: 26
PIF_VALUE: 27
PIF_VALUE: 28
PIF_VALUE: 27
PIF_VALUE: 27
PIF_VALUE: 26
PIF_VALUE: 29
PIF_VALUE: 28
PIF_VALUE: 27
PIF_VALUE: 27
PIF_VALUE: 29
PIF_VALUE: 1
PIF_VALUE: 30
PIF_VALUE: 23
PIF_VALUE: 26
PIF_VALUE: 29
PIF_VALUE: 27
PIF_VALUE: 28
PIF_VALUE: 26
PIF_VALUE: 1
PIF_VALUE: 30
PIF_VALUE: 8
PIF_VALUE: 0
PIF_VALUE: 0
PIF_VALUE: 28
PIF_VALUE: 26
PIF_VALUE: 29
PIF_VALUE: 27
PIF_VALUE: 27
PIF_VALUE: 28
PIF_VALUE: 26
PIF_VALUE: 28
PIF_VALUE: 28
PIF_VALUE: 25
PIF_VALUE: 29
PIF_VALUE: 27
PIF_VALUE: 27
PIF_VALUE: 26
PIF_VALUE: 27
PIF_VALUE: 26
PIF_VALUE: 27
PIF_VALUE: 29
PIF_VALUE: 4
PIF_VALUE: 27
PIF_VALUE: 27
PIF_VALUE: 28
PIF_VALUE: 27
PIF_VALUE: 28
PIF_VALUE: 28
PIF_VALUE: 27
PIF_VALUE: 27
PIF_VALUE: 26
PIF_VALUE: 28
PIF_VALUE: 27
PIF_VALUE: 26
PIF_VALUE: 27
PIF_VALUE: 27
PIF_VALUE: 23
PIF_VALUE: 26
PIF_VALUE: 27
PIF_VALUE: 26
PIF_VALUE: 27
PIF_VALUE: 25
PIF_VALUE: 26
PIF_VALUE: 27
PIF_VALUE: 30
PIF_VALUE: 2
PIF_VALUE: 27
PIF_VALUE: 24
PIF_VALUE: 27
PIF_VALUE: 26
PIF_VALUE: 28
PIF_VALUE: 26
PIF_VALUE: 27
PIF_VALUE: 0
PIF_VALUE: 27
PIF_VALUE: 26
PIF_VALUE: 25
PIF_VALUE: 27
PIF_VALUE: 2
PIF_VALUE: 29
PIF_VALUE: 25

## 2021-01-01 ASSESSMENT — PAIN DESCRIPTION - PROGRESSION
CLINICAL_PROGRESSION: NOT CHANGED
CLINICAL_PROGRESSION: GRADUALLY WORSENING
CLINICAL_PROGRESSION: NOT CHANGED
CLINICAL_PROGRESSION: GRADUALLY WORSENING
CLINICAL_PROGRESSION: NOT CHANGED
CLINICAL_PROGRESSION: GRADUALLY IMPROVING
CLINICAL_PROGRESSION: NOT CHANGED
CLINICAL_PROGRESSION: GRADUALLY WORSENING
CLINICAL_PROGRESSION: NOT CHANGED
CLINICAL_PROGRESSION: GRADUALLY IMPROVING
CLINICAL_PROGRESSION: NOT CHANGED
CLINICAL_PROGRESSION: GRADUALLY WORSENING
CLINICAL_PROGRESSION: NOT CHANGED
CLINICAL_PROGRESSION: GRADUALLY IMPROVING
CLINICAL_PROGRESSION: NOT CHANGED
CLINICAL_PROGRESSION: GRADUALLY WORSENING
CLINICAL_PROGRESSION: GRADUALLY IMPROVING
CLINICAL_PROGRESSION: NOT CHANGED
CLINICAL_PROGRESSION: NOT CHANGED
CLINICAL_PROGRESSION: GRADUALLY WORSENING
CLINICAL_PROGRESSION: NOT CHANGED
CLINICAL_PROGRESSION: GRADUALLY WORSENING
CLINICAL_PROGRESSION: GRADUALLY WORSENING
CLINICAL_PROGRESSION: NOT CHANGED
CLINICAL_PROGRESSION: NOT CHANGED
CLINICAL_PROGRESSION: GRADUALLY WORSENING
CLINICAL_PROGRESSION: NOT CHANGED
CLINICAL_PROGRESSION: GRADUALLY WORSENING
CLINICAL_PROGRESSION: NOT CHANGED
CLINICAL_PROGRESSION: GRADUALLY WORSENING
CLINICAL_PROGRESSION: NOT CHANGED
CLINICAL_PROGRESSION: GRADUALLY WORSENING
CLINICAL_PROGRESSION: GRADUALLY WORSENING
CLINICAL_PROGRESSION: NOT CHANGED
CLINICAL_PROGRESSION: NOT CHANGED

## 2021-01-01 ASSESSMENT — PAIN DESCRIPTION - DESCRIPTORS
DESCRIPTORS: STABBING
DESCRIPTORS: ACHING
DESCRIPTORS: ACHING
DESCRIPTORS: ACHING;CONSTANT;DISCOMFORT;SORE;SHARP
DESCRIPTORS: RADIATING
DESCRIPTORS: PATIENT UNABLE TO DESCRIBE
DESCRIPTORS: ACHING;SHARP
DESCRIPTORS: ACHING
DESCRIPTORS: SORE
DESCRIPTORS: ACHING;CONSTANT
DESCRIPTORS: ACHING;CONSTANT
DESCRIPTORS: SHARP
DESCRIPTORS: SHARP
DESCRIPTORS: ACHING;CONSTANT;SHARP
DESCRIPTORS: ACHING
DESCRIPTORS: SORE;ACHING
DESCRIPTORS: ACHING;DISCOMFORT
DESCRIPTORS: ACHING
DESCRIPTORS: ACHING
DESCRIPTORS: DISCOMFORT
DESCRIPTORS: ACHING
DESCRIPTORS: ACHING
DESCRIPTORS: PATIENT UNABLE TO DESCRIBE
DESCRIPTORS: ACHING
DESCRIPTORS: ACHING
DESCRIPTORS: SHARP
DESCRIPTORS: DISCOMFORT;ACHING
DESCRIPTORS: CONSTANT;SHARP
DESCRIPTORS: ACHING;SORE
DESCRIPTORS: THROBBING
DESCRIPTORS: PATIENT UNABLE TO DESCRIBE
DESCRIPTORS: ACHING
DESCRIPTORS: ACHING
DESCRIPTORS: ACHING;DISCOMFORT
DESCRIPTORS: ACHING;SHARP;CONSTANT
DESCRIPTORS: ACHING
DESCRIPTORS: STABBING
DESCRIPTORS: SHARP
DESCRIPTORS: ACHING
DESCRIPTORS: ACHING
DESCRIPTORS: SHARP
DESCRIPTORS: PATIENT UNABLE TO DESCRIBE
DESCRIPTORS: SHARP
DESCRIPTORS: SORE
DESCRIPTORS: PATIENT UNABLE TO DESCRIBE

## 2021-01-01 ASSESSMENT — PAIN - FUNCTIONAL ASSESSMENT
PAIN_FUNCTIONAL_ASSESSMENT: PREVENTS OR INTERFERES SOME ACTIVE ACTIVITIES AND ADLS
PAIN_FUNCTIONAL_ASSESSMENT: PREVENTS OR INTERFERES SOME ACTIVE ACTIVITIES AND ADLS
PAIN_FUNCTIONAL_ASSESSMENT: PREVENTS OR INTERFERES WITH MANY ACTIVE NOT PASSIVE ACTIVITIES
PAIN_FUNCTIONAL_ASSESSMENT: PREVENTS OR INTERFERES SOME ACTIVE ACTIVITIES AND ADLS
PAIN_FUNCTIONAL_ASSESSMENT: ACTIVITIES ARE NOT PREVENTED
PAIN_FUNCTIONAL_ASSESSMENT: PREVENTS OR INTERFERES SOME ACTIVE ACTIVITIES AND ADLS
PAIN_FUNCTIONAL_ASSESSMENT: ACTIVITIES ARE NOT PREVENTED
PAIN_FUNCTIONAL_ASSESSMENT: PREVENTS OR INTERFERES SOME ACTIVE ACTIVITIES AND ADLS
PAIN_FUNCTIONAL_ASSESSMENT: PREVENTS OR INTERFERES WITH MANY ACTIVE NOT PASSIVE ACTIVITIES
PAIN_FUNCTIONAL_ASSESSMENT: PREVENTS OR INTERFERES SOME ACTIVE ACTIVITIES AND ADLS
PAIN_FUNCTIONAL_ASSESSMENT: PREVENTS OR INTERFERES WITH MANY ACTIVE NOT PASSIVE ACTIVITIES
PAIN_FUNCTIONAL_ASSESSMENT: PREVENTS OR INTERFERES SOME ACTIVE ACTIVITIES AND ADLS
PAIN_FUNCTIONAL_ASSESSMENT: PREVENTS OR INTERFERES WITH ALL ACTIVE AND SOME PASSIVE ACTIVITIES
PAIN_FUNCTIONAL_ASSESSMENT: PREVENTS OR INTERFERES SOME ACTIVE ACTIVITIES AND ADLS
PAIN_FUNCTIONAL_ASSESSMENT: ACTIVITIES ARE NOT PREVENTED
PAIN_FUNCTIONAL_ASSESSMENT: PREVENTS OR INTERFERES SOME ACTIVE ACTIVITIES AND ADLS
PAIN_FUNCTIONAL_ASSESSMENT: PREVENTS OR INTERFERES SOME ACTIVE ACTIVITIES AND ADLS
PAIN_FUNCTIONAL_ASSESSMENT: PREVENTS OR INTERFERES WITH MANY ACTIVE NOT PASSIVE ACTIVITIES
PAIN_FUNCTIONAL_ASSESSMENT: ACTIVITIES ARE NOT PREVENTED
PAIN_FUNCTIONAL_ASSESSMENT: PREVENTS OR INTERFERES SOME ACTIVE ACTIVITIES AND ADLS
PAIN_FUNCTIONAL_ASSESSMENT: PREVENTS OR INTERFERES WITH MANY ACTIVE NOT PASSIVE ACTIVITIES
PAIN_FUNCTIONAL_ASSESSMENT: PREVENTS OR INTERFERES SOME ACTIVE ACTIVITIES AND ADLS
PAIN_FUNCTIONAL_ASSESSMENT: PREVENTS OR INTERFERES WITH MANY ACTIVE NOT PASSIVE ACTIVITIES

## 2021-01-01 ASSESSMENT — PAIN DESCRIPTION - FREQUENCY
FREQUENCY: CONTINUOUS
FREQUENCY: INTERMITTENT
FREQUENCY: INTERMITTENT
FREQUENCY: CONTINUOUS
FREQUENCY: INTERMITTENT
FREQUENCY: CONTINUOUS
FREQUENCY: CONTINUOUS
FREQUENCY: INTERMITTENT
FREQUENCY: CONTINUOUS
FREQUENCY: INTERMITTENT
FREQUENCY: CONTINUOUS
FREQUENCY: INTERMITTENT
FREQUENCY: INTERMITTENT
FREQUENCY: CONTINUOUS
FREQUENCY: INTERMITTENT
FREQUENCY: INTERMITTENT
FREQUENCY: CONTINUOUS
FREQUENCY: INTERMITTENT
FREQUENCY: CONTINUOUS
FREQUENCY: CONTINUOUS

## 2021-01-01 ASSESSMENT — PAIN DESCRIPTION - PAIN TYPE
TYPE: CHRONIC PAIN
TYPE: ACUTE PAIN;SURGICAL PAIN
TYPE: SURGICAL PAIN
TYPE: CHRONIC PAIN
TYPE: ACUTE PAIN
TYPE: SURGICAL PAIN
TYPE: SURGICAL PAIN
TYPE: ACUTE PAIN
TYPE: CHRONIC PAIN
TYPE: ACUTE PAIN
TYPE: ACUTE PAIN
TYPE: SURGICAL PAIN
TYPE: ACUTE PAIN
TYPE: SURGICAL PAIN
TYPE: SURGICAL PAIN
TYPE: ACUTE PAIN
TYPE: SURGICAL PAIN
TYPE: SURGICAL PAIN
TYPE: ACUTE PAIN
TYPE: CHRONIC PAIN
TYPE: ACUTE PAIN
TYPE: SURGICAL PAIN
TYPE: ACUTE PAIN;CHRONIC PAIN
TYPE: SURGICAL PAIN
TYPE: ACUTE PAIN
TYPE: ACUTE PAIN;SURGICAL PAIN
TYPE: ACUTE PAIN
TYPE: ACUTE PAIN;SURGICAL PAIN
TYPE: SURGICAL PAIN
TYPE: ACUTE PAIN
TYPE: SURGICAL PAIN
TYPE: ACUTE PAIN;SURGICAL PAIN
TYPE: SURGICAL PAIN
TYPE: ACUTE PAIN
TYPE: ACUTE PAIN
TYPE: SURGICAL PAIN
TYPE: ACUTE PAIN
TYPE: SURGICAL PAIN
TYPE: SURGICAL PAIN
TYPE: ACUTE PAIN;CHRONIC PAIN
TYPE: ACUTE PAIN
TYPE: SURGICAL PAIN
TYPE: ACUTE PAIN
TYPE: ACUTE PAIN
TYPE: SURGICAL PAIN
TYPE: ACUTE PAIN
TYPE: SURGICAL PAIN
TYPE: ACUTE PAIN
TYPE: SURGICAL PAIN
TYPE: ACUTE PAIN
TYPE: ACUTE PAIN;CHRONIC PAIN
TYPE: ACUTE PAIN
TYPE: SURGICAL PAIN
TYPE: ACUTE PAIN;SURGICAL PAIN
TYPE: ACUTE PAIN

## 2021-01-01 ASSESSMENT — PAIN SCALES - GENERAL
PAINLEVEL_OUTOF10: 5
PAINLEVEL_OUTOF10: 0
PAINLEVEL_OUTOF10: 0
PAINLEVEL_OUTOF10: 4
PAINLEVEL_OUTOF10: 8
PAINLEVEL_OUTOF10: 9
PAINLEVEL_OUTOF10: 4
PAINLEVEL_OUTOF10: 7
PAINLEVEL_OUTOF10: 10
PAINLEVEL_OUTOF10: 6
PAINLEVEL_OUTOF10: 7
PAINLEVEL_OUTOF10: 5
PAINLEVEL_OUTOF10: 0
PAINLEVEL_OUTOF10: 3
PAINLEVEL_OUTOF10: 8
PAINLEVEL_OUTOF10: 8
PAINLEVEL_OUTOF10: 3
PAINLEVEL_OUTOF10: 10
PAINLEVEL_OUTOF10: 8
PAINLEVEL_OUTOF10: 5
PAINLEVEL_OUTOF10: 0
PAINLEVEL_OUTOF10: 4
PAINLEVEL_OUTOF10: 8
PAINLEVEL_OUTOF10: 0
PAINLEVEL_OUTOF10: 9
PAINLEVEL_OUTOF10: 4
PAINLEVEL_OUTOF10: 6
PAINLEVEL_OUTOF10: 9
PAINLEVEL_OUTOF10: 8
PAINLEVEL_OUTOF10: 9
PAINLEVEL_OUTOF10: 0
PAINLEVEL_OUTOF10: 8
PAINLEVEL_OUTOF10: 7
PAINLEVEL_OUTOF10: 5
PAINLEVEL_OUTOF10: 8
PAINLEVEL_OUTOF10: 9
PAINLEVEL_OUTOF10: 0
PAINLEVEL_OUTOF10: 10
PAINLEVEL_OUTOF10: 8
PAINLEVEL_OUTOF10: 4
PAINLEVEL_OUTOF10: 9
PAINLEVEL_OUTOF10: 4
PAINLEVEL_OUTOF10: 4
PAINLEVEL_OUTOF10: 8
PAINLEVEL_OUTOF10: 8
PAINLEVEL_OUTOF10: 3
PAINLEVEL_OUTOF10: 0
PAINLEVEL_OUTOF10: 0
PAINLEVEL_OUTOF10: 6
PAINLEVEL_OUTOF10: 10
PAINLEVEL_OUTOF10: 9
PAINLEVEL_OUTOF10: 8
PAINLEVEL_OUTOF10: 0
PAINLEVEL_OUTOF10: 8
PAINLEVEL_OUTOF10: 8
PAINLEVEL_OUTOF10: 9
PAINLEVEL_OUTOF10: 4
PAINLEVEL_OUTOF10: 0
PAINLEVEL_OUTOF10: 9
PAINLEVEL_OUTOF10: 10
PAINLEVEL_OUTOF10: 9
PAINLEVEL_OUTOF10: 7
PAINLEVEL_OUTOF10: 0
PAINLEVEL_OUTOF10: 5
PAINLEVEL_OUTOF10: 4
PAINLEVEL_OUTOF10: 7
PAINLEVEL_OUTOF10: 0
PAINLEVEL_OUTOF10: 9
PAINLEVEL_OUTOF10: 7
PAINLEVEL_OUTOF10: 9
PAINLEVEL_OUTOF10: 8
PAINLEVEL_OUTOF10: 8
PAINLEVEL_OUTOF10: 9
PAINLEVEL_OUTOF10: 8
PAINLEVEL_OUTOF10: 3
PAINLEVEL_OUTOF10: 0
PAINLEVEL_OUTOF10: 4
PAINLEVEL_OUTOF10: 0
PAINLEVEL_OUTOF10: 9
PAINLEVEL_OUTOF10: 8
PAINLEVEL_OUTOF10: 0
PAINLEVEL_OUTOF10: 8
PAINLEVEL_OUTOF10: 3
PAINLEVEL_OUTOF10: 9
PAINLEVEL_OUTOF10: 4
PAINLEVEL_OUTOF10: 9
PAINLEVEL_OUTOF10: 0
PAINLEVEL_OUTOF10: 0
PAINLEVEL_OUTOF10: 8
PAINLEVEL_OUTOF10: 7
PAINLEVEL_OUTOF10: 0
PAINLEVEL_OUTOF10: 4
PAINLEVEL_OUTOF10: 6
PAINLEVEL_OUTOF10: 7
PAINLEVEL_OUTOF10: 8
PAINLEVEL_OUTOF10: 8
PAINLEVEL_OUTOF10: 0
PAINLEVEL_OUTOF10: 10
PAINLEVEL_OUTOF10: 3
PAINLEVEL_OUTOF10: 3
PAINLEVEL_OUTOF10: 0
PAINLEVEL_OUTOF10: 4
PAINLEVEL_OUTOF10: 0
PAINLEVEL_OUTOF10: 10
PAINLEVEL_OUTOF10: 5
PAINLEVEL_OUTOF10: 9
PAINLEVEL_OUTOF10: 10
PAINLEVEL_OUTOF10: 6
PAINLEVEL_OUTOF10: 0
PAINLEVEL_OUTOF10: 7
PAINLEVEL_OUTOF10: 8
PAINLEVEL_OUTOF10: 6
PAINLEVEL_OUTOF10: 0
PAINLEVEL_OUTOF10: 8
PAINLEVEL_OUTOF10: 5
PAINLEVEL_OUTOF10: 8
PAINLEVEL_OUTOF10: 9
PAINLEVEL_OUTOF10: 0
PAINLEVEL_OUTOF10: 9
PAINLEVEL_OUTOF10: 7
PAINLEVEL_OUTOF10: 0
PAINLEVEL_OUTOF10: 7
PAINLEVEL_OUTOF10: 9
PAINLEVEL_OUTOF10: 3
PAINLEVEL_OUTOF10: 10
PAINLEVEL_OUTOF10: 8
PAINLEVEL_OUTOF10: 4
PAINLEVEL_OUTOF10: 8
PAINLEVEL_OUTOF10: 3
PAINLEVEL_OUTOF10: 9
PAINLEVEL_OUTOF10: 6
PAINLEVEL_OUTOF10: 7
PAINLEVEL_OUTOF10: 4
PAINLEVEL_OUTOF10: 0
PAINLEVEL_OUTOF10: 9
PAINLEVEL_OUTOF10: 3
PAINLEVEL_OUTOF10: 9
PAINLEVEL_OUTOF10: 7
PAINLEVEL_OUTOF10: 9
PAINLEVEL_OUTOF10: 0
PAINLEVEL_OUTOF10: 7
PAINLEVEL_OUTOF10: 7
PAINLEVEL_OUTOF10: 0
PAINLEVEL_OUTOF10: 4
PAINLEVEL_OUTOF10: 0
PAINLEVEL_OUTOF10: 9
PAINLEVEL_OUTOF10: 8
PAINLEVEL_OUTOF10: 5
PAINLEVEL_OUTOF10: 9
PAINLEVEL_OUTOF10: 6
PAINLEVEL_OUTOF10: 9
PAINLEVEL_OUTOF10: 4
PAINLEVEL_OUTOF10: 3
PAINLEVEL_OUTOF10: 10
PAINLEVEL_OUTOF10: 7
PAINLEVEL_OUTOF10: 8
PAINLEVEL_OUTOF10: 3
PAINLEVEL_OUTOF10: 9
PAINLEVEL_OUTOF10: 8
PAINLEVEL_OUTOF10: 9
PAINLEVEL_OUTOF10: 8
PAINLEVEL_OUTOF10: 10
PAINLEVEL_OUTOF10: 5
PAINLEVEL_OUTOF10: 9
PAINLEVEL_OUTOF10: 0
PAINLEVEL_OUTOF10: 9
PAINLEVEL_OUTOF10: 8
PAINLEVEL_OUTOF10: 9
PAINLEVEL_OUTOF10: 9
PAINLEVEL_OUTOF10: 0
PAINLEVEL_OUTOF10: 9
PAINLEVEL_OUTOF10: 9
PAINLEVEL_OUTOF10: 4
PAINLEVEL_OUTOF10: 4
PAINLEVEL_OUTOF10: 9
PAINLEVEL_OUTOF10: 4
PAINLEVEL_OUTOF10: 8
PAINLEVEL_OUTOF10: 7
PAINLEVEL_OUTOF10: 10
PAINLEVEL_OUTOF10: 4
PAINLEVEL_OUTOF10: 9
PAINLEVEL_OUTOF10: 5
PAINLEVEL_OUTOF10: 6
PAINLEVEL_OUTOF10: 0
PAINLEVEL_OUTOF10: 8
PAINLEVEL_OUTOF10: 4
PAINLEVEL_OUTOF10: 10
PAINLEVEL_OUTOF10: 4
PAINLEVEL_OUTOF10: 1
PAINLEVEL_OUTOF10: 0
PAINLEVEL_OUTOF10: 9
PAINLEVEL_OUTOF10: 8
PAINLEVEL_OUTOF10: 8
PAINLEVEL_OUTOF10: 10
PAINLEVEL_OUTOF10: 8
PAINLEVEL_OUTOF10: 7
PAINLEVEL_OUTOF10: 0
PAINLEVEL_OUTOF10: 9
PAINLEVEL_OUTOF10: 10
PAINLEVEL_OUTOF10: 8
PAINLEVEL_OUTOF10: 8
PAINLEVEL_OUTOF10: 7
PAINLEVEL_OUTOF10: 0
PAINLEVEL_OUTOF10: 9
PAINLEVEL_OUTOF10: 6
PAINLEVEL_OUTOF10: 4
PAINLEVEL_OUTOF10: 0
PAINLEVEL_OUTOF10: 9
PAINLEVEL_OUTOF10: 9
PAINLEVEL_OUTOF10: 8
PAINLEVEL_OUTOF10: 9
PAINLEVEL_OUTOF10: 0
PAINLEVEL_OUTOF10: 0
PAINLEVEL_OUTOF10: 9
PAINLEVEL_OUTOF10: 8
PAINLEVEL_OUTOF10: 4
PAINLEVEL_OUTOF10: 10
PAINLEVEL_OUTOF10: 7
PAINLEVEL_OUTOF10: 9

## 2021-01-01 ASSESSMENT — PAIN DESCRIPTION - ORIENTATION
ORIENTATION: RIGHT;LEFT
ORIENTATION: MID;RIGHT;LEFT
ORIENTATION: RIGHT;LEFT
ORIENTATION: LEFT;RIGHT
ORIENTATION: LEFT
ORIENTATION: RIGHT;LEFT
ORIENTATION: RIGHT;LEFT
ORIENTATION: RIGHT
ORIENTATION: RIGHT;LEFT;LOWER
ORIENTATION: MID;RIGHT;LEFT
ORIENTATION: RIGHT;LEFT
ORIENTATION: MID
ORIENTATION: RIGHT
ORIENTATION: PROXIMAL
ORIENTATION: LEFT
ORIENTATION: MID
ORIENTATION: MID;RIGHT;LEFT
ORIENTATION: RIGHT
ORIENTATION: MID;RIGHT;LEFT
ORIENTATION: RIGHT
ORIENTATION: LEFT;RIGHT;LOWER
ORIENTATION: RIGHT;LEFT
ORIENTATION: RIGHT;LEFT
ORIENTATION: LOWER
ORIENTATION: RIGHT
ORIENTATION: OTHER (COMMENT)
ORIENTATION: LEFT
ORIENTATION: RIGHT
ORIENTATION: RIGHT;MID
ORIENTATION: LEFT
ORIENTATION: RIGHT;LOWER
ORIENTATION: RIGHT
ORIENTATION: LEFT
ORIENTATION: ANTERIOR
ORIENTATION: MID
ORIENTATION: RIGHT;LEFT
ORIENTATION: LEFT
ORIENTATION: RIGHT;LEFT
ORIENTATION: LOWER
ORIENTATION: RIGHT;LEFT
ORIENTATION: RIGHT;LEFT
ORIENTATION: MID;RIGHT;LEFT
ORIENTATION: RIGHT;LEFT;LOWER
ORIENTATION: MID
ORIENTATION: MID
ORIENTATION: RIGHT
ORIENTATION: RIGHT
ORIENTATION: LEFT
ORIENTATION: PROXIMAL
ORIENTATION: LOWER
ORIENTATION: RIGHT
ORIENTATION: RIGHT;LEFT
ORIENTATION: RIGHT

## 2021-01-01 ASSESSMENT — PAIN DESCRIPTION - LOCATION
LOCATION: ABDOMEN
LOCATION: ABDOMEN
LOCATION: LEG
LOCATION: LEG
LOCATION: ABDOMEN
LOCATION: LEG
LOCATION: CHEST
LOCATION: ABDOMEN
LOCATION: ABDOMEN
LOCATION: LEG
LOCATION: ABDOMEN
LOCATION: FOOT
LOCATION: ARM
LOCATION: ABDOMEN
LOCATION: LEG
LOCATION: LEG
LOCATION: ABDOMEN
LOCATION: LEG
LOCATION: ABDOMEN
LOCATION: ABDOMEN;HEAD
LOCATION: ABDOMEN
LOCATION: LEG
LOCATION: ABDOMEN
LOCATION: ABDOMEN
LOCATION: LEG
LOCATION: ABDOMEN
LOCATION: LEG
LOCATION: LEG
LOCATION: ABDOMEN
LOCATION: HIP
LOCATION: LEG
LOCATION: ABDOMEN
LOCATION: LEG
LOCATION: BACK
LOCATION: LEG
LOCATION: ABDOMEN
LOCATION: LEG
LOCATION: ABDOMEN
LOCATION: LEG
LOCATION: ABDOMEN

## 2021-01-01 ASSESSMENT — ENCOUNTER SYMPTOMS
ABDOMINAL PAIN: 1
DIARRHEA: 1
NAUSEA: 1
COLOR CHANGE: 1
ABDOMINAL DISTENTION: 0
SHORTNESS OF BREATH: 1
SHORTNESS OF BREATH: 1
EYES NEGATIVE: 1
EYE PAIN: 0
DIARRHEA: 0
CONSTIPATION: 1
BACK PAIN: 1
SHORTNESS OF BREATH: 0
ABDOMINAL PAIN: 0
SINUS PAIN: 0
NAUSEA: 1
COLOR CHANGE: 0
VOMITING: 1
WHEEZING: 0
ABDOMINAL DISTENTION: 0
APNEA: 0
DIARRHEA: 1
COLOR CHANGE: 0
ALLERGIC/IMMUNOLOGIC NEGATIVE: 1
CONSTIPATION: 0
RHINORRHEA: 0
COUGH: 0
COLOR CHANGE: 0
CONSTIPATION: 1
SORE THROAT: 0
EYES NEGATIVE: 1
SHORTNESS OF BREATH: 0
ABDOMINAL PAIN: 1
COLOR CHANGE: 0
EYE PAIN: 0
DIARRHEA: 1
DIARRHEA: 1
NAUSEA: 0
GASTROINTESTINAL NEGATIVE: 1
CHEST TIGHTNESS: 0
COUGH: 0
VOMITING: 0
COUGH: 0
WHEEZING: 1
COUGH: 1
ABDOMINAL DISTENTION: 0
DIARRHEA: 0
SHORTNESS OF BREATH: 0
COLOR CHANGE: 0
ABDOMINAL PAIN: 0
ABDOMINAL PAIN: 1

## 2021-01-01 ASSESSMENT — PAIN SCALES - WONG BAKER
WONGBAKER_NUMERICALRESPONSE: 0
WONGBAKER_NUMERICALRESPONSE: 4
WONGBAKER_NUMERICALRESPONSE: 0
WONGBAKER_NUMERICALRESPONSE: 4
WONGBAKER_NUMERICALRESPONSE: 0

## 2021-01-01 ASSESSMENT — PAIN DESCRIPTION - ONSET
ONSET: ON-GOING
ONSET: SUDDEN
ONSET: ON-GOING

## 2021-01-01 ASSESSMENT — PAIN DESCRIPTION - DIRECTION
RADIATING_TOWARDS: PROXIMAL
RADIATING_TOWARDS: FEET
RADIATING_TOWARDS: FEET
RADIATING_TOWARDS: PROXIMAL
RADIATING_TOWARDS: FEET

## 2021-01-01 ASSESSMENT — COPD QUESTIONNAIRES: CAT_SEVERITY: MODERATE

## 2021-01-01 ASSESSMENT — VISUAL ACUITY: OU: 1

## 2021-04-28 PROBLEM — L03.90 CELLULITIS: Status: ACTIVE | Noted: 2021-01-01

## 2021-04-28 PROBLEM — L13.9 BULLOUS DERMATITIS: Status: ACTIVE | Noted: 2021-01-01

## 2021-04-28 PROBLEM — D72.829 LEUKOCYTOSIS: Status: ACTIVE | Noted: 2021-01-01

## 2021-04-28 PROBLEM — L03.116 LEFT LEG CELLULITIS: Status: ACTIVE | Noted: 2021-01-01

## 2021-04-28 NOTE — ACP (ADVANCE CARE PLANNING)
.Advance Care Planning     Advance Care Planning Activator (Inpatient)  Conversation Note      Date of ACP Conversation: 4/28/2021     \"It has been progressively getting worse patient has become more confused. Patient is unable to provide any other review of systems question answers\"  ~ Dr. Tiffanie Davison 4/28    Conversation Conducted with:  Healthcare Decision Maker: Next of Kin by law (only applies in absence of above) (name) Zarina Mars (Son)    ACP Activator: [facility] Decision Maker:     Current Designated Health Care Decision Maker:     Primary Decision Maker: Zarina Mars - Child - [phone]  Click here to complete Healthcare Decision Makers including section of the Healthcare Decision Maker Relationship (ie \"Primary\")  Today we documented Decision Maker(s) consistent with Legal Next of Kin hierarchy. Son is also the guardian and will be providing us supporting documentation to that fact ASAP. Care Preferences    Ventilation: \"If you were in your present state of health and suddenly became very ill and were unable to breathe on your own, what would your preference be about the use of a ventilator (breathing machine) if it were available to you? \"      Would the patient desire the use of ventilator (breathing machine)?: yes    \"If your health worsens and it becomes clear that your chance of recovery is unlikely, what would your preference be about the use of a ventilator (breathing machine) if it were available to you? \"     Would the patient desire the use of ventilator (breathing machine)?: Yes      Resuscitation  \"CPR works best to restart the heart when there is a sudden event, like a heart attack, in someone who is otherwise healthy. Unfortunately, CPR does not typically restart the heart for people who have serious health conditions or who are very sick. \"    \"In the event your heart stopped as a result of an underlying serious health condition, would you want attempts to be made to restart your heart (answer \"yes\" for attempt to resuscitate) or would you prefer a natural death (answer \"no\" for do not attempt to resuscitate)? \" yes       [x] Yes   [] No   Educated Patient / Domenic Call regarding differences between Advance Directives and portable DNR orders.     Length of ACP Conversation in minutes:      Conversation Outcomes:  [x] ACP discussion completed  [] Existing advance directive reviewed with patient; no changes to patient's previously recorded wishes  [] New Advance Directive completed  [] Portable Do Not Rescitate prepared for Provider review and signature  [] POLST/POST/MOLST/MOST prepared for Provider review and signature      Follow-up plan:    [] Schedule follow-up conversation to continue planning  [] Referred individual to Provider for additional questions/concerns   [] Advised patient/agent/surrogate to review completed ACP document and update if needed with changes in condition, patient preferences or care setting    [x] This note routed to one or more involved healthcare providers

## 2021-04-28 NOTE — PLAN OF CARE
Problem: Skin Integrity:  Goal: Absence of new skin breakdown  Description: Absence of new skin breakdown  Outcome: Ongoing   Legs have silver alginate, gauze and are wrapped. Skin protected and linens cleaned. Problem: Falls - Risk of:  Goal: Will remain free from falls  Description: Will remain free from falls  Outcome: Met This Shift  Pt will remain free from accidental injury this shift. Pt has fall risk measures (fall risk bracelet, fall risk sign, fall risk cloth, non-slip socks, dome light on) in place. Pt bed is in low position, bed alarm on, bed wheels locked, call light within reach, bedside table within reach, chair wheels locked, chair alarm on. Problem: Body Temperature - Imbalanced:  Goal: Ability to maintain a body temperature in the normal range will improve  Description: Ability to maintain a body temperature in the normal range will improve  Outcome: Met This Shift   Pt afebrile through shift. Problem: Mobility - Impaired:  Goal: Mobility will improve to maximum level  Description: Mobility will improve to maximum level  Outcome: Ongoing   Mobility impaired due to BLE edema and cellulitis. Problem: Pain:  Goal: Control of chronic pain  Description: Control of chronic pain  Outcome: Ongoing   Pt pain 0 through shift while not moving and comfortable. BLE painful with movement. Problem: Mental Status - Impaired:  Goal: Mental status will improve  Description: Mental status will improve  Outcome: Ongoing   Pt confused and only oriented to person and time.

## 2021-04-28 NOTE — ED NOTES
Blood Culture #1 drawn from right ac at 0205  Blood Culture #2 drawn from right forearm at 0210  Site cleaned with Prevantics for 30 seconds and allowed to dry for 30 seconds before cultures were drawn.        Diana Mckinney RN  04/28/21 0700

## 2021-04-28 NOTE — PROGRESS NOTES
Clinical Pharmacy Progress Note  Medication History     Admit Date: 4/28/2021    List of of current medications patient is taking is complete. Home Medication list in EPIC updated to reflect changes noted below. Source of information: medication list from [facility]; list dated 4/28/21    Changes made to medication list:   Medication doses adjusted:    Famotidine - dose is 40 mg daily     Please call with any questions.   Bradley Mayer PharmD, [facility]  Wireless # [phone]  4/28/2021 8:50 AM

## 2021-04-28 NOTE — CONSULTS
Nephrology Consult Note                                                                                                                                                                                                                                                                                                                                                               Office : [phone]     Fax :[phone]              Patient's Name: Justice Blanc  10:48 AM  4/28/2021    Reason for Consult:  LAVINIA, hyponatremia  Requesting Physician:  Joan Goins      Chief Complaint:  Confusion, redness and swelling of LLE    History of Present Ilness: Justice Blanc is a 65 y.o. female with PMHx significant for Hepatitis C, dementia with behavioral disturbance, ?lung cancer and HTN who presents from nursing facility with confusion and progressive redness of her left lower extremity. Patient is a poor historian, thus history was obtained via chart review. Patient was reportedly more confused than usual and was noted to have redness and swelling over her left lower extremity which progressively got worse over a 48 hour period prompting hospital presentation. Work-up thus far revealed leukocytosis, significant LAVINIA, hyponatremia and lactic acidosis. CT imaging of LLE was obtained and was negative for soft tissue air or focal abscess. She was started on broad spectrum antibiotics, IVFs and will be admitted for treatment of cellulitis. Nephrology was consulted for evaluation of LAVINIA.      I called [facility] facility ([facility]) but was unable to get a hold of nurse for collateral information         Past Medical History:   Diagnosis Date    Anxiety     Back pain     Bronchitis     Cancer ([address]     malignant neoplasm of bronchus and lung    Chronic pain     Dementia ([address]     Drug-seeking behavior     Emphysema lung ([address]     GERD (gastroesophageal reflux disease)     Hepatitis C     HTN air, foreign body    or soft tissue abscess. Assessment/Plan   1. LAVINIA    2. HTN    3. Hyponatremia     4. Acid- base/ Electrolyte imbalance     5. Acute metabolic encephalopathy     6. Left lower extremity cellulitis     7. History of Hepatits C    Patient with significant LAVINIA, with BUN of 62 and Cr 3.9. Last known Cr 0.7 in 2015. Unable to locate any more information via care everywhere. Holding home lasix and aldactone. Follow-up urine lytes, UA. Strict I/Os. Bladder scan. Blood pressure stable. Cont Vanc and cefepime  Avoid nephrotoxic medications   Cont gentle IVFs in setting of sepsis and lactic acidosis. Monitor Na. Will place on fluid restriction diet. If no improvement in Cr following fluids, will evaluate with renal US  Has history of Hep C, will follow-up with LFTs and ammonia to rule out other causes of AMS.  Will also get cryo and RF to rule out hep c associated kidney disease        Thank you for allowing us to [facility] free to [facility]   [facility] of [address]  Office : [phone]  Fax :[phone]    Agree with plan above   IVF   abx   Cx   Ur studies    Juan Carlos Stevenson MD

## 2021-04-28 NOTE — CONSULTS
Infectious Diseases Inpatient Consult Note    Reason for Consult:   L LE cellulitis  Requesting Physician:   Dr Tai Mckeon  Primary Care Physician:  Rowena NGUYEN  History Obtained From:   Pt, EPIC    Admit Date: 4/28/2021  Hospital Day: 1    CHIEF COMPLAINT:       Chief Complaint   Patient presents with    Altered Mental Status    Cellulitis     BLE       HISTORY OF PRESENT ILLNESS:      66 yo woman with hx dementia, COPD, HTN, lung cancer, spinal stenosis / LBP, HCV  Lives in a nursing facility    Presents to ED with confusion and L LE redness. Pt had worsening L LE rednes and swelling over few days   Uncertain / poor historian - reports symptoms for 1 week, no trauma, no hx of similar problem  Denies fever / chills     In ED 4/28 - afebrile, WBC 16.9. Cr 3.9.     L leg swelling with bullae  CT ST swelling, no air    Admit,started on vancomycin, cefepime  Seen by Renal   Seen by Wound Care RN      Past Medical History:    Past Medical History:   Diagnosis Date    Anxiety     Back pain     Bronchitis     [facility]     malignant neoplasm of bronchus and lung    Chronic pain     Dementia ([facility]     Drug-seeking behavior     Emphysema lung (HCC)     GERD (gastroesophageal reflux disease)     Hepatitis C     HTN (hypertension)     Insomnia     Lumbar disc disease     Osteoporosis     Spinal stenosis        Past Surgical History:    Past Surgical History:   Procedure Laterality Date    CHOLECYSTECTOMY  2/2011    [place]       Current Medications:     metoprolol tartrate  25 mg Oral BID    therapeutic multivitamin-minerals  1 tablet Oral Daily    OLANZapine  5 mg Oral Nightly    vitamin B-12  1,000 mcg Oral Daily    sodium chloride flush  5-40 mL Intravenous 2 times per day    cefepime  1,000 mg Intravenous Q12H    pantoprazole  40 mg Oral QAM AC    vancomycin (VANCOCIN) intermittent dosing (placeholder)   Other See Admin Instructions    [START ON 4/29/2021] heparin (porcine)  5,000 Units Subcutaneous 3 times per day    Venelex   Topical BID       Allergies:  Flexeril [cyclobenzaprine], Penicillins, Bactrim [sulfamethoxazole-trimethoprim], Emerson-allergin [diphenhydramine], Codeine, Diphenhydramine hcl, Naproxen, Tylenol [acetaminophen], and Ketorolac tromethamine    Social History:    TOBACCO:    + cig  ETOH:    None   DRUGS:   None   MARITAL STATUS:      OCCUPATION:   None     Patient lives [facility] - [facility]    Family History:   No immunodeficiency    REVIEW OF SYSTEMS:    No fever / chills / sweats. No weight loss. No visual change, eye pain, eye discharge. No oral lesion, sore throat, dysphagia. Denies cough / sputum. Denies chest pain, palpitations. Denies n / v / abd pain. No diarrhea. Denies dysuria or change in urinary function. Denies joint swelling or pain. No myalgia, arthralgia.  + skin changes - L leg red / swelling   Denies focal weakness, sensory change or other neurologic symptom    Denies new / worse depression, psychiatric symptoms    PHYSICAL EXAM:      Vitals:    /65   Pulse 77   Temp 99.1 °F (37.3 °C) (Axillary)   Resp 20   Ht 5' 7\" (1.702 m)   Wt 261 lb 3.9 oz (118.5 kg)   SpO2 97%   BMI 40.92 kg/m²     GENERAL: No apparent distress.     HEENT: Membranes moist, no oral lesion, PERRL  NECK:  Supple, no lymphadenopathy  LUNGS: Clear b/l, no rales, no dullness  CARDIAC: RRR, no murmur appreciated  ABD:  + BS, soft / NT  EXT:  Bilateral LE dressing / ACE  NEURO: No focal neurologic findings  PSYCH: Orientation, sensorium, mood normal  LINES:  Peripheral iv    DATA:    Lab Results   Component Value Date    WBC 16.9 (H) 04/28/2021    HGB 14.6 04/28/2021    HCT 43.0 04/28/2021    MCV 89.0 04/28/2021     04/28/2021     Lab Results   Component Value Date    CREATININE 3.9 (H) 04/28/2021    BUN 62 (H) 04/28/2021     (L) 04/28/2021    K 4.7 04/28/2021    CL 91 (L) 04/28/2021    CO2 19 (L) 04/28/2021       Hepatic Function Panel:   Lab

## 2021-04-28 NOTE — ED PROVIDER NOTES
[facility]          ATTENDING PHYSICIAN NOTE       Date of evaluation: 4/28/2021    Chief Complaint     Altered Mental Status and Cellulitis (BLE)      History of Present Illness     Sylvia Duffy is a 65 y.o. female who presents chief complaint of altered mental status and cellulitis. Most of the history is provided from the nursing facility but the patient is also able to tell me that she has had redness and swelling of her left lower extremity for the past couple of days. Per nursing facility this has gotten progressively worse over that time course. They reported that she has been confused and she states that she has felt intermittently somewhat confused but denies any confusion at this time. Review of Systems     Review of Systems   Constitutional: Negative for chills and fever. HENT: Negative for congestion. Eyes: Negative for pain. Respiratory: Negative for cough, shortness of breath and wheezing. Cardiovascular: Negative for chest pain and leg swelling. Gastrointestinal: Negative for abdominal pain, diarrhea, nausea and vomiting. Genitourinary: Negative for dysuria. Musculoskeletal: Negative for arthralgias. Skin: Positive for color change and wound. Neurological: Negative for weakness and headaches. All other systems reviewed and are negative.       Past Medical, Surgical, Family, and Social History         Diagnosis Date    Anxiety     Back pain     Bronchitis     Cancer ([address]     malignant neoplasm of bronchus and lung    Chronic pain     Dementia ([address]     Drug-seeking behavior     Emphysema lung ([address]     GERD (gastroesophageal reflux disease)     Hepatitis C     HTN (hypertension)     Insomnia     Lumbar disc disease     Osteoporosis     Spinal stenosis          Procedure Laterality Date    CHOLECYSTECTOMY  2/2011    [place]     Her family history includes Heart Disease in her father; High Blood Pressure in her father. She reports that she has been smoking cigarettes. She has a 24.50 pack-year smoking history. She has never used smokeless tobacco. She reports that she does not drink alcohol or use drugs. Medications     Previous Medications    CARBOXYMETHYLCELLULOSE (REFRESH TEARS) 0.5 % SOLN OPHTHALMIC SOLUTION    Apply 1 drop to eye every 6 hours as needed (dry, red eyes)    CHOLECALCIFEROL (VITAMIN D3) 1.25 MG (15069 UT) CAPS    Take 1 capsule by mouth every 30 days Every Month on the 15th    FAMOTIDINE (PEPCID) 20 MG TABLET    Take 20 mg by mouth daily    FUROSEMIDE (LASIX) 80 MG TABLET    Take 80 mg by mouth daily    HYDROXYZINE (ATARAX) 25 MG TABLET    Take 25 mg by mouth daily    LACTULOSE (CHRONULAC) 10 GM/15ML SOLUTION    Take 30 mLs by mouth daily    MELATONIN 5 MG TABS TABLET    Take 5 mg by mouth nightly as needed    METOPROLOL TARTRATE (LOPRESSOR) 25 MG TABLET    Take 25 mg by mouth 2 times daily    MULTIPLE VITAMINS-MINERALS (THERAPEUTIC MULTIVITAMIN-MINERALS) TABLET    Take 1 tablet by mouth daily    OLANZAPINE (ZYPREXA) 5 MG TABLET    Take 5 mg by mouth nightly    POLYETHYLENE GLYCOL (GLYCOLAX) 17 G PACKET    Take 17 g by mouth three times a week Every Monday, Wednesday and Friday for bowel regimen    POTASSIUM CHLORIDE (KLOR-CON M) 20 MEQ TBCR EXTENDED RELEASE TABLET    Take 20 mEq by mouth daily (with breakfast)    SENNA-DOCUSATE (SENNA-PLUS) 8.6-50 MG PER TABLET    Take 2 tablets by mouth daily as needed for Constipation    SPIRONOLACTONE (ALDACTONE) 50 MG TABLET    Take 50 mg by mouth daily    TRAMADOL (ULTRAM) 50 MG TABLET    Take 50 mg by mouth 4 times daily. VITAMIN B-12 (CYANOCOBALAMIN) 1000 MCG TABLET    Take 1,000 mcg by mouth daily       Allergies     She is allergic to flexeril [cyclobenzaprine]; penicillins; bactrim [sulfamethoxazole-trimethoprim]; carolin-allergin [diphenhydramine]; codeine; diphenhydramine hcl; naproxen; tylenol [acetaminophen]; and ketorolac tromethamine.     Physical Exam ED Triage Vitals [04/28/21 0136]   Enc Vitals Group      BP (!) 145/69      Pulse 98      Resp 26      Temp 98 °F (36.7 °C)      Temp Source Oral      SpO2 99 %      Weight 210 lb (95.3 kg)      Height 5' 7\" (1.702 m)      Head Circumference       Peak Flow       Pain Score       Pain Loc       Pain Edu? Excl. in [address]? General:  Non-toxic, no acute distress, fully cooperative with my exam    HEENT:  NCAT    Neck:  Supple    Pulmonary:   No increased work of breathing; CTAB    Cardiac:  RRR, no murmur, thrill or gallop. Capillary refill <3s. 2+ distal pulses in DP bilaterally    Abdomen:  Soft, nontender, nondistended; no focal rebound or guarding    Musculoskeletal:  Grossly intact without obvious injury or deformity    Neuro:  AAOx4, no focal motor deficits    Skin: Extensive erythema with induration and very mild warmth of the left lower extremity beginning just below the hip and extending down into the foot with some chronic appearing ulcerations in the anterior tibia and a few areas of blistering. Does not extend into the perineum. No crepitance. Diagnostic Results     RADIOLOGY:  CT FEMUR LEFT WO CONTRAST   Final Result   Subcutaneous edema and skin thickening. No abnormal soft tissue air or    focal abscess. CT TIBIA FIBULA LEFT WO CONTRAST   Final Result   Subcutaneous edema and skin thickening. No soft tissue air, foreign body    or soft tissue abscess.           LABS:   Results for orders placed or performed during the hospital encounter of 04/28/21   CBC Auto Differential   Result Value Ref Range    WBC 16.9 (H) 4.0 - 11.0 K/uL    RBC 4.83 4.00 - 5.20 M/uL    Hemoglobin 14.6 12.0 - 16.0 g/dL    Hematocrit 43.0 36.0 - 48.0 %    MCV 89.0 80.0 - 100.0 fL    MCH 30.2 26.0 - 34.0 pg    MCHC 33.9 31.0 - 36.0 g/dL    RDW 14.4 12.4 - 15.4 %    Platelets 494 163 - 518 K/uL    MPV 8.2 5.0 - 10.5 fL    Neutrophils % 84.0 %    Lymphocytes % 7.0 %    Monocytes % 7.0 %    Eosinophils % 0.0 %    Basophils % 0.0 %    Neutrophils Absolute 14.5 (H) 1.7 - 7.7 K/uL    Lymphocytes Absolute 1.2 1.0 - 5.1 K/uL    Monocytes Absolute 1.2 0.0 - 1.3 K/uL    Eosinophils Absolute 0.0 0.0 - 0.6 K/uL    Basophils Absolute 0.0 0.0 - 0.2 K/uL    Bands Relative 2 0 - 7 %   Basic Metabolic Panel w/ Reflex to MG   Result Value Ref Range    Sodium 127 (L) 136 - 145 mmol/L    Potassium reflex Magnesium 4.7 3.5 - 5.1 mmol/L    Chloride 91 (L) 99 - 110 mmol/L    CO2 19 (L) 21 - 32 mmol/L    Anion Gap 17 (H) 3 - 16    Glucose 160 (H) 70 - 99 mg/dL    BUN 62 (H) 7 - 20 mg/dL    CREATININE 3.9 (H) 0.6 - 1.2 mg/dL    GFR Non- 11 (A) >60    GFR  14 (A) >60    Calcium 9.2 8.3 - 10.6 mg/dL   Lactate, Sepsis   Result Value Ref Range    Lactic Acid, Sepsis 2.9 (H) 0.4 - 1.9 mmol/L   Blood Gas, Venous   Result Value Ref Range    pH, Vaughn 7.349 (L) 7.350 - 7.450    pCO2, Vaughn 42.9 41.0 - 51.0 mmHg    pO2, Vaughn com (AA) 25 - 40 mmHg    HCO3, Venous 23.6 (L) 24.0 - 28.0 mmol/L    Base Excess, Vaughn -2.1 (L) -2.0 - 3.0 mmol/L    O2 Sat, Vaughn 44 Not established %    Carboxyhemoglobin 1.1 0.0 - 1.5 %    MetHgb, Vaughn 0.2 0.0 - 1.5 %    TC02 (Calc), Vaughn 25 mmol/L    Hemoglobin, Vaughn, Reduced 55.60 %   Protime-INR   Result Value Ref Range    Protime 16.3 (H) 10.0 - 13.2 sec    INR 1.40 (H) 0.86 - 1.14   Brain Natriuretic Peptide   Result Value Ref Range    Pro- (H) 0 - 124 pg/mL   Lactic Acid, Plasma   Result Value Ref Range    Lactic Acid 3.6 (H) 0.4 - 2.0 mmol/L     RECENT VITALS:  BP: 136/81, Temp: 98 °F (36.7 °C), Pulse: 92, Resp: 20, SpO2: 97 %     Procedures         ED Course     Nursing Notes, Past Medical Hx, Past Surgical Hx, Social Hx, Allergies, and Family Hx were reviewed.     The patient was given the following medications:  Orders Placed This Encounter   Medications    cefepime (MAXIPIME) 1000 mg IVPB minibag     Order Specific Question:   Antimicrobial Indications     Answer:   Skin and Soft infection she is felt to have severe sepsis without septic shock. Hospitalist has been called to discuss admission. Critical Care:  Due to the immediate potential for life-threatening deterioration due to severe sepsis without septic shock, I spent 45 minutes providing critical care. This time excludes time spent performing procedures but includes time spent on direct patient care, history retrieval, review of the chart, and discussions with patient, family, and consultant(s). Clinical Impression     1. Cellulitis of left lower extremity    2. Severe sepsis without septic shock ([address]    3. Acute renal failure, unspecified acute renal failure type ([address]        Disposition     PATIENT REFERRED TO:  No follow-up provider specified.     DISCHARGE MEDICATIONS:  New Prescriptions    No medications on file       [address] Sime[address] Nava MD  04/28/21 6889

## 2021-04-28 NOTE — FLOWSHEET NOTE
04/28/21 1326   Encounter Summary   Services provided to: Patient not available   Referral/Consult From: Maximiliano Chappell  [facility] SHAHBAZ, Son)   Complexity of Encounter High   Length of Encounter 45 minutes   Advance Care Planning Yes   Routine   Type Initial   Assessment Calm; Approachable; Hopeful   Intervention Active listening;Explored feelings, thoughts, concerns;Nurtured hope   Outcome Comfort;Expressed gratitude;Engaged in conversation;Expressed feelings/needs/concerns; Hopeful;Receptive   Advance Directives (For Healthcare)   Healthcare Directive No, patient does not have an advance directive for healthcare treatment  (Son states that he is the guardian )   Staff Dang Jason MA

## 2021-04-28 NOTE — CARE COORDINATION
Case Management Assessment           Initial Evaluation                Date / Time of Evaluation: 4/28/2021 10:54 AM                 Assessment Completed by: Yoel Watson    Patient Name: Raul Wang     YOB: 1950  Diagnosis: Cellulitis [L03.90]     Date / Time: 4/28/2021  1:25 AM    Patient Admission Status: Inpatient    If patient is discharged prior to next notation, then this note serves as note for discharge by case management. Current PCP: [address] [place] [address] Patient: No    Chart Reviewed: Yes  Patient/ Family Interviewed: Yes    Initial assessment completed at bedside with: patient over the phone due to COVID-19 rule out     Hospitalization in the last 30 days: No    Emergency Contacts:  Extended Emergency Contact Information  Primary Emergency Contact: [employer] [address] [place] [address] Phone: [phone]  Mobile Phone: [phone]  Relation: Child  Secondary Emergency Contact: Chelsie [address] of [address] [place] [address] Phone: [phone]  Relation: Brother/Sister    Advance Directives:   Code Status: Full [address]: No      Financial  Payor: Mya Tan / Plan: [facility] SOLUTIONS / Product Type: *No Product type* /     Pre-cert required for SNF: No    Pharmacy    [address] [facility] [phone]  [facility]  [address] [place] [address]  Phone: [phone] Fax: [phone]    [facility] [place] [phone] -  [phone]  [address] [place] [address]  Phone: [phone] Fax: [phone]      Potential assistance Purchasing Medications:    Does Patient want to participate in local refill/ meds to beds program?:      Meds To Beds General Rules:  1. Can ONLY be done Monday- Friday between 8:30am-5pm  2. Prescription(s) must be in pharmacy by 3pm to be filled same day  3. Copy of patient's insurance/ prescription drug card and patient face sheet must be sent along with the prescription(s)  4. Cost of Rx cannot be added to hospital bill. If financial assistance is needed, please contact unit  or ;  or  CANNOT provide pharmacy voucher for patients co-pays  5. Patients can then  the prescription on their way out of the hospital at discharge, or pharmacy can deliver to the bedside if staff is available. (payment due at time of pick-up or delivery - cash, check, or card accepted)     Able to afford home medications/ co-pay costs: Yes    ADLS  Support Systems:      PT AM-PAC:   /24  OT AM-PAC:   /24    [place]: From [facility]) LTC  Steps: no    Plans to RETURN to current housing: Yes  Barriers to RETURNING to current housing: medical complications    Home Care Information  Currently ACTIVE with [address]: No  Home Care Agency: Not Applicable    DISCHARGE PLAN:  Disposition: [facility] (LTC): [facility]   Phone: [phone]  Fax: [phone]    Transportation PLAN for discharge: EMS transportation     Factors facilitating achievement of predicted outcomes: Caregiver support, Cooperative, Pleasant and Has needed Durable Medical Equipment at home    Barriers to discharge: Medical complications and Medication managment    Additional Case Management Notes: CM spoke with pt and admissions over the phone. Pt can return to [address] [facility], [facility]) [address] [place] [address], no precert needed.      The Plan for Transition of Care is related to the following treatment goals of Cellulitis [L03.90]    The Patient and/or patient representative Juan [place] and her family were provided with a choice of provider and agrees with the discharge plan Yes    Freedom of choice list was provided with basic dialogue that supports the patient's individualized plan of care/goals and shares the quality data associated with the providers.  Yes    Care Transition patient: No    Chyna Agustin RN  [facility]  Case Management Department  Ph: [phone]   Fax: [phone]

## 2021-04-28 NOTE — ED TRIAGE NOTES
Pt to ED by EMS from [facility] for cellulitis and ams. Per Ems the pt is normally a&ox4 but SNF reported that she seemed confused. SNF also reports that redness has spread up her leg in the past 48 hours. Pt is oriented to self, place, situation, but is disoriented to time and president.

## 2021-04-28 NOTE — ED NOTES
Bed: A04-04  Expected date:   Expected time:   Means of arrival:   Comments:  Niurka Reeves RN  04/28/21 0126

## 2021-04-28 NOTE — ED NOTES
No electronic record of vaccination, and unable to reach ECF to confirm vaccination status.      Suly South RN  04/28/21 1653

## 2021-04-28 NOTE — PROGRESS NOTES
[facility] Wound Ostomy Continence Nurse  Follow-up Progress Note       NAME:  Justice Blanc  MEDICAL RECORD NUMBER:  [id number]  AGE:  65 y.o. GENDER:  female  :  1950  TODAY'S DATE:  2021    Subjective:   Wound Identification: BLE  Wound Type:VENOUS  Contributing Factors: morbid obesity, nictoene addiction, Hepatitis C, PVD, venous stasis, Ca, anxiety, spinal stenosis, drug seeking        Patient Goal of Care:  [x] Wound Healing  [] Odor Control  [x] Palliative Care  [] Pain Control   [] Other:     Objective:    BP (!) 116/54   Pulse 99   Temp 98.5 °F (36.9 °C) (Oral)   Resp 20   Ht 5' 7\" (1.702 m)   Wt 261 lb 3.9 oz (118.5 kg)   SpO2 96%   BMI 40.92 kg/m²   Cam Risk Score: Cam Scale Score: 15  Assessment:   Measurements:  Wound 21 Leg Left multiple open areas/ blisters from toes with surrounding cellulitis (Active)   Wound Image   21 1042   Wound Etiology Venous 21 1042   Dressing Status New dressing applied; Old drainage noted 21 1042   Wound Cleansed Irrigated with saline 21 1042   Dressing/Treatment ABD; Ace wrap;Alginate with Ag;Dry dressing 21 1042   Wound Depth (cm) 0.1 cm 21 1042   Wound Assessment Denuded;Devitalized tissue; Fluid filled blister;Pink/red;Ruptured blister;Superficial 21 1042   Drainage Amount Copious 21 1042   Drainage Description Yellow 21 1042   Odor None 21 1042   Lise-wound Assessment Edematous;Fragile; Hyperpigmented; Maceration; Non-blanchable erythema; Warm 21 1042   Margins Attached edges; Unattached edges; Undefined edges 21 1042   Wound Thickness Description not for Pressure Injury Partial thickness 21 1042   Number of days: 0       Wound 21 Leg Right; Lower Scattered open ulcers/blisters, pink lise-skin (Active)   Wound Etiology Venous 21 1042   Dressing Status New dressing applied; Old drainage noted 21 1042   Wound Cleansed Wound patient/caregiver understanding able to:   [] Indicates understanding       [x] Needs reinforcement  [] Unsuccessful      [] Verbal Understanding  [] Demonstrated understanding       [x] No evidence of learning  [] Refused teaching         [] N/A       Electronically signed by Kat Rothman RN, Jordyn Talavera on 4/28/2021 at 10:55 AM

## 2021-04-28 NOTE — H&P
HISTORY AND PHYSICAL             Date: 4/28/2021        Patient Name: Jose Mcadams     YOB: 1950      Age:  65 y.o. Chief Complaint     Chief Complaint   Patient presents with    Altered Mental Status    Cellulitis     BLE           History Obtained From   patient, electronic medical record    History of Present Illness     68-year-old female presented with a chief complaint of change in the mental status associated with the left lower extremity infection. Most of the history is obtained from electronic medical record as patient is not able to provide proper chronological history. She has redness, swelling and pain in her left lower extremity for the past couple days. It has been progressively getting worse patient has become more confused. Patient is unable to provide any other review of systems question answers    Past Medical History     Past Medical History:   Diagnosis Date    Anxiety     Back pain     Bronchitis     Cancer ([address]     malignant neoplasm of bronchus and lung    Chronic pain     Dementia ([address]     Drug-seeking behavior     Emphysema lung ([address]     GERD (gastroesophageal reflux disease)     Hepatitis C     HTN (hypertension)     Insomnia     Lumbar disc disease     Osteoporosis     Spinal stenosis         Past Surgical History     Past Surgical History:   Procedure Laterality Date    [facility]        Medications Prior to Admission     Prior to Admission medications    Medication Sig Start Date End Date Taking?  Authorizing Provider   vitamin B-12 (CYANOCOBALAMIN) 1000 MCG tablet Take 1,000 mcg by mouth daily   Yes Historical Provider, MD   polyethylene glycol (GLYCOLAX) 17 g packet Take 17 g by mouth three times a week Every Monday, Wednesday and Friday for bowel regimen   Yes Historical Provider, MD   famotidine (PEPCID) 20 MG tablet Take 40 mg by mouth daily    Yes Historical Provider, MD   furosemide (LASIX) 80 MG tablet Take 80 mg by mouth daily   Yes Historical Provider, MD   hydrOXYzine (ATARAX) 25 MG tablet Take 25 mg by mouth daily   Yes Historical Provider, MD   lactulose (CHRONULAC) 10 GM/15ML solution Take 30 mLs by mouth daily   Yes Historical Provider, MD   melatonin 5 MG TABS tablet Take 5 mg by mouth nightly as needed (insomnia)    Yes Historical Provider, MD   metoprolol tartrate (LOPRESSOR) 25 MG tablet Take 25 mg by mouth 2 times daily   Yes Historical Provider, MD   OLANZapine (ZYPREXA) 5 MG tablet Take 5 mg by mouth nightly   Yes Historical Provider, MD   potassium chloride (KLOR-CON M) 20 MEQ TBCR extended release tablet Take 20 mEq by mouth daily (with breakfast)   Yes Historical Provider, MD   carboxymethylcellulose (REFRESH TEARS) 0.5 % SOLN ophthalmic solution Apply 1 drop to eye every 6 hours as needed (dry, red eyes)   Yes Historical Provider, MD   senna-docusate (SENNA-PLUS) 8.6-50 MG per tablet Take 2 tablets by mouth daily as needed for Constipation   Yes Historical Provider, MD   spironolactone (ALDACTONE) 50 MG tablet Take 50 mg by mouth daily   Yes Historical Provider, MD   Multiple Vitamins-Minerals (THERAPEUTIC MULTIVITAMIN-MINERALS) tablet Take 1 tablet by mouth daily   Yes Historical Provider, MD   traMADol (ULTRAM) 50 MG tablet Take 50 mg by mouth 4 times daily.    Yes Historical Provider, MD   Cholecalciferol (VITAMIN D3) 1.25 MG (15837 UT) CAPS Take 1 capsule by mouth every 30 days Every Month on the 15th   Yes Historical Provider, MD        Allergies   Flexeril [cyclobenzaprine], Penicillins, Bactrim [sulfamethoxazole-trimethoprim], Emerson-allergin [diphenhydramine], Codeine, Diphenhydramine hcl, Naproxen, Tylenol [acetaminophen], and Ketorolac tromethamine    Social History     Social History     Tobacco History     Smoking Status  Current Every Day Smoker Smoking Frequency  0.5 packs/day for 49 years (24.5 pk yrs) Smoking Tobacco Type  Cigarettes    Smokeless Tobacco Use  Never Used          Alcohol History     Alcohol Use Status  No          Drug Use     Drug Use Status  No          Sexual Activity     Sexually Active  Not Asked                Family History     Family History   Problem Relation Age of Onset    High Blood Pressure Father     Heart Disease Father        Review of Systems   Negative except LLE pain , redness     Physical Exam   BP (!) 116/54   Pulse 99   Temp 98.5 °F (36.9 °C) (Oral)   Resp 20   Ht 5' 7\" (1.702 m)   Wt 261 lb 3.9 oz (118.5 kg)   SpO2 96%   BMI 40.92 kg/m²     CONSTITUTIONAL: Awake, alert, and disoriented  EYES:  Lids and lashes normal, pupils equal, round and reactive to light, extra ocular muscles intact, sclera clear, conjunctiva normal  LUNGS:  No increased work of breathing, good air exchange, clear to auscultation bilaterally, no crackles or wheezing  CARDIOVASCULAR:  Normal apical impulse, regular rate and rhythm, normal S1 and S2, no S3 or S4, and no murmur noted  ABDOMEN:  No scars, normal bowel sounds, soft, non-distended, non-tender, no masses palpated, no hepatosplenomegally  MUSCULOSKELETAL: Left lower extremity redness, mild tenderness, wrapped up with Ace wrap  NEUROLOGIC: Awake, alert, no focal deficit, disoriented  SKIN: Left lower extremity redness, erythema    Labs      Recent Results (from the past 24 hour(s))   CBC Auto Differential    Collection Time: 04/28/21  2:23 AM   Result Value Ref Range    WBC 16.9 (H) 4.0 - 11.0 K/uL    RBC 4.83 4.00 - 5.20 M/uL    Hemoglobin 14.6 12.0 - 16.0 g/dL    Hematocrit 43.0 36.0 - 48.0 %    MCV 89.0 80.0 - 100.0 fL    MCH 30.2 26.0 - 34.0 pg    MCHC 33.9 31.0 - 36.0 g/dL    RDW 14.4 12.4 - 15.4 %    Platelets 582 208 - 701 K/uL    MPV 8.2 5.0 - 10.5 fL    Neutrophils % 84.0 %    Lymphocytes % 7.0 %    Monocytes % 7.0 %    Eosinophils % 0.0 %    Basophils % 0.0 %    Neutrophils Absolute 14.5 (H) 1.7 - 7.7 K/uL    Lymphocytes Absolute 1.2 1.0 - 5.1 K/uL    Monocytes Absolute 1.2 0.0 - 1.3 K/uL    Eosinophils Absolute 0.0 0.0 - 0.6 K/uL    Basophils Absolute 0.0 0.0 - 0.2 K/uL    Bands Relative 2 0 - 7 %   Basic Metabolic Panel w/ Reflex to MG    Collection Time: 04/28/21  2:23 AM   Result Value Ref Range    Sodium 127 (L) 136 - 145 mmol/L    Potassium reflex Magnesium 4.7 3.5 - 5.1 mmol/L    Chloride 91 (L) 99 - 110 mmol/L    CO2 19 (L) 21 - 32 mmol/L    Anion Gap 17 (H) 3 - 16    Glucose 160 (H) 70 - 99 mg/dL    BUN 62 (H) 7 - 20 mg/dL    CREATININE 3.9 (H) 0.6 - 1.2 mg/dL    GFR Non- 11 (A) >60    GFR  14 (A) >60    Calcium 9.2 8.3 - 10.6 mg/dL   Lactate, Sepsis    Collection Time: 04/28/21  2:23 AM   Result Value Ref Range    Lactic Acid, Sepsis 2.9 (H) 0.4 - 1.9 mmol/L   Blood Gas, Venous    Collection Time: 04/28/21  2:23 AM   Result Value Ref Range    pH, Vaughn 7.349 (L) 7.350 - 7.450    pCO2, Vaughn 42.9 41.0 - 51.0 mmHg    pO2, Vaughn com (AA) 25 - 40 mmHg    HCO3, Venous 23.6 (L) 24.0 - 28.0 mmol/L    Base Excess, Vaughn -2.1 (L) -2.0 - 3.0 mmol/L    O2 Sat, Vaughn 44 Not established %    Carboxyhemoglobin 1.1 0.0 - 1.5 %    MetHgb, Vaughn 0.2 0.0 - 1.5 %    TC02 (Calc), Vaughn 25 mmol/L    Hemoglobin, Vaughn, Reduced 55.60 %   Protime-INR    Collection Time: 04/28/21  2:23 AM   Result Value Ref Range    Protime 16.3 (H) 10.0 - 13.2 sec    INR 1.40 (H) 0.86 - 1.14   Brain Natriuretic Peptide    Collection Time: 04/28/21  2:23 AM   Result Value Ref Range    Pro- (H) 0 - 124 pg/mL   Lactic Acid, Plasma    Collection Time: 04/28/21  4:15 AM   Result Value Ref Range    Lactic Acid 3.6 (H) 0.4 - 2.0 mmol/L        Imaging/Diagnostics Last 24 Hours   Ct Femur Left Wo Contrast    Result Date: 4/28/2021  Patient: Lucita Camargo  Time Out: 05:28 Exam(s): CT OTHER Left Femur  EXAM:   CT Left Lower Extremity Without Intravenous Contrast, Femur  CLINICAL HISTORY:   swelling eval for gas or abscess.   TECHNIQUE:   Axial computed tomography images of the left femur without intravenous contrast.  CTDI is 8.8 mGy and DLP is 932 mGy-cm. All CT scans at this facility use dose modulation, iterative reconstruction, and/or weight based dosing when appropriate to reduce radiation dose to as low as reasonably achievable. COMPARISON:   No relevant prior studies available. FINDINGS:   Bones/joints:  Small to moderate sized joint effusion at the knee. Advanced degenerative changes at the knee  No acute fracture. No dislocation. Soft tissues:  Subcutaneous edema with some anterior and lateral skin thickening. No soft tissue air or foreign body. No focal soft tissue abscess. Electronically signed by Joaquín Iverson MD on 04-28-21 at 7639    Subcutaneous edema and skin thickening. No abnormal soft tissue air or focal abscess. Ct Tibia Fibula Left Wo Contrast    Result Date: 4/28/2021  Patient: Marina Freire  Time Out: 05:34 Exam(s): CT OTHER tib/fib  EXAM:   CT Left Lower Extremity Without Intravenous Contrast, Tibia and Fibula  CLINICAL HISTORY:   swelling, eval for gas. TECHNIQUE:   Axial computed tomography images of the left tibia and fibula without intravenous contrast.  CTDI is 8.8 mGy and DLP is 932 mGy-cm. All CT scans at this facility use dose modulation, iterative reconstruction, and/or weight based dosing when appropriate to reduce radiation dose to as low as reasonably achievable. COMPARISON:   No relevant prior studies available. FINDINGS:   Bones/joints:  No focal or acute bone abnormality. No dislocation. Soft tissues: Moderate diffuse subcutaneous edema and diffuse skin thickening. No soft tissue air or focal soft tissue abscess. Electronically signed by Joaquín Iverson MD on [birth year] at 5782    Subcutaneous edema and skin thickening. No soft tissue air, foreign body or soft tissue abscess.       Assessment      Hospital Problems           Last Modified POA    Cellulitis 4/28/2021 Yes          Plan       Cellulitis  Appears streptococcal in nature  Continue with vancomycin and cefepime  Consult ID  Wound care is consulted. Monitor the area of erythema. LAVINIA  Last baseline we have in the system in [facility] is 0.5  Hold nephrotoxic medication  IV fluids  Discussed with Dr. Vimal Tee    Acute metabolic encephalopathy  Suspect secondary to infection as well as LAVINIA  Monitor response with above treatment.         Disposition: Pending improvement        Consultations Ordered:  PHARMACY TO DOSE VANCOMYCIN  IP CONSULT TO HOSPITALIST  IP CONSULT TO PHARMACY  PHARMACY TO DOSE VANCOMYCIN  IP CONSULT TO NEPHROLOGY    Electronically signed by Jon Loco MD on 4/28/21 at 10:48 AM EDT

## 2021-04-28 NOTE — PROGRESS NOTES
4 Eyes Admission Assessment     I agree as the admission nurse that 2 RN's have performed a thorough Head to Toe Skin Assessment on the patient. ALL assessment sites listed below have been assessed on admission. Areas assessed by both nurses:  [x]   Head, Face, and Ears   [x]   Shoulders, Back, and Chest - REDNESS UNDER L BREAST  [x]   Arms, Elbows, and Hands   [x]   Coccyx, Sacrum, and Ischum - INCONTINENCE DERMATITIS  [x]   Legs, Feet, and Heels - L LE CELLULITIS         Does the Patient have Skin Breakdown?   Yes a wound was noted on the Admission Assessment and an LDA was Initiated documentation include the Amanda-wound, Wound Assessment, Measurements, Dressing Treatment, Drainage, and Color\",         Cam Prevention initiated:  Yes   Wound Care Orders initiated:  Yes      WO nurse consulted for Pressure Injury (Stage 3,4, Unstageable, DTI, NWPT, and Complex wounds):  Yes      Nurse 1 eSignature: Electronically signed by Cathy Perera RN on 4/28/21 at 7:05 AM EDT    **SHARE this note so that the co-signing nurse is able to place an eSignature**    Nurse 2 eSignature: Electronically signed by Romaine Clark RN on 4/29/21 at 12:01 AM EDT

## 2021-04-29 NOTE — PROGRESS NOTES
Clinical Pharmacy Progress Note    ADMIT DATE: 4/28/2021     Interval update: SCr improving significantly overnight (3.9?2?1.6). 64 yo F with PMH of HTN, anxiety, GERD, spinal stenosis, lumbar disc disease, dementia and lung cancer who presented from SNF with AMS and cellulitis. Admitted with cellulitis, AMS and LAVINIA. Pharmacy has been consulted to dose vancomycin per Dr. Luisito Camarillo and Dr. Daryl Chauhan. PERTINENT MEDICATIONS:  Cefepime 2g IV q12h - day #2  Vancomycin - pharmacy to dose - day #2   (4/28-4/29)  Date Dose Vanc Level   4/28 1.75g IV total      4/29  8.9 mcg/mL         1.75g IV q24h (4/28-current)    LABORATORY:  Recent Labs     04/28/21  2326 04/29/21  0623   * 132*   K 4.9 4.0   CL 95* 100   CO2 17* 21   BUN 49* 43*   CREATININE 2.0* 1.6*   GLUCOSE 211* 170*     Estimated Creatinine Clearance: 42 mL/min (A) (based on SCr of 1.6 mg/dL (H)). Recent Labs     04/28/21  0223 04/29/21  0623   WBC 16.9* 10.0   HGB 14.6 12.3    182     MICROBIOLOGY:  Blood (4/28):  NGTD x 2   COVID-19 (4/28): Not detected    VITALS:  BP (!) 139/56   Pulse 83   Temp 98.4 °F (36.9 °C) (Oral)   Resp 16   Ht 5' 7\" (1.702 m)   Wt 262 lb 12.6 oz (119.2 kg)   SpO2 96%   BMI 41.16 kg/m²     Intake/Output Summary (Last 24 hours) at 4/29/2021 0739  Last data filed at 4/29/2021 0418  Gross per 24 hour   Intake 1770.2 ml   Output 1250 ml   Net 520.2 ml       PROPHYLAXIS:  Stress ulcer: pantoprazole  VTE:  Heparin 5000 units TID    ASSESSMENT/PLAN:  1)  Sepsis d/t LLE cellulitis: Vancomycin + Cefepime - Day #2  · ID consulted on admission - consider changing cefepime to ceftriaxone 1g IV q24h based on ID recommendations. · Vancomycin--pharmacy to dose  · Admitted with LAVINIA -- unsure of patient's baseline. Last known SCr was 0.7 in 2015. Admitted with SCr of 3.9, which has quickly improved. SCr down to 1.6 today. · Received 1.75g IV x1 total yesterday. · Random level this AM = 8.9 mcg/mL.   · Given improvement in renal function, will schedule dose of 1.75g IV q24h -- dose provides ~15 mg/kg/dose, interval based on estimated elimination half-life of ~18h. · Trough will be ordered when appropriate. Target trough: 10-15 mcg/mL. · Clinical status will be monitored closely / dose will be adjusted as appropriate. · Cefepime--  · Currently on 1g IV q12h. Given improvement in renal function, will adjust to 2g IV q12h based on CrCl and indication. Dose based on normal dosing of 2g IV q8h. · Will continue to monitor and adjust dose as appropriate. Please call with any questions.   Martina MckinneyD, BCPS  Wireless: [id number]  or [phone]  4/29/2021 7:39 AM

## 2021-04-29 NOTE — PROGRESS NOTES
Office : [phone]     Fax :[phone]         Renal Progress Note  Subjective:   Admit Date: 4/28/2021     HPI      Interval History: Patient seen and examined this morning. More alert today. Endorses vague abdominal pain, localized \"in the middle. \" No associated nausea or vomiting. Says she had BM yesterday. Doesn't feel like eating. DIET DIET GENERAL; Daily Fluid Restriction: 1200 ml  Medications:   Scheduled Meds:   cefepime  2,000 mg Intravenous Q12H    vancomycin  1,750 mg Intravenous Q24H    metoprolol tartrate  25 mg Oral BID    therapeutic multivitamin-minerals  1 tablet Oral Daily    OLANZapine  5 mg Oral Nightly    vitamin B-12  1,000 mcg Oral Daily    sodium chloride flush  5-40 mL Intravenous 2 times per day    pantoprazole  40 mg Oral QAM AC    heparin (porcine)  5,000 Units Subcutaneous 3 times per day    Venelex   Topical BID     Continuous Infusions:   sodium chloride         Labs:  CBC:   Recent Labs     04/28/21 0223 04/29/21  0623   WBC 16.9* 10.0   HGB 14.6 12.3    182     BMP:    Recent Labs     04/28/21 0223 04/28/21 2326 04/29/21  0623   * 128* 132*   K 4.7 4.9 4.0   CL 91* 95* 100   CO2 19* 17* 21   BUN 62* 49* 43*   CREATININE 3.9* 2.0* 1.6*   GLUCOSE 160* 211* 170*     Ca/Mg/Phos:   Recent Labs     04/28/21 0223 04/28/21 2326 04/29/21  0623   CALCIUM 9.2 8.0* 8.2*   PHOS  --  3.5  --      Hepatic:   Recent Labs     04/28/21  1148   *   ALT 97*   BILITOT 2.6*   ALKPHOS 280*     Troponin: No results for input(s): TROPONINI in the last 72 hours.   BNP: No results for input(s): BNP in the last 72 hours. Lipids: No results for input(s): CHOL, TRIG, HDL, LDLCALC, LABVLDL in the last 72 hours. ABGs: No results for input(s): PHART, PO2ART, NLN5WIP in the last 72 hours. INR:   Recent Labs     04/28/21  0223   INR 1.40*     UA:  Recent Labs     04/28/21  1140   COLORU Yellow   CLARITYU Clear   GLUCOSEU Negative   BILIRUBINUR Negative   KETUA Negative   SPECGRAV 1.015   BLOODU SMALL*   PHUR 6.0   PROTEINU Negative   UROBILINOGEN 0.2   NITRU Negative   LEUKOCYTESUR TRACE*   LABMICR YES   URINETYPE NotGiven      Urine Microscopic:   Recent Labs     04/28/21  1140   BACTERIA 1+*   HYALCAST 0-2   WBCUA 0-2   RBCUA None seen   EPIU 0-1     Urine Culture: No results for input(s): LABURIN in the last 72 hours. Urine Chemistry:   Recent Labs     04/28/21  1140   CLUR <20   PROTEINUR 20.90*   NAUR <20       Objective:   Vitals: BP (!) 117/44   Pulse 96   Temp 98.5 °F (36.9 °C) (Oral)   Resp 17   Ht 5' 7\" (1.702 m)   Wt 262 lb 12.6 oz (119.2 kg)   SpO2 92%   BMI 41.16 kg/m²    Wt Readings from Last 3 Encounters:   04/29/21 262 lb 12.6 oz (119.2 kg)   01/02/13 180 lb (81.6 kg)   09/20/12 192 lb 6.4 oz (87.3 kg)      24HR INTAKE/OUTPUT:      Intake/Output Summary (Last 24 hours) at 4/29/2021 6926  Last data filed at 4/29/2021 0418  Gross per 24 hour   Intake 1770.2 ml   Output 1250 ml   Net 520.2 ml     Constitutional:  Alert, awake, no apparent distress  NECK: supple, no JVD  Cardiovascular:  S1, S2 without m/r/g  Respiratory:  CTA B without w/r/r  Abdomen: +bs, soft, nt  Ext: Lower extremities wrapped in ace bandage, L LE erythema noted up to mid-thigh which looks stable, if not, improved from yesterday. Assessment and Plan:       IMAGING:  CT FEMUR LEFT WO CONTRAST   Final Result   Subcutaneous edema and skin thickening. No abnormal soft tissue air or    focal abscess. CT TIBIA FIBULA LEFT WO CONTRAST   Final Result   Subcutaneous edema and skin thickening.   No soft tissue air, foreign body    or soft

## 2021-04-29 NOTE — PROGRESS NOTES
Patient refusing blood draw and medications. Explained to patient purpose of each, however patient states she wants to go out for a cigarette before anything. Explained to patient she cannot smoke in the hospital but offered to ask for nicotine patch, however patient refusing this as well. Notified on call hospitalist via perfect serve, will continue to monitor. 2111 - Callback received from Dr. Ruel Paz, no new orders received, instructed to keep educating patient on plan of care compliance.

## 2021-04-29 NOTE — PROGRESS NOTES
[facility] Wound Ostomy Continence Nurse  Follow-up Progress Note       NAME:  Justice Blanc  MEDICAL RECORD NUMBER:  [id number]  AGE:  65 y.o. GENDER:  female  :  1950  TODAY'S DATE:  2021    Subjective:   Wound Identification: LLE  Wound Type: venous    Strike through large drainage- copious from some blisters. Improved -less bright red, knee and foot mostly without redness now and 1+ edema ankle up thigh, not as tight. Pt. With full incontinence, unwilling to allow change of wet pants and pad, finally agreeable after much encouragement. Patient Goal of Care:  [x] Wound Healing  [] Odor Control  [x] Palliative Care  [] Pain Control   [] Other:     Objective:    BP (!) 145/76   Pulse 103   Temp 98.1 °F (36.7 °C) (Oral)   Resp 18   Ht 5' 7\" (1.702 m)   Wt 262 lb 12.6 oz (119.2 kg)   SpO2 94%   BMI 41.16 kg/m²   Cam Risk Score: Cam Scale Score: 15  Assessment:   Measurements:  Wound 21 Leg Left multiple open areas/ blisters from toes up thigh with surrounding cellulitis (Active)   Wound Image   21 1042   Wound Etiology Venous 21 1200   Dressing Status New dressing applied; Old drainage noted 21 1200   Wound Cleansed Cleansed with saline 21 1200   Dressing/Treatment ABD; Ace wrap;Alginate with Ag;Dry dressing; Foam 21 1200   Dressing Change Due 21 1100   Wound Depth (cm) 0.1 cm 21 1042   Wound Assessment Denuded;Devitalized tissue;Erythema;Fluid filled blister;Pink/red;Ruptured blister; Other (Comment) 21 1200   Drainage Amount Copious 21 1200   Drainage Description Yellow 21 1200   Odor Mild 21 1200   Amanda-wound Assessment Blanchable erythema; Hyperpigmented;Non-blanchable erythema 21 1200   Margins Attached edges; Unattached edges; Undefined edges 21 1200   Wound Thickness Description not for Pressure Injury Partial thickness 21 1200   Number of days: 1 patient/caregiver understanding able to:   [x] Indicates understanding       [x] Needs reinforcement  [] Unsuccessful      [] Verbal Understanding  [] Demonstrated understanding       [] No evidence of learning  [] Refused teaching         [] N/A       Electronically signed by Von Mcgrath RN, Chaparrita Staton on 4/29/2021 at 1:53 PM

## 2021-04-29 NOTE — PROGRESS NOTES
Hospital Medicine Care  Progress Note      Chief complain; Altered Mental Status and Cellulitis (BLE)            Subjective:     Complaining of right lower extremity pain  Denies any chest pain, shortness of breath  Denies any nausea or vomiting  Mentation appears to be much much better    Review of Systems:     Review of Systems as mentioned above, other ros is negative. Objective:       Medications        Scheduled Meds:   ceFAZolin  2,000 mg Intravenous Q12H    metoprolol tartrate  25 mg Oral BID    therapeutic multivitamin-minerals  1 tablet Oral Daily    OLANZapine  5 mg Oral Nightly    vitamin B-12  1,000 mcg Oral Daily    sodium chloride flush  5-40 mL Intravenous 2 times per day    pantoprazole  40 mg Oral QAM AC    heparin (porcine)  5,000 Units Subcutaneous 3 times per day    Venelex   Topical BID     Continuous Infusions:   sodium chloride 75 mL/hr at 04/29/21 1022    sodium chloride       PRN Meds:.acetaminophen, traMADol, polyvinyl alcohol, melatonin, sodium chloride flush, sodium chloride, promethazine **OR** ondansetron, polyethylene glycol      Allergies  Allergies   Allergen Reactions    Flexeril [Cyclobenzaprine] Shortness Of Breath    Penicillins Shortness Of Breath, Itching and Swelling    Bactrim [Sulfamethoxazole-Trimethoprim]     Emerson-Allergin [Diphenhydramine] Nausea Only    Codeine Itching    Diphenhydramine Hcl Other (See Comments)     Heart racing    Naproxen Nausea Only     Stomach pain      Tylenol [Acetaminophen]     Ketorolac Tromethamine Nausea And Vomiting         Vitals:    04/29/21 0600 04/29/21 0856 04/29/21 0935 04/29/21 1059   BP:  (!) 117/44 (!) 145/46 (!) 145/76   Pulse: 83 96 102 103   Resp:  17  18   Temp:  98.5 °F (36.9 °C)  98.1 °F (36.7 °C)   TempSrc:  Oral  Oral   SpO2:  92%  94%   Weight:       Height:             Physical exam:         Physical Exam  Constitutional:       Appearance: Normal appearance.    HENT:      Head: Normocephalic and atraumatic. Cardiovascular:      Rate and Rhythm: Normal rate and regular rhythm. Pulses: Normal pulses. Heart sounds: Normal heart sounds. Pulmonary:      Effort: Pulmonary effort is normal.      Breath sounds: Normal breath sounds. Abdominal:      General: Abdomen is flat. Palpations: Abdomen is soft. Musculoskeletal:         General: Swelling present. Comments: Left lower extremity erythema, swelling, tenderness   Skin:     Capillary Refill: Capillary refill takes less than 2 seconds. Findings: Erythema present. Neurological:      General: No focal deficit present. Mental Status: She is alert and oriented to person, place, and time. Mental status is at baseline. Labs      Recent Labs     04/28/21  0223 04/29/21  0623   WBC 16.9* 10.0   HGB 14.6 12.3   HCT 43.0 36.1    182   MCV 89.0 89.5        Recent Labs     04/28/21  0223 04/28/21  2326 04/29/21  0623   * 128* 132*   K 4.7 4.9 4.0   CL 91* 95* 100   CO2 19* 17* 21   PHOS  --  3.5  --    BUN 62* 49* 43*   CREATININE 3.9* 2.0* 1.6*       Recent Labs     04/28/21  1148 04/29/21  0623   * 174*   ALT 97* 91*   BILIDIR 1.3* 1.2*   BILITOT 2.6* 2.3*   ALKPHOS 280* 232*       No results for input(s): MG in the last 72 hours. Radiology  CT FEMUR LEFT WO CONTRAST   Final Result   Subcutaneous edema and skin thickening. No abnormal soft tissue air or    focal abscess. CT TIBIA FIBULA LEFT WO CONTRAST   Final Result   Subcutaneous edema and skin thickening. No soft tissue air, foreign body    or soft tissue abscess. Assessment and Plan: Active Problems:    Cellulitis    Left leg cellulitis    Leukocytosis    Bullous dermatitis  Resolved Problems:    * No resolved hospital problems.  *       Cellulitis of the left lower extremity  On IV antibiotics  Follow-up on the cultures  ID following  Improving  Pain uncontrolled, added tramadol    LAVINIA  Improving  Continue with IV

## 2021-04-29 NOTE — PROGRESS NOTES
ID Follow-up NOTE    CC:   L leg cellulitis  Antibiotics: Vancomycin, Ceftriaxone     Admit Date: 2021  Hospital Day: 2    Subjective:     Patient c/o ongoing L leg pain  Eating. No change in mental status - per RN    Objective:     Patient Vitals for the past 8 hrs:   BP Temp Temp src Pulse Resp SpO2 Weight   21 0856 (!) 117/44 98.5 °F (36.9 °C) Oral 96 17 92 % --   21 0600 -- -- -- 83 -- -- --   21 0418 (!) 139/56 98.4 °F (36.9 °C) Oral 80 16 96 % 262 lb 12.6 oz (119.2 kg)     I/O last 3 completed shifts: In: 1770.2 [P.O.:240; I.V.:1530.2]  Out: 1250 [Urine:1250]  No intake/output data recorded. EXAM:  GENERAL: No apparent distress.   RA  HEENT: Membranes moist, no oral lesion  NECK:  Supple, no lymphadenopathy  LUNGS: Clear b/l, no rales, no dullness  CARDIAC: RRR, no murmur appreciated  ABD:  + BS, soft / NT  EXT:  LE edema, L LE erythema, superficial wounds (from denuded blisters) - improved (less red, less swelling), see images       NEURO: No focal neurologic findings  PSYCH: Orientation, sensorium, mood normal  LINES:  Peripheral iv       Data Review:  Lab Results   Component Value Date    WBC 10.0 2021    HGB 12.3 2021    HCT 36.1 2021    MCV 89.5 2021     2021     Lab Results   Component Value Date    CREATININE 1.6 (H) 2021    BUN 43 (H) 2021     (L) 2021    K 4.0 2021     2021    CO2 21 2021       Hepatic Function Panel:   Lab Results   Component Value Date    ALKPHOS 232 2021    ALT 91 2021     2021    PROT 6.5 2021    PROT 8.0 2012    BILITOT 2.3 2021    BILIDIR 1.2 2021    IBILI 1.1 2021    LABALBU 2.6 2021       Micro:   BC - no growth to date     Imagin/28 Femur / tib - fib CT:  Subcutaneous edema and skin thickening.  No soft tissue air, foreign body   or soft tissue abscess.     Scheduled Meds:   cefepime  2,000 mg Intravenous Q12H    vancomycin  1,750 mg Intravenous Q24H    metoprolol tartrate  25 mg Oral BID    therapeutic multivitamin-minerals  1 tablet Oral Daily    OLANZapine  5 mg Oral Nightly    vitamin B-12  1,000 mcg Oral Daily    sodium chloride flush  5-40 mL Intravenous 2 times per day    pantoprazole  40 mg Oral QAM AC    heparin (porcine)  5,000 Units Subcutaneous 3 times per day    Venelex   Topical BID       Continuous Infusions:   sodium chloride 75 mL/hr at 04/29/21 1022    sodium chloride         PRN Meds:  acetaminophen, traMADol, polyvinyl alcohol, melatonin, sodium chloride flush, sodium chloride, promethazine **OR** ondansetron, polyethylene glycol      Assessment:     Hx dementia, COPD, HTN, lung cancer, spinal stenosis / LBP, HCV  Lives in a nursing facility     Encephalopathy  LAVINIA - Cr down  Leukocytosis - resolved     L LE cellulitis, short duration, red / hot / bullae, c/c Streptococcal cellulitis     Multiple allergies listed - PCN, bactrim listed      Plan:     Change to ancef (aware PCN allergy)  Will f/u on BC results - no growth to date  Wound care - agree with alginate with silver, dressing / ACE    Medical Decision Making:   The following items were considered in medical decision making:  Discussion of patient care with other providers - RN, Wound Care RN  Reviewed clinical lab tests  Microbiology culture results reviewed    Change in therapy - changed antibiotics    Discussed with pt, RN, Wound Care RN  Jesus Munoz MD

## 2021-04-29 NOTE — PROGRESS NOTES
Patient agreeable to lab draw. Lactic 2.2. NS infusing at 100 ml/hr. Notified on call hospitalist at this time. Will continue to monitor.

## 2021-04-29 NOTE — PLAN OF CARE
Problem: Skin Integrity:  Goal: Absence of new skin breakdown  Description: Absence of new skin breakdown  4/29/2021 0634 by Angela Burk RN  Outcome: Ongoing  Note: No new skin integrity issues noted thus far this shift. Cellulitis to BLE, L>R. BLE wrapped in dressing/ace wrap per order. Redness to groin noted. Venelex ointment applied to coccyx. Patient repositioned Q2H, specialty mattress in place. Will continue to monitor. Problem: Pain:  Goal: Control of acute pain  Description: Control of acute pain  Note: Patient with complaints of pain to BLE. Notified on call hospitalist and received one time order for IVP morphine. Administered per order and effective. Patient is currently resting in bed quietly. Will continue to monitor.

## 2021-04-29 NOTE — PLAN OF CARE
Problem: Pain:  Goal: Pain level will decrease  Description: Pain level will decrease  Outcome: Met This Shift  Note: Pt c/o 9/10 head and abdominal pain, medicated per MAR. Reassessed and pt stated 4/10 pain and satisfied with current pain level. Problem: Skin Integrity:  Goal: Absence of new skin breakdown  Description: Absence of new skin breakdown  4/29/2021 1554 by Rian Rasmussen RN  Outcome: Ongoing  Note: No new skin issues noted. Pt educated on importance of wound care, q2 turn and incontinence care. Pt VU but typically refuses personal care such as incontinence care. Purwick placed to assist with incontinence. Pt on specialty mattress. LLE dressing changed by Minesh wound care RN and this RN. Excoriation/red areas treated with inter-dry.

## 2021-04-30 NOTE — CARE COORDINATION
Cm following spoke with Ernestina Anderson [phone] pt LTC on bed hold no precert needed to return, per ID looks like PO abx at DC. Pt can Dc back to [address] once medically stable.  Electronically signed by Jamel Grider RN on 4/30/2021 at 11:25 AM  [phone]

## 2021-04-30 NOTE — PROGRESS NOTES
Hospital Medicine Care  Progress Note      Chief complain; Altered Mental Status and Cellulitis (BLE)            Subjective:     Complaining of right lower extremity pain  Pain is partially controlled with the tramadol  Denies any chest pain, shortness of breath  Denies any nausea vomiting    Review of Systems:     Review of Systems as mentioned above, other ros is negative. Objective:       Medications        Scheduled Meds:   ceFAZolin  2,000 mg Intravenous Q12H    metoprolol tartrate  25 mg Oral BID    therapeutic multivitamin-minerals  1 tablet Oral Daily    OLANZapine  5 mg Oral Nightly    vitamin B-12  1,000 mcg Oral Daily    sodium chloride flush  5-40 mL Intravenous 2 times per day    pantoprazole  40 mg Oral QAM AC    heparin (porcine)  5,000 Units Subcutaneous 3 times per day    Venelex   Topical BID     Continuous Infusions:   sodium chloride 50 mL/hr at 04/30/21 0232    sodium chloride       PRN Meds:.traMADol, polyvinyl alcohol, melatonin, sodium chloride flush, sodium chloride, promethazine **OR** ondansetron, polyethylene glycol      Allergies  Allergies   Allergen Reactions    Flexeril [Cyclobenzaprine] Shortness Of Breath    Penicillins Shortness Of Breath, Itching and Swelling    Bactrim [Sulfamethoxazole-Trimethoprim]     Emerson-Allergin [Diphenhydramine] Nausea Only    Codeine Itching    Diphenhydramine Hcl Other (See Comments)     Heart racing    Naproxen Nausea Only     Stomach pain      Tylenol [Acetaminophen]     Ketorolac Tromethamine Nausea And Vomiting         Vitals:    04/29/21 2222 04/30/21 0429 04/30/21 0741 04/30/21 1136   BP: 104/70 (!) 101/58 115/74 108/72   Pulse: 78 82 81 80   Resp: 14 16 16 16   Temp: 98.2 °F (36.8 °C) 98.4 °F (36.9 °C) 98.2 °F (36.8 °C) 98.1 °F (36.7 °C)   TempSrc: Axillary Oral     SpO2: 93% 93% 94% 95%   Weight:       Height:             Physical exam:         Physical Exam  Constitutional:       Appearance: Normal appearance.    HENT: tramadol    LAVINIA  Improving  Continue with IV fluids.     Debility  PT OT        Disposition: Hopefully will be able to be discharged on p.o. antibiotics tomorrow on p.o. cephalexin 1000 mg 3 times daily per Dr. Stu Mcmahon MD  4/30/2021

## 2021-04-30 NOTE — PROGRESS NOTES
ID Follow-up NOTE    CC:   L leg cellulitis  Antibiotics: Vancomycin, Ceftriaxone     Admit Date: 2021  Hospital Day: 3    Subjective:     Patient c/o ongoing L leg pain  Eating. Objective:     Patient Vitals for the past 8 hrs:   BP Temp Temp src Pulse Resp SpO2   21 0741 115/74 98.2 °F (36.8 °C) -- 81 16 94 %   21 0429 (!) 101/58 98.4 °F (36.9 °C) Oral 82 16 93 %     I/O last 3 completed shifts: In: 462 [P.O.:462]  Out: 1650 [Urine:1650]  No intake/output data recorded. EXAM:  GENERAL: No apparent distress.   RA  HEENT: Membranes moist, no oral lesion  NECK:  Supple, no lymphadenopathy  LUNGS: Clear b/l, no rales, no dullness  CARDIAC: RRR, no murmur appreciated  ABD:  + BS, soft / NT  EXT:  Bilateral LE dressing / ACE  Seen with Wound Care RN on :  LE edema, L LE erythema, superficial wounds (from denuded blisters) - improved (less red, less swelling)  See images below from         NEURO: No focal neurologic findings  PSYCH: Orientation, sensorium, mood normal  LINES:  Peripheral iv       Data Review:  Lab Results   Component Value Date    WBC 10.0 2021    HGB 12.3 2021    HCT 36.1 2021    MCV 89.5 2021     2021     Lab Results   Component Value Date    CREATININE 1.6 (H) 2021    BUN 43 (H) 2021     (L) 2021    K 4.0 2021     2021    CO2 21 2021       Hepatic Function Panel:   Lab Results   Component Value Date    ALKPHOS 232 2021    ALT 91 2021     2021    PROT 6.5 2021    PROT 8.0 2012    BILITOT 2.3 2021    BILIDIR 1.2 2021    IBILI 1.1 2021    LABALBU 2.6 2021       Micro:   BC - no growth to date   SARS CoV-2 PCR neg     Imagin/28 Femur / tib - fib CT:  Subcutaneous edema and skin thickening.  No soft tissue air, foreign body   or soft tissue abscess.     Scheduled Meds:   ceFAZolin  2,000 mg Intravenous Q12H    metoprolol tartrate  25 mg Oral BID    therapeutic multivitamin-minerals  1 tablet Oral Daily    OLANZapine  5 mg Oral Nightly    vitamin B-12  1,000 mcg Oral Daily    sodium chloride flush  5-40 mL Intravenous 2 times per day    pantoprazole  40 mg Oral QAM AC    heparin (porcine)  5,000 Units Subcutaneous 3 times per day    Venelex   Topical BID       Continuous Infusions:   sodium chloride 50 mL/hr at 04/30/21 0232    sodium chloride         PRN Meds:  traMADol, polyvinyl alcohol, melatonin, sodium chloride flush, sodium chloride, promethazine **OR** ondansetron, polyethylene glycol      Assessment:     Hx dementia, COPD, HTN, lung cancer, spinal stenosis / LBP, HCV  Lives in a nursing facility     Encephalopathy  LAVINIA - Cr down  Leukocytosis - resolved     L LE cellulitis, short duration, red / hot / bullae, c/c Streptococcal cellulitis     Multiple allergies listed - PCN, bactrim listed      Plan:     Cont ancef (aware PCN allergy)  Will f/u on BC results - no growth to date  Wound care - agree with alginate with silver, dressing / ACE    Medical Decision Making: The following items were considered in medical decision making:  Discussion of patient care with other providers - RN  Reviewed clinical lab tests - no new labs today  Microbiology culture results reviewed  - BC no growth to date  Monitoring antibiotics therapy    Discussed with pt, RN  Call with ID issues over weekend.   If discharged, cephalexin 1,000 tid x 10 days further   Pushpa Leonard MD

## 2021-04-30 NOTE — PROGRESS NOTES
Pt has an order for a 1200mL fluid restriction, however the current order for continuous IV fluids @ 75mL/hr provides 1800 mL/day. MD Taylor informed via [employer], called RN and instructed to decrease IV fluids to 50mL/hr for now, nephrology to consult in am. No further orders at this time.

## 2021-04-30 NOTE — PROGRESS NOTES
Physical Therapy/Occupational Therapy    Orders received and chart reviewed. Pt from LTC. Spoke with pt who reports she does not stand and has not ambulated in at least a year. Acute PT/OT not appropriate. Will sign off.   Kalina Franks, PT [id number]  Arnol Miranda, OTR/L, [id number]

## 2021-04-30 NOTE — PLAN OF CARE
Problem: Skin Integrity:  Goal: Will show no infection signs and symptoms  Description: Will show no infection signs and symptoms  Outcome: Ongoing  Goal: Absence of new skin breakdown  Description: Absence of new skin breakdown  Outcome: Ongoing     Problem: Falls - Risk of:  Goal: Will remain free from falls  Description: Will remain free from falls  Outcome: Ongoing  Goal: Absence of physical injury  Description: Absence of physical injury  Outcome: Ongoing     Problem: Discharge Planning:  Goal: Discharged to appropriate level of care  Description: Discharged to appropriate level of care  Outcome: Ongoing     Problem:  Body Temperature - Imbalanced:  Goal: Ability to maintain a body temperature in the normal range will improve  Description: Ability to maintain a body temperature in the normal range will improve  Outcome: Ongoing     Problem: Mobility - Impaired:  Goal: Mobility will improve to maximum level  Description: Mobility will improve to maximum level  Outcome: Ongoing     Problem: Pain:  Goal: Pain level will decrease  Description: Pain level will decrease  Outcome: Ongoing  Goal: Control of acute pain  Description: Control of acute pain  Outcome: Ongoing  Goal: Control of chronic pain  Description: Control of chronic pain  Outcome: Ongoing     Problem: Skin Integrity - Impaired:  Goal: Will show no infection signs and symptoms  Description: Will show no infection signs and symptoms  Outcome: Ongoing  Goal: Absence of new skin breakdown  Description: Absence of new skin breakdown  Outcome: Ongoing     Problem: Mental Status - Impaired:  Goal: Mental status will improve  Description: Mental status will improve  Outcome: Ongoing     Problem: Pain:  Goal: Pain level will decrease  Description: Pain level will decrease  Outcome: Ongoing  Goal: Control of acute pain  Description: Control of acute pain  Outcome: Ongoing  Goal: Control of chronic pain  Description: Control of chronic pain  Outcome: Ongoing

## 2021-04-30 NOTE — PROGRESS NOTES
Office : [phone]     Fax :[phone]         Renal Progress Note  Subjective:   Admit Date: 4/28/2021     EL Christina is a 65 y.o. female with PMHx significant for Hepatitis C, dementia with behavioral disturbance, and HTN who presents from nursing facility with confusion and progressive redness of her left lower extremity. Patient is a poor historian, thus history was obtained via chart review. Patient was reportedly more confused than usual and was noted to have redness and swelling over her left lower extremity which progressively got worse over a 48 hour period prompting hospital presentation. Interval History:   No acute events overnight. Creatinine improved to 0.7. Good UO of 1.6L yesterday. Patient seen this morning at bedside. She is requesting pain medication. She states she's had some nausea, but she has been able to eat some of her breakfast.     She denies fever, chills, sob, or chest pain.     DIET DIET GENERAL; Daily Fluid Restriction: 1200 ml  Medications:   Scheduled Meds:   ceFAZolin  2,000 mg Intravenous Q12H    metoprolol tartrate  25 mg Oral BID    therapeutic multivitamin-minerals  1 tablet Oral Daily    OLANZapine  5 mg Oral Nightly    vitamin B-12  1,000 mcg Oral Daily    sodium chloride flush  5-40 mL Intravenous 2 times per day    pantoprazole  40 mg Oral QAM AC    heparin (porcine)  5,000 Units Subcutaneous 3 times per day    Venelex   Topical BID     Continuous Infusions:   sodium chloride 50 mL/hr at 04/30/21 0232    sodium chloride         Labs:  CBC:   Recent Labs     04/28/21  0223 04/29/21  0623   WBC 16.9* 10.0   HGB 14.6 12.3    182     BMP:    Recent Labs     04/28/21 0223 04/28/21 2326 04/29/21  0623   * 128* 132*   K 4.7 4.9 4.0   CL 91* 95* 100   CO2 19* 17* 21   BUN 62* 49* 43*   CREATININE 3.9* 2.0* 1.6*   GLUCOSE 160* 211* 170*     Ca/Mg/Phos:   Recent Labs     04/28/21 0223 04/28/21 2326 04/29/21  0623   CALCIUM 9.2 8.0* 8.2*   PHOS  --  3.5  --      Hepatic:   Recent Labs     04/28/21  1148 04/29/21  0623   * 174*   ALT 97* 91*   BILITOT 2.6* 2.3*   ALKPHOS 280* 232*     Troponin: No results for input(s): TROPONINI in the last 72 hours. BNP: No results for input(s): BNP in the last 72 hours. Lipids: No results for input(s): CHOL, TRIG, HDL, LDLCALC, LABVLDL in the last 72 hours. ABGs: No results for input(s): PHART, PO2ART, VIJ4DOY in the last 72 hours. INR:   Recent Labs     04/28/21 0223   INR 1.40*     UA:  Recent Labs     04/28/21  1140   COLORU Yellow   CLARITYU Clear   GLUCOSEU Negative   BILIRUBINUR Negative   KETUA Negative   SPECGRAV 1.015   BLOODU SMALL*   PHUR 6.0   PROTEINU Negative   UROBILINOGEN 0.2   NITRU Negative   LEUKOCYTESUR TRACE*   LABMICR YES   URINETYPE NotGiven      Urine Microscopic:   Recent Labs     04/28/21  1140   BACTERIA 1+*   HYALCAST 0-2   WBCUA 0-2   RBCUA None seen   EPIU 0-1     Urine Culture: No results for input(s): LABURIN in the last 72 hours.   Urine Chemistry:   Recent Labs     04/28/21  1140   CLUR <20   PROTEINUR 20.90*   NAUR <20       Objective:   Vitals: /74   Pulse 81   Temp 98.2 °F (36.8 °C)   Resp 16   Ht 5' 7\" (1.702 m)   Wt 262 lb 12.6 oz (119.2 kg)   SpO2 94%   BMI 41.16 kg/m²    Wt Readings from Last 3 Encounters:   04/29/21 262 lb 12.6 oz (119.2 kg)   01/02/13 180 lb (81.6 kg)   09/20/12 192 lb 6.4 oz (87.3 kg)      24HR INTAKE/OUTPUT:      Intake/Output Summary (Last 24 hours) at 4/30/2021 0952  Last data filed at 4/30/2021 0512  Gross per 24 hour   Intake 462 ml   Output 1650 ml   Net -1188 ml Constitutional:  Alert, awake, no apparent distress  NECK: supple, no JVD  Cardiovascular:  S1, S2 without m/r/g  Respiratory:  CTA B without w/r/r  Abdomen: +bs, soft, nt  Ext: Lower extremities wrapped in ace bandages. Assessment and Plan:       IMAGING:  CT FEMUR LEFT WO CONTRAST   Final Result   Subcutaneous edema and skin thickening. No abnormal soft tissue air or    focal abscess. CT TIBIA FIBULA LEFT WO CONTRAST   Final Result   Subcutaneous edema and skin thickening. No soft tissue air, foreign body    or soft tissue abscess. Assessment/Plan     1. LAVINIA,   -LAVINIA resolved, Cr now 0.7 (peaked at 3.9 on 4/28) s/p IVFs. -Good UO of 1.6L yesterday   -Avoid nephrotoxic meds.    -Hold home lasix and aldactone  -discontinue fluids      2. HTN  -bp stable     3. Hyponatremia  -resolved     4. Acid- base/ Electrolyte imbalance  -AGMA on arrival, improved      5. Acute metabolic encephalopathy   -Mentation improved. Currently A&Ox3.      6. Left lower extremity cellulitis   -Cont broad spectrum Abx for cellulitis  -ID following  -Blood cultures NGTD     7. History of Hepatits C    -cryo pending to rule out hep c associated kidney disease.    -RF increased at [facility] [address] [facility]      Will staff and discuss with Dr. Jimmie Rosa  [facility] [address] [facility] -[phone]

## 2021-05-01 NOTE — PROGRESS NOTES
results for input(s): BNP in the last 72 hours. Lipids: No results for input(s): CHOL, TRIG, HDL, LDLCALC, LABVLDL in the last 72 hours. ABGs: No results for input(s): PHART, PO2ART, PEN3BBZ in the last 72 hours. INR:   No results for input(s): INR in the last 72 hours. UA:  Recent Labs     04/28/21  1140   COLORU Yellow   CLARITYU Clear   GLUCOSEU Negative   BILIRUBINUR Negative   KETUA Negative   SPECGRAV 1.015   BLOODU SMALL*   PHUR 6.0   PROTEINU Negative   UROBILINOGEN 0.2   NITRU Negative   LEUKOCYTESUR TRACE*   LABMICR YES   URINETYPE NotGiven      Urine Microscopic:   Recent Labs     04/28/21  1140   BACTERIA 1+*   HYALCAST 0-2   WBCUA 0-2   RBCUA None seen   EPIU 0-1     Urine Culture: No results for input(s): LABURIN in the last 72 hours. Urine Chemistry:   Recent Labs     04/28/21  1140   CLUR <20   LABCREA 154.9   PROTEINUR 20.90*   NAUR <20       Objective:   Vitals: /72   Pulse 78   Temp 98.1 °F (36.7 °C) (Oral)   Resp 16   Ht 5' 7\" (1.702 m)   Wt 262 lb 12.6 oz (119.2 kg)   SpO2 94%   BMI 41.16 kg/m²    Wt Readings from Last 3 Encounters:   04/29/21 262 lb 12.6 oz (119.2 kg)   01/02/13 180 lb (81.6 kg)   09/20/12 192 lb 6.4 oz (87.3 kg)      24HR INTAKE/OUTPUT:      Intake/Output Summary (Last 24 hours) at 5/1/2021 0815  Last data filed at 5/1/2021 0347  Gross per 24 hour   Intake 840 ml   Output 1000 ml   Net -160 ml     Constitutional:  Alert, awake, no apparent distress  NECK: supple, no JVD  Cardiovascular:  S1, S2 without m/r/g  Respiratory:  CTA B without w/r/r  Abdomen: +bs, soft, nt  Ext: Lower extremities wrapped in ace bandage, L LE erythema noted up to mid-thigh which looks stable, if not, improved from yesterday. Assessment and Plan:       IMAGING:  CT FEMUR LEFT WO CONTRAST   Final Result   Subcutaneous edema and skin thickening. No abnormal soft tissue air or    focal abscess.       CT TIBIA FIBULA LEFT WO CONTRAST   Final Result   Subcutaneous edema and skin

## 2021-05-01 NOTE — PROGRESS NOTES
VSS, a/o to self and place. Pt intermittently confused overnight/needing to be frequently reminded of POC. q2h turns, specialty mattress in place. Inter-dry to pannus  Folds/ in between legs w noted moisture. BLE ace wrap dressing remain clean/dry/intact. purewick in place as pt incont. venelex ointment applied to coccyx per orders. Pt continues to receive IV abx. Son, Reena Mclaughlin called and updated on pt POC. All questions answered @ this time. Bed remains in lowest position. Call light within reach. Continues to call out appropriately when needing assistance. All needs met.  Will cont to monitor

## 2021-05-01 NOTE — PROGRESS NOTES
Hospitalist Progress Note      PCP: eYnny Santillan    Date of Admission: 4/28/2021    Chief Complaint:  left leg pain, swelling    Hospital Course: 26-year-old female presented with a chief complaint of change in the mental status associated with the left lower extremity infection. Subjective: Patient still complains of left lower extremity pain. Denies any significant improvement in that. No fevers or chills noted      Medications:  Reviewed    Infusion Medications    sodium chloride       Scheduled Medications    ceFAZolin  2,000 mg Intravenous Q12H    metoprolol tartrate  25 mg Oral BID    therapeutic multivitamin-minerals  1 tablet Oral Daily    OLANZapine  5 mg Oral Nightly    vitamin B-12  1,000 mcg Oral Daily    sodium chloride flush  5-40 mL Intravenous 2 times per day    pantoprazole  40 mg Oral QAM AC    heparin (porcine)  5,000 Units Subcutaneous 3 times per day    Venelex   Topical BID     PRN Meds: traMADol, polyvinyl alcohol, melatonin, sodium chloride flush, sodium chloride, promethazine **OR** ondansetron, polyethylene glycol      Intake/Output Summary (Last 24 hours) at 5/1/2021 1354  Last data filed at 5/1/2021 0947  Gross per 24 hour   Intake 610 ml   Output 1000 ml   Net -390 ml       Physical Exam Performed:    /73   Pulse 75   Temp 98.4 °F (36.9 °C) (Oral)   Resp 16   Ht 5' 7\" (1.702 m)   Wt 262 lb 12.6 oz (119.2 kg)   SpO2 94%   BMI 41.16 kg/m²     General appearance: Awake and alert  HEENT: Pupils equal, round, and reactive to light. Conjunctivae/corneas clear. Neck: Supple, with full range of motion. No jugular venous distention. Trachea midline. Respiratory:  Normal respiratory effort. Clear to auscultation, bilaterally without Rales/Wheezes/Rhonchi. Cardiovascular: Regular rate and rhythm with normal S1/S2 without murmurs, rubs or gallops. Abdomen: Soft, non-tender, non-distended with normal bowel sounds.   Musculoskeletal: Left leg with compression wrap on. Tenderness to palpation on pressing the leg  Skin: Skin color, texture, turgor normal.  No rashes or lesions. Neurologic:  Neurovascularly intact without any focal sensory/motor deficits. Cranial nerves: II-XII intact, grossly non-focal.  Psychiatric: Alert and oriented, thought content appropriate, normal insight  Capillary Refill: Brisk,3 seconds, normal   Peripheral Pulses: +2 palpable, equal bilaterally       Labs:   Recent Labs     04/29/21  0623   WBC 10.0   HGB 12.3   HCT 36.1        Recent Labs     04/28/21  2326 04/29/21  0623 04/30/21  0953 05/01/21  0855   * 132* 136 131*   K 4.9 4.0 4.1 4.3   CL 95* 100 106 102   CO2 17* 21 22 23   BUN 49* 43* 20 12   CREATININE 2.0* 1.6* 0.7 0.6   CALCIUM 8.0* 8.2* 8.1* 8.0*   PHOS 3.5  --  1.9* 2.4*     Recent Labs     04/29/21  0623   *   ALT 91*   BILIDIR 1.2*   BILITOT 2.3*   ALKPHOS 232*     No results for input(s): INR in the last 72 hours. No results for input(s): Charley Loth in the last 72 hours. Urinalysis:      Lab Results   Component Value Date    NITRU Negative 04/28/2021    WBCUA 0-2 04/28/2021    BACTERIA 1+ 04/28/2021    RBCUA None seen 04/28/2021    BLOODU SMALL 04/28/2021    SPECGRAV 1.015 04/28/2021    GLUCOSEU Negative 04/28/2021    GLUCOSEU Negative 12/15/2011       Radiology:  CT FEMUR LEFT WO CONTRAST   Final Result   Subcutaneous edema and skin thickening. No abnormal soft tissue air or    focal abscess. CT TIBIA FIBULA LEFT WO CONTRAST   Final Result   Subcutaneous edema and skin thickening. No soft tissue air, foreign body    or soft tissue abscess.               Assessment/Plan:    Left lower extremity cellulitis  -Continue IV Ancef  -ID following  -Continue compression wrap  -Pain control    Acute kidney injury  -Resolved  -Nephrology following  -Holding Lasix and Aldactone    Hyponatremia  -Continue to monitor    Hypertension  -Continue metoprolol    Generalized weakness/debility  -PT/OT     History of hepatitis C  -Stable    Morbid obesity BMI 41  -Complicating medical problem placing patient at high risk of morbidity and mortality    DVT Prophylaxis: Subcu heparin  Diet: DIET GENERAL; Daily Fluid Restriction: 1200 ml  Code Status: Full Code    PT/OT Eval Status: Ongoing    Dispo -pending clinical improvement    Lucia Altamirano MD

## 2021-05-01 NOTE — PLAN OF CARE
Problem: Skin Integrity:  Goal: Will show no infection signs and symptoms  Description: Will show no infection signs and symptoms  5/1/2021 0308 by Jose Bustamante RN  Outcome: Ongoing  Note: Dressing to BLE cellulitis remains clean/dry/intact. Excoriation/redness to bottom- venelex ointment applied to site. Q2h turns provided. Speciality mattress in place to prevent any new skin breakdown. Inter-dry to pannus/ between legs to prevent moisture. Problem: Body Temperature - Imbalanced:  Goal: Ability to maintain a body temperature in the normal range will improve  Description: Ability to maintain a body temperature in the normal range will improve  5/1/2021 0308 by Jose Bustamante RN  Outcome: Ongoing  Note: VSS, afebrile overnight. Continues to receive IV abx. Problem: Mobility - Impaired:  Goal: Mobility will improve to maximum level  Description: Mobility will improve to maximum level  5/1/2021 0308 by Jose Bustamante RN  Outcome: Ongoing  Note: Pt bed bound at baseline, q2h turns provided. Problem: Pain:  Goal: Pain level will decrease  Description: Pain level will decrease  5/1/2021 0308 by Jose Bustamante RN  Outcome: Ongoing  Note: Pt encouraged to use call light to notify staff when pain rises beyond tolerable. Pt educated on pain scale and was using call light appropriately. PRN tramadol given for increasing pain w relief.

## 2021-05-01 NOTE — PROGRESS NOTES
Pt alert and oriented x3, intermittently confused. Lung sounds clear. Abdomen soft and non-tender with active bowel sounds. Pt tolerating diet. Pure wick in place d/t incontinence. Pt turned Q2, specialty mattress in place. Interdry under breasts and abdominal pannus changed PRN. Pain managed with Q6 PRN tramadol. Dressings in place to BLE- pt refused dressing change during day d/t pain, pt stated we could try again later- night shift RN aware. Pt resting comfortably at this time. Call light in reach. Will continue with plan of care.

## 2021-05-02 NOTE — PLAN OF CARE
Problem: Pain:  Goal: Pain level will decrease  Description: Pain level will decrease  Note: Patient moaning and complaining of pain. Pt asking for pain medication. MD notified. Morphine administered as ordered. Tramadol PRN administered.

## 2021-05-02 NOTE — PROGRESS NOTES
Pt AAO to self and place. Repeatedly refused lower extremity dressing changes, citing pain which was unrelieved with tramadol. MD Claudell Formica informed via perfectserve, ordered one-time dose of 0.5 mg hydromorphone for dressing change.

## 2021-05-02 NOTE — PROGRESS NOTES
Office : [phone]     Fax :[phone]         Renal Progress Note  Subjective:   Admit Date: 4/28/2021     HPI      Interval History: Patient seen and examined this morning. More alert today. Endorses vague abdominal pain, localized \"in the middle. \" No associated nausea or vomiting. Says she had BM yesterday. Doesn't feel like eating. Interval history. Feels better. No respiratory stress. Renal function better. DIET DIET GENERAL; Daily Fluid Restriction: 1200 ml  Medications:   Scheduled Meds:   ceFAZolin  2,000 mg Intravenous Q12H    metoprolol tartrate  25 mg Oral BID    therapeutic multivitamin-minerals  1 tablet Oral Daily    OLANZapine  5 mg Oral Nightly    vitamin B-12  1,000 mcg Oral Daily    sodium chloride flush  5-40 mL Intravenous 2 times per day    pantoprazole  40 mg Oral QAM AC    heparin (porcine)  5,000 Units Subcutaneous 3 times per day    Venelex   Topical BID     Continuous Infusions:   sodium chloride         Labs:  CBC:   No results for input(s): WBC, HGB, PLT in the last 72 hours. BMP:    Recent Labs     04/30/21  0953 05/01/21  0855    131*   K 4.1 4.3    102   CO2 22 23   BUN 20 12   CREATININE 0.7 0.6   GLUCOSE 178* 153*     Ca/Mg/Phos:   Recent Labs     04/30/21  0953 05/01/21  0855   CALCIUM 8.1* 8.0*   MG  --  1.80   PHOS 1.9* 2.4*     Hepatic:   No results for input(s): AST, ALT, ALB, BILITOT, ALKPHOS in the last 72 hours. Troponin: No results for input(s): TROPONINI in the last 72 hours. BNP: No results for input(s): BNP in the last 72 hours.   Lipids: No results for input(s): CHOL, TRIG, HDL, LDLCALC, LABVLDL in the last 72 hours. ABGs: No results for input(s): PHART, PO2ART, EYB5IEQ in the last 72 hours. INR:   No results for input(s): INR in the last 72 hours. UA:  No results for input(s): Mckenzie Dago, GLUCOSEU, BILIRUBINUR, KETUA, SPECGRAV, BLOODU, PHUR, PROTEINU, UROBILINOGEN, NITRU, LEUKOCYTESUR, Holly Coyer in the last 72 hours. Urine Microscopic:   No results for input(s): LABCAST, BACTERIA, COMU, HYALCAST, WBCUA, RBCUA, EPIU in the last 72 hours. Urine Culture: No results for input(s): LABURIN in the last 72 hours. Urine Chemistry:   No results for input(s): Elyce Frankel, PROTEINUR, NAUR in the last 72 hours. Objective:   Vitals: /85   Pulse 80   Temp 98.3 °F (36.8 °C) (Oral)   Resp 16   Ht 5' 7\" (1.702 m)   Wt 262 lb 12.6 oz (119.2 kg)   SpO2 93%   BMI 41.16 kg/m²    Wt Readings from Last 3 Encounters:   04/29/21 262 lb 12.6 oz (119.2 kg)   01/02/13 180 lb (81.6 kg)   09/20/12 192 lb 6.4 oz (87.3 kg)      24HR INTAKE/OUTPUT:      Intake/Output Summary (Last 24 hours) at 5/2/2021 5858  Last data filed at 5/1/2021 2006  Gross per 24 hour   Intake 730 ml   Output 700 ml   Net 30 ml     Constitutional:  Alert, awake, no apparent distress  NECK: supple, no JVD  Cardiovascular:  S1, S2 without m/r/g  Respiratory:  CTA B without w/r/r  Abdomen: +bs, soft, nt  Ext: Lower extremities wrapped in ace bandage, L LE erythema noted up to mid-thigh which looks stable, if not, improved from yesterday. Assessment and Plan:       IMAGING:  CT FEMUR LEFT WO CONTRAST   Final Result   Subcutaneous edema and skin thickening. No abnormal soft tissue air or    focal abscess. CT TIBIA FIBULA LEFT WO CONTRAST   Final Result   Subcutaneous edema and skin thickening. No soft tissue air, foreign body    or soft tissue abscess. Assessment/Plan     1. LAVINIA-       2. HTN-       3. Hyponatremia-resolved.   Sodium level today is 136 ---> 131      4. Acid- base/ Electrolyte imbalance      5. Acute metabolic encephalopathy      6. Left lower extremity cellulitis      7. History of Hepatits C     LAVINIA improving, Cr now 1.6 ---> 0.7 --> 0.6      Avoid nephrotoxic meds. Holding  lasix and aldactone    Blood pressure stable. Limit daily fluid intake to 1200 ml       Cont broad spectrum Abx for cellulitis,  ID following. Blood cultures NGTD    Has history of Hep C, cryo and RF pending to rule out hep c associated kidney disease. LFTs elevated, but hemolyzed. Will repeat. Ammonia slightly elevated at 62.                 Luis Antonio Vance MD      [facility] [address] [place] [address] 18 [phone]

## 2021-05-02 NOTE — PROGRESS NOTES
Patient moaning and complaining of pain. Pt asking for pain medication. MD notified. Morphine administered as ordered. Tramadol PRN administered.

## 2021-05-02 NOTE — PROGRESS NOTES
Hospitalist Progress Note      PCP: Benigno Pena    Date of Admission: 4/28/2021    Chief Complaint:  left leg pain, swelling    Hospital Course: 59-year-old female presented with a chief complaint of change in the mental status associated with the left lower extremity infection. Subjective: No acute events reported overnight. Left lower extremity still pretty tender and swollen. Patient still complains of significant pain    Medications:  Reviewed    Infusion Medications    sodium chloride       Scheduled Medications    ceFAZolin  2,000 mg Intravenous Q12H    metoprolol tartrate  25 mg Oral BID    therapeutic multivitamin-minerals  1 tablet Oral Daily    OLANZapine  5 mg Oral Nightly    vitamin B-12  1,000 mcg Oral Daily    sodium chloride flush  5-40 mL Intravenous 2 times per day    pantoprazole  40 mg Oral QAM AC    heparin (porcine)  5,000 Units Subcutaneous 3 times per day    Venelex   Topical BID     PRN Meds: traMADol, polyvinyl alcohol, melatonin, sodium chloride flush, sodium chloride, promethazine **OR** ondansetron, polyethylene glycol      Intake/Output Summary (Last 24 hours) at 5/2/2021 1015  Last data filed at 5/2/2021 0958  Gross per 24 hour   Intake 960 ml   Output 700 ml   Net 260 ml       Physical Exam Performed:    /85   Pulse 80   Temp 98.3 °F (36.8 °C) (Oral)   Resp 16   Ht 5' 7\" (1.702 m)   Wt 262 lb 12.6 oz (119.2 kg)   SpO2 93%   BMI 41.16 kg/m²     General appearance: Awake and alert  HEENT: Pupils equal, round, and reactive to light. Conjunctivae/corneas clear. Neck: Supple, with full range of motion. No jugular venous distention. Trachea midline. Respiratory:  Normal respiratory effort. Clear to auscultation, bilaterally without Rales/Wheezes/Rhonchi. Cardiovascular: Regular rate and rhythm with normal S1/S2 without murmurs, rubs or gallops. Abdomen: Soft, non-tender, non-distended with normal bowel sounds.   Musculoskeletal: Left leg with compression wrap on. Tenderness to palpation on pressing the leg  Skin: Skin color, texture, turgor normal.  No rashes or lesions. Neurologic:  Neurovascularly intact without any focal sensory/motor deficits. Cranial nerves: II-XII intact, grossly non-focal.  Psychiatric: Alert and oriented, thought content appropriate, normal insight  Capillary Refill: Brisk,3 seconds, normal   Peripheral Pulses: +2 palpable, equal bilaterally       Labs:   Recent Labs     05/02/21  0928   WBC 15.1*   HGB 12.0   HCT 36.2        Recent Labs     04/30/21  0953 05/01/21  0855 05/02/21  0928    131* 132*   K 4.1 4.3 4.7    102 102   CO2 22 23 22   BUN 20 12 10   CREATININE 0.7 0.6 0.7   CALCIUM 8.1* 8.0* 8.1*   PHOS 1.9* 2.4*  --      No results for input(s): AST, ALT, BILIDIR, BILITOT, ALKPHOS in the last 72 hours. No results for input(s): INR in the last 72 hours. No results for input(s): Venkatesh Ferrell in the last 72 hours. Urinalysis:      Lab Results   Component Value Date    NITRU Negative 04/28/2021    WBCUA 0-2 04/28/2021    BACTERIA 1+ 04/28/2021    RBCUA None seen 04/28/2021    BLOODU SMALL 04/28/2021    SPECGRAV 1.015 04/28/2021    GLUCOSEU Negative 04/28/2021    GLUCOSEU Negative 12/15/2011       Radiology:  CT FEMUR LEFT WO CONTRAST   Final Result   Subcutaneous edema and skin thickening. No abnormal soft tissue air or    focal abscess. CT TIBIA FIBULA LEFT WO CONTRAST   Final Result   Subcutaneous edema and skin thickening. No soft tissue air, foreign body    or soft tissue abscess.               Assessment/Plan:    Left lower extremity cellulitis  -Continue IV Ancef  -ID following  -Continue compression wrap  -Pain control    Acute kidney injury  -Resolved  -Nephrology following  -Holding Lasix and Aldactone    Hyponatremia  -Continue to monitor    Hypertension  -Continue metoprolol    Generalized weakness/debility  -PT/OT     History of hepatitis C  -Stable    Morbid obesity BMI 39  -Complicating medical problem placing patient at high risk of morbidity and mortality    DVT Prophylaxis: Subcu heparin  Diet: DIET GENERAL; Daily Fluid Restriction: 1200 ml  Code Status: Full Code    PT/OT Eval Status: Ongoing    Dispo -continue IV antibiotics for left lower extremity cellulitis. Still in significant pain. ID on board.   Awaiting improvement prior to discharge to Rachel Lewis MD

## 2021-05-03 NOTE — PLAN OF CARE
Problem: Skin Integrity - Impaired:  Goal: Will show no infection signs and symptoms  Description: Will show no infection signs and symptoms  Outcome: Ongoing  Note: BLE dressings changed today by wound RN and Dr. Stephon Gimenez. WBC increased to 15.1 this morning, abx adjusted by MD. Pt remains afebrile. Pain rated 8/10 consistently throughout shift. Will monitor per plan of care. Problem: Mental Status - Impaired:  Goal: Mental status will improve  Description: Mental status will improve  Outcome: Ongoing  Note: Pt remains alert and oriented today with slight intermittent confusion- easily reoriented. Pt has been calm and cooperative throughout shift.

## 2021-05-03 NOTE — PROGRESS NOTES
Office : [phone]     Fax :[phone]         Renal Progress Note  Subjective:   Admit Date: 4/28/2021     HPI      Interval History: Patient seen and examined this morning. More alert today. Endorses vague abdominal pain, localized \"in the middle. \" No associated nausea or vomiting. Says she had BM yesterday. Doesn't feel like eating. Interval history. Feels better. No respiratory stress. Renal function better. DIET DIET GENERAL; Daily Fluid Restriction: 1200 ml  Medications:   Scheduled Meds:   ceFAZolin  2,000 mg Intravenous Q8H    metoprolol tartrate  25 mg Oral BID    therapeutic multivitamin-minerals  1 tablet Oral Daily    OLANZapine  5 mg Oral Nightly    vitamin B-12  1,000 mcg Oral Daily    sodium chloride flush  5-40 mL Intravenous 2 times per day    pantoprazole  40 mg Oral QAM AC    heparin (porcine)  5,000 Units Subcutaneous 3 times per day    Venelex   Topical BID     Continuous Infusions:   sodium chloride         Labs:  CBC:   Recent Labs     05/02/21  0928   WBC 15.1*   HGB 12.0        BMP:    Recent Labs     05/01/21  0855 05/02/21  0928   * 132*   K 4.3 4.7    102   CO2 23 22   BUN 12 10   CREATININE 0.6 0.7   GLUCOSE 153* 164*     Ca/Mg/Phos:   Recent Labs     05/01/21  0855 05/02/21  0928   CALCIUM 8.0* 8.1*   MG 1.80  --    PHOS 2.4*  --      Hepatic:   No results for input(s): AST, ALT, ALB, BILITOT, ALKPHOS in the last 72 hours. Troponin: No results for input(s): TROPONINI in the last 72 hours. BNP: No results for input(s): BNP in the last 72 hours.   Lipids: No results for input(s): CHOL, TRIG, HDL, LDLCALC, LABVLDL in the last 72 hours. ABGs: No results for input(s): PHART, PO2ART, TRD9JKA in the last 72 hours. INR:   No results for input(s): INR in the last 72 hours. UA:  No results for input(s): Ravi Sandra, GLUCOSEU, BILIRUBINUR, KETUA, SPECGRAV, BLOODU, PHUR, PROTEINU, UROBILINOGEN, NITRU, LEUKOCYTESUR, Marchia Crazier in the last 72 hours. Urine Microscopic:   No results for input(s): LABCAST, BACTERIA, COMU, HYALCAST, WBCUA, RBCUA, EPIU in the last 72 hours. Urine Culture: No results for input(s): LABURIN in the last 72 hours. Urine Chemistry:   No results for input(s): Ollis Hirschfeld, PROTEINUR, NAUR in the last 72 hours. Objective:   Vitals: /72   Pulse 79   Temp 98.8 °F (37.1 °C) (Oral)   Resp 24   Ht 5' 7\" (1.702 m)   Wt 262 lb 12.6 oz (119.2 kg)   SpO2 94%   BMI 41.16 kg/m²    Wt Readings from Last 3 Encounters:   04/29/21 262 lb 12.6 oz (119.2 kg)   01/02/13 180 lb (81.6 kg)   09/20/12 192 lb 6.4 oz (87.3 kg)      24HR INTAKE/OUTPUT:      Intake/Output Summary (Last 24 hours) at 5/3/2021 1550  Last data filed at 5/3/2021 1514  Gross per 24 hour   Intake 1360 ml   Output 1420 ml   Net -60 ml     Constitutional:  Alert, awake, no apparent distress  NECK: supple, no JVD  Cardiovascular:  S1, S2 without m/r/g  Respiratory:  CTA B without w/r/r  Abdomen: +bs, soft, nt  Ext: Lower extremities wrapped in ace bandage, L LE erythema noted up to mid-thigh which looks stable, if not, improved from yesterday. Assessment and Plan:       IMAGING:  CT FEMUR LEFT WO CONTRAST   Final Result   Subcutaneous edema and skin thickening. No abnormal soft tissue air or    focal abscess. CT TIBIA FIBULA LEFT WO CONTRAST   Final Result   Subcutaneous edema and skin thickening. No soft tissue air, foreign body    or soft tissue abscess. Assessment/Plan     1. LAVINIA-       2. HTN-       3. Hyponatremia-resolved. 4. Acid- base/ Electrolyte imbalance      5.  Acute metabolic encephalopathy      6. Left lower extremity cellulitis      7. History of Hepatits C     LAVINIA improving, Cr better     Avoid nephrotoxic meds. Holding  lasix and aldactone    Blood pressure stable. Limit daily fluid intake to 1200 ml       Cont broad spectrum Abx for cellulitis,  ID following. Blood cultures NGTD    Has history of Hep C, cryo and RF pending to rule out hep c associated kidney disease. LFTs elevated, but hemolyzed. Will repeat. Ammonia slightly elevated at 62.                 Jenelle Bocanegra MD      [facility] [address] [place] [address] [phone]

## 2021-05-03 NOTE — PROGRESS NOTES
Pt alert and oriented. VSS. Lung sounds clear bilaterally. Abdomen soft and non-tender, pt states she's feeling constipated- given dose of PRN glycolax this morning. WBC elevated to 15.1 today- MD aware, dressings to BLE changed by wound RN and Dr. Leopoldo Alston today, abx adjusted. Pt remains afebrile and states she is feeling better. Tolerating diet. Pure wick in place d/t incontinence, output appropriate. Pain managed with PRN Q6 tramadol and a one time dose of morphine prior to dressing change. Pt repositioned Q2. Fall precautions in place, all needs met a this time, will continue to monitor per plan of care.

## 2021-05-03 NOTE — CARE COORDINATION
SW following, Pt from [address], bed on hold, no precert needed. Increased dose of IV ABX, ID following for ABX recs, DC back to [address] once medically stable.     Electronically signed by LIDIA Bolivar, GEE on 5/3/2021 at 3:09 [phone]

## 2021-05-03 NOTE — PROGRESS NOTES
ID Follow-up NOTE    CC:   L leg cellulitis  Antibiotics: Vancomycin, Ceftriaxone     Admit Date: 2021  Hospital Day: 6    Subjective:     Patient reports less L leg pain  No other complaint      Objective:     Patient Vitals for the past 8 hrs:   BP Temp Temp src Pulse Resp SpO2   21 0855 126/73 -- -- -- -- --   21 0715 119/68 98.7 °F (37.1 °C) Oral 81 24 94 %   21 0408 (!) 105/56 99 °F (37.2 °C) Oral 81 16 94 %     I/O last 3 completed shifts: In: 1800 [P.O.:1800]  Out: 0647 [[id number]]  I/O this shift:  In: 250 [P.O.:240; I.V.:10]  Out: -     EXAM:  GENERAL: No apparent distress.   RA  HEENT: Membranes moist, no oral lesion  NECK:  Supple, no lymphadenopathy  LUNGS: Clear b/l, no rales, no dullness  CARDIAC: RRR, no murmur appreciated  ABD:  + BS, soft / NT  EXT:  Bilateral LE dressing / ACE  Seen with Wound Care RN on :  LE edema, L LE erythema, superficial wounds (from denuded blisters) - improved (less red, less swelling)    See images below from       NEURO: No focal neurologic findings  PSYCH: Orientation, sensorium, mood normal  LINES:  Peripheral iv       Data Review:  Lab Results   Component Value Date    WBC 15.1 (H) 2021    HGB 12.0 2021    HCT 36.2 2021    MCV 89.8 2021     2021     Lab Results   Component Value Date    CREATININE 0.7 2021    BUN 10 2021     (L) 2021    K 4.7 2021     2021    CO2 22 2021       Hepatic Function Panel:   Lab Results   Component Value Date    ALKPHOS 232 2021    ALT 91 2021     2021    PROT 6.5 2021    PROT 8.0 2012    BILITOT 2.3 2021    BILIDIR 1.2 2021    IBILI 1.1 2021    LABALBU 2.5 2021       Micro:   BC - no growth to date   SARS CoV-2 PCR neg     Imagin/28 Femur / tib - fib CT:  Subcutaneous edema and skin thickening.  No soft tissue air, foreign body   or soft tissue abscess.     Scheduled Meds:   morphine  2 mg Intravenous Once    ceFAZolin  2,000 mg Intravenous Q12H    metoprolol tartrate  25 mg Oral BID    therapeutic multivitamin-minerals  1 tablet Oral Daily    OLANZapine  5 mg Oral Nightly    vitamin B-12  1,000 mcg Oral Daily    sodium chloride flush  5-40 mL Intravenous 2 times per day    pantoprazole  40 mg Oral QAM AC    heparin (porcine)  5,000 Units Subcutaneous 3 times per day    Venelex   Topical BID       Continuous Infusions:   sodium chloride         PRN Meds:  traMADol, polyvinyl alcohol, melatonin, sodium chloride flush, sodium chloride, promethazine **OR** ondansetron, polyethylene glycol      Assessment:     Hx dementia, COPD, HTN, lung cancer, spinal stenosis / LBP, HCV  Lives in a nursing facility     Encephalopathy  LAVINIA - Cr down  Leukocytosis     L LE cellulitis, short duration, red / hot / bullae, c/c Streptococcal cellulitis     Multiple allergies listed - PCN, bactrim listed      Plan:     Cont ancef (aware PCN allergy) - increase dose  Wound care - agree with alginate with silver, dressing / ACE, will eval L leg at time of dressing change later today    At discharge, cephalexin 1,000 tid x 10 days further     Medical Decision Making:   The following items were considered in medical decision making:  Discussion of patient care with other providers - RN  Reviewed clinical lab tests - no new labs today  Microbiology culture results reviewed  - BC no growth to date  Monitoring antibiotics therapy    Discussed with pt, RN  Will eval L leg at time of dressing change - RN will call me If discharged,   Alex Jason MD

## 2021-05-03 NOTE — PROGRESS NOTES
Pt AAOx3. Continues to report pain uncontrolled by tramadol, begins to call repeatedly for pain medications 3-4 hours after administration. Pt refused repositioning x3, sometimes consents to being repositioned after further education. Pt also requests milk/jello far in excess of fluid restriction despite education. Pt otherwise compliant with treatment plan, no refusal of medications or pulling at lines/tubes.

## 2021-05-03 NOTE — PROGRESS NOTES
gallops. Abdomen: Soft, non-tender, non-distended with normal bowel sounds. Musculoskeletal: Left leg with compression wrap on. Tenderness to palpation on pressing the leg  Skin: Skin color, texture, turgor normal.  No rashes or lesions. Neurologic:  Neurovascularly intact without any focal sensory/motor deficits. Cranial nerves: II-XII intact, grossly non-focal.  Psychiatric: Alert and oriented, thought content appropriate, normal insight  Capillary Refill: Brisk,3 seconds, normal   Peripheral Pulses: +2 palpable, equal bilaterally       Labs:   Recent Labs     05/02/21  0928   WBC 15.1*   HGB 12.0   HCT 36.2        Recent Labs     04/30/21  0953 05/01/21  0855 05/02/21  0928    131* 132*   K 4.1 4.3 4.7    102 102   CO2 22 23 22   BUN 20 12 10   CREATININE 0.7 0.6 0.7   CALCIUM 8.1* 8.0* 8.1*   PHOS 1.9* 2.4*  --      No results for input(s): AST, ALT, BILIDIR, BILITOT, ALKPHOS in the last 72 hours. No results for input(s): INR in the last 72 hours. No results for input(s): Marletta Traer in the last 72 hours. Urinalysis:      Lab Results   Component Value Date    NITRU Negative 04/28/2021    WBCUA 0-2 04/28/2021    BACTERIA 1+ 04/28/2021    RBCUA None seen 04/28/2021    BLOODU SMALL 04/28/2021    SPECGRAV 1.015 04/28/2021    GLUCOSEU Negative 04/28/2021    GLUCOSEU Negative 12/15/2011       Radiology:  CT FEMUR LEFT WO CONTRAST   Final Result   Subcutaneous edema and skin thickening. No abnormal soft tissue air or    focal abscess. CT TIBIA FIBULA LEFT WO CONTRAST   Final Result   Subcutaneous edema and skin thickening. No soft tissue air, foreign body    or soft tissue abscess.               Assessment/Plan:    Left lower extremity cellulitis  -Continue IV Ancef  -Continue compression wrap  -Pain control  -ID following    Acute kidney injury  -Resolved  -Nephrology following  -Holding Lasix and Aldactone    Hyponatremia  -Continue to monitor    Hypertension  -Continue metoprolol    Generalized weakness/debility  -PT/OT     History of hepatitis C  -Stable    Morbid obesity BMI 41  -Complicating medical problem placing patient at high risk of morbidity and mortality    DVT Prophylaxis: Subcu heparin  Diet: DIET GENERAL; Daily Fluid Restriction: 1200 ml  Code Status: Full Code    PT/OT Eval Status: Ongoing    Dispo -continue IV antibiotics for left lower extremity cellulitis. Still in significant pain. ID on board. Awaiting improvement prior to discharge to LTC.   Hopefully tomorrow    Evangelina Ventura MD

## 2021-05-04 NOTE — PLAN OF CARE
Problem: Body Temperature - Imbalanced:  Goal: Ability to maintain a body temperature in the normal range will improve  Description: Ability to maintain a body temperature in the normal range will improve  Outcome: Met This Shift   VSS, afebrile, with normal physiological function at this time. Problem: Skin Integrity:  Goal: Will show no infection signs and symptoms  Description: Will show no infection signs and symptoms  Outcome: Ongoing   Pt. Turned and repositioned Q2 hrs and PRN. Skin is kept clean and dry. Problem: Falls - Risk of:  Goal: Will remain free from falls  Description: Will remain free from falls  Outcome: Ongoing   Pt. Is bed bound, fall prevention measures in place to promote safety and decrease the risks of falls. Pt. Calls out appropriately. No falls reported thus far this shift.

## 2021-05-04 NOTE — PROGRESS NOTES
Pt. Complaint of pain 10/10 to right side. MD weathers served to inform of last dose of tramadol of 50 mg at 0119, and next dose due time of 0719. Internal medicine Dr. Serafin Lagunas served. MD stated to give med early, then retracted to ask if pt. Is covered by hospitalist. MD informed Internal medicine. MD informed RN to reach out to Hospitalist as well.

## 2021-05-04 NOTE — PROGRESS NOTES
VSS. A&Ox4. Complaining of pain in lower abdomen, given prn tramadol as ordered. Patient satisfied on reassessment. IV abx intermittently. Tolerating diet. Patient had one episode of coughing up mucous, denies needing any antiemetic at this time. Patient stated she hasn't had a BM, MD ordered senna, and lactulose for bowel regimen. q2 repositioning, patient refusing at this time. Specialty bed in place. Ace wraps on BLE for cellulitis, no drainage noted on assessment. Feet elevated. HOB 60. Door open for comfort and safety. Call light within reach. Bed in lowest position.  Will cont to monitor

## 2021-05-04 NOTE — PROGRESS NOTES
ID Follow-up NOTE    CC:   L leg cellulitis  Antibiotics: Vancomycin, Ceftriaxone     Admit Date: 2021  Hospital Day: 7    Subjective:     Patient reports less L leg pain  No other complaint      Objective:     Patient Vitals for the past 8 hrs:   BP Temp Temp src Pulse Resp SpO2 Weight   21 1052 134/80 97.8 °F (36.6 °C) Axillary 73 -- 97 % --   21 0756 121/81 99.1 °F (37.3 °C) Axillary 87 16 99 % --   21 0653 -- -- -- -- -- -- 267 lb (121.1 kg)     I/O last 3 completed shifts: In: 3011 [P.O.:1130; I.V.:10]  Out: 1800 [Urine:1800]  I/O this shift:  In: 120 [P.O.:120]  Out: 275 [Urine:275]    EXAM:  GENERAL: No apparent distress.   RA  HEENT: Membranes moist, no oral lesion  NECK:  Supple, no lymphadenopathy  LUNGS: Clear b/l, no rales, no dullness  CARDIAC: RRR, no murmur appreciated  ABD:  + BS, soft / NT  EXT:  Bilateral LE dressing / ACE  Seen with Wound Care RN on 5/3:  LE edema, LESS L LE erythema, superficial wounds (from denuded blisters) - improved from     NEURO: No focal neurologic findings  PSYCH: Orientation, sensorium, mood normal  LINES:  Peripheral iv       Data Review:  Lab Results   Component Value Date    WBC 11.7 (H) 2021    HGB 11.4 (L) 2021    HCT 32.8 (L) 2021    MCV 88.6 2021     2021     Lab Results   Component Value Date    CREATININE 0.7 2021    BUN 10 2021     (L) 2021    K 4.7 2021     2021    CO2 22 2021       Hepatic Function Panel:   Lab Results   Component Value Date    ALKPHOS 232 2021    ALT 91 2021     2021    PROT 6.5 2021    PROT 8.0 2012    BILITOT 2.3 2021    BILIDIR 1.2 2021    IBILI 1.1 2021    LABALBU 2.5 2021       Micro:   BC - no growth to date   SARS CoV-2 PCR neg     Imagin/28 Femur / tib - fib CT:  Subcutaneous edema and skin thickening.  No soft tissue air, foreign body   or soft tissue abscess.     Scheduled Meds:   ceFAZolin  2,000 mg Intravenous Q8H    metoprolol tartrate  25 mg Oral BID    therapeutic multivitamin-minerals  1 tablet Oral Daily    OLANZapine  5 mg Oral Nightly    vitamin B-12  1,000 mcg Oral Daily    sodium chloride flush  5-40 mL Intravenous 2 times per day    pantoprazole  40 mg Oral QAM AC    heparin (porcine)  5,000 Units Subcutaneous 3 times per day    Venelex   Topical BID       Continuous Infusions:   sodium chloride         PRN Meds:  traMADol, polyvinyl alcohol, melatonin, sodium chloride flush, sodium chloride, promethazine **OR** ondansetron, polyethylene glycol      Assessment:     Hx dementia, COPD, HTN, lung cancer, spinal stenosis / LBP, HCV  Lives in a nursing facility     Encephalopathy  LAVINIA - Cr down  Leukocytosis     L LE cellulitis, short duration, red / hot / bullae, c/c Streptococcal cellulitis     Multiple allergies listed - PCN, bactrim listed      Plan:     Cont ancef (aware PCN allergy)   Wound care - agree with alginate with silver, dressing / ACE, will eval L leg at time of dressing change later today    At discharge, cephalexin 1,000 tid x 10 days further     Medical Decision Making:   The following items were considered in medical decision making:  Discussion of patient care with other providers - RN  Reviewed clinical lab tests - no new labs today  Microbiology culture results reviewed  - BC no growth to date  Monitoring antibiotics therapy    Discussed with pt  Anu Miller MD

## 2021-05-04 NOTE — PROGRESS NOTES
Spoke to patient's son, gave updates of plan of care. Family expressed some frustration with not hearing from a physician, perfectserved internal medicine and ID with patient's contact info for a follow up call.  Will cont to monitor

## 2021-05-04 NOTE — PROGRESS NOTES
Hospitalist Progress Note      PCP: Kang Martines    Date of Admission: 4/28/2021    Chief Complaint:  left leg pain, swelling    Hospital Course: 68-year-old female presented with a chief complaint of change in the mental status associated with the left lower extremity infection. Subjective: No acute events reported overnight. Left lower extremity . Patient appears better but when discussed discharge patient became upset and stated that she needs another day. She has not had a bowel movement. She cannot just get up and needle. Mention that she needs to talk to her son to make plans. Explained to patient in eye contact and son. Patient still apprehensive about discharge today. Spoke with son at length, answered questions. Plan for discharge tomorrow. Medications:  Reviewed    Infusion Medications    sodium chloride       Scheduled Medications    senna  1 tablet Oral BID    lactulose  30 mL Oral Daily    furosemide  20 mg Oral Daily    ceFAZolin  2,000 mg Intravenous Q8H    metoprolol tartrate  25 mg Oral BID    therapeutic multivitamin-minerals  1 tablet Oral Daily    OLANZapine  5 mg Oral Nightly    vitamin B-12  1,000 mcg Oral Daily    sodium chloride flush  5-40 mL Intravenous 2 times per day    pantoprazole  40 mg Oral QAM AC    heparin (porcine)  5,000 Units Subcutaneous 3 times per day    Venelex   Topical BID     PRN Meds: bisacodyl, traMADol, polyvinyl alcohol, melatonin, sodium chloride flush, sodium chloride, promethazine **OR** ondansetron, polyethylene glycol      Intake/Output Summary (Last 24 hours) at 5/4/2021 1604  Last data filed at 5/4/2021 1519  Gross per 24 hour   Intake 860 ml   Output 1575 ml   Net -715 ml       Physical Exam Performed:    /75   Pulse 82   Temp 98.3 °F (36.8 °C) (Oral)   Resp 16   Ht 5' 7\" (1.702 m)   Wt 267 lb (121.1 kg)   SpO2 95%   BMI 41.82 kg/m²     General appearance: Awake and alert.   Morbidly wrap  -Pain control  -ID following    Acute kidney injury  -Resolved  -Lasix 20 mg daily resumed  -Plan to continue Lasix 20 with Aldactone 50 on discharge per nephrology  -Discussed with Dr. Diana Bazan    Hyponatremia  -Continue to monitor    Hypertension  -Continue metoprolol    Generalized weakness/debility  -PT/OT     History of hepatitis C  -Stable  -Lactulose resumed    Constipation  -Continue MiraLAX  -Senna and lactulose added  -Bisacodyl suppository prn added    Morbid obesity BMI 41  -Complicating medical problem placing patient at high risk of morbidity and mortality    DVT Prophylaxis: Subcu heparin  Diet: DIET GENERAL; Daily Fluid Restriction: 1200 ml  Code Status: Full Code    PT/OT Eval Status: Ongoing    Enedelia Hernandez MD

## 2021-05-04 NOTE — DISCHARGE SUMMARY
Bisacodyl suppository as needed was given. Generalized weakness/debility  Patient was evaluated by PT OT and is being discharged to skilled nursing facility.     Morbid obesity BMI 41      Physical Exam Performed:     /83   Pulse 84   Temp 98 °F (36.7 °C) (Oral)   Resp 16   Ht 5' 7\" (1.702 m)   Wt 267 lb (121.1 kg)   SpO2 92%   BMI 41.82 kg/m²     General appearance: Awake and alert. Morbidly obese  HEENT: Pupils equal, round, and reactive to light. Conjunctivae/corneas clear. Neck: Supple, with full range of motion. No jugular venous distention. Trachea midline. Respiratory:  Normal respiratory effort. Clear to auscultation, bilaterally without Rales/Wheezes/Rhonchi. Cardiovascular: Regular rate and rhythm with normal S1/S2 without murmurs, rubs or gallops. Abdomen: Soft, non-tender, non-distended with normal bowel sounds. Musculoskeletal: Bilateral lower extremities with ace wraps  Neurologic:  Neurovascularly intact without any focal sensory/motor deficits. Cranial nerves: II-XII intact, grossly non-focal.  Psychiatric: Alert and oriented, thought content appropriate, normal insight  Capillary Refill: Brisk,3 seconds, normal   Peripheral Pulses: +2 palpable, equal bilaterally     Labs: For convenience and continuity at follow-up the following most recent labs are provided:      CBC:    Lab Results   Component Value Date    WBC 11.7 05/04/2021    HGB 11.4 05/04/2021    HCT 32.8 05/04/2021     05/04/2021       Renal:    Lab Results   Component Value Date     05/05/2021    K 4.4 05/05/2021    K 4.7 05/02/2021     05/05/2021    CO2 24 05/05/2021    BUN 12 05/05/2021    CREATININE 0.6 05/05/2021    CALCIUM 8.1 05/05/2021    PHOS 3.4 05/05/2021         Significant Diagnostic Studies    Radiology:   CT FEMUR LEFT WO CONTRAST   Final Result   Subcutaneous edema and skin thickening. No abnormal soft tissue air or    focal abscess.       CT TIBIA FIBULA LEFT WO CONTRAST   Final Result   Subcutaneous edema and skin thickening. No soft tissue air, foreign body    or soft tissue abscess. Consults:     PHARMACY TO DOSE VANCOMYCIN  IP CONSULT TO HOSPITALIST  IP CONSULT TO PHARMACY  PHARMACY TO DOSE VANCOMYCIN  IP CONSULT TO NEPHROLOGY  IP CONSULT TO INFECTIOUS DISEASES    Disposition:  SNF    Condition at Discharge: Stable    Discharge Instructions/Follow-up:  Cont wound care with alginate with silver, dressing / ACE. Complete antibiotic course. Monitor BP and HR. Monitor renal profile. Lasix and metoprolol dose decreased with concern to low blood pressure. Potassium discontinued. Adjust dosage as needed. Follow up with PCP. Code Status:  Prior     Activity: activity as tolerated    Diet: regular diet      Discharge Medications:     Discharge Medication List as of 5/5/2021 10:28 AM           Details   Balsam [place]-Castor Oil (VENELEX) OINT ointment Apply topically 2 times daily, Topical, 2 TIMES DAILY Starting Tue 5/4/2021, Disp-1 Tube, R-0, Normal      cephALEXin (KEFLEX) 500 MG capsule Take 2 capsules by mouth 3 times daily for 10 days, Disp-60 capsule, R-0Normal              Details   traMADol (ULTRAM) 50 MG tablet Take 1 tablet by mouth 4 times daily for 3 days. , Disp-12 tablet, R-0Print      metoprolol tartrate (LOPRESSOR) 25 MG tablet Take 0.5 tablets by mouth 2 times daily, Disp-60 tablet, R-3Normal      furosemide (LASIX) 20 MG tablet Take 1 tablet by mouth daily, Disp-30 tablet, R-0Normal              Details   vitamin B-12 (CYANOCOBALAMIN) 1000 MCG tablet Take 1,000 mcg by mouth dailyHistorical Med      polyethylene glycol (GLYCOLAX) 17 g packet Take 17 g by mouth three times a week Every Monday, Wednesday and Friday for bowel regimenHistorical Med      famotidine (PEPCID) 20 MG tablet Take 40 mg by mouth daily Historical Med      hydrOXYzine (ATARAX) 25 MG tablet Take 25 mg by mouth dailyHistorical Med      lactulose (CHRONULAC) 10 GM/15ML solution Take 30 mLs by mouth dailyHistorical Med      melatonin 5 MG TABS tablet Take 5 mg by mouth nightly as needed (insomnia) Historical Med      OLANZapine (ZYPREXA) 5 MG tablet Take 5 mg by mouth nightlyHistorical Med      carboxymethylcellulose (REFRESH TEARS) 0.5 % SOLN ophthalmic solution Apply 1 drop to eye every 6 hours as needed (dry, red eyes)Historical Med      senna-docusate (SENNA-PLUS) 8.6-50 MG per tablet Take 2 tablets by mouth daily as needed for ConstipationHistorical Med      spironolactone (ALDACTONE) 50 MG tablet Take 50 mg by mouth dailyHistorical Med      Multiple Vitamins-Minerals (THERAPEUTIC MULTIVITAMIN-MINERALS) tablet Take 1 tablet by mouth dailyHistorical Med      Cholecalciferol (VITAMIN D3) 1.25 MG (10455 UT) CAPS Take 1 capsule by mouth every 30 days Every Month on the 15thHistorical Med             Time Spent on discharge is more than 30 minutes in the examination, evaluation, counseling and review of medications and discharge plan. Signed:    Carole Bolanos MD   5/5/2021      Thank you Swati Clark for the opportunity to be involved in this patient's care. If you have any questions or concerns please feel free to contact me at [phone].

## 2021-05-04 NOTE — DISCHARGE INSTR - COC
Continuity of Care Form    Patient Name: Guanaco Hankins   :    MRN:  [id number]    Admit date:  2021  Discharge date:  ***    Code Status Order: Full Code   Advance Directives:   Advance Care Flowsheet Documentation       Date/Time Healthcare Directive Type of Healthcare Directive Copy in [address] Agent's Name Healthcare Agent's Phone Number    [phone]  No, patient does not have an advance directive for healthcare treatment Son states that he is the guardian  -- -- -- -- --            Admitting Physician:  Jose Hollins MD  PCP: Deric Castillo    Discharging Nurse: [facility] Unit/Room#: 9182/1761-43  Discharging Unit Phone Number: ***    Emergency Contact:   Extended Emergency Contact Information  Primary Emergency Contact: [address] Phone: [phone]  Mobile Phone: [phone]  Relation: Child  Secondary Emergency Contact: Chelsie [address] Phone: [phone]  Relation: Brother/Sister    Past Surgical History:  Past Surgical History:   Procedure Laterality Date    CHOLECYSTECTOMY  2011    [facility]       Immunization History:   Immunization History   Administered Date(s) Administered    Influenza Virus Vaccine 10/26/2012    Tdap (Boostrix, Adacel) 2013       Active Problems:  Patient Active Problem List   Diagnosis Code    Hypertension I10    Anxiety F41.9    Lumbar disc disease M51.9    Drug-seeking behavior Z76.5    GI bleed K92.2    Peptic ulcer disease K27.9    Erosive gastritis K29.60    Cellulitis L03.90    Left leg cellulitis L03. 116    Leukocytosis D72.829    Bullous dermatitis L13.9    LAVINIA (acute kidney injury) ([facility] [address] N17.9       Isolation/Infection:   Isolation            No Isolation          Patient Infection Status       Infection Onset Added Last Indicated Last Indicated By Review Planned Expiration Resolved Resolved By    None active    Resolved    COVID-19 Rule Out 04/28/21 04/28/21 04/28/21 COVID-19 (Ordered)   04/28/21 Rule-Out Test Resulted            Nurse Assessment:  Last Vital Signs: /81   Pulse 87   Temp 99.1 °F (37.3 °C) (Axillary)   Resp 16   Ht 5' 7\" (1.702 m)   Wt 267 lb (121.1 kg)   SpO2 99%   BMI 41.82 kg/m²     Last documented pain score (0-10 scale): Pain Level: 9  Last Weight:   Wt Readings from Last 1 Encounters:   05/04/21 267 lb (121.1 kg)     Mental Status:  oriented    IV Access:  - None    Nursing Mobility/ADLs:  Walking   Dependent  Transfer  Dependent  Bathing  Dependent  Dressing  Dependent  Toileting  Dependent  Feeding  Independent  Med Admin  Dependent  Med Delivery   whole    Wound Care Documentation and Therapy:  Wound 04/28/21 Leg Left multiple open areas/ blisters from toes up thigh with surrounding cellulitis (Active)   Wound Image   04/28/21 1042   Wound Etiology Venous 05/03/21 2005   Dressing Status Clean;Dry; Intact 05/04/21 0757   Wound Cleansed Cleansed with saline 05/02/21 0235   Dressing/Treatment Alginate with Ag;Gauze dressing/dressing sponge; Ace wrap;ABD 05/03/21 2005   Offloading for Diabetic Foot Ulcers Yes (type) 05/02/21 0235   Dressing Change Due 05/04/21 05/02/21 0235   Wound Depth (cm) 0.1 cm 04/28/21 1042   Wound Assessment Other (Comment) 05/04/21 0757   Drainage Amount None 05/04/21 0757   Drainage Description Yellow;Sanguinous 05/02/21 0235   Odor None 05/02/21 0235   Lise-wound Assessment Edematous;Fragile; Non-blanchable erythema 05/02/21 0235   Margins Attached edges; Unattached edges; Undefined edges 04/30/21 1513   Wound Thickness Description not for Pressure Injury Partial thickness 04/30/21 1513   Number of days: 6       Wound 04/28/21 Leg Right; Lower Scattered open ulcers/blisters, pink lise-skin (Active)   Wound Etiology Venous 05/04/21 0757   Dressing Status Clean;Dry; Intact 05/04/21 0757   Wound Cleansed Cleansed with saline 05/02/21 0235 - No ventilator support    Rehab Therapies: {THERAPEUTIC INTERVENTION:6559100770}  Weight Bearing Status/Restrictions: [address] ALLRED Weight Bearin:::0}  Other Medical Equipment (for information only, NOT a DME order):  {EQUIPMENT:210996908}  Other Treatments: ***    Patient's personal belongings (please select all that are sent with patient):  None    RN SIGNATURE:  Electronically signed by Jacques Nelson RN on 21 at 10:27 AM EDT    CASE MANAGEMENT/SOCIAL WORK SECTION    Inpatient Status Date: ***    Readmission Risk Assessment Score:  Readmission Risk              Risk of Unplanned Readmission:        13           Discharging to Facility/ [address] [place] [address] Details  [address] [facility], [address] [facility] [address]       Phone: [phone]       Fax: [phone]          / signature: Electronically signed by LIDIA Gordon, [facility] on 21 at 11:49 AM EDT    PHYSICIAN SECTION    Prognosis: Fair    Condition at Discharge: Stable    Rehab Potential (if transferring to Rehab): Fair    Recommended Labs or Other Treatments After Discharge: Cont wound care with alginate with silver, dressing / ACE. Complete antibiotic course. Monitor BP and HR. Monitor renal profile. Lasix and metoprolol dose decreased with concern to low blood pressure. Potassium discontinued. Adjust dosage as needed. Follow up with PCP. Physician Certification: I certify the above information and transfer of Keri Aguirre  is necessary for the continuing treatment of the diagnosis listed and that she requires [facility] for greater 30 days.      Update Admission H&P: No change in H&P    PHYSICIAN SIGNATURE:  Electronically signed by Lion Solano MD on 21 at 10:15 AM EDT

## 2021-05-04 NOTE — FLOWSHEET NOTE
05/04/21 0221   Encounter Summary   Services provided to: Patient not available   Referral/Consult From: Rounding   Continue Visiting   (5/4, dje   sleep )   Complexity of Encounter Low   Length of Encounter 15 minutes   Routine   Type Follow up   Assessment Sleeping

## 2021-05-05 NOTE — CARE COORDINATION
Case Management Assessment            Discharge Note                    Date / Time of Note: 5/5/2021 9:47 AM                  Discharge Note Completed by: Timothy Delacruzmartin    Patient Name: Lon Cobb   YOB: 1950  Diagnosis: Cellulitis [L03.90]   Date / Time: 4/28/2021  1:25 AM    Current PCP: [address] patient: No    Hospitalization in the last 30 days: No    Advance Directives:  Code Status: Full Code  [place] DNR form completed and on chart: Not Indicated    Financial:  Payor: Kamryn Kelly / Plan: [facility] [employer] / Product Type: *No Product type* /      Pharmacy:    [address] [phone]  [address] [place] [id number]  Phone: [phone] Fax: [phone]    [facility]/pharmacy [address] [phone] -  [phone]  [address] [place] [address]  Phone: [phone] Fax: [phone]      Assistance purchasing medications?: Potential Assistance Purchasing Medications: No  Assistance provided by Case Management: None at this time    Does patient want to participate in local refill/ meds to beds program?: No    Meds To Beds General Rules:  1. Can ONLY be done Monday- Friday between 8:30am-5pm  2. Prescription(s) must be in pharmacy by 3pm to be filled same day  3. Copy of patient's insurance/ prescription drug card and patient face sheet must be sent along with the prescription(s)  4. Cost of Rx cannot be added to hospital bill. If financial assistance is needed, please contact unit  or ;  or  CANNOT provide pharmacy voucher for patients co-pays  5.  Patients can then  the prescription on their way out of the hospital at discharge, or pharmacy can deliver to the bedside if staff is available. (payment due at time of pick-up or delivery - cash, check, or card accepted) Able to afford home medications/ co-pay costs: Yes    ADLS:  Current PT AM-PAC Score:   /24  Current OT AM-PAC Score:   /24      DISCHARGE Disposition: [facility] ([facility]): [address] [place] [address]  Phone: [phone]  Fax: [phone]    LOC at discharge: [address] Tan Completed: Yes    Notification completed in HENS/PAS?:  Not Applicable    IMM Completed:       Transportation:  Transportation PLAN for discharge: EMS transportation   Mode of Transport: Ambulance stretcher - BLS  Reason for medical transport: Other: bilt leg wounds, pain spinal stenosis  Name of Transport Company: [address]  Phone: [phone]  Time of Transport: 1230p    Transport form completed: Yes        Additional CM Notes: Pt from [address] [place] [address], will return today 5/5 via [facility] care [phone] at 1230p, nurse to call report [phone] orders faxed to [phone]. Sukumar Bazan aware of [facility] [phone]    The Plan for Transition of Care is related to the following treatment goals of Cellulitis [L03.90]    The Patient and/or patient representative Ariana Butterfield and her family were provided with a choice of provider and agrees with the discharge plan Yes    Freedom of choice list was provided with basic dialogue that supports the patient's individualized plan of care/goals and shares the quality data associated with the providers.  Yes    Care Transitions patient: No    Renzo Lomax RN  [facility] [employer]  Case Management Department  Ph: [phone]  Fax: [phone]

## 2021-05-05 NOTE — PROGRESS NOTES
Patient is in bed and resting at this time, vitals are stable, pain is 9/10 with both legs, gave PRN Tramadol. Patient said tomorrow if wound care changes the dressings to give something stronger for pain. Scheduled Cefazolin is given IV in R hand. Pure wick is being used and works great. Call light with reach and bed alarm on.

## 2021-05-05 NOTE — PROGRESS NOTES
Office : [phone]     Fax :[phone]         Renal Progress Note  Subjective:   Admit Date: 4/28/2021     HPI      Interval History: Patient seen and examined this morning. More alert today. Endorses vague abdominal pain, localized \"in the middle. \" No associated nausea or vomiting. Says she had BM yesterday. Doesn't feel like eating. Interval history. Feels better. No respiratory stress. Renal function stable   Edema ++      DIET DIET GENERAL; Daily Fluid Restriction: 1200 ml  Medications:   Scheduled Meds:   senna  1 tablet Oral BID    lactulose  30 mL Oral Daily    furosemide  20 mg Oral Daily    furosemide  40 mg Intravenous Once    ceFAZolin  2,000 mg Intravenous Q8H    metoprolol tartrate  25 mg Oral BID    therapeutic multivitamin-minerals  1 tablet Oral Daily    OLANZapine  5 mg Oral Nightly    vitamin B-12  1,000 mcg Oral Daily    sodium chloride flush  5-40 mL Intravenous 2 times per day    pantoprazole  40 mg Oral QAM AC    heparin (porcine)  5,000 Units Subcutaneous 3 times per day    Venelex   Topical BID     Continuous Infusions:   sodium chloride         Labs:  CBC:   Recent Labs     05/02/21  0928 05/04/21  0950   WBC 15.1* 11.7*   HGB 12.0 11.4*    145     BMP:    Recent Labs     05/02/21  0928   *   K 4.7      CO2 22   BUN 10   CREATININE 0.7   GLUCOSE 164*     Ca/Mg/Phos:   Recent Labs     05/02/21  0928   CALCIUM 8.1*     Hepatic:   No results for input(s): AST, ALT, ALB, BILITOT, ALKPHOS in the last 72 hours. Troponin: No results for input(s): TROPONINI in the last 72 hours.   BNP: No results for input(s): BNP in the last 72 hours. Lipids: No results for input(s): CHOL, TRIG, HDL, LDLCALC, LABVLDL in the last 72 hours. ABGs: No results for input(s): PHART, PO2ART, UIM8CBV in the last 72 hours. INR:   No results for input(s): INR in the last 72 hours. UA:  No results for input(s): Melita [place], GLUCOSEU, BILIRUBINUR, KETUA, SPECGRAV, BLOODU, PHUR, PROTEINU, UROBILINOGEN, NITRU, LEUKOCYTESUR, [place] in the last 72 hours. Urine Microscopic:   No results for input(s): LABCAST, BACTERIA, COMU, HYALCAST, WBCUA, RBCUA, EPIU in the last 72 hours. Urine Culture: No results for input(s): LABURIN in the last 72 hours. Urine Chemistry:   No results for input(s): [place], PROTEINUR, NAUR in the last 72 hours. Objective:   Vitals: /63   Pulse 83   Temp 98.2 °F (36.8 °C) (Oral)   Resp 16   Ht 5' 7\" (1.702 m)   Wt 267 lb (121.1 kg)   SpO2 96%   BMI 41.82 kg/m²    Wt Readings from Last 3 Encounters:   05/04/21 267 lb (121.1 kg)   01/02/13 180 lb (81.6 kg)   09/20/12 192 lb 6.4 oz (87.3 kg)      24HR INTAKE/OUTPUT:      Intake/Output Summary (Last 24 hours) at 5/4/2021 2247  Last data filed at [address] [facility]  Gross per 24 hour   Intake 840 ml   Output 2525 ml   Net -1685 ml     Constitutional:  Alert, awake, no apparent distress  NECK: supple, no JVD  Cardiovascular:  S1, S2 without m/r/g  Respiratory:  CTA B without w/r/r  Abdomen: +bs, soft, nt  Ext: Lower extremities wrapped in ace bandage, L LE erythema noted up to mid-thigh which looks stable, if not, improved from yesterday. Assessment and Plan:       IMAGING:  CT FEMUR LEFT WO CONTRAST   Final Result   Subcutaneous edema and skin thickening. No abnormal soft tissue air or    focal abscess. CT TIBIA FIBULA LEFT WO CONTRAST   Final Result   Subcutaneous edema and skin thickening. No soft tissue air, foreign body    or soft tissue abscess.         BP Readings from Last 3 Encounters:   05/04/21 126/63   01/02/13 (!) 128/94   09/20/12 99/73     Assessment/Plan     1. LAVINIA-  Better      2. HTN-  Controlled      3. Hyponatremia- stable     4. Acid- base/ Electrolyte imbalance      5. Acute metabolic encephalopathy      6. Left lower extremity cellulitis      7. History of Hepatits C - no signs of renal Hep C      LAVINIA improving, Cr better     Lasix x 1 dose     Avoid nephrotoxic meds. Cont  lasix at discharge     Blood pressure stable. Limit daily fluid intake to 1200 ml       Cont broad spectrum Abx for cellulitis,  ID following.     Has history of Hep C, cryo and RF elevated                   Cristhian Ny MD      [facility] [place] [phone]

## 2021-05-06 NOTE — PROGRESS NOTES
for input(s): LABURIN in the last 72 hours. Urine Chemistry:   No results for input(s): Albina Escalera, BRAD FELICIANO in the last 72 hours. Objective:   Vitals: /83   Pulse 84   Temp 98 °F (36.7 °C) (Oral)   Resp 16   Ht 5' 7\" (1.702 m)   Wt 267 lb (121.1 kg)   SpO2 92%   BMI 41.82 kg/m²    Wt Readings from Last 3 Encounters:   05/04/21 267 lb (121.1 kg)   01/02/13 180 lb (81.6 kg)   09/20/12 192 lb 6.4 oz (87.3 kg)      24HR INTAKE/OUTPUT:      Intake/Output Summary (Last 24 hours) at 5/6/2021 0018  Last data filed at 5/5/2021 0148  Gross per 24 hour   Intake 222 ml   Output --   Net 222 ml     Constitutional:  Alert, awake, no apparent distress  NECK: supple, no JVD  Cardiovascular:  S1, S2 without m/r/g  Respiratory:  CTA B without w/r/r  Abdomen: +bs, soft, nt  Ext: Lower extremities wrapped in ace bandage, L LE erythema noted up to mid-thigh which looks stable, if not, improved from yesterday. Assessment and Plan:       IMAGING:  CT FEMUR LEFT WO CONTRAST   Final Result   Subcutaneous edema and skin thickening. No abnormal soft tissue air or    focal abscess. CT TIBIA FIBULA LEFT WO CONTRAST   Final Result   Subcutaneous edema and skin thickening. No soft tissue air, foreign body    or soft tissue abscess. BP Readings from Last 3 Encounters:   05/05/21 132/83   01/02/13 (!) 128/94   09/20/12 99/73     Assessment/Plan     1. LAVINIA-  Better      2. HTN-  Controlled      3. Hyponatremia- stable     4. Acid- base/ Electrolyte imbalance      5. Acute metabolic encephalopathy      6. Left lower extremity cellulitis      7. History of Hepatits C - no signs of renal Hep C      LAVINIA improving, Cr better     Lasix PO    Avoid nephrotoxic meds. Cont  lasix at discharge     Blood pressure stable. Limit daily fluid intake to 1200 ml       Cont broad spectrum Abx for cellulitis,  ID following.     Has history of Hep C, cryo and RF elevated                   Dhaval Tindni , MD      [facility] of [address] [place] [address] [phone]

## 2021-05-19 PROBLEM — A41.9 SEPSIS (HCC): Status: ACTIVE | Noted: 2021-01-01

## 2021-05-20 PROBLEM — L03.119 RECURRENT CELLULITIS OF LOWER EXTREMITY: Status: ACTIVE | Noted: 2021-01-01

## 2021-05-20 NOTE — PROGRESS NOTES
Internal Medicine PGY-1 Resident Progress Note        PCP: No primary care provider on file. Date of Admission: 5/19/2021    Chief Complaint: Bilateral lower extremity pain    Interval History:     Patient seen at bedside, not in acute distress, no acute events overnight. Patient still complains of lower extremity tenderness and pain. Patient denies fever, chills, chest pain, nausea, vomiting, abdominal pain. Medications:  Reviewed  Medications    dextrose      sodium chloride       Scheduled Medications    insulin lispro  0-6 Units Subcutaneous TID WC    insulin lispro  0-3 Units Subcutaneous Nightly    ceFAZolin  2,000 mg Intravenous Q8H    Unna-Flex Elastic Unna Boot  2 each Topical Once    famotidine  40 mg Oral Daily    furosemide  20 mg Oral Daily    metoprolol tartrate  12.5 mg Oral BID    OLANZapine  5 mg Oral Nightly    spironolactone  50 mg Oral Daily    sodium chloride flush  5-40 mL Intravenous 2 times per day    enoxaparin  40 mg Subcutaneous Daily     PRN Meds: traMADol, HYDROmorphone **OR** HYDROmorphone, glucose, dextrose, glucagon (rDNA), dextrose, dextran 70-hypromellose, sodium chloride flush, sodium chloride, promethazine **OR** ondansetron, polyethylene glycol, senna      Intake/Output Summary (Last 24 hours) at 5/20/2021 1538  Last data filed at 5/20/2021 9062  Gross per 24 hour   Intake 2744.3 ml   Output 250 ml   Net 2494.3 ml       Physical Exam Performed:    BP (!) 97/52   Pulse 90   Temp 98 °F (36.7 °C) (Oral)   Resp 20   Ht 5' 7\" (1.702 m)   Wt 284 lb 9.8 oz (129.1 kg)   SpO2 92%   BMI 44.58 kg/m²     Physical Exam  Constitutional:       General: She is not in acute distress. Appearance: She is obese. She is ill-appearing. HENT:      Head: Normocephalic and atraumatic. Mouth/Throat:      Mouth: Mucous membranes are moist.      Pharynx: Oropharynx is clear. Cardiovascular:      Rate and Rhythm: Normal rate and regular rhythm.       Heart sounds:

## 2021-05-20 NOTE — PROGRESS NOTES
4 Eyes Admission Assessment     I agree as the admission nurse that 2 RN's have performed a thorough Head to Toe Skin Assessment on the patient. ALL assessment sites listed below have been assessed on admission. Areas assessed by both nurses:  [x]   Head, Face, and Ears   [x]   Shoulders, Back, and Chest  [x]   Arms, Elbows, and Hands   [x]   Coccyx, Sacrum, and Ischum  [x]   Legs, Feet, and Heels        Does the Patient have Skin Breakdown? Yes a wound was noted on the Admission Assessment and an LDA was Initiated documentation include the Amanda-wound, Wound Assessment, Measurements, Dressing Treatment, Drainage, and Color\",  Pt has redness and open wounds to BLE - podiatry following. BLE wrapped in ACE wraps.  Redness noted under right breast.         Cam Prevention initiated:  Yes   Wound Care Orders initiated:  Yes      [address]  nurse consulted for Pressure Injury (Stage 3,4, Unstageable, DTI, NWPT, and Complex wounds):  No      Nurse 1 eSignature: Electronically signed by Emerita Metzger RN on 5/20/21 at 12:08 AM EDT    **SHARE this note so that the co-signing nurse is able to place an eSignature**    Nurse 2 eSignature: Electronically signed by Alek Dewitt RN on 5/20/21 at 6:46 AM EDT

## 2021-05-20 NOTE — PROGRESS NOTES
Clinical Pharmacy Progress Note    Admit date: 5/19/2021     Subjective/Objective:  Pt is a 64yof with PMHx that includes HTN, HCV, anxiety, GERD, lung cancer, and spinal stenosis who is admitted with B/L LE pain & swelling with wounds. Of note, patient was recently admitted here 4/28-5/5 with LLE cellulitis. Pt was discharged to LTC on Cephalexin x10 days. Being treated now for sepsis 2/2 RLE cellulitis / lymphangitis and B/L LE venous stasis dermatitis. Also found to be hyperglycemic on admission (no prior h/o DM). Pharmacy is consulted to dose Vancomycin per Dr. Berenice Rg    Current antibiotics:  Cefepime 2000mg IV q12h (5/20-current) - day #1  Vancomycin - Pharmacy to dose - day #1   1750mg IV q18h (5/20 - current)      Recent Labs     05/19/21  1910 05/20/21  0539   * 134*   K 4.3 4.5    105   CO2 23 21   BUN 16 19   CREATININE 0.9 1.1   GLUCOSE 234* 230*       Estimated Creatinine Clearance: 60 mL/min (based on SCr of 1.1 mg/dL). Lab Results   Component Value Date    WBC 14.1 (H) 05/20/2021    HGB 9.2 (L) 05/20/2021    HCT 27.3 (L) 05/20/2021    MCV 91.8 05/20/2021     05/20/2021       Lab Results   Component Value Date    PROTIME 16.3 (H) 04/28/2021    INR 1.40 (H) 04/28/2021       Height:  5' 7\" (170.2 cm)  Weight:  250 lb (113.4 kg)    Vancomycin Levels:  Trough  5/21 @ 13:30 = ordered    Culture results:  Blood (5/19) = sent  Wound, left leg (5/19) = in process  Wound, right leg (5/19) = in process    Prophylaxis:  VTE:  Enoxaparin  GI:  Famotidine      Assessment/Plan:  1)  RLE cellulitis / lymphangitis with B/L LE venous stasis dermatitis:  Cefepime + Vancomycin - day #1  · Cefepime -   Current dose remains appropriate based on indication and current renal function. Will continue to monitor and adjust as needed per [facility] Renal Dose Adjustment Policy. · Vancomycin - Pharmacy to dose  · Continue 1750mg IV q18h. · Will check level prior to 3rd dose, due 5/21 14:00.   Pt will not be at steady state yet, but want to be sure patient is clearing Vanc adequately. Obesity puts patient at high risk for accumulation and possibly over-estimates CrCl for kinetic calculations. · Clinical condition will be monitored closely, and levels will be ordered as clinically indicated. Please call with questions--  Thanks--  Martina JordanD, BCPS, BCGP  [id number] ([facility])   5/20/2021 9:20 AM    Addendum 11:05:   Vancomycin has been discontinued. Will sign off pharmacy to dose Vanc consult. If medication is restarted and pharmacy is to manage dosing, please re-consult at that time.     Please call with questions--  Thanks--  Yair Benson, PharmD, [address] [facility], [address] [facility] ([facility])   5/20/2021 11:05 AM

## 2021-05-20 NOTE — PLAN OF CARE
Nutrition Problem #1: Inadequate oral intake  Intervention: Food and/or Nutrient Delivery: Continue Current Diet, Start Oral Nutrition Supplement  Nutritional Goals: Pt will consume >50% of meals and HP ons this admission    Problem: Nutrition  Goal: Optimal nutrition therapy  Outcome: Ongoing

## 2021-05-20 NOTE — DISCHARGE INSTR - COC
Continuity of Care Form    Patient Name: Sylvia Duffy   :  [birth year]  MRN:  [id number]    Admit date:  2021  Discharge date:  ***    Code Status Order: Full Code   Advance Directives:   Advance Care Flowsheet Documentation       Date/Time Healthcare Directive Type of Healthcare Directive Copy in [address] Agent's Name Healthcare Agent's Phone Number    21 0146  No, patient does not have an advance directive for healthcare treatment -- -- -- -- --            Admitting Physician:  Skye Land DO  PCP: No primary care provider on file. Discharging Nurse: [facility] Unit/Room#: 8093/6132-52  Discharging Unit Phone Number: ***    Emergency Contact:   Extended Emergency Contact Information  Primary Emergency Contact: Jaky Dawn [address] Phone: [phone]  Mobile Phone: [phone]  Relation: Child  Secondary Emergency Contact: Jamesfrance[address] Phone: [phone]  Relation: Brother/Sister    Past Surgical History:  Past Surgical History:   Procedure Laterality Date    [facility]       Immunization History:   Immunization History   Administered Date(s) Administered    Influenza Virus Vaccine 10/26/2012    Tdap (Boostrix, Adacel) 2013       Active Problems:  Patient Active Problem List   Diagnosis Code    Hypertension I10    Anxiety F41.9    Lumbar disc disease M51.9    Drug-seeking behavior Z76.5    GI bleed K92.2    Peptic ulcer disease K27.9    Erosive gastritis K29.60    Cellulitis L03.90    Left leg cellulitis L03. 116    Leukocytosis D72.829    Bullous dermatitis L13.9    LAVINIA (acute kidney injury) ([address] N17.9    Sepsis (HCC) A41.9       Isolation/Infection:   Isolation            No Isolation          Patient Infection Status       Infection Onset Added Last Indicated Last Indicated By Review Planned Expiration Resolved Resolved By (Active)   Wound Etiology Venous 21 0440   Dressing Status Clean;Dry; Intact 21 0440   Wound Cleansed Cleansed with saline 21 0235   Dressing/Treatment Ace wrap 21 0440   Offloading for Diabetic Foot Ulcers Offloading boot 21 0440   Dressing Change Due 21 1513   Wound Depth (cm) 0.1 cm 21 1042   Wound Assessment Other (Comment) 21 0258   Drainage Amount Scant 21 0835   Drainage Description Yellow 21 0835   Odor None 21 1545   Amanda-wound Assessment Edematous; Non-blanchable erythema;Fragile; Warm 21 1127   Margins Unattached edges; Undefined edges 21 1513   Wound Thickness Description not for Pressure Injury Full thickness 21 1513   Number of days: 22       Wound 21 Breast Left; Lower Excoriation, erythema and sateliting papules (Active)   Wound Etiology Other 21 1601   Dressing Status Clean;Dry; Intact 21 2324   Wound Cleansed Soap and water 21 1545   Dressing/Treatment Silver dressing 21 1127   Wound Assessment Pink/red 21 0835   Drainage Amount Other (Comment) 21 1601   Amanda-wound Assessment Excoriated 21 1545   Margins Attached edges; Undefined edges 21 1601   Number of days: 20        Elimination:  Continence:   · Bowel: {YES / ES:58069}  · Bladder: {YES / C}  Urinary Catheter: {Urinary Catheter:001155667:::0}   Colostomy/Ileostomy/Ileal Conduit: {YES / FM:10884}       Date of Last BM: ***    Intake/Output Summary (Last 24 hours) at 2021 1018  Last data filed at 2021 0614  Gross per 24 hour   Intake 2744.3 ml   Output 250 ml   Net 2494.3 ml     I/O last 3 completed shifts:   In: 2744.3 [P.O.:360; I.V.:2384.3]  Out: 250 [Urine:250]    Safety Concerns:     [address] CINTHIA Safety Concerns:806404246:::0}    Impairments/Disabilities:      [address] VICENT[address] Impairments/Disabilities:566997840:::0}    Nutrition Therapy:  Current Nutrition Therapy:   Gisell VICENTE Diet NMMS:061130060:::1}    Routes of Feeding: {CHP DME Other Feedings:729068327:::0}  Liquids: {Slp liquid thickness:58524}  Daily Fluid Restriction: {CHP DME Yes amt example:138173295:::0}  Last Modified Barium Swallow with Video (Video Swallowing Test): {Done Not Done APQJ:032298033:::3}    Treatments at the Time of Hospital Discharge:   Respiratory Treatments: ***  Oxygen Therapy:  {Therapy; copd oxygen:64017:::0}  Ventilator:    {[facility] Vent List:313842008:::0}    Rehab Therapies: {THERAPEUTIC INTERVENTION:7182858623}  Weight Bearing Status/Restrictions: [address] [place] [address] CC Weight Bearin:::0}  Other Medical Equipment (for information only, NOT a DME order):  {EQUIPMENT:449184610}  Other Treatments: ***    Patient's personal belongings (please select all that are sent with patient):  {CHP DME Belongings:831132887:::0}    RN SIGNATURE:  {Esignature:779149588:::0}    CASE MANAGEMENT/SOCIAL WORK SECTION    Inpatient Status Date: 2021    Readmission Risk Assessment Score:  Readmission Risk              Risk of Unplanned Readmission:  16           Discharging to Facility/ Agency     [facility] Details  [address], [address] [place] [address]       Phone: [phone]       Fax: [phone]          Dialysis Facility (if applicable) NA  · Name:  · Address:  · Dialysis Schedule:  · Phone:  · Fax:    / signature: Electronically signed by Sheri Bucio RN on 21 at 12:26 PM EDT    PHYSICIAN SECTION    Prognosis: Fair    Condition at Discharge: Stable    Rehab Potential (if transferring to Rehab): Fair    Recommended Labs or Other Treatments After Discharge:     PT OT  CBC in 1 week , forward to pcp  Renal in 1 week, forward to pcp     Podiatry Instructions:  Dressings to bilateral lower extremities are to be changed on Monday and Friday consisting of:  1. Gently wrap Unna boot from toe to knee  2. Gently wrap Kerlix from toe to knee  3.   Wrap a 4 inch Ace bandage from toe to ankle with compression  4. Wrap a 6 inch Ace bandage from ankle to knee with compression    Patient is weightbearing as tolerated to bilateral lower extremities. Call and schedule follow-up appointment with Dr. Tamika Franks DPM within 1 week of discharge. Patient is to wear Prevalon boots at all times while in bed to prevent deep tissue injury.       Physician Certification: I certify the above information and transfer of Zarina Berg  is necessary for the continuing treatment of the diagnosis listed and that she requires [facility] Admission H&P: No change in H&P    PHYSICIAN SIGNATURE:  Electronically signed by Claudeen Churches, MD on 5/25/21 at 9:50 AM EDT

## 2021-05-20 NOTE — CONSULTS
Clinical Pharmacy Consult Note    Admit date: 5/19/2021    Subjective/Objective:  66 yo F with PMHx that includes HCV, HTN, recent hospitalization for cellulitis who is admitted with cellulitis. Pharmacy is consulted to dose Vancomycin per Dr. Dada Bautista    Pertinent Medications:  Vancomycin -- pharmacy to dose -- day #1   Vanc 1.75g IV x1 in ED  Cefepime 2g IV q24 hours -- day #1    Recent Labs     05/19/21 1910   *   K 4.3      CO2 23   BUN 16   CREATININE 0.9       Estimated Creatinine Clearance: 73 mL/min (based on SCr of 0.9 mg/dL). Recent Labs     05/19/21 1910   WBC 13.2*   HGB 11.8*   HCT 35.3*   MCV 92.7          Height:  5' 7\" (170.2 cm)  Weight: 250 lb (113.4 kg)      Assessment/Plan:  1. Cellulitis: vancomycin + cefepime  Vancomycin  · Will start vancomycin 1750 mg IV q18 hours. · Clinical pharmacist will follow-up in AM.  · Renal function will be monitored closely and dosing will be adjusted as appropriate. Please call with any questions. Thank you for consulting pharmacy!   Dejon Castellon RPh 5/20/2021, 4:59 AM

## 2021-05-20 NOTE — PROGRESS NOTES
Patient received to room 6312 from the ED. Pt A&O x4 upon arrival.Tele monitor applied, rate and rhythm verified with monitor reader. VSS. Pt oriented to room, staff, and call system. Educated on fall protocol and hourly rounding. Assessment as documented. Admission navigator complete. Normal saline bolus infusing. Bed locked in low position. Pt informed to utilize call light with any needs. Pt verbalized understanding. Call light within reach. Hourly rounding in place. No further needs at this time. Will continue to monitor.

## 2021-05-20 NOTE — ED NOTES
Pt placed on CMU, pulse ox, and NIBP. Pt given call light and instructed on its use. No new needs at this time. Will cont to monitor.       Marcia KwanNew [facility]ecare [facility]  05/19/21 0705

## 2021-05-20 NOTE — PROGRESS NOTES
Podiatric Surgery Daily Progress Note      Admit Date: 5/19/2021      Code:Full Code    Patient seen and examined, labs and records reviewed    Subjective:     Patient seen at bedside this am.  Patient is sleeping soundly, when woken she began requesting pain medication. Patient denies f/c/n/v/sob/cp. Review of Systems: A 12 point review of symptoms is unremarkable with the exception of the chief complaint. Patient specifically denies nausea, fever, vomiting, chills, shortness of breath, chest pain, abdominal pain, constipation or difficulty urinating. Objective     BP (!) 101/58   Pulse 93   Temp 98.5 °F (36.9 °C) (Oral)   Resp 26   Ht 5' 7\" (1.702 m)   Wt 250 lb (113.4 kg)   SpO2 91%   BMI 39.16 kg/m²      I/O:    Intake/Output Summary (Last 24 hours) at 5/20/2021 1002  Last data filed at 5/20/2021 2874  Gross per 24 hour   Intake 2744.3 ml   Output 250 ml   Net 2494.3 ml              Wt Readings from Last 3 Encounters:   05/19/21 250 lb (113.4 kg)   05/04/21 267 lb (121.1 kg)   01/02/13 180 lb (81.6 kg)       LABS:    Recent Labs     05/19/21  1910 05/20/21  0539   WBC 13.2* 14.1*   HGB 11.8* 9.2*   HCT 35.3* 27.3*    150        Recent Labs     05/20/21  0539   *   K 4.5      CO2 21   BUN 19   CREATININE 1.1      No results for input(s): PROT, INR, APTT in the last 72 hours.     CBC with Differential:    Lab Results   Component Value Date    WBC 14.1 05/20/2021    RBC 2.98 05/20/2021    HGB 9.2 05/20/2021    HCT 27.3 05/20/2021     05/20/2021    MCV 91.8 05/20/2021    MCH 30.9 05/20/2021    MCHC 33.7 05/20/2021    RDW 16.3 05/20/2021    SEGSPCT 41.4 09/20/2012    BANDSPCT 1 05/02/2021    METASPCT 4 04/29/2021    LYMPHOPCT 4.9 05/20/2021    MONOPCT 7.7 05/20/2021    MYELOPCT 1 04/29/2021    EOSPCT 0.7 01/10/2012    BASOPCT 0.3 05/20/2021    MONOSABS 1.1 05/20/2021    LYMPHSABS 0.7 05/20/2021    EOSABS 0.0 05/20/2021    BASOSABS 0.0 05/20/2021     CMP:    Lab Results Component Value Date     05/20/2021    K 4.5 05/20/2021     05/20/2021    CO2 21 05/20/2021    BUN 19 05/20/2021    CREATININE 1.1 05/20/2021    GFRAA 59 05/20/2021    GFRAA 86 08/16/2012    AGRATIO 1.5 10/26/2015    LABGLOM 49 05/20/2021    GLUCOSE 230 05/20/2021    PROT 6.5 04/29/2021    PROT 8.0 08/16/2012    LABALBU 2.3 05/05/2021    CALCIUM 7.8 05/20/2021    BILITOT 2.3 04/29/2021    ALKPHOS 232 04/29/2021     04/29/2021    ALT 91 04/29/2021         LOWER EXTREMITY EXAMINATION    Dressing to b/l LE intact. No strikethrough noted to the external dressing. Moderate sanguinous drainage noted to the internal layers of the dressing. VASCULAR: DP and PT pulses are palpable 2/4 b/l. CFT is brisk to the digits of the foot b/l. Skin temperature is warm to warm from proximal to distal with focal calor noted. Erythema remains with lower extremity elevation. Diffuse non-pitting edema noted to b/l LE. No pain with calf compression b/l. NEUROLOGIC: Gross and epicritic sensation is diminished b/l. Protective sensation is diminished at all pedal sites b/l. DERMATOLOGIC:   Patient provided verbal consent for photos to be obtained today, 5/20/21. Numerous and diffuse full thickness ulcerations of differing stages and sizes with a combination of granular and fibrotic base, mild sanguineous drainage from the ulcerations with extensive cellulitis R>L, erythema is streaking along the medial right leg - mild improvement. Diffuse xerosis and dermatological changes to b/l LE. Ecchymosis noted to the dorsal aspect of the feet b/l, specifically overlying the lesser digits likely 2/2 trauma. Toenails 1-5 b/l are elongated, thickened, discolored yellow with subungual debris. No purulent drainage, crepitus, fluctuance, tracking, tunneling, or probe to bone. MUSCULOSKELETAL: Muscle strength is 3/5 for all pedal groups tested. Mild pain with palpation of the posterior right leg periwound. Ankle joint ROM is decreased in dorsiflexion with the knee extended. No obvious biomechanical abnormalities. IMAGING:   Right TibFib XR 5/19/21  Impression   Impression:    1. Subcutaneous edema which could represent cellulitis. 2. No evidence of subcutaneous emphysema. Left TibFib XR 5/19/21  Impression   Impression:   1. No evidence of subcutis emphysema. 3. Subcutaneous trace edema which could represent cellulitis. Right foot XR 5/19/21  Impression   Impression:        1. Subcutaneous edema which could represent cellulitis. Left foot XR 5/19/21  Impression   Impression:   Linear areas of sclerosis of the heads of the third and fourth metatarsals, suspicious for recent or healing fractures. 2. Subcutaneous edema which could indicate cellulitis. ASSESSMENT/PLAN  1. Cellulitis/lymphangitis right LE - improving  2. Venous stasis dermatitis b/l LE  3. Edema b/l LE  4. Full thickness ulcerations b/l LE  5. Metatarsal fractures, left foot 3 and 4    -VSS, leukocytosis noted (WBC 14.1)  -ESR 70 and CRP 17.6  -Prealb 5.5; dietary supplements ordered  -HbA1c pending, will f/u results  -Dietician consulted, appreciate recommendations  -Blood cultures 1 & 2 pending  -XR images reviewed, impression noted above  -Wound culture in process, Left 1+ GPR and Right no wbc or organisms  -Continue broad spectrum antibiotics  -ID consulted, appreciate recommendations  -B/l LE dressed with unna boots, kerlix, and ace with compression  -Prevalon boots reapplied to b/l LE. Patient is to wear at all times while in bed to prevent further deep tissue injury.  -Lengthy discussion with patient regarding the importance of elevating the lower extremities to assist in edema control, patient voiced understanding.   -WBAT to b/l LE  -PT/OT signed off    Discussed assessment and plan with Dr. Sumaya Mcclain, DIPAK.    DISPO: B/l LE ulcerations with venous stasis dermatitis and interposed cellulitis R>L, no surgical intervention planned at this time - continue with compressive therapy, local wound care, and IV antibiotics; f/u wound culture, HbA1c, and ID recs    Vladimir Méndez DPM  Podiatric Resident PGY2  Pager [phone] or Vincenzo  5/20/2021, 10:02 AM    Dr. Rosalia North, [address] [facility]  Office: [phone]  Cell: [phone]

## 2021-05-20 NOTE — CONSULTS
Infectious Diseases Inpatient Consult Note    Reason for Consult:   L LE cellulitis  Requesting Physician:   Dr Aftab Vergara  Primary Care Physician:  No primary care provider on file. History Obtained From:   Pt, EPIC    Admit Date: 5/19/2021  Hospital Day: 2    CHIEF COMPLAINT:       Chief Complaint   Patient presents with    Cellulitis     bilateral legs, on abx but pain is getting worse       HISTORY OF PRESENT ILLNESS:      64 yo woman with hx dementia, COPD, HTN, lung cancer, spinal stenosis / LBP, HCV, chronic pain  Lives in a nursing facility x years    Admit 4/28 - 5/4 with confusion and L LE redness.   LAVINIA resolved  Cellulitis improved and discharged on po cephalexin 1 gm tid    Pt reports that she completed po antibiotics around  She developed increase in L leg redness, swelling and pain  Associated with chills, loass    Return to [facility] ED with   In  - afebrile, WBC 13.2, LA 2.9, x-ray done, BC sent  Admit, started on vancomycin, cefepime  Seen by Podiatry, wound care, compression stocking      Past Medical History:    Past Medical History:   Diagnosis Date    Anxiety     Back pain     Bronchitis     Cancer ([address]     malignant neoplasm of bronchus and lung    Chronic pain     Dementia [facility]     Drug-seeking behavior     Emphysema lung ([facility]     GERD (gastroesophageal reflux disease)     Hepatitis C     HTN (hypertension)     Insomnia     Lumbar disc disease     Osteoporosis     Spinal stenosis        Past Surgical History:    Past Surgical History:   Procedure Laterality Date    CHOLE[place]STECTOMY  2/2011    [place]       Current Medications:     cefepime  2,000 mg Intravenous Q12H    insulin lispro  0-6 Units Subcutaneous TID WC    insulin lispro  0-3 Units Subcutaneous Nightly    vancomycin  15 mg/kg Intravenous Q18H    Unna-Flex Elastic Unna Boot  2 each Topical Once    famotidine  40 mg Oral Daily    furosemide  20 mg Oral Daily    metoprolol tartrate  12.5 mg Oral BID    OLANZapine  5 mg Oral Nightly    spironolactone  50 mg Oral Daily    sodium chloride flush  5-40 mL Intravenous 2 times per day    enoxaparin  40 mg Subcutaneous Daily       Allergies:  Cyclobenzaprine, Penicillins, Acetaminophen, Codeine, Diphenhydramine hcl, Ketorolac, Naproxen, Diphenhydramine, Ketorolac tromethamine, Ketorolac tromethamine, and Sulfamethoxazole-trimethoprim    Social History:    TOBACCO:    + cig  ETOH:    None   DRUGS:   None   MARITAL STATUS:      OCCUPATION:   None     Patient lives ECF - [facility] ([place])    Family History:   No immunodeficiency    REVIEW OF SYSTEMS:    No fever / chills / sweats. No weight loss. No visual change, eye pain, eye discharge. No oral lesion, sore throat, dysphagia. Denies cough / sputum. Denies chest pain, palpitations. Denies n / v / abd pain. No diarrhea. Denies dysuria or change in urinary function. Denies joint swelling or pain. No myalgia, arthralgia.  + skin changes - L leg red / swelling   Denies focal weakness, sensory change or other neurologic symptom    Denies new / worse depression, psychiatric symptoms    PHYSICAL EXAM:      Vitals:    BP (!) 101/58   Pulse 93   Temp 98.5 °F (36.9 °C) (Oral)   Resp 26   Ht 5' 7\" (1.702 m)   Wt 250 lb (113.4 kg)   SpO2 91%   BMI 39.16 kg/m²     GENERAL: No apparent distress.     HEENT: Membranes moist, no oral lesion, PERRL  NECK:  Supple, no lymphadenopathy  LUNGS: Clear b/l, no rales, no dullness  CARDIAC: RRR, no murmur appreciated  ABD:  + BS, soft / NT  EXT:  Bilateral LE dressing / ACE - reviewed images in Podiatry note  NEURO: No focal neurologic findings  PSYCH: Orientation, sensorium, mood normal  LINES:  Peripheral iv    DATA:    Lab Results   Component Value Date    WBC 14.1 (H) 05/20/2021    HGB 9.2 (L) 05/20/2021    HCT 27.3 (L) 05/20/2021    MCV 91.8 05/20/2021     05/20/2021     Lab Results   Component Value Date    CREATININE 1.1 05/20/2021    BUN 19 05/20/2021 episode will be candidate for secondary prophylaxis, lower dose cephalexin (avoid PCN due to allergy) to prevent relapse     Medical Decision Making: The following items were considered in medical decision making:  Discussion of patient care with other providers  Reviewed / ordered clinical lab tests  Reviewed radiology tests  Review diagnostic tests/interventions  Microbiology cultures and other micro tests reviewed      Risk of Complications/Morbidity: High   Illness(es)/ Infection present that pose threat to bodily function. There is potential for severe exacerbation of infection/side effects of treatment.   Therapy requires intensive monitoring for antimicrobial agent toxicity    Discussed with pt, RN  Randy Hammond MD

## 2021-05-20 NOTE — CONSULTS
NUTRITION ASSESSMENT  Admission Date: 5/19/2021     Type and Reason for Visit: Initial, Positive Nutrition Screen    NUTRITION RECOMMENDATIONS:   1. PO Diet: Continue cardiac, CC3, dental soft diet    2. ONS: start Ensure HP, danica  3. Obtain actual wt    NUTRITION ASSESSMENT:  Nutritional summary & status: + screen/consult for wounds and decreased po. Pt w/full thickness ulcerations, venous stasis dermatitis/cellulitis. Pt voices that she has not had a good appetite for the past 3 days. Reports that she usually drinks a high protein supplement at home. Discussed addittion of ONS at this time. Pt w/<50% of lunch today. Dental soft diet ordered for poor dentition. Pt w/elevated BG levels and A1c reflecting pre-diabetes. Will monitor po intakes and ONS acceptance this admission. Diet education to be offered as appropriate. Patient admitted d/t Bilateral LE pain    PMH significant for: HCV, drug-seeking behavior, HTN    MALNUTRITION ASSESSMENT  Context of Malnutrition: Acute Illness   Malnutrition Status: At risk for malnutrition (Comment)  Findings of the 6 clinical characteristics of malnutrition (Minimum of 2 out of 6 clinical characteristics is required to make the diagnosis of moderate or severe Protein Calorie Malnutrition based on AND/ASPEN Guidelines):  Energy Intake: Less than/equal to 50% of estimated energy requirements    Energy Intake Time: x3 days per pt    Weight Loss %: Unable to assess    Weight loss Time: Unable to assess   Body Fat Loss: No significant loss    Body Fat Location: No Significant   Body Muscle Loss: No significant loss    Body Muscle Loss Location: No significant    Fluid Accumulation: No significant    Fluid Accumulation Location: No significant     Strength: Not Performed;  Not Measured     NUTRITION DIAGNOSIS   Problem: Problem #1: Inadequate oral intake   Etiology: Insufficient energy/nutrient consumption  Signs & Symptoms: decreased appetite     NUTRITION INTERVENTION  Food and/or Nutrient Delivery: Continue Current Diet, Start Oral Nutrition Supplement   Nutrition Education/Counseling: No recommendation at this time   Coordination of Nutrition Care: Continue to monitor while inpatient     NUTRITION MONITORING & EVALUATION:  Evaluation:Goals set   Goals:Goals: Pt will consume >50% of meals and HP ons this admission  Monitoring: Meal Intake , Pertinent Labs , Supplement Intake  or Weight      OBJECTIVE DATA: Significant to nutrition assessment  · Nutrition-Focused Physical Findings: non-pitting generalized edema   · Labs: Reviewed; BI=101 mgdL, A1c=6.3% (5/21)  · Meds: Reviewed;   · Wounds full thickness ulcers, venous stasis       ANTHROPOMETRICS  Current Height: 5' 7\" (170.2 cm)  Current Weight: 250 lb (113.4 kg)    Admission weight: 250 lb (113.4 kg)- stated   Ideal Bodyweight 135 lb    Usual Bodyweight  Yosef   Weight Changes yosef       BMI BMI (Calculated): 39.2    Wt Readings from Last 50 Encounters:   05/19/21 250 lb (113.4 kg)   05/04/21 267 lb (121.1 kg)     COMPARATIVE STANDARDS  Estimated Total Kcals/Day : 25-30  Ideal Bodyweight  (61 kg) 4659-8639 kcal/day    Estimated Total Protein (g/day) : 1.3-1.5 Ideal Bodyweight  (61 kg) 79-92 g/day  Estimated Daily Total Fluid (ml/day): 6045-4671 mL per day     Food / Nutrition-Related History  Pre-Admission / Home Diet:  Pre-Admission/Home Diet: General   Home Supplements / Herbals:    none noted  Food Restrictions / Cultural Requests:    none noted    Current Nutrition Therapies   DIET CARDIAC; Carb Control: 3 carb choices (45 gms)/meal; Dental Soft  Dietary Nutrition Supplements: Wound Healing Oral Supplement, Diabetic Oral Supplement     PO Intake: 26-50%  PO Supplement: None   PO Supplement Intake: None   IVF: none   NUTRITION RISK LEVEL: Risk Level:  Moderate     Gricelda Tovar LD  [place]:  [phone]  Office:  [phone]

## 2021-05-20 NOTE — CONSULTS
Clinical Pharmacy Progress Note  Medication History     Admit Date: 5/19/2021    Asked to verify home medications by Dr. Patel Koch. List of of current medications patient is taking is complete. Home Medication list in EPIC updated to reflect changes noted below. Source of information: Medication list from [facility] of [address] [place] [address] made to medication list:   Medications removed (no longer taking):  · Cyanocobalamin    Medications added:   · Tramadol    Medication doses / instructions adjusted:   · None    Other notes:   · Pt was on Cephalexin 1000mg po TID - last dose 5/14/21  · Pt was prescribed Torsemide 20mg x2 tabs (40mg total) po daily - order was to start 5/20/21 (indication listed = cellulitis of left lower limb). Pt is already on Furosemide 20mg daily also.     Please call with questions--  Thanks--  Roselia Galdamez, PharmD, [address] [facility], [address] ([facility])   5/20/2021 8:09 AM

## 2021-05-20 NOTE — PLAN OF CARE
Problem: Falls - Risk of:  Goal: Will remain free from falls  Description: Will remain free from falls  Outcome: Ongoing  Note: Fall precautions in place. Bed locked in lowest position with side rails up x2. Bed exit alarm in use. Pt educated on call light system and instructed to use call light prior to getting up. Pt calls out appropriately for needs. Call light and personal belongings within reach. Pt uses wheelchair at baseline. Will continue to monitor. Problem: Pain:  Goal: Control of acute pain  Description: Control of acute pain  Outcome: Ongoing  Note: Pt c/o severe pain to her BLE. Pt receiving dilaudid and tramadol q6 prn. Pt medicated with dilaudid. Pt reports that pain decreases after medication administration. Pt currently resting comfortably in bed. Will continue to monitor and reassess pain. Problem: Skin Integrity:  Goal: Will show no infection signs and symptoms  Description: Will show no infection signs and symptoms  Outcome: Ongoing  Note: WBC elevated at 13.2. Pt currently afebrile. Pt receiving IV vancomycin. Will continue to monitor. Problem: Skin Integrity:  Goal: Absence of new skin breakdown  Description: Absence of new skin breakdown  Outcome: Ongoing  Note: Pt has some redness under right breast - treated with interdry. BLE red and bertin. BLE wrapped in ACE wraps by podiatry - dressings remain clean, dry, and intact. Prevalon boots in use. Pt assisted with turning every 2 hours and as needed with pillow support. Purewick in place for moisture prevention. Will continue to monitor.

## 2021-05-20 NOTE — CARE COORDINATION
CM consulted  For  D/c planning . Pt from [facility] care, will return once Medically stable: via [address]               [address] [address]       Phone: [phone]       Fax: [phone]          Electronically signed by Adin Rhodes RN on 5/20/2021 at 5:13 PM         Adin Rhodes RN Case Manager  [facility]  [address] [place] [address].   [facility] [address]  [phone]  Fax [phone]

## 2021-05-20 NOTE — CONSULTS
Diphenhydramine hcl, Ketorolac, Naproxen, Diphenhydramine, Ketorolac tromethamine, Ketorolac tromethamine, and Sulfamethoxazole-trimethoprim  Social History:    TOBACCO:   reports that she has been smoking cigarettes. She has a 24.50 pack-year smoking history. She has never used smokeless tobacco.  ETOH:   reports no history of alcohol use. DRUGS:   reports no history of drug use. Family History:       Problem Relation Age of Onset    High Blood Pressure Father     Heart Disease Father      REVIEW OF SYSTEMS:    A 10 point review of systems was conducted, significant findings as noted in HPI. All other systems negative. PHYSICAL EXAM:      Vitals:    BP (!) 166/68   Pulse 115   Temp 98.7 °F (37.1 °C) (Oral)   Resp 19   Ht 5' 7\" (1.702 m)   Wt 250 lb (113.4 kg)   SpO2 96%   BMI 39.16 kg/m²     LABS:   Recent Labs     05/19/21 1910   WBC 13.2*   HGB 11.8*   HCT 35.3*        Recent Labs     05/19/21 1910   *   K 4.3      CO2 23   BUN 16   CREATININE 0.9     No results for input(s): PROT, INR, APTT in the last 72 hours. GENERAL: Patient is alert and oriented x3. No signs of acute distress noted. VASCULAR: DP and PT pulses are palpable 2/4 b/l. CFT is brisk to the digits of the foot b/l. Skin temperature is warm to warm from proximal to distal with focal calor noted. Erythema remains with lower extremity elevation. Diffuse non-pitting edema noted to b/l LE. No pain with calf compression b/l. NEUROLOGIC: Gross and epicritic sensation is diminished b/l. Protective sensation is diminished at all pedal sites b/l. DERMATOLOGIC:   Patient provided verbal consent for photos to be obtained today, 5/19/21. Numerous and diffuse full thickness ulcerations of differing stages and sizes with a combination of granular and fibrotic base, mild sanguineous drainage from the ulcerations with extensive cellulitis R>L, erythema is streaking along the medial right leg.  Diffuse xerosis and dermatological changes to b/l LE. Ecchymosis noted to the dorsal aspect of the feet b/l, specifically overlying the lesser digits likely 2/2 trauma. Toenails 1-5 b/l are elongated, thickened, discolored yellow with subungual debris. No purulent drainage, crepitus, fluctuance, tracking, tunneling, or probe to bone. MUSCULOSKELETAL: Muscle strength is 5/5 for all pedal groups tested. No pain with palpation of the foot or ankle b/l. Ankle joint ROM is decreased in dorsiflexion with the knee extended. No obvious biomechanical abnormalities. IMAGING:  Right TibFib XR 5/19/21  Impression   Impression:    1. Subcutaneous edema which could represent cellulitis. 2. No evidence of subcutaneous emphysema. Left TibFib XR 5/19/21  Impression   Impression:   1. No evidence of subcutis emphysema. 3. Subcutaneous trace edema which could represent cellulitis. ASSESSMENT:   1. Cellulitis/lymphangitis right LE  2. Venous stasis dermatitis b/l LE  3. Edema b/l LE  4. Full thickness ulcerations b/l LE    PLAN:  -Patient seen and examined at the bedside this PM  -VSS, leukocytosis noted (WBC 13.2)  -ESR and CRP pending  -lactic acid 2.9  -HbA1c ordered, will f/u results  -prealbumin ordered, will f/u results  -Blood cultures 1 & 2 pending  -TibFib XR images reviewed, impression noted above  -Foot XR images ordered, will f/u results  -Wound culture b/l LE obtained, will f/u results  -Recommend admission to the hospital for local wound care and empiric IV antibioitics  -ID consulted, appreciate recommendations  -B/l LE dressed with hydrogel, adaptic, dsd, and ace with compression  -unna boots ordered to the room for dressing change tomorrow  -Prevalon boots ordered to room.  Patient is to wear at all times while in bed to prevent further deep tissue injury.  -Lengthy discussion with patient regarding the importance of elevating the lower extremities to assist in edema control, patient voiced understanding. -WBAT to b/l LE  -PT/OT consulted, appreciate recommendations    DISPO: B/l LE ulcerations with venous stasis dermatitis and interposed cellulitis R>L, no surgical intervention planned at this time - continue with compressive therapy, local wound care, and IV antibiotics    Thank you for the opportunity to take part in the patient's care.  The patient assessment and plan was discussed with Dr. Alley Baker, DPM  Podiatric Resident PGY2  Pager [phone] or Vincenzo  5/19/2021, 9:10 PM    Dr. Rosalia North, [facility]  Office: [phone]  Cell: [phone]

## 2021-05-20 NOTE — H&P
Internal Medicine PGY- 1 Resident History & Physical      PCP: No primary care provider on file. Date of Admission: 5/19/2021    Chief Complaint:  Bilateral LE pain    History Of Present Illness: Cora Velasquez is a 65 y.o. female w/ PMHX of HCV, drug-seeking behavior, HTN, and recent hospitalization for cellulitis (4/28-5/5), who presented to [facility] with bilateral lower extremity pain. She endorses pain that is 7/10. She feels that her pain is worse this time around than her previous admission. Patient endorses having chills today. She endorses that at her nursing facility, they having been changing her dressings every other day. She was discharged from her previous admission on PO ancef through 5/14. In the ED, the patient was afebrile and tachycardic to 120. Podiatry was consulted. Patient was started on vanc and cefepime, in addition to 2 L IV fluid bolus. She received norco once for pain control as well. Past Medical History:        Diagnosis Date    Anxiety     Back pain     Bronchitis     Cancer (HCC)     malignant neoplasm of bronchus and lung    Chronic pain     Dementia ([facility] [address]     Drug-seeking behavior     Emphysema lung (HCC)     GERD (gastroesophageal reflux disease)     Hepatitis C     HTN (hypertension)     Insomnia     Lumbar disc disease     Osteoporosis     Spinal stenosis        Past Surgical History:          Procedure Laterality Date    CHOLECYSTECTOMY  2/2011    [facility]       Medications Prior to Admission:      Prior to Admission medications    Medication Sig Start Date End Date Taking?  Authorizing Provider   Balsam [place]-Castor Oil (VENELEX) OINT ointment Apply topically 2 times daily 5/4/21   Haig Homans, MD   metoprolol tartrate (LOPRESSOR) 25 MG tablet Take 0.5 tablets by mouth 2 times daily 5/4/21   Haig Homans, MD   furosemide (LASIX) 20 MG tablet Take 1 tablet by mouth daily 5/4/21 6/3/21  Haig Homans, MD   vitamin B-12 (CYANOCOBALAMIN) 1000 MCG tablet Take 1,000 mcg by mouth daily    Historical Provider, MD   polyethylene glycol (GLYCOLAX) 17 g packet Take 17 g by mouth three times a week Every Monday, Wednesday and Friday for bowel regimen    Historical Provider, MD   famotidine (PEPCID) 20 MG tablet Take 40 mg by mouth daily     Historical Provider, MD   hydrOXYzine (ATARAX) 25 MG tablet Take 25 mg by mouth daily    Historical Provider, MD   lactulose (CHRONULAC) 10 GM/15ML solution Take 30 mLs by mouth daily    Historical Provider, MD   melatonin 5 MG TABS tablet Take 5 mg by mouth nightly as needed (insomnia)     Historical Provider, MD   OLANZapine (ZYPREXA) 5 MG tablet Take 5 mg by mouth nightly    Historical Provider, MD   carboxymethylcellulose (REFRESH TEARS) 0.5 % SOLN ophthalmic solution Apply 1 drop to eye every 6 hours as needed (dry, red eyes)    Historical Provider, MD   senna-docusate (SENNA-PLUS) 8.6-50 MG per tablet Take 2 tablets by mouth daily as needed for Constipation    Historical Provider, MD   spironolactone (ALDACTONE) 50 MG tablet Take 50 mg by mouth daily    Historical Provider, MD   Multiple Vitamins-Minerals (THERAPEUTIC MULTIVITAMIN-MINERALS) tablet Take 1 tablet by mouth daily    Historical Provider, MD   Cholecalciferol (VITAMIN D3) 1.25 MG (43808 UT) CAPS Take 1 capsule by mouth every 30 days Every Month on the 15th    Historical Provider, MD       Allergies:  Cyclobenzaprine, Penicillins, Acetaminophen, Codeine, Diphenhydramine hcl, Ketorolac, Naproxen, Diphenhydramine, Ketorolac tromethamine, Ketorolac tromethamine, and Sulfamethoxazole-trimethoprim    Social History:      The patient currently lives at [address] [place] [address]:   reports that she has been smoking cigarettes. She has a 24.50 pack-year smoking history. She has never used smokeless tobacco.  ETOH:   reports no history of alcohol use.       History:          Problem Relation Age of Onset    High Blood Pressure Father     Heart Disease Father        REVIEW OF SYSTEMS: Pertinentpositives as noted in the HPI. All other systems reviewed and negative. ROS: Review of Systems   All other systems reviewed and are negative. 10 point ROS negative except as listed above. PHYSICALEXAM PERFORMED:    /67   Pulse 104   Temp 98.5 °F (36.9 °C) (Oral)   Resp 26   Ht 5' 7\" (1.702 m)   Wt 250 lb (113.4 kg)   SpO2 91%   BMI 39.16 kg/m²     General appearance:  No apparent distress, appears stated age and cooperative. HEENT:  Normal cephalic, atraumaticwithout obvious deformity. Pupils equal, round, and reactive to light. Extra ocular muscles intact. Respiratory:  Normal respiratory effort. Clear to auscultation, bilaterally without Rales/Wheezes/Rhonchi. Cardiovascular:  Regular rate and rhythm with normal S1/S2 without murmurs, rubs or gallops. Abdomen: Soft,non-tender, non-distended with normal bowel sounds. Musculoskeletal:  Bilateral LEs wrapped up in ace bandages, tender to palpation  Neurologic: Neurovascularly intact without any focalsensory/motor deficits. Cranial nerves: II-XII intact, grossly non-focal.  Psychiatric:  Alert and oriented,thought content appropriate, normal insight  Peripheral Pulses: +2 palpable, equal bilaterally     Labs:     Recent Labs     05/19/21 1910   WBC 13.2*   HGB 11.8*   HCT 35.3*        Recent Labs     05/19/21 1910   *   K 4.3      CO2 23   BUN 16   CREATININE 0.9   CALCIUM 8.8     No results for input(s): AST, ALT, BILIDIR, BILITOT, ALKPHOS in the last 72 hours. No results for input(s): INR in the last 72 hours. No results for input(s): Charley Loth in the last 72 hours.     Urinalysis:   Lab Results   Component Value Date    NITRU Negative 04/28/2021    WBCUA 0-2 04/28/2021    BACTERIA 1+ 04/28/2021    RBCUA None seen 04/28/2021    BLOODU SMALL 04/28/2021    SPECGRAV 1.015 04/28/2021    GLUCOSEU Negative 04/28/2021    GLUCOSEU Negative 12/15/2011       Radiology:     CXR: I have reviewed the CXR with the following interpretation:   EKG:  I have reviewed the EKG with the following interpretation:     XR FOOT RIGHT (MIN 3 VIEWS)   Final Result   Impression:       1. Subcutaneous edema which could represent cellulitis. XR FOOT LEFT (MIN 3 VIEWS)   Final Result   Impression:   Linear areas of sclerosis of the heads of the third and fourth metatarsals, suspicious for recent or healing fractures. 2. Subcutaneous edema which could indicate cellulitis. XR TIBIA FIBULA LEFT (2 VIEWS)   Final Result   Impression:   1. No evidence of subcutis emphysema. 3. Subcutaneous trace edema which could represent cellulitis. XR TIBIA FIBULA RIGHT (2 VIEWS)   Final Result   Impression:    1. Subcutaneous edema which could represent cellulitis. 2. No evidence of subcutaneous emphysema. ASSESSMENT & PLAN:  Martin Hannon is a 65 y.o. female w/ HCV, drug-seeking behavior, HTN, and recent hospitalization for cellulitis (4/28-5/5), who presented to [facility]. with bilateral lower extremity pain. Active Hospital Problems    Diagnosis Date Noted    Sepsis ([facility] [address] [A41.9] 05/19/2021       Sepsis 2/2 cellulitis  WBC 13.2. Lactic acid 2.9. Tachycardic to 120s on presentation. S/p 2L IV fluid bolus in ED.  - 500mL IV fluids  - blood cultures pending  - wound cx pending - 1+ Gram positive rods  - lactic acid q6 hrs  - ID consulted  - continue vanc and cefepime    Cellulitis of bilateral LEs  WBC 13.2. CRP 17.6, ESR 70. Recent admission for cellulitis, was discharged on ancef PO. XR of R and L lower extremities consistent with cellulitis. - wound cx pending - 1+ Gram positive rods  - vanc - pharmacy to dose  - cefepime 2mg q12hrs  - podiatry consulted  - ID consulted  - HbA1c ordered  - tramadol 25 mg q6hrs PRN for pain  - dilaudid q6hrs PRN for pain    Hyperglycemia  Patient does not have known hx of DM.  On olanzapine at home which is a known cause of metabolic syndrome.  - LDSS  - hypoglycemia protocol  -

## 2021-05-21 NOTE — FLOWSHEET NOTE
05/20/21 1511   Encounter Summary   Services provided to: Patient   Referral/Consult From: Rounding   Continue Visiting   (5/20/21, AI. )   Complexity of Encounter   (5/20/21, AI. )   Length of Encounter 15 minutes   Routine   Type Initial   Assessment Calm; Approachable   Intervention Nurtured hope   Outcome Expressed gratitude

## 2021-05-21 NOTE — PROGRESS NOTES
Internal Medicine PGY-1 Resident Progress Note        PCP: No primary care provider on file. Date of Admission: 5/19/2021    Chief Complaint: Bilateral lower extremity pain    Interval History:     Patient seen at bedside, not in acute distress, no acute events overnight. Patient still complains of lower extremity tenderness and pain. Patient denies fever, chills, chest pain, nausea, vomiting, abdominal pain. Blood cultures came back and grew Pseudomonas; wound cultures grew actinobacter, Klebsiella. Medications:  Reviewed  Medications    sodium chloride 100 mL/hr at 05/21/21 1334    dextrose      sodium chloride       Scheduled Medications    cefepime  2,000 mg Intravenous Q12H    insulin lispro  0-6 Units Subcutaneous TID WC    insulin lispro  0-3 Units Subcutaneous Nightly    Unna-Flex Elastic Unna Boot  2 each Topical Once    famotidine  40 mg Oral Daily    [Held by provider] furosemide  20 mg Oral Daily    metoprolol tartrate  12.5 mg Oral BID    OLANZapine  5 mg Oral Nightly    [Held by provider] spironolactone  50 mg Oral Daily    sodium chloride flush  5-40 mL Intravenous 2 times per day    enoxaparin  40 mg Subcutaneous Daily     PRN Meds: traMADol, HYDROmorphone **OR** HYDROmorphone, glucose, dextrose, glucagon (rDNA), dextrose, dextran 70-hypromellose, sodium chloride flush, sodium chloride, promethazine **OR** ondansetron, polyethylene glycol, senna      Intake/Output Summary (Last 24 hours) at 5/21/2021 1549  Last data filed at 5/21/2021 1300  Gross per 24 hour   Intake 1312.91 ml   Output 225 ml   Net 1087.91 ml       Physical Exam Performed:    BP (!) 110/57 Comment: nurse notified  Pulse 77   Temp 98.3 °F (36.8 °C) (Oral)   Resp 18   Ht 5' 7\" (1.702 m)   Wt 286 lb 13.1 oz (130.1 kg)   SpO2 94%   BMI 44.92 kg/m²     Physical Exam  Constitutional:       General: She is not in acute distress. Appearance: She is obese. She is ill-appearing.    HENT:      Head: Normocephalic and atraumatic. Mouth/Throat:      Mouth: Mucous membranes are moist.      Pharynx: Oropharynx is clear. Cardiovascular:      Rate and Rhythm: Normal rate and regular rhythm. Heart sounds: Normal heart sounds. No murmur heard. No friction rub. No gallop. Pulmonary:      Breath sounds: Normal breath sounds. No wheezing, rhonchi or rales. Abdominal:      Tenderness: There is no abdominal tenderness. Musculoskeletal:      Right lower leg: Edema present. Left lower leg: Edema present. Skin:     General: Skin is warm and dry. Comments: Bilateral lower extremities wrapped in Ace bandages, tender to palpation bilaterally   Neurological:      General: No focal deficit present. Mental Status: She is alert and oriented to person, place, and time. Mental status is at baseline. Psychiatric:         Behavior: Behavior normal.         Labs:   Recent Labs     05/19/21 1910 05/20/21  0539 05/21/21  0748   WBC 13.2* 14.1* 8.8   HGB 11.8* 9.2* 9.8*   HCT 35.3* 27.3* 28.7*    150 157     Recent Labs     05/19/21 1910 05/20/21  0539 05/21/21  0748   * 134* 134*   K 4.3 4.5 4.7    105 107   CO2 23 21 21   BUN 16 19 27*   CREATININE 0.9 1.1 1.4*   CALCIUM 8.8 7.8* 8.0*     No results for input(s): AST, ALT, BILIDIR, BILITOT, ALKPHOS in the last 72 hours. No results for input(s): INR in the last 72 hours. No results for input(s): Ame Castor in the last 72 hours. Urinalysis:      Lab Results   Component Value Date    NITRU Negative 04/28/2021    WBCUA 0-2 04/28/2021    BACTERIA 1+ 04/28/2021    RBCUA None seen 04/28/2021    BLOODU SMALL 04/28/2021    SPECGRAV 1.015 04/28/2021    GLUCOSEU Negative 04/28/2021    GLUCOSEU Negative 12/15/2011       Radiology:  XR FOOT RIGHT (MIN 3 VIEWS)   Final Result   Impression:       1. Subcutaneous edema which could represent cellulitis.       XR FOOT LEFT (MIN 3 VIEWS)   Final Result   Impression:   Linear areas of

## 2021-05-21 NOTE — PROGRESS NOTES
Podiatric Surgery Daily Progress Note      Admit Date: 5/19/2021      Code:Full Code    Patient seen and examined, labs and records reviewed    Subjective:     Patient seen at bedside this afternoon. Patient was asleep upon entering room. Patient has no new complaints and denies any acute events overnight. Patient denies f/c/n/v/sob/cp. Review of Systems: A 12 point review of symptoms is unremarkable with the exception of the chief complaint. Patient specifically denies nausea, fever, vomiting, chills, shortness of breath, chest pain, abdominal pain, constipation or difficulty urinating. Objective     /68   Pulse 64   Temp 98.1 °F (36.7 °C) (Oral)   Resp 18   Ht 5' 7\" (1.702 m)   Wt 286 lb 13.1 oz (130.1 kg)   SpO2 94%   BMI 44.92 kg/m²      I/O:    Intake/Output Summary (Last 24 hours) at 5/21/2021 3918  Last data filed at 5/20/2021 2131  Gross per 24 hour   Intake 497.83 ml   Output 100 ml   Net 397.83 ml              Wt Readings from Last 3 Encounters:   05/21/21 286 lb 13.1 oz (130.1 kg)   05/04/21 267 lb (121.1 kg)   01/02/13 180 lb (81.6 kg)       LABS:    Recent Labs     05/19/21  1910 05/20/21  0539   WBC 13.2* 14.1*   HGB 11.8* 9.2*   HCT 35.3* 27.3*    150        Recent Labs     05/20/21  0539   *   K 4.5      CO2 21   BUN 19   CREATININE 1.1      No results for input(s): PROT, INR, APTT in the last 72 hours.     CBC with Differential:    Lab Results   Component Value Date    WBC 14.1 05/20/2021    RBC 2.98 05/20/2021    HGB 9.2 05/20/2021    HCT 27.3 05/20/2021     05/20/2021    MCV 91.8 05/20/2021    MCH 30.9 05/20/2021    MCHC 33.7 05/20/2021    RDW 16.3 05/20/2021    SEGSPCT 41.4 09/20/2012    BANDSPCT 1 05/02/2021    METASPCT 4 04/29/2021    LYMPHOPCT 4.9 05/20/2021    MONOPCT 7.7 05/20/2021    MYELOPCT 1 04/29/2021    EOSPCT 0.7 01/10/2012    BASOPCT 0.3 05/20/2021    MONOSABS 1.1 05/20/2021    LYMPHSABS 0.7 05/20/2021    EOSABS 0.0 05/20/2021 BASOSABS 0.0 05/20/2021     CMP:    Lab Results   Component Value Date     05/20/2021    K 4.5 05/20/2021     05/20/2021    CO2 21 05/20/2021    BUN 19 05/20/2021    CREATININE 1.1 05/20/2021    GFRAA 59 05/20/2021    GFRAA 86 08/16/2012    AGRATIO 1.5 10/26/2015    LABGLOM 49 05/20/2021    GLUCOSE 230 05/20/2021    PROT 6.5 04/29/2021    PROT 8.0 08/16/2012    LABALBU 2.3 05/05/2021    CALCIUM 7.8 05/20/2021    BILITOT 2.3 04/29/2021    ALKPHOS 232 04/29/2021     04/29/2021    ALT 91 04/29/2021         LOWER EXTREMITY EXAMINATION    Dressing to b/l LE left clean, dry, and intact. No strike through noted to b/l dressing. CFT brisk to digits b/l. Patient able to perform active range of motion to digits b/l. No pain with calf compression b/l. IMAGING:   Right TibFib XR 5/19/21  Impression   Impression:    1. Subcutaneous edema which could represent cellulitis. 2. No evidence of subcutaneous emphysema. Left TibFib XR 5/19/21  Impression   Impression:   1. No evidence of subcutis emphysema. 3. Subcutaneous trace edema which could represent cellulitis. Right foot XR 5/19/21  Impression   Impression:        1. Subcutaneous edema which could represent cellulitis. Left foot XR 5/19/21  Impression   Impression:   Linear areas of sclerosis of the heads of the third and fourth metatarsals, suspicious for recent or healing fractures. 2. Subcutaneous edema which could indicate cellulitis. ASSESSMENT/PLAN  1. Cellulitis/lymphangitis right LE - improving  2. Venous stasis dermatitis b/l LE  3. Edema b/l LE  4. Full thickness ulcerations b/l LE  5.  Metatarsal fractures, left foot 3 and 4    -VSS, No leukocytosis noted (WBC 8.8)  -ESR 70 and CRP 17.6  -Prealb 5.5; dietary supplements ordered  -HbA1c 6.3%  -Dietician consulted, appreciate recommendations  -Blood cultures positive for GNR  -XR images reviewed, impression noted above  -Wound culture in process, Left 1+ GPR and Right no wbc or organisms  -Continue antibiotics per ID recs  -B/l LE dressing with unna boots, kerlix, and ace with compression left clean dry and intact  -Prevalon boots reapplied to b/l LE. Patient is to wear at all times while in bed to prevent further deep tissue injury.  -Lengthy discussion with patient regarding the importance of elevating the lower extremities to assist in edema control, patient voiced understanding. -WBAT to b/l LE    Patient examined and evaluated at the bedside with Dr. Maximo Giles DPM.    DISPO: B/l LE ulcerations with venous stasis dermatitis and interposed cellulitis R>L, no surgical intervention planned at this time - continue with compressive therapy, local wound care, and IV antibiotics; f/u wound culture and ID final recs.     Dorys Cabello DPM  05/21/21  12:50 PM    Dr. Merary Berkowitz, [address] [place] [address]   Office: [phone]  Cell: [phone]

## 2021-05-21 NOTE — PLAN OF CARE
Problem: Skin Integrity:  Goal: Will show no infection signs and symptoms  Description: Will show no infection signs and symptoms  Outcome: Ongoing  Note: WBC elevated at 14.1. Pt remains afebrile. Pt receiving IV ancef. No new signs of infection noted. Will continue to monitor. Problem: Pain:  Goal: Control of acute pain  Description: Control of acute pain  Outcome: Ongoing  Note: Pt c/o severe pain to her BLE. Pt receiving dilaudid and tramadol q6 prn. Pt reports that pain decreases after medication administration. Pt currently resting comfortably in bed. Will continue to monitor and reassess pain. Problem: Skin Integrity:  Goal: Absence of new skin breakdown  Description: Absence of new skin breakdown  Outcome: Ongoing  Note: Pt has some redness under right breast - treated with interdry. BLE red and bertin. BLE wrapped in ACE wraps by podiatry - dressings remain clean, dry, and intact. Prevalon boots in use. Pt assisted with turning every 2 hours and as needed with pillow support. Purewick in place for moisture prevention. Will continue to monitor and intervene as needed. Problem: Falls - Risk of:  Goal: Will remain free from falls  Description: Will remain free from falls  Outcome: Ongoing  Note: Fall precautions in place. Bed locked in lowest position with side rails up x2. Bed exit alarm in use. Pt educated on call light system and instructed to use call light prior to getting up. Pt calls out appropriately for needs. Call light and personal belongings within reach. Pt uses wheelchair at baseline. Will continue to monitor.

## 2021-05-21 NOTE — PROGRESS NOTES
ID Follow-up NOTE    CC:   L LE cellulitis, GNR bacteremia  Antibiotics: Ancef    Admit Date: 2021  Hospital Day: 3    Subjective:     Patient c/o RIGHT leg pain  No other complaint     Objective:     Patient Vitals for the past 8 hrs:   BP Temp Temp src Pulse Resp SpO2 Weight   21 0835 117/70 97.7 °F (36.5 °C) Oral 76 18 94 % --   21 0510 109/68 98.1 °F (36.7 °C) Oral 64 18 94 % 286 lb 13.1 oz (130.1 kg)     I/O last 3 completed shifts: In: 962.9 [P.O.:720; I.V.:242.9]  Out: 225 [Urine:225]  I/O this shift:  In: 370 [P.O.:360; I.V.:10]  Out: -     EXAM:  GENERAL: No apparent distress. HEENT: Membranes moist, no oral lesion  NECK:  Supple, no lymphadenopathy  LUNGS: Clear b/l, no rales, no dullness  CARDIAC: RRR, no murmur appreciated  ABD:  + BS, soft / NT  EXT:  No rash, no edema, no lesions - bilateral LE with dressing / ACE  NEURO: No focal neurologic findings  PSYCH: Orientation, sensorium, mood normal  LINES:  Peripheral iv       Data Review:  Lab Results   Component Value Date    WBC 8.8 2021    HGB 9.8 (L) 2021    HCT 28.7 (L) 2021    MCV 91.6 2021     2021     Lab Results   Component Value Date    CREATININE 1.4 (H) 2021    BUN 27 (H) 2021     (L) 2021    K 4.7 2021     2021    CO2 21 2021       Hepatic Function Panel:   Lab Results   Component Value Date    ALKPHOS 232 2021    ALT 91 2021     2021    PROT 6.5 2021    PROT 8.0 2012    BILITOT 2.3 2021    BILIDIR 1.2 2021    IBILI 1.1 2021    LABALBU 2.3 2021     Micro:   BC + GNR   Leg wound - light GNR    Imagin/19 x-rays --  XR FOOT LEFT (MIN 3 VIEWS)   Final Result   Impression:   Linear areas of sclerosis of the heads of the third and fourth metatarsals, suspicious for recent or healing fractures.     2. Subcutaneous edema which could indicate cellulitis.       XR TIBIA FIBULA LEFT (2 VIEWS)   Final Result   Impression:   1. No evidence of subcutis emphysema. 3. Subcutaneous trace edema which could represent cellulitis.       XR TIBIA FIBULA RIGHT (2 VIEWS)   Final Result   Impression:    1. Subcutaneous edema which could represent cellulitis. 2. No evidence of subcutaneous emphysema. Scheduled Meds:   insulin lispro  0-6 Units Subcutaneous TID WC    insulin lispro  0-3 Units Subcutaneous Nightly    ceFAZolin  2,000 mg Intravenous Q8H    Unna-Flex Elastic Unna Boot  2 each Topical Once    famotidine  40 mg Oral Daily    furosemide  20 mg Oral Daily    metoprolol tartrate  12.5 mg Oral BID    OLANZapine  5 mg Oral Nightly    spironolactone  50 mg Oral Daily    sodium chloride flush  5-40 mL Intravenous 2 times per day    enoxaparin  40 mg Subcutaneous Daily       Continuous Infusions:   dextrose      sodium chloride         PRN Meds:  traMADol, HYDROmorphone **OR** HYDROmorphone, glucose, dextrose, glucagon (rDNA), dextrose, dextran 70-hypromellose, sodium chloride flush, sodium chloride, promethazine **OR** ondansetron, polyethylene glycol, senna      Assessment:     Hx dementia, COPD, HTN, lung cancer, spinal stenosis / LBP, HCV  Lives in a nursing facility     Encephalopathy - resolved  Leukocytosis - mild  L LE cellulitis   - consistent with c/c Streptococcal cellulitis (worse over short duration, chill, red / hot / bullae)   - recurrent which can be seen with Streptococcal cellulitis   L LE wounds - wound care and control edema  Leukocytosis - resolved  GNR bacteremia - + BC      Multiple allergies listed - PCN, bactrim listed. Has tolerated cephalosporins    Plan:     Change to cefepime  Will f/u on [facility] results  Wound care, compression per Podiatry     After treatment of acute episode will be candidate for secondary prophylaxis, lower dose cephalexin (avoid PCN due to allergy) to prevent relapse     Medical Decision Making:   The following items were considered in medical decision making:  Discussion of patient care with other providers  Review / order clinical lab tests  Review / order radiology tests  Review / order other diagnostic tests/interventions  Independent review of radiologic images  Microbiology cultures and other micro tests reviewed      Discussed with pt  Romulo Willams MD

## 2021-05-22 NOTE — PLAN OF CARE
Problem: Falls - Risk of:  Goal: Will remain free from falls  Description: Will remain free from falls  5/22/2021 0748 by Vijay Fletcher RN  Outcome: Ongoing  Patient at risk for falls. Patient resting quietly in bed. Side rails up x 3. Bed locked in lowest position. Bed alarm on. Bedside table and call light within reach. Patient instructed to call for assistance. Patient verbalized understanding. Will continue to monitor.        Problem: Pain:  Goal: Pain level will decrease  Description: Pain level will decrease  5/22/2021 0748 by Vijay Fletcher RN  Outcome: Ongoing     Problem: Skin Integrity:  Goal: Will show no infection signs and symptoms  Description: Will show no infection signs and symptoms  Outcome: Ongoing

## 2021-05-22 NOTE — PROGRESS NOTES
Internal Medicine PGY-1 Resident Progress Note        PCP: No primary care provider on file. Date of Admission: 5/19/2021    Chief Complaint: Bilateral lower extremity pain    Interval History:     Patient seen at bedside, not in acute distress, no acute events overnight. Patient still complains of lower extremity tenderness and pain. Patient denies fever, chills, chest pain, nausea, vomiting, abdominal pain. Blood cultures came back and grew Pseudomonas; wound cultures grew actinobacter, Klebsiella. Medications:  Reviewed  Medications    dextrose      sodium chloride       Scheduled Medications    cefepime  2,000 mg Intravenous Q8H    heparin (porcine)  5,000 Units Subcutaneous 3 times per day    insulin lispro  0-6 Units Subcutaneous TID WC    insulin lispro  0-3 Units Subcutaneous Nightly    Unna-Flex Elastic Unna Boot  2 each Topical Once    famotidine  40 mg Oral Daily    [Held by provider] furosemide  20 mg Oral Daily    metoprolol tartrate  12.5 mg Oral BID    OLANZapine  5 mg Oral Nightly    [Held by provider] spironolactone  50 mg Oral Daily    sodium chloride flush  5-40 mL Intravenous 2 times per day     PRN Meds: oxyCODONE-acetaminophen, glucose, dextrose, glucagon (rDNA), dextrose, dextran 70-hypromellose, sodium chloride flush, sodium chloride, promethazine **OR** ondansetron, polyethylene glycol, senna      Intake/Output Summary (Last 24 hours) at 5/22/2021 1519  Last data filed at 5/22/2021 1420  Gross per 24 hour   Intake 1589 ml   Output 2300 ml   Net -711 ml       Physical Exam Performed:    /64   Pulse 78   Temp 97.8 °F (36.6 °C) (Oral)   Resp 16   Ht 5' 7\" (1.702 m)   Wt 286 lb 13.1 oz (130.1 kg)   SpO2 97%   BMI 44.92 kg/m²     Physical Exam  Constitutional:       General: She is not in acute distress. Appearance: She is obese. She is ill-appearing. HENT:      Head: Normocephalic and atraumatic.       Mouth/Throat:      Mouth: Mucous membranes are moist. fractures. 2. Subcutaneous edema which could indicate cellulitis. XR TIBIA FIBULA LEFT (2 VIEWS)   Final Result   Impression:   1. No evidence of subcutis emphysema. 3. Subcutaneous trace edema which could represent cellulitis. XR TIBIA FIBULA RIGHT (2 VIEWS)   Final Result   Impression:    1. Subcutaneous edema which could represent cellulitis. 2. No evidence of subcutaneous emphysema. Assessment/Plan:    Active Hospital Problems    Diagnosis Date Noted    Sepsis ([facility] [address] [A41.9] 05/19/2021    Cellulitis of left lower extremity [L03.116] 04/28/2021    Leukocytosis [D72.829] 04/28/2021    Recurrent cellulitis of lower extremity [L03.119] 04/28/2021     Sepsis 2/2 cellulitis  On presentation: WBC 13.2. Lactic acid 2.9. Tachycardic to 120s on presentation. S/p 2L IV fluid bolus in ED. - blood cultures grew Pseudomonas  - wound cx pending grew Klebsiella and Acinetobacter  - lactic acid normalized  -Discontinue Ancef, restart cefepime.     Cellulitis of bilateral LEs  UKW 46.9. CRP 17.6, ESR 70. Recent admission for cellulitis, was discharged on ancef PO. XR of R and L lower extremities consistent with cellulitis. - wound cx pending - 1+ Gram positive rods  - Ancef as above  - podiatry consulted, no surgical intervention at this time  - ID consulted  -Change pain medications to Percocet 5mg every 6 hours as needed.     Hyperglycemia  Patient does not have known hx of DM. On olanzapine at home which is a known cause of metabolic syndrome.  - LDSS  - hypoglycemia protocol  - HbA1c: 6.3     Constipation  - senna  - miralax     LAVINIA-resolved  Patient had a creatinine jump from 1.1-1.4  -Started on  mL/HR  -Monitor creatinine  -Hold Lasix and spironolactone     HTN  -Continue Lopressor  -Hold Lasix and spironolactone.     DVT Prophylaxis: Lovenox  Diet: DIET CARDIAC; Carb Control: 3 carb choices (45 gms)/meal; Dental Soft  Dietary Nutrition Supplements: Wound Healing Oral Supplement, Low Calorie High Protein Supplement  Code Status: Full Code    Dispo -GMF    I will discuss the patient with the senior resident and MD Isra Nagy DO  Internal Medicine Resident PGY-1  Reach me via [facility]

## 2021-05-23 NOTE — PROGRESS NOTES
pt son/ Brendon Mckeon requesting physician contact him with updates and plan of care [phone]. Dr Adriana Lamb notified via perfect serve.

## 2021-05-23 NOTE — PROGRESS NOTES
Podiatric Surgery Daily Progress Note      Admit Date: 5/19/2021      Code:Full Code    Patient seen and examined, labs and records reviewed    Subjective:     Patient seen at bedside this AM. Patient has no new complaints and denies any acute events overnight. Patient denies f/c/n/v/sob/cp. Review of Systems: A 12 point review of symptoms is unremarkable with the exception of the chief complaint. Patient specifically denies nausea, fever, vomiting, chills, shortness of breath, chest pain, abdominal pain, constipation or difficulty urinating. Objective     /78   Pulse 77   Temp 97.8 °F (36.6 °C) (Oral)   Resp 18   Ht 5' 7\" (1.702 m)   Wt 287 lb 7.7 oz (130.4 kg)   SpO2 96%   BMI 45.03 kg/m²      I/O:    Intake/Output Summary (Last 24 hours) at 5/23/2021 0917  Last data filed at 5/23/2021 0902  Gross per 24 hour   Intake 1082 ml   Output 2000 ml   Net -918 ml              Wt Readings from Last 3 Encounters:   05/23/21 287 lb 7.7 oz (130.4 kg)   05/04/21 267 lb (121.1 kg)   01/02/13 180 lb (81.6 kg)       LABS:    Recent Labs     05/22/21  0656 05/23/21  0716   WBC 5.0 5.4   HGB 10.2* 9.8*   HCT 30.6* 29.0*    160        Recent Labs     05/23/21  0716      K 4.6      CO2 23   BUN 17   CREATININE 0.7      No results for input(s): PROT, INR, APTT in the last 72 hours.     CBC with Differential:    Lab Results   Component Value Date    WBC 5.4 05/23/2021    RBC 3.19 05/23/2021    HGB 9.8 05/23/2021    HCT 29.0 05/23/2021     05/23/2021    MCV 90.8 05/23/2021    MCH 30.6 05/23/2021    MCHC 33.7 05/23/2021    RDW 16.7 05/23/2021    SEGSPCT 41.4 09/20/2012    BANDSPCT 1 05/22/2021    METASPCT 4 04/29/2021    LYMPHOPCT 18.0 05/22/2021    MONOPCT 9.0 05/22/2021    MYELOPCT 1 04/29/2021    EOSPCT 0.7 01/10/2012    BASOPCT 0.0 05/22/2021    MONOSABS 0.5 05/22/2021    LYMPHSABS 0.9 05/22/2021    EOSABS 0.2 05/22/2021    BASOSABS 0.0 05/22/2021     CMP:    Lab Results   Component Value Date     05/23/2021    K 4.6 05/23/2021     05/23/2021    CO2 23 05/23/2021    BUN 17 05/23/2021    CREATININE 0.7 05/23/2021    GFRAA >60 05/23/2021    GFRAA 86 08/16/2012    AGRATIO 1.5 10/26/2015    LABGLOM >60 05/23/2021    GLUCOSE 106 05/23/2021    PROT 6.5 04/29/2021    PROT 8.0 08/16/2012    LABALBU 2.3 05/05/2021    CALCIUM 8.0 05/23/2021    BILITOT 2.3 04/29/2021    ALKPHOS 232 04/29/2021     04/29/2021    ALT 91 04/29/2021         LOWER EXTREMITY EXAMINATION    Dressing to b/l LE left clean, dry, and intact. No strike through noted to b/l dressing. CFT brisk to digits b/l. Patient able to perform active range of motion to digits b/l. No pain with calf compression b/l. IMAGING:   Right TibFib XR 5/19/21  Impression   Impression:    1. Subcutaneous edema which could represent cellulitis. 2. No evidence of subcutaneous emphysema. Left TibFib XR 5/19/21  Impression   Impression:   1. No evidence of subcutis emphysema. 3. Subcutaneous trace edema which could represent cellulitis. Right foot XR 5/19/21  Impression   Impression:        1. Subcutaneous edema which could represent cellulitis. Left foot XR 5/19/21  Impression   Impression:   Linear areas of sclerosis of the heads of the third and fourth metatarsals, suspicious for recent or healing fractures. 2. Subcutaneous edema which could indicate cellulitis. ASSESSMENT/PLAN  1. Cellulitis/lymphangitis right LE - improving  2. Venous stasis dermatitis b/l LE  3. Edema b/l LE  4. Full thickness ulcerations b/l LE  5.  Metatarsal fractures, left foot 3 and 4    -VSS, No leukocytosis noted  -ESR 70 and CRP 17.6  -Prealb 5.5; dietary supplements ordered  -HbA1c 6.3%  -Blood cultures positive for GNR, Pseudomonas putida   -XR images reviewed, impression noted above  -Wound culture: Pseudomonas fluorescens/putida, Acinetobacter baumannii, Klebsiella pneumoniae   -Continue antibiotics per ID recs  -B/l LE

## 2021-05-23 NOTE — PROGRESS NOTES
Internal Medicine Progress Note  Patient Name: Josue Zhu   Patient :    Date: 2021   Admit Date: 2021     CC: Lower extremity pain (bilateral)    Interval history: No overnight events. Vitals stable and WNL. Patient states that she continues to have bilateral lower extremity pain (8.5/10 intensity). Also is having upper back pain and weakness. No bowel movements since admission. She has not been OOB. No other acute complaints at this time. Review of Systems   Constitutional: Negative for fatigue and fever. HENT: Negative for ear pain and sinus pain. Eyes: Negative for pain. Respiratory: Negative for cough and shortness of breath. Cardiovascular: Negative for chest pain. Gastrointestinal: Negative for abdominal pain, constipation and diarrhea. Endocrine: Negative for polyuria. Genitourinary: Negative for flank pain and pelvic pain. Musculoskeletal: Positive for back pain. LE pain (bilateral). Skin: Positive for wound. Negative for color change. Neurological: Positive for weakness. Negative for dizziness and headaches. Psychiatric/Behavioral: Negative for agitation, behavioral problems and confusion. Physical Exam:  Patient Vitals for the past 8 hrs:   BP Temp Temp src Pulse Resp SpO2   21 1345 119/70 98 °F (36.7 °C) Oral 74 18 96 %   21 0915 (!) 116/58 98 °F (36.7 °C) Oral 79 18 94 %     Physical Exam  Constitutional:       General: She is awake. She is not in acute distress. Appearance: Normal appearance. She is obese. HENT:      Head: Normocephalic and atraumatic. Nose: Nose normal.   Eyes:      General: Vision grossly intact. Gaze aligned appropriately. Right eye: No discharge. Left eye: No discharge. Extraocular Movements: Extraocular movements intact. Conjunctiva/sclera: Conjunctivae normal.   Cardiovascular:      Rate and Rhythm: Normal rate and regular rhythm. Pulses: Normal pulses. Heart sounds: Normal heart sounds. Pulmonary:      Effort: Pulmonary effort is normal.      Breath sounds: Normal breath sounds. Abdominal:      General: There is no distension. There are no signs of injury. Palpations: Abdomen is soft. Tenderness: There is no abdominal tenderness. Musculoskeletal:         General: No deformity or signs of injury. Normal range of motion. Cervical back: Full passive range of motion without pain, normal range of motion and neck supple. Right lower leg: Edema present. Left lower leg: Edema present. Skin:     General: Skin is warm. Findings: Lesion present. Neurological:      General: No focal deficit present. Mental Status: She is alert, oriented to person, place, and time and easily aroused. Mental status is at baseline. Motor: Motor function is intact. Coordination: Coordination is intact. Gait: Gait is intact. Psychiatric:         Attention and Perception: Attention and perception normal.         Mood and Affect: Mood and affect normal.         Speech: Speech normal.         Behavior: Behavior normal. Behavior is cooperative. Thought Content:  Thought content normal.         Cognition and Memory: Cognition normal.         Judgment: Judgment normal.         Intake/Output Summary (Last 24 hours) at 5/23/2021 1616  Last data filed at 5/23/2021 1519  Gross per 24 hour   Intake 1082 ml   Output 2775 ml   Net -1693 ml      Medications:   sennosides-docusate sodium  1 tablet Oral Daily    cefepime  2,000 mg Intravenous Q8H    heparin (porcine)  5,000 Units Subcutaneous 3 times per day    insulin lispro  0-6 Units Subcutaneous TID WC    insulin lispro  0-3 Units Subcutaneous Nightly    Unna-Flex Elastic Unna Boot  2 each Topical Once    famotidine  40 mg Oral Daily    metoprolol tartrate  12.5 mg Oral BID    OLANZapine  5 mg Oral Nightly    [Held by provider] spironolactone  50 mg Oral Daily    sodium chloride flush  5-40 mL Intravenous 2 times per day       dextrose      sodium chloride        Labs:  CBC:   Recent Labs     05/21/21  0748 05/22/21  0656 05/23/21  0716   WBC 8.8 5.0 5.4   HGB 9.8* 10.2* 9.8*   HCT 28.7* 30.6* 29.0*    154 160   MCV 91.6 92.8 90.8     Renal:    Recent Labs     05/21/21  0748 05/22/21  0656 05/23/21  0716   * 135* 138   K 4.7 4.7 4.6    108 110   CO2 21 20* 23   BUN 27* 24* 17   CREATININE 1.4* 1.0 0.7   GLUCOSE 120* 149* 106*   CALCIUM 8.0* 7.9* 8.0*   ANIONGAP 6 7 5     Radiology:  XR FOOT RIGHT (MIN 3 VIEWS)   Final Result   Impression:       1. Subcutaneous edema which could represent cellulitis. XR FOOT LEFT (MIN 3 VIEWS)   Final Result   Impression:   Linear areas of sclerosis of the heads of the third and fourth metatarsals, suspicious for recent or healing fractures. 2. Subcutaneous edema which could indicate cellulitis. XR TIBIA FIBULA LEFT (2 VIEWS)   Final Result   Impression:   1. No evidence of subcutis emphysema. 3. Subcutaneous trace edema which could represent cellulitis. XR TIBIA FIBULA RIGHT (2 VIEWS)   Final Result   Impression:    1. Subcutaneous edema which could represent cellulitis. 2. No evidence of subcutaneous emphysema. Assessment and Plan:    70F PMH recent admit (cellulitis, 04/28-05/05, s/p keflex x 10d), HCV, chronic pain, drug-seeking, smoking, lung cancer, COPD, bullous dermatitis, HTN, gastritis, PUD, osteoporosis, dementia presented 05/19 w/ BL LE pain. 1. Sepsis  - SIRS 3/4 at admssion (T 98.5, *, RR 26*, WBC 13.2*). - Likely 2/2 cellulitis of the lower extremities. - Blood cultures: Pseudomonas. - Wound cultures: Pseudomonas, Acinetobacter. - Plan: Cefepime 2000 mg QID. Podiatry consulted. Percocet 5 mg q6h for pain. Lasix 40 mg IV dose today for LE edema. Unna boot. F/U echo study. 2. HTN  - Controlled (-126/58-78 today). - Plan: Metoprolol tartrate 12.5 mg BID.      3. Constipation  - Setting of opiate use. - States she not had a bowel movement since admission.  - Plan: Senokot-S 1 tablet QD. 4. Hyperglycemia  - A1c 6.3 (05/19/21). - Glucose 106-234 since admission  - Plan: LDSSI. 5. Mood disturbance  - Olanzapine 5 mg qPM.    6. GERD  - Pepcid 40 mg QD.    7. DVT PPX  - Heparin 5000 units q8h.    8. Diet  - Cardiac. 9. Disposition  - Pre-admission: [address] [place] [address]. - Expect DC to LTC when stable. Patient discussed with attending physician MD Yamini Patel DO, PhD  Internal Medicine Resident (PGY-2)  [facility] [place] [id number]

## 2021-05-23 NOTE — PLAN OF CARE
Problem: Falls - Risk of:  Goal: Will remain free from falls  Description: Will remain free from falls  Outcome: Ongoing  Note: Pt calls appropriately for assistance, does not attempt to get out of bed. No falls/injuries to date this hospitalization. Safety precautions in place. Problem: Pain:  Goal: Pain level will decrease  Description: Pain level will decrease  Outcome: Ongoing  Note: Pain moderate on 1-10 scale. Slight improvement with PRN Percocet, however, this relief is not long-lasting. Pt often calls to request more pain medication, multiple times before 6 hour intervals have . Problem: Skin Integrity:  Goal: Will show no infection signs and symptoms  Description: Will show no infection signs and symptoms  Outcome: Ongoing  Note: Cellulitis ongoing. Problem: Skin Integrity:  Goal: Absence of new skin breakdown  Description: Absence of new skin breakdown  Outcome: Ongoing  Note: No new skin breakdown noted.

## 2021-05-23 NOTE — PLAN OF CARE
Problem: Falls - Risk of:  Goal: Will remain free from falls  Description: Will remain free from falls  2021 by Jaron Oneill RN  Outcome: Ongoing  Note: Patient is a fall risk. See Fall Risk assessment for details. Bed is in low, lock position; call light/belongings within reach. No attempts to get out of bed have been made, calls appropriately when assistance is needed. Bed alarm and hourly rounds in place for safety. Will continue to monitor and reassess throughout shift. 2021 by Nathan Rosa RN  Outcome: Ongoing  Note: Pt calls appropriately for assistance, does not attempt to get out of bed. No falls/injuries to date this hospitalization. Safety precautions in place. Goal: Absence of physical injury  Description: Absence of physical injury  2021 by Jaron Oneill RN  Outcome: Ongoing  2021 by Nathan Rosa RN  Outcome: Ongoing     Problem: Pain:  Goal: Pain level will decrease  Description: Pain level will decrease  2021 by Jaron Oneill RN  Outcome: Ongoing  Note: Pain/discomfort being managed with PRN analgesics per MD orders. Pt able to express presence and absence of pain and rate pain appropriately using numerical scale. 2021 by Nathan Rosa RN  Outcome: Ongoing  Note: Pain moderate on 1-10 scale. Slight improvement with PRN Percocet, however, this relief is not long-lasting. Pt often calls to request more pain medication, multiple times before 6 hour intervals have .     Goal: Control of acute pain  Description: Control of acute pain  2021 by Jaron Oneill RN  Outcome: Ongoing  2021 by Nathan Rosa RN  Outcome: Ongoing  Goal: Control of chronic pain  Description: Control of chronic pain  2021 by Jaron Oneill RN  Outcome: Ongoing  2021 by Nathan Rosa RN  Outcome: Ongoing     Problem: Skin Integrity:  Goal: Will show no infection signs and symptoms  Description: Will show no infection signs and symptoms  5/23/2021 1536 by Cayden Paniagua RN  Outcome: Ongoing  Note: Skin assessment performed each shift per protocol. Skin care protocol in place. Refusing Q2 turns; turns self.   5/23/2021 0417 by Tejinder Farris RN  Outcome: Ongoing  Note: Cellulitis ongoing. Goal: Absence of new skin breakdown  Description: Absence of new skin breakdown  5/23/2021 1536 by Cayden Paniagua RN  Outcome: Ongoing  5/23/2021 0417 by Tejinder Farris RN  Outcome: Ongoing  Note: No new skin breakdown noted. Problem: Nutrition  Goal: Optimal nutrition therapy  5/23/2021 1536 by Cayden Paniagua RN  Outcome: Ongoing  Note: Good appetite, good PO intake.    5/23/2021 0417 by Tejinder Farris RN  Outcome: Ongoing

## 2021-05-24 NOTE — PROGRESS NOTES
Final Result   Impression:    1. Subcutaneous edema which could represent cellulitis. 2. No evidence of subcutaneous emphysema. Assessment/Plan:    Active Hospital Problems    Diagnosis Date Noted    Sepsis ([facility] [address] [A41.9] 05/19/2021    Cellulitis of left lower extremity [L03.116] 04/28/2021    Leukocytosis [D72.829] 04/28/2021    Recurrent cellulitis of lower extremity [L03.119] 04/28/2021     1. Sepsis  - SIRS 3/4 at admssion (T 98.5, *, RR 26*, WBC 13.2*). - Likely 2/2 cellulitis of the lower extremities. - Blood cultures: Pseudomonas. - Wound cultures: Pseudomonas, Acinetobacter. ESBL klebsiella   - Plan: Cefepime 2000 mg QID. Podiatry consulted. Percocet 5 mg q6h for pain. Lasix 40 mg IV dose today for LE edema. Unna boot. F/U echo study.      2. HTN  - Controlled (-126/58-78 today). - Plan: Metoprolol tartrate 12.5 mg BID.      3. Constipation  - Setting of opiate use. - States she not had a bowel movement since admission.  - Plan: Senokot-S 1 tablet QD.     4. Hyperglycemia  - A1c 6.3 (05/19/21). - Glucose 106-234 since admission  - Plan: LDSSI.     5. Mood disturbance  - Olanzapine 5 mg qPM.     6. GERD  - Pepcid 40 mg QD.     DVT Prophylaxis: lovenox  Diet: DIET CARDIAC; Carb Control: 3 carb choices (45 gms)/meal; Dental Soft  Dietary Nutrition Supplements: Wound Healing Oral Supplement, Low Calorie High Protein Supplement  Code Status: Full Code    Dispo - [facility]    I will discuss the patient with the senior resident and MD Nery Taylor DO  Internal Medicine Resident PGY-1  Reach me via [facility]

## 2021-05-24 NOTE — CARE COORDINATION
CM consulted  For  D/c planning . Podiatry and ID following  , continue Wound Care and IV atbx     Pt from [facility] care, will return once Medically stable: via [address], [address] [place] [address]       Phone: [phone]       Fax: [phone]          Electronically signed by Jaida Ayala RN on 5/24/2021 at \[facility]\""[facility]  JAMIN Case Manager  [facility]  [address] [address] [facility].   [facility] [address] [phone]  Fax [phone]

## 2021-05-24 NOTE — PLAN OF CARE
Problem: Falls - Risk of:  Goal: Will remain free from falls  Description: Will remain free from falls  5/24/2021 0048 by Yanci Grant RN  Outcome: Ongoing  Note: Pt calls appropriately for assistance; does not attempt to get out of bed. Prefers to stay in bed and use bedpan rather than get up to use the bathroom. No falls or injuries to date this hospitalization. Safety precautions in place; will monitor. Problem: Pain:  Goal: Pain level will decrease  Description: Pain level will decrease  5/24/2021 0048 by Yanci Grant RN  Outcome: Ongoing  Note: Pt rates pain 9/10 this night shift before dose of PRN Percocet, which she verbalizes relief after. She requests to have boots and dressings taken off, but orders prohibit this. Repositioning patient often this nightshift with some relief verbalized. Problem: Skin Integrity:  Goal: Will show no infection signs and symptoms  Description: Will show no infection signs and symptoms  5/24/2021 0048 by Yanci Grant RN  Outcome: Ongoing  Note: Cellulitis continues. Antibiotics being given for this according ID orders. Problem: Skin Integrity:  Goal: Absence of new skin breakdown  Description: Absence of new skin breakdown  5/24/2021 0048 by Yanci Grant RN  Outcome: Ongoing  Note: No new skin breakdown noted.

## 2021-05-24 NOTE — PROGRESS NOTES
ID Follow-up NOTE    CC:   R LE cellulitis, GNR bacteremia  Antibiotics: Cefepime    Admit Date: 5/19/2021  Hospital Day: 6    Subjective:     Patient c/o R leg pain  No other complaint     Objective:     Patient Vitals for the past 8 hrs:   BP Temp Temp src Pulse Resp SpO2 Weight   05/24/21 0810 113/68 98.3 °F (36.8 °C) Oral 76 18 96 % --   05/24/21 0435 -- -- -- -- -- -- 286 lb 13.1 oz (130.1 kg)     I/O last 3 completed shifts: In: 5347 [P.O.:1182; IV Piggyback:50]  Out: 1975 [EJIKO:0541]  I/O this shift:  In: 200 [P.O.:200]  Out: 0     EXAM:  GENERAL: No apparent distress.     HEENT: Membranes moist, no oral lesion  NECK:  Supple, no lymphadenopathy  LUNGS: Clear b/l, no rales, no dullness  CARDIAC: RRR, no murmur appreciated  ABD:  + BS, soft / NT  EXT:  No rash, no edema, no lesions - bilateral LE with dressing / ACE  NEURO: No focal neurologic findings  PSYCH: Orientation, sensorium, mood normal  LINES:  Peripheral iv       Data Review:  Lab Results   Component Value Date    WBC 6.8 05/24/2021    HGB 10.4 (L) 05/24/2021    HCT 30.5 (L) 05/24/2021    MCV 91.1 05/24/2021     05/24/2021     Lab Results   Component Value Date    CREATININE 0.8 05/24/2021    BUN 18 05/24/2021     05/24/2021    K 4.6 05/24/2021     05/24/2021    CO2 22 05/24/2021       Hepatic Function Panel:   Lab Results   Component Value Date    ALKPHOS 232 04/29/2021    ALT 91 04/29/2021     04/29/2021    PROT 6.5 04/29/2021    PROT 8.0 08/16/2012    BILITOT 2.3 04/29/2021    BILIDIR 1.2 04/29/2021    IBILI 1.1 04/29/2021    LABALBU 2.0 05/23/2021     Micro:  5/19 BC x 2 + Ps putida  5/19 Leg wound - light Pseudomonas fluorescens/putida, K pneumoniae ESBL  Pseudomonas fluorescens putida group (1)  Antibiotic Interpretation JENNIFER   cefepime Sensitive 8 mcg/mL   ciprofloxacin Sensitive <=1 mcg/mL   gentamicin Sensitive <=4 mcg/mL   meropenem Sensitive 2 mcg/mL   piperacillin-tazobactam Sensitive <=16 mcg/mL   tobramycin 5 mg Oral Nightly    [Held by provider] spironolactone  50 mg Oral Daily    sodium chloride flush  5-40 mL Intravenous 2 times per day       Continuous Infusions:   dextrose      sodium chloride         PRN Meds:  perflutren lipid microspheres, oxyCODONE-acetaminophen, glucose, dextrose, glucagon (rDNA), dextrose, dextran 70-hypromellose, sodium chloride flush, sodium chloride, promethazine **OR** ondansetron, polyethylene glycol, senna      Assessment:     Hx dementia, COPD, HTN, lung cancer, spinal stenosis / LBP, HCV  Lives in a nursing facility     Encephalopathy - resolved  Leukocytosis - mild  R LE cellulitis   - consistent with c/c Streptococcal cellulitis (worse over short duration, chill, red / hot / bullae)   - recurrent which can be seen with Streptococcal cellulitis   L LE wounds - wound care and control edema  Leukocytosis - resolved  Ps putida bacteremia secondary to soft tissue infection     Multiple allergies listed - PCN, bactrim listed. Has tolerated cephalosporins    Plan:     Cont cefepime    (aware no coverage for Klebsiella isolate though this is less of pathogen than Pseudomonas, gram positives)    Wound care, compression per Podiatry     Will confer with Podiatry regarding status of R leg cellulitis     May be able to use po ciprofloxacin after discharge (and not need iv antibiotics)  Ongoing compression / wound care after discharge will be essential to prevent relapse / [facility] readmission    Medical Decision Making:   The following items were considered in medical decision making:  Discussion of patient care with other providers  Review / order clinical lab tests  Review / order radiology tests  Review / order other diagnostic tests/interventions  Independent review of radiologic images  Microbiology cultures and other micro tests reviewed      Discussed with pt  Lashonda Christina MD

## 2021-05-24 NOTE — PROGRESS NOTES
Podiatric Surgery Daily Progress Note  Cora Velasquez      Subjective :   Patient seen and examined this am at the bedside. Patient denies any acute overnight events. Patient denies N/V/F/C/SOB. Patient denies calf pain, thigh pain, chest pain. Review of Systems: A 12 point review of symptoms is unremarkable with the exception of the chief complaint. Patient specifically denies nausea, fever, vomiting, chills, shortness of breath, chest pain, abdominal pain, constipation or difficulty urinating. Objective     BP (!) 101/57   Pulse 67   Temp 98.2 °F (36.8 °C) (Oral)   Resp 16   Ht 5' 7\" (1.702 m)   Wt 286 lb 13.1 oz (130.1 kg)   SpO2 95%   BMI 44.92 kg/m²      I/O:    Intake/Output Summary (Last 24 hours) at 5/24/2021 1248  Last data filed at 5/24/2021 4752  Gross per 24 hour   Intake 1210 ml   Output 1975 ml   Net -765 ml              Wt Readings from Last 3 Encounters:   05/24/21 286 lb 13.1 oz (130.1 kg)   05/04/21 267 lb (121.1 kg)   01/02/13 180 lb (81.6 kg)       LABS:    Recent Labs     05/23/21  0716 05/24/21  0922   WBC 5.4 6.8   HGB 9.8* 10.4*   HCT 29.0* 30.5*    175        Recent Labs     05/24/21  0922      K 4.6      CO2 22   BUN 18   CREATININE 0.8      No results for input(s): PROT, INR, APTT in the last 72 hours. LOWER EXTREMITY EXAMINATION    Dressing to bilateral LE intact. No strikethrough noted to the external dressing. Mild sanguinous drainage noted to the internal layers of the dressing. VASCULAR: DP and PT pulses are palpable 2/4 b/l. CFT is brisk to the digits of the foot b/l. Skin temperature is warm to warm from proximal to distal with no focal calor noted. Diffuse non-pitting edema noted to b/l LE, mildly improved with compression. No pain with calf compression b/l. NEUROLOGIC: Gross and epicritic sensation is diminished b/l. Protective sensation is diminished at all pedal sites b/l.      DERMATOLOGIC:   Numerous and diffuse full thickness ulcerations of differing stages and sizes with a combination of granular and fibrotic base, mild sanguineous drainage from the ulcerations. Diffuse xerosis and dermatological changes to b/l LE. No erythema, purulent drainage, crepitus, fluctuance, malodor, tracking, tunneling, or probe to bone. MUSCULOSKELETAL: Muscle strength is 5/5 for all pedal groups tested. No pain with palpation of the foot or ankle b/l. Diffuse tenderness with palpation of the periwound areas. Ankle joint ROM is decreased in dorsiflexion with the knee extended. No obvious biomechanical abnormalities. IMAGING:              Right TibFib XR 5/19/21  Impression   Impression:    1. Subcutaneous edema which could represent cellulitis. 2. No evidence of subcutaneous emphysema. Left TibFib XR 5/19/21  Impression   Impression:   1. No evidence of subcutis emphysema. 3. Subcutaneous trace edema which could represent cellulitis. Right foot XR 5/19/21  Impression   Impression:        1. Subcutaneous edema which could represent cellulitis. Left foot XR 5/19/21  Impression   Impression:   Linear areas of sclerosis of the heads of the third and fourth metatarsals, suspicious for recent or healing fractures. 2. Subcutaneous edema which could indicate cellulitis.           ASSESSMENT/PLAN  -Cellulitis/lymphangitis right LE -resolved  -Venous stasis dermatitis b/l LE  -Edema b/l LE  -Full thickness ulcerations b/l LE  -Metatarsal fractures, left foot 3 and 4     -Hypotensive, otherwise VSS, No leukocytosis noted  -ESR 70 and CRP 17.6  -Prealb 5.5; dietary supplements ordered  -HbA1c 6.3%  -Blood cultures positive for GNR, Pseudomonas putida   -XR images reviewed, impression noted above  -Wound culture: Pseudomonas fluorescens/putida, Acinetobacter baumannii, Klebsiella pneumoniae ESBL   -Continue antibiotics per ID recs  -Applied B/l LE dressing with unna boots, kerlix, and ace with

## 2021-05-24 NOTE — ED PROVIDER NOTES
[address]          ATTENDING PHYSICIAN NOTE       Date of evaluation: 5/19/2021    Chief Complaint     Cellulitis (bilateral legs, on abx but pain is getting worse)      History of Present Illness     Anderson Shaw is a 65 y.o. female who presents with worsening pain in her bilateral legs. Patient has a history of wounds on her bilateral lower extremities, states she is having more pain with this. She is having difficulty walking due to the pain and edema. She was admitted for this recently and placed on antibiotics upon discharge, she has now completed these antibiotics. Patient denied any systemic fevers or chills. Review of Systems     Review of Systems   Cardiovascular: Positive for leg swelling. Skin:        Worsening redness in b/l legs       Past Medical, Surgical, Family, and Social History     She has a past medical history of Anxiety, Back pain, Bronchitis, Cancer ([address] Chronic pain, Dementia ([address] Drug-seeking behavior, Emphysema lung ([address] GERD (gastroesophageal reflux disease), Hepatitis C, HTN (hypertension), Insomnia, Lumbar disc disease, Osteoporosis, and Spinal stenosis. She has a past surgical history that includes Cholecystectomy (2/2011). Her family history includes Heart Disease in her father; High Blood Pressure in her father. She reports that she has been smoking cigarettes. She has a 24.50 pack-year smoking history. She has never used smokeless tobacco. She reports that she does not drink alcohol and does not use drugs. Medications     Current Discharge Medication List      CONTINUE these medications which have NOT CHANGED    Details   traMADol (ULTRAM) 50 MG tablet Take 50 mg by mouth 4 times daily.       Balsam [place]-Castor Oil (VENELEX) OINT ointment Apply topically 2 times daily  Qty: 1 Tube, Refills: 0      metoprolol tartrate (LOPRESSOR) 25 MG tablet Take 0.5 tablets by mouth 2 times daily  Qty: 60 tablet, Refills: 3 furosemide (LASIX) 20 MG tablet Take 1 tablet by mouth daily  Qty: 30 tablet, Refills: 0      polyethylene glycol (GLYCOLAX) 17 g packet Take 17 g by mouth three times a week Every Monday, Wednesday and Friday for bowel regimen      famotidine (PEPCID) 20 MG tablet Take 40 mg by mouth daily       hydrOXYzine (ATARAX) 25 MG tablet Take 25 mg by mouth daily      lactulose (CHRONULAC) 10 GM/15ML solution Take 30 mLs by mouth daily      melatonin 5 MG TABS tablet Take 5 mg by mouth nightly as needed (insomnia)       OLANZapine (ZYPREXA) 5 MG tablet Take 5 mg by mouth nightly      carboxymethylcellulose (REFRESH TEARS) 0.5 % SOLN ophthalmic solution Place 1 drop into both eyes every 6 hours as needed (dry, red eyes)       senna-docusate (SENNA-PLUS) 8.6-50 MG per tablet Take 2 tablets by mouth daily as needed for Constipation      spironolactone (ALDACTONE) 50 MG tablet Take 50 mg by mouth daily      Multiple Vitamins-Minerals (THERAPEUTIC MULTIVITAMIN-MINERALS) tablet Take 1 tablet by mouth daily      Cholecalciferol (VITAMIN D3) 1.25 MG (67081 UT) CAPS Take 1 capsule by mouth every 30 days Every Month on the 15th             Allergies     She is allergic to cyclobenzaprine, penicillins, acetaminophen, codeine, diphenhydramine hcl, ketorolac, naproxen, diphenhydramine, ketorolac tromethamine, ketorolac tromethamine, and sulfamethoxazole-trimethoprim. Physical Exam     INITIAL VITALS: BP: (!) 166/68, Temp: 98.7 °F (37.1 °C), Pulse: 115, Resp: 19, SpO2: 96 %   Physical Exam  Vitals reviewed. Constitutional:       Appearance: Normal appearance. She is normal weight. Cardiovascular:      Rate and Rhythm: Normal rate. Pulses: Normal pulses. Pulmonary:      Effort: Pulmonary effort is normal.   Musculoskeletal:      Right lower leg: Edema present. Left lower leg: Edema present. Skin:     General: Skin is warm and dry. Capillary Refill: Capillary refill takes less than 2 seconds.       Comments: See Anion Gap 8 3 - 16    Glucose 230 (H) 70 - 99 mg/dL    BUN 19 7 - 20 mg/dL    CREATININE 1.1 0.6 - 1.2 mg/dL    GFR Non- 49 (A) >60    GFR  59 (A) >60    Calcium 7.8 (L) 8.3 - 10.6 mg/dL   Lactic acid, plasma   Result Value Ref Range    Lactic Acid 2.6 (H) 0.4 - 2.0 mmol/L   CBC auto differential   Result Value Ref Range    WBC 14.1 (H) 4.0 - 11.0 K/uL    RBC 2.98 (L) 4.00 - 5.20 M/uL    Hemoglobin 9.2 (L) 12.0 - 16.0 g/dL    Hematocrit 27.3 (L) 36.0 - 48.0 %    MCV 91.8 80.0 - 100.0 fL    MCH 30.9 26.0 - 34.0 pg    MCHC 33.7 31.0 - 36.0 g/dL    RDW 16.3 (H) 12.4 - 15.4 %    Platelets 930 101 - 029 K/uL    MPV 7.8 5.0 - 10.5 fL    Neutrophils % 87.1 %    Lymphocytes % 4.9 %    Monocytes % 7.7 %    Eosinophils % 0.0 %    Basophils % 0.3 %    Neutrophils Absolute 12.3 (H) 1.7 - 7.7 K/uL    Lymphocytes Absolute 0.7 (L) 1.0 - 5.1 K/uL    Monocytes Absolute 1.1 0.0 - 1.3 K/uL    Eosinophils Absolute 0.0 0.0 - 0.6 K/uL    Basophils Absolute 0.0 0.0 - 0.2 K/uL   Lactic acid, plasma   Result Value Ref Range    Lactic Acid 2.0 0.4 - 2.0 mmol/L   Basic Metabolic Panel w/ Reflex to MG   Result Value Ref Range    Sodium 134 (L) 136 - 145 mmol/L    Potassium reflex Magnesium 4.7 3.5 - 5.1 mmol/L    Chloride 107 99 - 110 mmol/L    CO2 21 21 - 32 mmol/L    Anion Gap 6 3 - 16    Glucose 120 (H) 70 - 99 mg/dL    BUN 27 (H) 7 - 20 mg/dL    CREATININE 1.4 (H) 0.6 - 1.2 mg/dL    GFR Non- 37 (A) >60    GFR  45 (A) >60    Calcium 8.0 (L) 8.3 - 10.6 mg/dL   CBC auto differential   Result Value Ref Range    WBC 8.8 4.0 - 11.0 K/uL    RBC 3.13 (L) 4.00 - 5.20 M/uL    Hemoglobin 9.8 (L) 12.0 - 16.0 g/dL    Hematocrit 28.7 (L) 36.0 - 48.0 %    MCV 91.6 80.0 - 100.0 fL    MCH 31.3 26.0 - 34.0 pg    MCHC 34.1 31.0 - 36.0 g/dL    RDW 17.0 (H) 12.4 - 15.4 %    Platelets 164 372 - 851 K/uL    MPV 8.2 5.0 - 10.5 fL    Neutrophils % 62.9 %    Lymphocytes % 17.0 %    Monocytes % 17.3 % Performed on ACCU-CHEK    POCT Glucose   Result Value Ref Range    POC Glucose 150 (H) 70 - 99 mg/dl    Performed on ACCU-CHEK    POCT Glucose   Result Value Ref Range    POC Glucose 148 (H) 70 - 99 mg/dl    Performed on ACCU-CHEK    POCT Glucose   Result Value Ref Range    POC Glucose 151 (H) 70 - 99 mg/dl    Performed on ACCU-CHEK    POCT Glucose   Result Value Ref Range    POC Glucose 129 (H) 70 - 99 mg/dl    Performed on ACCU-CHEK    POCT Glucose   Result Value Ref Range    POC Glucose 160 (H) 70 - 99 mg/dl    Performed on ACCU-CHEK    POCT Glucose   Result Value Ref Range    POC Glucose 153 (H) 70 - 99 mg/dl    Performed on ACCU-CHEK    POCT Glucose   Result Value Ref Range    POC Glucose 95 70 - 99 mg/dl    Performed on ACCU-CHEK    POCT Glucose   Result Value Ref Range    POC Glucose 159 (H) 70 - 99 mg/dl    Performed on ACCU-CHEK    POCT Glucose   Result Value Ref Range    POC Glucose 158 (H) 70 - 99 mg/dl    Performed on ACCU-CHEK    POCT Glucose   Result Value Ref Range    POC Glucose 141 (H) 70 - 99 mg/dl    Performed on ACCU-CHEK    POCT Glucose   Result Value Ref Range    POC Glucose 113 (H) 70 - 99 mg/dl    Performed on ACCU-CHEK      RECENT VITALS:  BP: 113/68,Temp: 98.3 °F (36.8 °C), Pulse: 76, Resp: 18, SpO2: 96 %     Procedures     none    ED Course     Nursing Notes, Past Medical Hx, Past Surgical Hx, Social Hx,Allergies, and Family Hx were reviewed.     patient was given the following medications:  Orders Placed This Encounter   Medications    HYDROcodone-acetaminophen (NORCO) 5-325 MG per tablet 1 tablet    vancomycin (VANCOCIN) 1,750 mg in dextrose 5 % 500 mL IVPB     Order Specific Question:   Antimicrobial Indications     Answer:   Skin and Soft Tissue Infection    cefepime (MAXIPIME) 2000 mg IVPB minibag     Order Specific Question:   Antimicrobial Indications     Answer:   Skin and Soft Tissue Infection    0.9 % sodium chloride bolus    DISCONTD: Unna-Flex Elastic [place] MISC 2 each extremity was worse than before - patient has streaking into her left thigh. Plan for admission. Patient was given IV antibiotics here. Clinical Impression     1.  Cellulitis of left lower extremity        Disposition     PATIENT REFERRED TO:  Masha Walter, [address]    Schedule an appointment as soon as possible for a visit in 1 week  For wound re-check    [facility]  [address] [facility] [address]:  Current Discharge Medication List          DISPOSITION Admitted 05/19/2021 09:01:45 PM         Rikki Beasley MD  05/27/21 9014

## 2021-05-25 NOTE — PROGRESS NOTES
Physician Progress Note      PATIENT:               Dixon Kothari  CSN #:                  [id number]  :                       1950  ADMIT DATE:       2021 6:12 PM  [address] DATE:  RESPONDING  PROVIDER #:        Bryanna Small TEXT:    Patient admitted  with Sepsis due to Cellulitis. Noted documentation of   \"Encephalopathy- resolved\" on  by ID consultant. Pt never noted to have   AMS on admission nor during stay by neither Attending nor Nursing, pt noted to   be alert and oriented x 4 since admission. In order to support the diagnosis   of Encephalopathy please include additional clinical indicators in your   documentation as well as type. Or please document if the diagnosis of   Encephalopathy has been ruled out after further study. The medical record reflects the following:  Risk Factors: Sepsis  Clinical Indicators: Per H&P \"Alert and oriented,thought content appropriate\",   Per Podiatry c/s on admit \"alert and oriented x3\", Per Nursing since arrival:   A/O x 4. Per ID c/s  \"Admit  -  with confusion and L LE redness,   Return to [facility] ED with afebrile, WBC 13.2, LA 2.9, Encephalopathy - resolved\"  Treatment: Sepsis treatment- Cefepime, 2.5L NS bolus  Options provided:  -- Encephalopathy was ruled out  -- Metabolic Encephalopathy present as evidenced by, Please document evidence. -- Other - I will add my own diagnosis  -- Disagree - Not applicable / Not valid  -- Disagree - Clinically unable to determine / Unknown  -- Refer to Clinical Documentation Reviewer    PROVIDER RESPONSE TEXT:    Metabolic Encephalopathy is ruled out. Query created by:  Marty Adame on 2021 3:59 PM      Electronically signed by:  Marquise Pacheco 2021 6:54 PM

## 2021-05-25 NOTE — PROGRESS NOTES
Patient a/o X4 with VS stable this shift. NSR on tele. Has purewick in place. No significant events this shift. Bed in lowest/locked position with bed alarm on. Call light/bedside table/personal belongings with in reach. Pt encouraged to call out with any needs.

## 2021-05-25 NOTE — DISCHARGE SUMMARY
INTERNAL MEDICINE DEPARTMENT AT [address] [place] [address]  DISCHARGE SUMMARY    Patient ID: Audrey Ortiz                                             Discharge Date: 5/25/2021   Patient's PCP: No primary care provider on file. Discharge Physician: Amrita Young DO   Admit Date: 5/19/2021   Admitting Physician: Wisam Vasquez DO      DISCHARGE DIAGNOSES:  1-sepsis 2/2 LE cellulitis with superimposed Pseudomonas bacteremia  2-HTN  3-hyperglycemia  [facility] Course: Audrey Ortiz is a 65 y.o. female w/ PMHX of HCV, drug-seeking behavior, HTN, and recent hospitalization for cellulitis (4/28-5/5), who presented to [facility] with bilateral lower extremity pain. In the ED, patient was afebrile, tachycardic in the 120s, glucose of 234, and a lactic acid of 2.9. podiatry and ID were consulted, started on vancomycin, cefepime with a 2 L IVF bolus (normalized the lactic acidosis by the next day). A1c was 6.3, started on low-dose sliding scale. Podiatry recommended no surgical intervention at this time and continue with IV antibiotics. ID discontinued vancomycin and cefepime; started Ancef. Blood cultures grew Pseudomonas, and wound cultures grew ESBL Klebsiella, and Acetobacter; restarted cefepime. With lower extremity edema and concern for CHF, an echocardiogram was ordered which showed EF of 65%, without any regional wall motion abnormalities and normal diastolic function. Patient's condition continued to improve, and on the day of discharge patient was switched to oral ciprofloxacin for 10 days. Patient understands and agrees with plans, medication, and recommendations.     Physical Exam:  /74   Pulse 75   Temp 98.1 °F (36.7 °C) (Oral)   Resp 16   Ht 5' 7\" (1.702 m)   Wt 283 lb 15.2 oz (128.8 kg)   SpO2 97%   BMI 44.47 kg/m²   General appearance: alert, appears stated age and cooperative  Head: Normocephalic, without obvious abnormality, atraumatic  Eyes: conjunctivae/corneas clear. PERRL, EOM's intact. Fundi benign. Ears: normal TM's and external ear canals both ears  Nose: Nares normal. Septum midline. Mucosa normal. No drainage or sinus tenderness. Throat: lips, mucosa, and tongue normal; teeth and gums normal  Neck: no adenopathy, no carotid bruit, no JVD, supple, symmetrical, trachea midline and thyroid not enlarged, symmetric, no tenderness/mass/nodules  Lungs: clear to auscultation bilaterally  Heart: regular rate and rhythm, S1, S2 normal, no murmur, click, rub or gallop  Abdomen: soft, non-tender; bowel sounds normal; no masses,  no organomegaly  Extremities: extremities normal, atraumatic, no cyanosis or edema  Neurologic: Grossly normal    Consults: ID  Significant Diagnostic Studies:   XR FOOT RIGHT (MIN 3 VIEWS)   Final Result   Impression:       1. Subcutaneous edema which could represent cellulitis. XR FOOT LEFT (MIN 3 VIEWS)   Final Result   Impression:   Linear areas of sclerosis of the heads of the third and fourth metatarsals, suspicious for recent or healing fractures. 2. Subcutaneous edema which could indicate cellulitis. XR TIBIA FIBULA LEFT (2 VIEWS)   Final Result   Impression:   1. No evidence of subcutis emphysema. 3. Subcutaneous trace edema which could represent cellulitis. XR TIBIA FIBULA RIGHT (2 VIEWS)   Final Result   Impression:    1. Subcutaneous edema which could represent cellulitis. 2. No evidence of subcutaneous emphysema.         Disposition: long term care facility  Discharged Condition: Stable  Follow Up: Primary Care Physician in one week    DISCHARGE MEDICATION:     Medication List      START taking these medications    ciprofloxacin 500 MG tablet  Commonly known as: CIPRO  Take 1 tablet by mouth 2 times daily for 10 days     NALOXONE OPIATE OVERDOSE KIT  1 each by Nasal route once for 1 dose     oxyCODONE-acetaminophen 5-325 MG per tablet  Commonly known as: PERCOCET  Take 1 tablet

## 2021-05-25 NOTE — PLAN OF CARE
Problem: Falls - Risk of:  Goal: Will remain free from falls  Description: Will remain free from falls  Outcome: Ongoing     Problem: Falls - Risk of:  Goal: Absence of physical injury  Description: Absence of physical injury  Outcome: Ongoing     Problem: Pain:  Goal: Pain level will decrease  Description: Pain level will decrease  Outcome: Ongoing     Problem: Pain:  Goal: Control of acute pain  Description: Control of acute pain  Outcome: Ongoing     Problem: Pain:  Goal: Control of chronic pain  Description: Control of chronic pain  Outcome: Ongoing     Problem: Skin Integrity:  Goal: Will show no infection signs and symptoms  Description: Will show no infection signs and symptoms  Outcome: Ongoing     Problem: Skin Integrity:  Goal: Absence of new skin breakdown  Description: Absence of new skin breakdown  Outcome: Ongoing     Problem: Nutrition  Goal: Optimal nutrition therapy  Outcome: Ongoing

## 2021-05-25 NOTE — PROGRESS NOTES
PTS to transfer patient via stretcher to [facility]. Attempted to call report with no answer. Will try again. All personal belongings went with patient.

## 2021-05-25 NOTE — CARE COORDINATION
Case Management Assessment            Discharge Note                    Date / Time of Note: 5/25/2021 12:39 PM                  Discharge Note Completed by: Madina Almazan RN    Patient Name: Moreno Mann   YOB: 1950  Diagnosis: Sepsis [facility]) [A41.9]   Date / Time: 5/19/2021  6:12 PM    Current PCP: No primary care provider on file. Clinic patient: No    Hospitalization in the last 30 days: Yes    Advance Directives:  Code Status: Full Code  [place] DNR form completed and on chart: No    Financial:  Payor: Hong Freitas / Plan: [facility] SOLUTIONS / Product Type: *No Product type* /      Pharmacy:    [facility] [address] [place] [address] [phone]  [address] [place] [id number]  Phone: [phone] Fax: [phone]    [facility] [address] [place] [address] [phone] [facility] [phone]  [address] [place] [address]  Phone: [phone] Fax: [phone]      Assistance purchasing medications?:    Assistance provided by Case Management: None at this time    Does patient want to participate in local refill/ meds to beds program?:      Meds To Beds General Rules:  1. Can ONLY be done Monday- Friday between 8:30am-5pm  2. Prescription(s) must be in pharmacy by 3pm to be filled same day  3. Copy of patient's insurance/ prescription drug card and patient face sheet must be sent along with the prescription(s)  4. Cost of Rx cannot be added to hospital bill. If financial assistance is needed, please contact unit  or ;  or  CANNOT provide pharmacy voucher for patients co-pays  5.  Patients can then  the prescription on their way out of the hospital at discharge, or pharmacy can deliver to the bedside if staff is available. (payment due at time of pick-up or delivery - cash, check, or card accepted)     Able to afford home medications/ co-pay costs: Yes    ADLS:  Current PT AM-PAC Score:   /24  Current OT AM-PAC Score:   /24      DISCHARGE Disposition:    ROSSY [address], [address] [facility] Phone: [phone]       FAX Fax: [phone]            LOC at discharge: [address] Completed: Yes    Notification completed in HENS/PAS?:  Not Applicable    IMM Completed:   Not Indicated    Transportation:  Transportation PLAN for discharge: EMS transportation   Mode of Transport: Ambulance stretcher - BLS  Reason for medical transport: Bed confined: Meets the following criteria 1) unable to get out of bed without assistance or ambulate, 2) unable to safely sit up in a wheelchair, 3) unable to maintain erect seating position in a chair for time needed for transport  Name of Transport Company: [address]  Phone: [phone]  Time of Transport: 1732    Transport form completed: Yes    Home Care:  [address] ordered at discharge: No  [address]: Not Applicable  Orders faxed: No    Durable Medical Equipment:  DME Provider: defer  Equipment obtained during hospitalization: efer    Home Oxygen and Respiratory Equipment:  Oxygen needed at discharge?: No  [address]: Not Applicable  Portable tank available for discharge?: No    Dialysis:  Dialysis patient: No    Dialysis Center:  Not Applicable    Hospice Services:  Location: Not Applicable  Agency: Not Applicable    Consents signed: Not Indicated    Referrals made at [facility] for outpatient continued care:  Not Applicable    Additional CM Notes:   CM confirmed  D/C back to LTC facility . CM spoke with Camella Plants in admissions;   [phone]:    Confirmed  No Covid test  Or  Pre cert  Needed to return . Pt from [facility] care, will return today 5/25   via [facility] care [phone] @ [address] [place] [address]   , nurse to call report [phone]   orders faxed to [phone].     [address] [phone]   Called  And  Aware  , currently at Bedside  With patient . The Plan for Transition of Care is related to the following treatment goals of Sepsis [facility]) [A41.9]    The Patient and/or patient representative Larissa Sims and her family were provided with a choice of provider and agrees with the discharge plan Yes    Freedom of choice list was provided with basic dialogue that supports the patient's individualized plan of care/goals and shares the quality data associated with the providers.  Yes    Care Transitions patient: No    Maryana Bay RN  [facility]  Case Management Department  Ph: [phone]

## 2021-05-25 NOTE — PROGRESS NOTES
ID Follow-up NOTE    CC:   R LE cellulitis, Pseudomonas bacteremia  Antibiotics: Cefepime    Admit Date: 5/19/2021  Hospital Day: 7    Subjective:     Patient c/o R leg pain  No other complaint     Objective:     Patient Vitals for the past 8 hrs:   BP Temp Temp src Pulse Resp SpO2   05/25/21 1156 121/74 98.1 °F (36.7 °C) Oral 75 16 97 %   05/25/21 0825 133/69 98.2 °F (36.8 °C) Oral 77 16 95 %     I/O last 3 completed shifts: In: 300 [P.O.:300]  Out: 1425 [Urine:1425]  I/O this shift: In: 480 [P.O.:480]  Out: -     EXAM:  GENERAL: No apparent distress.     HEENT: Membranes moist, no oral lesion  NECK:  Supple, no lymphadenopathy  LUNGS: Clear b/l, no rales, no dullness  CARDIAC: RRR, no murmur appreciated  ABD:  + BS, soft / NT  EXT:  No rash, no edema, no lesions - bilateral LE with dressing / ACE  NEURO: No focal neurologic findings  PSYCH: Orientation, sensorium, mood normal  LINES:  Peripheral iv       Data Review:  Lab Results   Component Value Date    WBC 9.0 05/25/2021    HGB 11.0 (L) 05/25/2021    HCT 32.3 (L) 05/25/2021    MCV 91.6 05/25/2021     05/25/2021     Lab Results   Component Value Date    CREATININE 0.8 05/25/2021    BUN 22 (H) 05/25/2021     05/25/2021    K 4.4 05/25/2021     05/25/2021    CO2 21 05/25/2021       Hepatic Function Panel:   Lab Results   Component Value Date    ALKPHOS 232 04/29/2021    ALT 91 04/29/2021     04/29/2021    PROT 6.5 04/29/2021    PROT 8.0 08/16/2012    BILITOT 2.3 04/29/2021    BILIDIR 1.2 04/29/2021    IBILI 1.1 04/29/2021    LABALBU 2.0 05/23/2021     Micro:  5/19 BC x 2 + Ps putida  5/19 Leg wound - light Pseudomonas fluorescens/putida, K pneumoniae ESBL  Pseudomonas fluorescens putida group (1)  Antibiotic Interpretation JENNIFER   cefepime Sensitive 8 mcg/mL   ciprofloxacin Sensitive <=1 mcg/mL   gentamicin Sensitive <=4 mcg/mL   meropenem Sensitive 2 mcg/mL   piperacillin-tazobactam Sensitive <=16 mcg/mL   tobramycin Sensitive <=4 mcg/mL Acinetobacter baumannii/haemolyticus (2)  Antibiotic Interpretation JENNIFER   ampicillin-sulbactam Sensitive <=8/4 mcg/mL   cefepime Sensitive <=2 mcg/mL   cefTRIAXone Sensitive 8 mcg/mL   ciprofloxacin Sensitive <=1 mcg/mL   gentamicin Sensitive <=4 mcg/mL   meropenem Sensitive <=1 mcg/mL   tobramycin Sensitive <=4 mcg/mL   trimethoprim-sulfamethoxazole Sensitive <=2/38 mcg/mL     Klebsiella pneumoniae (esbl) (3)  Antibiotic Interpretation JENNIFER   amoxicillin-clavulanate Intermediate 16/8 mcg/mL   ampicillin Resistant >16 mcg/mL   ceFAZolin Resistant >16 mcg/mL   cefepime Resistant >16 mcg/mL   cefTRIAXone Resistant >32 mcg/mL   cefuroxime Resistant >16 mcg/mL   ciprofloxacin Resistant >2 mcg/mL   ertapenem Sensitive <=0.5 mcg/mL   gentamicin Sensitive <=4 mcg/mL   meropenem Sensitive <=1 mcg/mL   trimethoprim-sulfamethoxazole Resistant >2/38 mcg/mL       Imagin/19 x-rays --  XR FOOT LEFT (MIN 3 VIEWS)   Final Result   Impression:   Linear areas of sclerosis of the heads of the third and fourth metatarsals, suspicious for recent or healing fractures. 2. Subcutaneous edema which could indicate cellulitis.       XR TIBIA FIBULA LEFT (2 VIEWS)   Final Result   Impression:   1. No evidence of subcutis emphysema. 3. Subcutaneous trace edema which could represent cellulitis.       XR TIBIA FIBULA RIGHT (2 VIEWS)   Final Result   Impression:    1. Subcutaneous edema which could represent cellulitis. 2. No evidence of subcutaneous emphysema.        Scheduled Meds:   nicotine  1 patch Transdermal Daily    sennosides-docusate sodium  1 tablet Oral Daily    cefepime  2,000 mg Intravenous Q8H    heparin (porcine)  5,000 Units Subcutaneous 3 times per day    insulin lispro  0-6 Units Subcutaneous TID     insulin lispro  0-3 Units Subcutaneous Nightly    Unna-Flex Elastic Unna Boot  2 each Topical Once    famotidine  40 mg Oral Daily    metoprolol tartrate  12.5 mg Oral BID    OLANZapine  5 mg Oral Nightly

## 2021-05-26 NOTE — ED PROVIDER NOTES
[address] [facility] [address] ENCOUNTER          PHYSICIAN ASSISTANT NOTE       Date of evaluation: 5/26/2021    Chief Complaint     Medication Refill (just discharged from ED, wants pain medication)      History of Present Illness     Cora Velasquez is a 65 y.o. female who presents with complaints of pain and seeking a medication refill. Patient was seen and discharged from the emergency department earlier today after presenting with complaints of chest and back pain. Patient had a cardiac and PE work-up which was unremarkable. The patient was advised to stay for stress test however she declined. The patient was also discharged from the hospital yesterday being treated for lower extremity cellulitis. Patient states Sonam Hannon are not giving me my medicine at the nursing home\" and states she needs a refill of her antibiotic and her tramadol. Other than continued leg pain the patient is denying any new symptoms. Patient denies fever, chills, chest pain, shortness of breath, nausea, vomiting, abdominal pain, diarrhea, or constipation. With the exception of the above, there are no aggravating or alleviating factors. Review of Systems     ROS: Pertinent positive and negative findings as documented in the HPI, otherwise all other systems were reviewed and were negative. 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 She has a past surgical history that includes Cholecystectomy (2/2011).   Her family history includes Heart Disease in her father; High Blood Pressure in her father. She reports that she has been smoking cigarettes. She has a 24.50 pack-year smoking history. She has never used smokeless tobacco. She reports that she does not drink alcohol and does not use drugs. Medications     Discharge Medication List as of 5/26/2021  3:18 PM      CONTINUE these medications which have NOT CHANGED    Details   oxyCODONE-acetaminophen (PERCOCET) 5-325 MG per tablet Take 1 tablet by mouth every 6 hours as needed for Pain for up to 3 days. , Disp-12 tablet, R-0Print      Balsam [place]-Castor Oil (VENELEX) OINT ointment Apply topically 2 times daily, Topical, 2 TIMES DAILY Starting Tue 5/4/2021, Disp-1 Tube, R-0, Normal      metoprolol tartrate (LOPRESSOR) 25 MG tablet Take 0.5 tablets by mouth 2 times daily, Disp-60 tablet, R-3Normal      furosemide (LASIX) 20 MG tablet Take 1 tablet by mouth daily, Disp-30 tablet, R-0Normal      polyethylene glycol (GLYCOLAX) 17 g packet Take 17 g by mouth three times a week Every Monday, Wednesday and Friday for bowel regimenHistorical Med      famotidine (PEPCID) 20 MG tablet Take 40 mg by mouth daily Historical Med      hydrOXYzine (ATARAX) 25 MG tablet Take 25 mg by mouth dailyHistorical Med      lactulose (CHRONULAC) 10 GM/15ML solution Take 30 mLs by mouth dailyHistorical Med      melatonin 5 MG TABS tablet Take 5 mg by mouth nightly as needed (insomnia) Historical Med      OLANZapine (ZYPREXA) 5 MG tablet Take 5 mg by mouth nightlyHistorical Med      carboxymethylcellulose (REFRESH TEARS) 0.5 % SOLN ophthalmic solution Place 1 drop into both eyes every 6 hours as needed (dry, red eyes) Historical Med      senna-docusate (SENNA-PLUS) 8.6-50 MG per tablet Take 2 tablets by mouth daily as needed for ConstipationHistorical Med      spironolactone (ALDACTONE) 50 MG tablet Take 50 mg by mouth dailyHistorical Med      Multiple Vitamins-Minerals (THERAPEUTIC MULTIVITAMIN-MINERALS) tablet Take 1 tablet by mouth dailyHistorical Med      Cholecalciferol (VITAMIN D3) 1.25 MG (02834 UT) CAPS Take 1 capsule by mouth every 30 days Every Month on the 15thHistorical Med             Allergies     She is allergic to cyclobenzaprine, penicillins, acetaminophen, codeine, diphenhydramine hcl, ketorolac, naproxen, diphenhydramine, ketorolac tromethamine, ketorolac tromethamine, and sulfamethoxazole-trimethoprim. Physical Exam     INITIAL VITALS: BP: (!) 152/69, Temp: 98 °F (36.7 °C), Pulse: 92, Resp: 16, SpO2: 96 %    General: 65 y.o. female in no apparent distress, well developed, obese, non-toxic appearance. HEENT: Atraumatic, normocephalic. EOMs intact. Neck:  Full range of motion. Chest/pulm: Respiratory rate normal. Speaks in complete sentences, no respiratory distress, lungs CTA bilaterally, no wheezes, rales, rhonchi. Cardiovascular: Heart rate normal. RRR, no murmurs, rubs, gallops appreciated. Abdomen: No gross distension. Soft, non-tender, non-peritoneal. No suprapubic or CVAT. : Deferred. Musculoskeletal: No evident long bone or joint deformity. Bilateral lower extremities wrapped. Neuro: A&O x 4. Normal speech without dysarthria or aphasia. Moves all extremities spontaneously and symmetrically. Movements normal without ataxia. Skin: Warm, dry. No obvious rashes, petechiae, or purpura. Psych: Appropriate mood and affect, normal interaction. Diagnostic Results     RADIOLOGY:  No orders to display       LABS:   No results found for this visit on 05/26/21. RECENT VITALS:  BP: (!) 149/77, Temp: 98 °F (36.7 °C), Pulse: 92, Resp: 16, SpO2: 98 %     Procedures     None    ED Course     Nursing Notes, Past Medical Hx,Past Surgical Hx, Social Hx, Allergies, and Family Hx were reviewed.     The patient was given the following medications:  Orders Placed This Encounter   Medications    ciprofloxacin (CIPRO) 500 MG tablet     Sig: Take 1 tablet by mouth 2 times daily for 10 days     Dispense:  20 tablet     Refill:  0 CONSULTS:  None    MEDICAL DECISION MAKING / ASSESSMENT / Stephaniekita Ayala is a 65 y.o. female who presents to the emergency department for medication refill. On presentation, patient is well-appearing and in no acute distress. Patient is afebrile with normal VS.    Nursing staff verified with the nursing facility that she has in fact been receiving her antibiotics for her lower extremity cellulitis. Patient had been discharged from the hospital with a short course of Norco.  I informed her I would not be refilling her narcotic pain medication and that she needs to follow-up with primary care or pain management for management of chronic back pain. I asked if the patient would like to stay for the stress test that the prior team wanted to obtain earlier today. The patient declined this. At this point in time, patient is stable for discharge. Patient was given strict return precautions as outlined in the AVS. Patient was agreeable and understanding to this plan of care. Prior to discharge, patient was PO tolerant. The patient was seen independently by me, the advanced practice provider. The patient and / or the family were informed of the results of any tests, a time was given to answer questions, a plan was proposed and they agreed with plan. Clinical Impression     1. Encounter for medication refill    2.  Drug-seeking behavior        Disposition     PATIENT REFERRED TO:  [place] [id number]  [phone]    Schedule an appointment as soon as possible for a visit         DISCHARGE MEDICATIONS:  Discharge Medication List as of 5/26/2021  3:18 PM          DISPOSITION Decision To Discharge 05/26/2021 03:05:47 PM        Juana Pierce PA-C  05/26/21 0124

## 2021-05-26 NOTE — ED PROVIDER NOTES
[address]NCOUN[address]          ATTENDING PHYSICIAN NOTE       Date of evaluation: 5/26/2021    ADDENDUM:      Care of this patient was assumed from Dr. Elisa Parson. The patient was seen for Chest Pain and Leg Pain  . The patient's initial evaluation and plan have been discussed with the prior provider who initially evaluated the patient. Nursing Notes, Past Medical Hx, Past Surgical Hx, Social Hx, Allergies, and Family Hx were all reviewed. Diagnostic Results     RADIOLOGY:  CT CHEST PULMONARY EMBOLISM W CONTRAST   Final Result   1. Limited by artifact. No PE visualized. No aortic dissection or    aneurysm. 2.  Cirrhotic liver and evidence of portal hypertension. Mild ascites in    the upper abdomen. 3.  Query thickened small bowel loops in the left abdomen, partial    limited visualization. XR CHEST PORTABLE   Final Result   1. Very low lung volumes limits evaluation. 2.  Mild left mid and lower lung atelectasis or airspace disease.               LABS:   Results for orders placed or performed during the hospital encounter of 05/26/21   CBC auto differential   Result Value Ref Range    WBC 8.0 4.0 - 11.0 K/uL    RBC 3.39 (L) 4.00 - 5.20 M/uL    Hemoglobin 10.5 (L) 12.0 - 16.0 g/dL    Hematocrit 30.4 (L) 36.0 - 48.0 %    MCV 89.6 80.0 - 100.0 fL    MCH 31.0 26.0 - 34.0 pg    MCHC 34.6 31.0 - 36.0 g/dL    RDW 16.4 (H) 12.4 - 15.4 %    Platelets 230 319 - 441 K/uL    MPV 8.0 5.0 - 10.5 fL    Neutrophils % 75.0 %    Lymphocytes % 10.0 %    Monocytes % 11.0 %    Eosinophils % 1.0 %    Basophils % 0.0 %    Neutrophils Absolute 6.2 1.7 - 7.7 K/uL    Lymphocytes Absolute 0.8 (L) 1.0 - 5.1 K/uL    Monocytes Absolute 0.9 0.0 - 1.3 K/uL    Eosinophils Absolute 0.1 0.0 - 0.6 K/uL    Basophils Absolute 0.0 0.0 - 0.2 K/uL    Metamyelocytes Relative 3 (A) %    RBC Morphology Normal     Anisocytosis Occasional (A)     Polychromasia Occasional (A)     Hypochromia Occasional (A) Basic Metabolic Panel (EP - 1)   Result Value Ref Range    Sodium 138 136 - 145 mmol/L    Potassium 4.2 3.5 - 5.1 mmol/L    Chloride 110 99 - 110 mmol/L    CO2 22 21 - 32 mmol/L    Anion Gap 6 3 - 16    Glucose 107 (H) 70 - 99 mg/dL    BUN 20 7 - 20 mg/dL    CREATININE 0.7 0.6 - 1.2 mg/dL    GFR Non-African American >60 >60    GFR African American >60 >60    Calcium 8.3 8.3 - 10.6 mg/dL   Troponin   Result Value Ref Range    Troponin <0.01 <0.01 ng/mL   Troponin   Result Value Ref Range    Troponin 0.01 <0.01 ng/mL   Troponin (lab)   Result Value Ref Range    Troponin 0.01 <0.01 ng/mL   Hepatic function panel (LFTs)   Result Value Ref Range    Total Protein 6.8 6.4 - 8.2 g/dL    Albumin 2.2 (L) 3.4 - 5.0 g/dL    Alkaline Phosphatase 177 (H) 40 - 129 U/L     (H) 10 - 40 U/L     (H) 15 - 37 U/L    Total Bilirubin 1.5 (H) 0.0 - 1.0 mg/dL    Bilirubin, Direct 0.7 (H) 0.0 - 0.3 mg/dL    Bilirubin, Indirect 0.8 0.0 - 1.0 mg/dL       RECENT VITALS:  BP: 138/60, Temp: 98.4 °F (36.9 °C), Pulse: 103, Resp: 26, SpO2: 95 %       ED Course     The patient was given the following medications:  Orders Placed This Encounter   Medications    iopamidol (ISOVUE-370) 76 % injection 80 mL    morphine (PF) injection 2 mg       CONSULTS:  None    MEDICAL DECISION MAKING / ASSESSMENT / Leata Alma is a 65 y.o. female presenting with chest pain as well as right sided back pain and right leg pain that began around 2:00 in the morning. The patient is describing the pain to me as sharp and on my examination, is entirely reproducible over her anterior chest wall is well as her back in the areas described. She is uncomfortable as a result and has to lay on her side. Her initial cardiac work-up was negative with an EKG that does not show any signs of acute ischemia as well as 3 troponins .<=01.   The patient was tachycardic but not hypoxic and because of the nature of the chest pain a CTPA was performed which is negative for PE. We have advised that the patient undergo stress testing but she is refusing at this time to undergo any further cardiac work-up. She was given morphine for the pain as she is allergic to anti-inflammatories transiently helped the pain. She stated that she had to get back to the [facility] care facility to meet with her son and did not wish to have any further testing here in the emergency department and refused a same-day stress test or admission. Based on my exam, I feel that the pain is likely musculoskeletal and she already has oxycodone for pain as she is allergic to NSAIDs and acetaminophen. She will be advised to take this for the pain. Clinical Impression     1. Chest wall pain    2.  Acute right-sided thoracic back pain        Disposition     PATIENT REFERRED TO:      Schedule an appointment as soon as possible for a visit in 3 days  Follow up within 3 days, Return to ED sooner if symptoms worsen      DISCHARGE MEDICATIONS:  Current Discharge Medication List          DISPOSITION Decision To Discharge 05/26/2021 10:12:31 AM       Juan Antonio Em MD  05/26/21 1028

## 2021-05-26 NOTE — ED PROVIDER NOTES
father; High Blood Pressure in her father. She reports that she has been smoking cigarettes. She has a 24.50 pack-year smoking history. She has never used smokeless tobacco. She reports that she does not drink alcohol and does not use drugs. Medications     Previous Medications    BALSAM PERU-CASTOR OIL (VENELEX) OINT OINTMENT    Apply topically 2 times daily    CARBOXYMETHYLCELLULOSE (REFRESH TEARS) 0.5 % SOLN OPHTHALMIC SOLUTION    Place 1 drop into both eyes every 6 hours as needed (dry, red eyes)     CHOLECALCIFEROL (VITAMIN D3) 1.25 MG (80628 UT) CAPS    Take 1 capsule by mouth every 30 days Every Month on the 15th    CIPROFLOXACIN (CIPRO) 500 MG TABLET    Take 1 tablet by mouth 2 times daily for 10 days    FAMOTIDINE (PEPCID) 20 MG TABLET    Take 40 mg by mouth daily     FUROSEMIDE (LASIX) 20 MG TABLET    Take 1 tablet by mouth daily    HYDROXYZINE (ATARAX) 25 MG TABLET    Take 25 mg by mouth daily    LACTULOSE (CHRONULAC) 10 GM/15ML SOLUTION    Take 30 mLs by mouth daily    MELATONIN 5 MG TABS TABLET    Take 5 mg by mouth nightly as needed (insomnia)     METOPROLOL TARTRATE (LOPRESSOR) 25 MG TABLET    Take 0.5 tablets by mouth 2 times daily    MULTIPLE VITAMINS-MINERALS (THERAPEUTIC MULTIVITAMIN-MINERALS) TABLET    Take 1 tablet by mouth daily    OLANZAPINE (ZYPREXA) 5 MG TABLET    Take 5 mg by mouth nightly    OXYCODONE-ACETAMINOPHEN (PERCOCET) 5-325 MG PER TABLET    Take 1 tablet by mouth every 6 hours as needed for Pain for up to 3 days.     POLYETHYLENE GLYCOL (GLYCOLAX) 17 G PACKET    Take 17 g by mouth three times a week Every Monday, Wednesday and Friday for bowel regimen    SENNA-DOCUSATE (SENNA-PLUS) 8.6-50 MG PER TABLET    Take 2 tablets by mouth daily as needed for Constipation    SPIRONOLACTONE (ALDACTONE) 50 MG TABLET    Take 50 mg by mouth daily       Allergies     She is allergic to cyclobenzaprine, penicillins, acetaminophen, codeine, diphenhydramine hcl, ketorolac, naproxen, diphenhydramine, ketorolac tromethamine, ketorolac tromethamine, and sulfamethoxazole-trimethoprim. Physical Exam     INITIAL VITALS: BP: (!) 150/77, Temp: 98.4 °F (36.9 °C), Pulse: 100, Resp: 18, SpO2: 94 %   Physical Exam  Vitals and nursing note reviewed. Constitutional:       General: She is not in acute distress. HENT:      Head: Normocephalic and atraumatic. Mouth/Throat:      Mouth: Mucous membranes are moist.      Pharynx: No oropharyngeal exudate. Eyes:      General: No scleral icterus. Extraocular Movements: Extraocular movements intact. Conjunctiva/sclera: Conjunctivae normal.      Pupils: Pupils are equal, round, and reactive to light. Cardiovascular:      Rate and Rhythm: Normal rate and regular rhythm. Heart sounds: Normal heart sounds. Pulmonary:      Effort: Pulmonary effort is normal.      Breath sounds: Normal breath sounds. No wheezing, rhonchi or rales. Comments: Patient does have reproducible chest wall tenderness on the left parasternal border, though states that this pain is different than the pain she was experiencing. Chest:      Chest wall: Tenderness present. Abdominal:      General: Bowel sounds are normal.      Palpations: Abdomen is soft. Tenderness: There is no abdominal tenderness. There is no guarding or rebound. Musculoskeletal:         General: Swelling present. Normal range of motion. Cervical back: Normal range of motion and neck supple. Comments: Dressings in place over bilateral lower extremities. Skin:     General: Skin is warm and dry. Neurological:      General: No focal deficit present. Mental Status: She is alert and oriented to person, place, and time. Cranial Nerves: No cranial nerve deficit. Motor: No weakness.       Coordination: Coordination normal.         Diagnostic Results     EKG   EKG as interpreted by me shows patient to be in a normal sinus rhythm with a normal axis, normal UT and QT intervals, normal QRS duration, no ST segment abnormalities, no T wave abnormalities. RADIOLOGY:  XR CHEST PORTABLE   Final Result   1. Very low lung volumes limits evaluation. 2.  Mild left mid and lower lung atelectasis or airspace disease.           CT CHEST PULMONARY EMBOLISM W CONTRAST    (Results Pending)       LABS:   Results for orders placed or performed during the hospital encounter of 05/26/21   CBC auto differential   Result Value Ref Range    WBC 8.0 4.0 - 11.0 K/uL    RBC 3.39 (L) 4.00 - 5.20 M/uL    Hemoglobin 10.5 (L) 12.0 - 16.0 g/dL    Hematocrit 30.4 (L) 36.0 - 48.0 %    MCV 89.6 80.0 - 100.0 fL    MCH 31.0 26.0 - 34.0 pg    MCHC 34.6 31.0 - 36.0 g/dL    RDW 16.4 (H) 12.4 - 15.4 %    Platelets 568 855 - 721 K/uL    MPV 8.0 5.0 - 10.5 fL    Neutrophils % 75.0 %    Lymphocytes % 10.0 %    Monocytes % 11.0 %    Eosinophils % 1.0 %    Basophils % 0.0 %    Neutrophils Absolute 6.2 1.7 - 7.7 K/uL    Lymphocytes Absolute 0.8 (L) 1.0 - 5.1 K/uL    Monocytes Absolute 0.9 0.0 - 1.3 K/uL    Eosinophils Absolute 0.1 0.0 - 0.6 K/uL    Basophils Absolute 0.0 0.0 - 0.2 K/uL    Metamyelocytes Relative 3 (A) %    RBC Morphology Normal     Anisocytosis Occasional (A)     Polychromasia Occasional (A)     Hypochromia Occasional (A)    Basic Metabolic Panel (EP - 1)   Result Value Ref Range    Sodium 138 136 - 145 mmol/L    Potassium 4.2 3.5 - 5.1 mmol/L    Chloride 110 99 - 110 mmol/L    CO2 22 21 - 32 mmol/L    Anion Gap 6 3 - 16    Glucose 107 (H) 70 - 99 mg/dL    BUN 20 7 - 20 mg/dL    CREATININE 0.7 0.6 - 1.2 mg/dL    GFR Non-African American >60 >60    GFR African American >60 >60    Calcium 8.3 8.3 - 10.6 mg/dL   Troponin   Result Value Ref Range    Troponin <0.01 <0.01 ng/mL   Troponin   Result Value Ref Range    Troponin 0.01 <0.01 ng/mL     RECENT VITALS:  BP: (!) 136/46,Temp: 98.4 °F (36.9 °C), Pulse: 95, Resp: 29, SpO2: 94 %     Procedures     N/A    ED Course     Nursing Notes, Past Medical Hx, Past Surgical Hx, Social Hx,Allergies, and Family Hx were reviewed. patient was given the following medications:  Orders Placed This Encounter   Medications    iopamidol (ISOVUE-370) 76 % injection 80 mL       CONSULTS:  None    MEDICAL DECISIONMAKING / ASSESSMENT / Lucrecia Ashley is a 65 y.o. female with multiple chronic medical problems was recently admitted to the hospital for lower extremity cellulitis presents complaining of chest pain. Patient describes an atypical chest pain, with it being a sharp pain located in center of the chest with associated shortness of breath. Patient denies having pain like this in the past. On arrival, the patient is hemodynamically stable. She has clear breath sounds and normal heart sounds. She does have reproducible chest wall tenderness but states this pain feels different than the pain she has been experiencing. EKG shows no acute ischemic abnormalities. Chest x-ray is unremarkable. Troponin is negative. CTPA was obtained due to the patient's recent hospitalization which is pending. At this time, care of the patient will be turned over to the oncoming provider who complete the patient's work-up and disposition pending results of the CTPA with anticipation of need for admission for cardiac risk stratification. Clinical Impression     1. Chest pain, unspecified type        Disposition     PATIENT REFERRED TO:  No follow-up provider specified.     DISCHARGE MEDICATIONS:  New Prescriptions    No medications on file       [id number] Meggan Cloud MD  05/26/21 0101

## 2021-05-26 NOTE — ED NOTES
[facility] [place] for [facility]/no answer/pt discharged with ambulance to [facility].      Cicero Sandifer, RN  05/26/21 3441

## 2021-05-26 NOTE — FLOWSHEET NOTE
05/25/21 1520   Encounter Summary   Services provided to: Patient   Referral/Consult From: Hong   Continue Visiting   (5/25/21, AI.)   Complexity of Encounter Moderate   Length of Encounter 15 minutes   Routine   Type Follow up   Assessment Approachable   Intervention Nurtured hope   Outcome Expressed gratitude

## 2021-06-17 PROBLEM — K66.8 PNEUMOPERITONEUM: Status: ACTIVE | Noted: 2021-01-01

## 2021-06-18 NOTE — ED PROVIDER NOTES
[address] ENCOUNTER          PHYSICIAN ASSISTANT NOTE       Date of evaluation: 6/17/2021    Chief Complaint     Abnormal Test Results (elevated white count)      History of Present Illness     Sarah Altamirano is a 65 y.o. female, with a history of hypertension, spinal stenosis with chronic back pain, anxiety, dementia, emphysema and remote cholecystectomy, who presents to the ED with complaints of generalized abdominal pain associated with nausea and vomiting. This started just a couple of hours prior to arrival.  States her last bowel movement was approximately 1 week ago. She describes her pain as a constant ache, no aggravating or alleviating factors. She rates it an 8 out of 10 in severity, does not radiate. Patient also states she developed a productive cough today and has been feeling short of breath since last night. Has had no fever or chills. Labs from the nursing home today indicated a white blood cell count of 14, up from 4.4 oneweek ago prompting her transfer to the ED. She was started on Levaquin for presumed UTI. She does have a history of recurrent cellulitis of her lower extremities for which she was recently admitted 1 month ago with subsequent sepsis and Pseudomonas bacteremia. She reportedly is noncompliant with her medications at the nursing home other than her pain medication and frequently demands narcotics. This was relayed by her nurse practitioner who can be reached at [phone]. Review of Systems     Review of Systems   Constitutional: Negative for chills and fever. HENT: Negative for congestion, rhinorrhea and sore throat. Respiratory: Positive for cough and shortness of breath. Cardiovascular: Positive for leg swelling (chronic). Negative for chest pain and palpitations. Gastrointestinal: Positive for abdominal pain, constipation, nausea and vomiting. Genitourinary: Negative for dysuria, frequency and urgency. Musculoskeletal: Negative for arthralgias and myalgias. Skin: Negative for color change and rash. Neurological: Negative for dizziness, light-headedness and headaches. All other systems reviewed and are negative. Past Medical, Surgical, Family, and Social History     She has a past medical history of Anxiety, Back pain, Bronchitis, Cancer ([address] Chronic pain, Dementia ([facility] [address] Drug-seeking behavior, Emphysema lung ([facility] [address] ESBL (extended spectrum beta-lactamase) producing bacteria infection, GERD (gastroesophageal reflux disease), Hepatitis C, HTN (hypertension), Insomnia, Lumbar disc disease, Multiple drug resistant organism (MDRO) culture positive, Osteoporosis, and Spinal stenosis. She has a past surgical history that includes Cholecystectomy (2/2011). Her family history includes Heart Disease in her father; High Blood Pressure in her father. She reports that she has been smoking cigarettes. She has a 24.50 pack-year smoking history. She has never used smokeless tobacco. She reports that she does not drink alcohol and does not use drugs. Medications     Previous Medications    BALSAM PERU-CASTOR OIL (VENELEX) OINT OINTMENT    Apply topically 2 times daily    CARBOXYMETHYLCELLULOSE (REFRESH TEARS) 0.5 % SOLN OPHTHALMIC SOLUTION    Place 1 drop into both eyes every 6 hours as needed (dry, red eyes)     CHOLECALCIFEROL (VITAMIN D3) 1.25 MG (98514 UT) CAPS    Take 1 capsule by mouth every 30 days Every Month on the 15th    FAMOTIDINE (PEPCID) 20 MG TABLET    Take 40 mg by mouth daily     FUROSEMIDE (LASIX) 40 MG TABLET    Take 40 mg by mouth 2 times daily    GABAPENTIN (NEURONTIN) 100 MG CAPSULE    Take 100 mg by mouth 3 times daily.     HYDROXYZINE (ATARAX) 25 MG TABLET    Take 25 mg by mouth daily    LACTULOSE (CHRONULAC) 10 GM/15ML SOLUTION    Take 30 mLs by mouth daily    MELATONIN 5 MG TABS TABLET    Take 5 mg by mouth nightly as needed (insomnia)     METOPROLOL TARTRATE (LOPRESSOR) 25 MG TABLET Take 0.5 tablets by mouth 2 times daily    MULTIPLE VITAMINS-MINERALS (THERAPEUTIC MULTIVITAMIN-MINERALS) TABLET    Take 1 tablet by mouth daily    OLANZAPINE (ZYPREXA) 5 MG TABLET    Take 5 mg by mouth nightly    POLYETHYLENE GLYCOL (GLYCOLAX) 17 G PACKET    Take 17 g by mouth three times a week Every Monday, Wednesday and Friday for bowel regimen    POTASSIUM CHLORIDE (KLOR-CON M) 20 MEQ EXTENDED RELEASE TABLET    Take 1 tablet by mouth daily    SENNA-DOCUSATE (SENNA-PLUS) 8.6-50 MG PER TABLET    Take 2 tablets by mouth daily as needed for Constipation    SPIRONOLACTONE (ALDACTONE) 50 MG TABLET    Take 50 mg by mouth daily    TRAMADOL (ULTRAM) 50 MG TABLET    Take 50 mg by mouth every 6 hours. Allergies     She is allergic to cyclobenzaprine, penicillins, acetaminophen, codeine, diphenhydramine hcl, ketorolac, naproxen, diphenhydramine, ketorolac tromethamine, ketorolac tromethamine, and sulfamethoxazole-trimethoprim. Physical Exam     INITIAL VITALS: BP: (!) 154/83, Temp: 98.3 °F (36.8 °C), Pulse: 115, Resp: 18, SpO2: 94 %  Physical Exam  Vitals reviewed. Constitutional:       General: She is not in acute distress. Appearance: She is well-developed. She is obese. She is not ill-appearing. HENT:      Head: Normocephalic and atraumatic. Mouth/Throat:      Mouth: Mucous membranes are dry. Eyes:      Extraocular Movements: Extraocular movements intact. Conjunctiva/sclera: Conjunctivae normal.   Neck:      Trachea: Phonation normal.   Cardiovascular:      Rate and Rhythm: Regular rhythm. Tachycardia present. Heart sounds: No murmur heard. No friction rub. No gallop. Comments: Legs wrapped in ace wraps, + edema. Pulmonary:      Effort: Pulmonary effort is normal. No respiratory distress. Breath sounds: No wheezing, rhonchi or rales. Abdominal:      General: There is no distension. Palpations: Abdomen is soft. Tenderness:  There is generalized abdominal tenderness. There is no guarding or rebound. Musculoskeletal:      Cervical back: Normal range of motion and neck supple. Skin:     General: Skin is warm and dry. Findings: No petechiae or rash. Neurological:      Mental Status: She is alert and oriented to person, place, and time. Psychiatric:         Mood and Affect: Mood and affect normal.         Behavior: Behavior normal.       RECENT VITALS:  BP: (!) 154/83, Temp: 98.3 °F (36.8 °C), Pulse: 115, Resp: 18, SpO2: 94 %     ED Course     Nursing Notes, Past Medical Hx,Past Surgical Hx, Social Hx, Allergies, and Family Hx were reviewed. The patient was given the following medications:  Orders Placed This Encounter   Medications    ondansetron (ZOFRAN) injection 4 mg    dicyclomine (BENTYL) injection 20 mg    0.9 % sodium chloride bolus    iopamidol (ISOVUE-370) 76 % injection 80 mL    magnesium sulfate 2000 mg in 50 mL IVPB premix    morphine injection 4 mg    ondansetron (ZOFRAN) injection 4 mg    ondansetron (ZOFRAN) 4 MG/2ML injection     Marcella Toledo: cabinet override       CONSULTS:  C/ Duane Marin 19 / ASSESSMENT / Breanna Zaira is a 65 y.o. female presenting with complaints of productive cough and shortness of breath started last night as well as generalized abdominal pain, nausea and vomiting that started a couple of hours prior to arrival.  States she has not had a bowel movement in 1 week. She did have an elevated white blood cell count today and was therefore referred into the ED for additional evaluation. Lungs are clear on exam, she does have generalized abdominal tenderness on exam, nothing localized. She is mildly tachycardic, afebrile, vital signs are otherwise stable. IV access was established. She was initially given IV Zofran and IM Bentyl with minimal improvement. Was also given 1 L of normal saline. She was later given morphine for persistent pain.     Labs include a CBC with a white blood cell count of 17,000, it is otherwise unremarkable. Renal panel results a sodium of 135, glucose 171, it is otherwise unremarkable. Lactate is elevated at 3.3. Magnesium is 1.5. Liver panel results a bilirubin of 3.3, up from 1.5 3 weeks ago with elevated, but stable, transaminases, , . Albumin is 2.8, up from 2.23 weeks ago. It is otherwise unremarkable. Lipase is normal.    Given the patient generalized abdominal tenderness and elevated white blood cell count, a CT of the abdomen and pelvis with IV contrast was ordered. The radiologist called with a preliminary report of free air in the upper abdomen and right upper quadrant with no obvious site of perforation noted. Formal report also showed distended proximal to mid small bowel with distal small bowel not distended, consistent with small bowel obstruction. Stool throughout the colon with distal colonic diverticulosis. Small fat-containing umbilical hernia, air within the hernia with a possible skin defect anteriorly at the umbilicus. General surgery was consulted. The patient will be admitted to the hospital with likely surgical intervention given evidence of bowel obstruction with perforation. She is in stable condition upon transport to the floor. This patient was also evaluated by the attending physician. All care plans were discussed and agreed upon. Clinical Impression     1. Bowel perforation ([address]    2.  Small bowel obstruction [facility])        Disposition       DISPOSITION  Admitted     Edgard Castillo [place]  06/17/21 3121

## 2021-06-18 NOTE — ED PROVIDER NOTES
ED Attending Attestation Note     Date of evaluation: 6/17/2021    This patient was seen by the advance practice provider. I have seen and examined the patient, agree with the workup, evaluation, management and diagnosis. The care plan has been discussed. I have reviewed the ECG and concur with the SHARON's interpretation. My assessment reveals patient who presents because from nursing home white count was elevated at 14,000. Recently started Levaquin I believe today for possible UTI. On exam patient's legs are wrapped. She has dry mucous membranes. Will obtain labs and urinalysis and start IV fluids.      Eugenia Sanchez MD  06/17/21 2059

## 2021-06-18 NOTE — ANESTHESIA PRE PROCEDURE
Department of Anesthesiology  Preprocedure Note       Name:  Audrey Ortiz   Age:  65 y.o.  :  1950                                          MRN:  [id number]         Date:  2021      Surgeon: London Guevara):  Sarabjit Morales MD    Procedure: Procedure(s):  LAPAROTOMY EXPLORATORY, POSSIBLE BOWEL RESECTION, POSSIBLE TEMPORARY CLOSURE    Medications prior to admission:   Prior to Admission medications    Medication Sig Start Date End Date Taking? Authorizing Provider   gabapentin (NEURONTIN) 100 MG capsule Take 100 mg by mouth 3 times daily. Yes Historical Provider, MD   furosemide (LASIX) 40 MG tablet Take 40 mg by mouth 2 times daily   Yes Historical Provider, MD   potassium chloride (KLOR-CON M) 20 MEQ extended release tablet Take 1 tablet by mouth daily 21  Yes Historical Provider, MD   traMADol (ULTRAM) 50 MG tablet Take 50 mg by mouth every 6 hours.    Yes Historical Provider, MD   metoprolol tartrate (LOPRESSOR) 25 MG tablet Take 0.5 tablets by mouth 2 times daily 21  Yes Romana Caraway, MD   polyethylene glycol (GLYCOLAX) 17 g packet Take 17 g by mouth three times a week Every Monday, Wednesday and Friday for bowel regimen   Yes Historical Provider, MD   famotidine (PEPCID) 20 MG tablet Take 40 mg by mouth daily    Yes Historical Provider, MD   hydrOXYzine (ATARAX) 25 MG tablet Take 25 mg by mouth daily   Yes Historical Provider, MD   lactulose (CHRONULAC) 10 GM/15ML solution Take 30 mLs by mouth daily   Yes Historical Provider, MD   melatonin 5 MG TABS tablet Take 5 mg by mouth nightly as needed (insomnia)    Yes Historical Provider, MD   OLANZapine (ZYPREXA) 5 MG tablet Take 5 mg by mouth nightly   Yes Historical Provider, MD   carboxymethylcellulose (REFRESH TEARS) 0.5 % SOLN ophthalmic solution Place 1 drop into both eyes every 6 hours as needed (dry, red eyes)    Yes Historical Provider, MD   senna-docusate (SENNA-PLUS) 8.6-50 MG per tablet Take 2 tablets by mouth daily as needed for lidocaine injection   Intravenous PRN Franky Amour, DO   100 mg at 06/17/21 2353    0.9 % sodium chloride infusion   Intravenous Continuous PRN Franky Amour, DO   New Bag at 06/17/21 2343    0.9 % sodium chloride infusion   Intravenous Continuous PRN Franky Amour, DO   New Bag at 06/17/21 2343    succinylcholine chloride (ANECTINE) injection   Intravenous PRN [place] Amour, DO   180 mg at 06/17/21 2353       Allergies: Allergies   Allergen Reactions    Cyclobenzaprine Shortness Of Breath and Other (See Comments)     \"cramping\"      Penicillins Shortness Of Breath, Itching, Swelling and Hives    Acetaminophen     Codeine Itching and Hives     Itching      Diphenhydramine Hcl Other (See Comments)     Heart racing    Ketorolac Itching and Nausea Only     Pt tolerated dose without side effects    Naproxen Nausea Only, Other (See Comments) and Itching     Stomach pain    Bothers her stomach  Bothers her stomach      Diphenhydramine Nausea Only, Hives and Palpitations     Heart racing  Also states it makes her heart race      Ketorolac Tromethamine Nausea And Vomiting    Ketorolac Tromethamine Itching, Nausea Only and Nausea And Vomiting    Sulfamethoxazole-Trimethoprim      Abdominal pain         Problem List:    Patient Active Problem List   Diagnosis Code    Hypertension I10    Anxiety F41.9    Lumbar disc disease M51.9    Drug-seeking behavior Z76.5    GI bleed K92.2    Peptic ulcer disease K27.9    Erosive gastritis K29.60    Recurrent cellulitis of lower extremity L03.119    Cellulitis of left lower extremity L03. 116    Leukocytosis D72.829    Bullous dermatitis L13.9    LAVINIA (acute kidney injury) ([address] N17.9    Sepsis (HCC) A41.9    Pneumoperitoneum K66.8       Past Medical History:        Diagnosis Date    Anxiety     Back pain     Bronchitis     Cancer ([address]     malignant neoplasm of bronchus and lung    Chronic pain     Dementia ([address]     Drug-seeking behavior     [facility] [address]     ESBL (extended spectrum beta-lactamase) producing bacteria infection 05/19/2021    left leg wound (Acinetobacter, Pseudomonas, and Klebsiella)    GERD (gastroesophageal reflux disease)     Hepatitis C     HTN (hypertension)     Insomnia     Lumbar disc disease     Multiple drug resistant organism (MDRO) culture positive 05/19/2021    left leg wound (Acinetobacter, Pseudomonas, and Klebsiella)    Osteoporosis     Spinal stenosis        Past Surgical History:        Procedure Laterality Date    CHOLECYSTECTOMY  2/2011    [facility]       Social History:    Social History     Tobacco Use    Smoking status: Current Every Day Smoker     Packs/day: 0.50     Years: 49.00     Pack years: 24.50     Types: Cigarettes    Smokeless tobacco: Never Used   Substance Use Topics    Alcohol use:  No                                Ready to quit: Not Answered  Counseling given: Not Answered      Vital Signs (Current):   Vitals:    06/17/21 2027 06/17/21 2200 06/17/21 2230   BP: (!) 154/83 136/72 (!) 143/79   Pulse: 115  109   Resp: 18  29   Temp: 98.3 °F (36.8 °C)     TempSrc: Oral     SpO2: 94% 90% 95%                                              BP Readings from Last 3 Encounters:   06/17/21 (!) 143/79   06/18/21 138/65   05/26/21 (!) 149/77       NPO Status:                                                                                 BMI:   Wt Readings from Last 3 Encounters:   05/26/21 285 lb (129.3 kg)   05/26/21 260 lb (117.9 kg)   05/25/21 283 lb 15.2 oz (128.8 kg)     There is no height or weight on file to calculate BMI.    CBC:   Lab Results   Component Value Date    WBC 17.2 06/17/2021    RBC 3.71 06/17/2021    HGB 11.5 06/17/2021    HCT 33.6 06/17/2021    MCV 90.6 06/17/2021    RDW 15.4 06/17/2021     06/17/2021       CMP:   Lab Results   Component Value Date     06/17/2021    K 3.5 06/17/2021     06/17/2021    CO2 24 06/17/2021    BUN 17 06/17/2021 CREATININE 0.9 06/17/2021    GFRAA >60 06/17/2021    GFRAA 86 08/16/2012    AGRATIO 0.5 06/17/2021    LABGLOM >60 06/17/2021    GLUCOSE 171 06/17/2021    PROT 8.1 06/17/2021    PROT 8.0 08/16/2012    CALCIUM 8.7 06/17/2021    BILITOT 3.3 06/17/2021    ALKPHOS 164 06/17/2021     06/17/2021     06/17/2021       POC Tests: No results for input(s): POCGLU, POCNA, POCK, POCCL, POCBUN, POCHEMO, POCHCT in the last 72 hours. Coags:   Lab Results   Component Value Date    PROTIME 21.8 06/17/2021    INR 1.87 06/17/2021       HCG (If Applicable): No results found for: PREGTESTUR, PREGSERUM, HCG, HCGQUANT     ABGs: No results found for: PHART, PO2ART, AQF1MPG, VEF6ZGC, BEART, J5LWLQRP     Type & Screen (If Applicable):  Lab Results   Component Value Date    LABABO O 08/16/2012    [address] Positive 08/16/2012       Drug/Infectious Status (If Applicable):  Lab Results   Component Value Date    HEPCAB >150.00 05/19/2011       COVID-19 Screening (If Applicable):   Lab Results   Component Value Date    COVID19 Not Detected 04/28/2021           Anesthesia Evaluation  Patient summary reviewed and Nursing notes reviewed no history of anesthetic complications:   Airway: Mallampati: II  TM distance: <3 FB   Neck ROM: full  Mouth opening: > = 3 FB Dental:    (+) lower dentures and upper dentures      Pulmonary:normal exam    (+) COPD: moderate,                             Cardiovascular:  Exercise tolerance: poor (<4 METS),   (+) hypertension:,       ECG reviewed  Rhythm: regular  Rate: normal  Echocardiogram reviewed         Beta Blocker:  Not on Beta Blocker         Neuro/Psych:               GI/Hepatic/Renal:   (+) GERD:, PUD, hepatitis:, liver disease:,           Endo/Other:                     Abdominal:           Vascular:                                        Anesthesia Plan      general     ASA 3 - emergent       Induction: intravenous. MIPS: Postoperative opioids intended.   Anesthetic plan and risks discussed with patient. Plan discussed with CRNA.     Attending anesthesiologist reviewed and agrees with Pre Eval content              Dl Mayer DO   6/18/2021

## 2021-06-18 NOTE — BRIEF OP NOTE
Brief Postoperative Note      Patient: Wayne Humphries  YOB: 1950  MRN: [id number]    Date of Procedure: 6/17/2021    Pre-Op Diagnosis: Free air     Post-Op Diagnosis: Perforated sigmoid diverticulitis       Procedure(s):  1. exploratory laparotomy 2. Nick's procedure    Surgeon(s):  Francisco Javier Lizama MD    Assistant:  Resident: Sabiha Wooten DO; Monica Bush MD; Nina Maher MD    Anesthesia: General    Estimated Blood Loss (mL): 190    Complications: None    Specimens:   ID Type Source Tests Collected by Time Destination   A : Sigmoid Colon Specimen Abdomen SURGICAL PATHOLOGY Francisco Javier Lizama MD 6/18/2021 0102        Implants:  * No implants in log *      Drains:   Closed/Suction Drain Right RLQ;Abdomen Bulb [address] [place] (Active)   Site Description Healing 06/18/21 0148   Dressing Status Dry;Clean; Intact; Other (Comment) 06/18/21 0148   Status Compressed 06/18/21 0148       Closed/Suction Drain Right; Anterior Hip; Abdomen Bulb 15 [place] (Active)   Site Description Healing 06/18/21 0148   Dressing Status Intact;Dry;Clean 06/18/21 0148   Status Compressed 06/18/21 0148       Negative Pressure Wound Therapy Abdomen Medial;Upper (Active)   Wound Type Surgical 06/18/21 0205   Unit Type V. A.C. ULTA Therapy wound vac # [id number] 06/18/21 0205   Dressing Type Black foam 06/18/21 0205   Cycle Continuous 06/18/21 0205   Dressing Status Clean;Dry; Intact 06/18/21 0205   Dressing Changed Changed/New 06/18/21 0205       Colostomy LUQ (Active)   Stomal Appliance 1 piece;Clean;Dry; Intact 06/18/21 0226   Stoma  Assessment Pink 06/18/21 0226   Mucocutaneous Junction Intact 06/18/21 0226   Peristomal Assessment Clean; Intact 06/18/21 0226   Treatment Bag change;Site care 06/18/21 0226   Stool Amount Small 06/18/21 0226       Urethral Catheter Non-latex (Active)       [REMOVED] External Urinary Catheter (Removed)   Catheter changed  No 05/23/21 0432   Urine Color Yellow 05/24/21 1528   Urine Appearance Clear 05/24/21 1528   Urine Odor Other (Comment) 05/21/21 0628   Output (mL) 975 mL 05/24/21 1528   Suction 40 mmgHg continuous 05/24/21 1528   Placement Replaced 05/24/21 1528   Skin Assessment No Injury 05/23/21 0432       Findings: Cirrhotic liver; Multiple varices;  Perforated sigmoid diverticulitis    Electronically signed by Sabiha Wooten DO on 6/18/2021 at 2:32 AM

## 2021-06-18 NOTE — H&P
Resident History and Physical   General Surgery    Chief Complaint: Abdominal pain    History of Present Illness: Elvis Arzate is a 65 y.o. female with hypertension, emphysema, Hepatitis C (fibroscan consistent with cirrhosis 2018), anxiety, gastritis, PUD (last EGD on file with improved gastric ulcer and duodenitis in 2013), recurrent cellulitis, dementia, and recent hospitalization for sepsis secondary to pseudomonas bacteremia who presents to the ED secondary to abdominal pain. The patient is unable to provide a detailed history; however, she does report that she has been experiencing pain for approximately one week with acute unprovoked exacerbation of the pain since last night. The patient is a current smoker. Past Medical History:        Diagnosis Date    Anxiety     Back pain     Bronchitis     Cancer (HCC)     malignant neoplasm of bronchus and lung    Chronic pain     Dementia (HCC)     Drug-seeking behavior     Emphysema lung (HCC)     ESBL (extended spectrum beta-lactamase) producing bacteria infection 05/19/2021    left leg wound (Acinetobacter, Pseudomonas, and Klebsiella)    GERD (gastroesophageal reflux disease)     Hepatitis C     HTN (hypertension)     Insomnia     Lumbar disc disease     Multiple drug resistant organism (MDRO) culture positive 05/19/2021    left leg wound (Acinetobacter, Pseudomonas, and Klebsiella)    Osteoporosis     Spinal stenosis      Past Surgical History:        Procedure Laterality Date    CHOLECYSTECTOMY  2/2011    [place]     Allergies:  Cyclobenzaprine, Penicillins, Acetaminophen, Codeine, Diphenhydramine hcl, Ketorolac, Naproxen, Diphenhydramine, Ketorolac tromethamine, Ketorolac tromethamine, and Sulfamethoxazole-trimethoprim    Medications:   Home Meds  No current facility-administered medications on file prior to encounter.      Current Outpatient Medications on File Prior to Encounter   Medication Sig Dispense Refill    gabapentin She has never used smokeless tobacco.  ETOH:   reports no history of alcohol use. DRUGS:   reports no history of drug use. Review of Systems:   A 14 point review of systems was conducted, significant findings as noted in HPI. All other systems negative. Physical Exam:    Vitals:    06/17/21 2027 06/17/21 2200 06/17/21 2230   BP: (!) 154/83 136/72 (!) 143/79   Pulse: 115  109   Resp: 18  29   Temp: 98.3 °F (36.8 °C)     TempSrc: Oral     SpO2: 94% 90% 95%     General Appearance: Chronically ill and toxic appearing  HEENT: Slight scleral icterus, trachea midline, airway patent  Chest/Lungs: Normal effort; breathing 2L supplemental oxygen via nasal cannula, tachypnic    Cardiovascular: Tachycardic, hypertensive   Abdomen: Umbilical hernia without any skin changes, diffusely tender/peritoneal  Skin: Warm   Extremities: Peripheral edema, bilateral lower extremities wrapped    Neuro: Alert, oriented to person; no focal deficits; sensation intact    Laboratory Results:    CBC:   Recent Labs     06/17/21 2044   WBC 17.2*   HGB 11.5*   HCT 33.6*   MCV 90.6        BMP:   Recent Labs     06/17/21 2044   *   K 3.5      CO2 24   BUN 17   CREATININE 0.9     PT/INR: No results for input(s): PROTIME, INR in the last 72 hours. APTT: No results for input(s): APTT in the last 72 hours.   Liver Profile:  Lab Results   Component Value Date     06/17/2021     06/17/2021    BILIDIR 0.7 05/26/2021    BILITOT 3.3 06/17/2021    ALKPHOS 164 06/17/2021     Lab Results   Component Value Date    CHOL 145 08/08/2011    HDL 33 08/08/2011    TRIG 158 08/08/2011     UA:   Lab Results   Component Value Date    COLORU Yellow 04/28/2021    PHUR 6.0 04/28/2021    WBCUA 0-2 04/28/2021    RBCUA None seen 04/28/2021    BACTERIA 1+ 04/28/2021    CLARITYU Clear 04/28/2021    SPECGRAV 1.015 04/28/2021    LEUKOCYTESUR TRACE 04/28/2021    UROBILINOGEN 0.2 04/28/2021    BILIRUBINUR Negative 04/28/2021    BILIRUBINUR Negative 12/15/2011    BLOODU SMALL 04/28/2021    GLUCOSEU Negative 04/28/2021    GLUCOSEU Negative 12/15/2011     Imaging:   XR CHEST PORTABLE   Final Result      Free intraperitoneal air. No acute cardiopulmonary findings. Attending in the ED was notified of the free air from the patient's CT study performed earlier. CT ABDOMEN PELVIS W IV CONTRAST Additional Contrast? None   Final Result      Free intraperitoneal air, predominantly in the upper abdomen and right upper quadrant. Site of perforation is not definitely identified. Distended proximal to mid small bowel. Distal small bowel not distended, consistent with small bowel obstruction. Stool throughout the colon. Distal colonic diverticulosis. Small fat-containing umbilical hernia. Air within the hernia, with a possible skin defect anteriorly at the umbilicus. Correlate clinically. Critical result findings were called to Sabrina Bowling in the ED at 2150 hours on 6/17/2021. Assessment/Plan: This is a 65 y.o. female with hypertension, emphysema, Hepatitis C (fibroscan consistent with cirrhosis 2018-- Child's class C by laboratory values today), anxiety, gastritis, PUD (last EGD on file with improved gastric ulcer and duodenitis in 2013), recurrent cellulitis, dementia, and recent hospitalization for sepsis secondary to pseudomonas bacteremia who presents to the ED secondary to abdominal pain. The patient is peritonitic, tachycardic, with a leukocytosis to 17.2, elevated lactate to 3.3, hyperglycemia, and hyponatremia.  CT scan reveals pneumoperitoneum without a readily identifiable source for perforation.     - The patient will be taken to the OR for emergent exploratory laparotomy, possible bowel resection, possible ostomy, possible [place] patch, possible temporary abdominal closure       - The patient's son, Margarita Amato [phone], was made aware of the patient's clinical status along with the risks, benefits, and alternatives of the proposed operation.  He provided witnessed verbal consent after all questions were answered to his stated satisfaction  - The patient has two peripheral IVs in place   - Will place an NG in the ED   - Hannon ordered  - A second liter of normal saline ordered along, following which plasmalyte is ordered at a rate of 125mL/hr  - Cipro and Flagyl ordered secondary to a penicillin allergy   - IV Protonix BID ordered  - Lovenox ordered for VTE PPx  - Blood type ordered and 2 units of pRBCs crossed for the OR     - Blood bank personally called and confirmed receipt of orders with active fulfillment   - The patient was discussed with Dr. Lyric Valles MD, MPH  PGY-2 General Surgery  06/17/21  11:22 PM

## 2021-06-18 NOTE — CARE COORDINATION
program?:      Meds To Beds General Rules:  1. Can ONLY be done Monday- Friday between 8:30am-5pm  2. Prescription(s) must be in pharmacy by 3pm to be filled same day  3. Copy of patient's insurance/ prescription drug card and patient face sheet must be sent along with the prescription(s)  4. Cost of Rx cannot be added to hospital bill. If financial assistance is needed, please contact unit  or ;  or  CANNOT provide pharmacy voucher for patients co-pays  5. Patients can then  the prescription on their way out of the hospital at discharge, or pharmacy can deliver to the bedside if staff is available. (payment due at time of pick-up or delivery - cash, check, or card accepted)     Able to afford home medications/ co-pay costs: Yes    ADLS  Support Systems:      PT AM-PAC:   /24  OT AM-PAC:   /24    [place]: long term care  Steps: none    Plans to RETURN to current housing: Yes  Barriers to RETURNING to current housing: none noted    DISCHARGE PLAN:  Disposition: [facility] (LTC): [facility]  Phone: [phone]  Fax: [phone]    Transportation PLAN for discharge: EMS transportation     Factors facilitating achievement of predicted outcomes: Family support, Cooperative and Pleasant    Barriers to discharge: pain control, return of GI function    Additional Case Management Notes: NA    The Plan for Transition of Care is related to the following treatment goals of Pneumoperitoneum [K66.8]    The Patient and/or patient representative Adarsh Murray and her family were provided with a choice of provider and agrees with the discharge plan Yes    Freedom of choice list was provided with basic dialogue that supports the patient's individualized plan of care/goals and shares the quality data associated with the providers.  Yes    Care Transition patient: No    Rhina Rayo, JAMIN  [facility] [employer]  Case Management Department  Ph: [phone]   Fax: [phone]

## 2021-06-18 NOTE — PROGRESS NOTES
Pt admitted to ICU. Pt drowsy, oriented x4. Complaint of pain in abdomen. Educated about PCA pump. Pt on 15L NRB. MIKE drain x2, ostomy and wooten in place, will monitor per orders. Skin assessment completed. Safety precautions in place. Will continue to monitor.

## 2021-06-18 NOTE — PROGRESS NOTES
ICU SURGERY DAILY PROGRESS NOTE    CC: Perforated sigmoid diverticulitis     SUBJECTIVE:   Interval Hx:   Patient underwent Nick's procedure yesterday for perforated sigmoid diverticulitis. This morning, complaining of abdominal pain. Unable to cooperate with pressing PCA button for pain control       ROS: A 10 point review of systems was conducted, significant findings as noted above. All other systems negative. OBJECTIVE:   Vitals:   Vitals:    06/18/21 0430 06/18/21 0445 06/18/21 0500 06/18/21 0600   BP: (!) 143/70  (!) 143/125 (!) 145/79   Pulse: 109  113 112   Resp: 23 19 23 20   Temp: 98.1 °F (36.7 °C)      TempSrc: Axillary      SpO2: 96% 97% 96% 97%   Weight:    252 lb 13.9 oz (114.7 kg)       I/O:     Intake/Output Summary (Last 24 hours) at 6/18/2021 0652  Last data filed at 6/18/2021 0619  Gross per 24 hour   Intake 3185.93 ml   Output 1453 ml   Net 1732.93 ml     I/O last 3 completed shifts: In: 1283.3 [IV Piggyback:1283.3]  Out: -     Diet: Diet NPO      Physical Examination:   General appearance: alert, NAD  HENT: NC/AT, MMM & intact, no JVD, neck supple, trachea midline  Eyes: no scleral icterus  Chest/Lungs: symmetric rise and fall of chest, normal effort, on 15L non-rebreather. Cardiovascular: tachycardic, well perfused  Abdomen: tender, wound vac in place with adequate suction to the midline, superior MIKE drain with sanguinous output, inferior MIKE drain with serosanguinous output, colostomy stoma pink and viable with small amount of stool in the bag, NGT in place with bilious output.    Extremities: no cyanosis or edema  Skin: No rashes, warm and dry  Neurologic: AAOx3    Labs:  CBC:   Recent Labs     06/17/21 2044 06/18/21  0458   WBC 17.2* 16.7*   HGB 11.5* 11.5*   HCT 33.6* 34.1*    227       BMP:   Recent Labs     06/17/21 2044 06/18/21  0458   * 135*   K 3.5 3.8    103   CO2 24 21   BUN 17 17   CREATININE 0.9 0.8   GLUCOSE 171* 203*     LFT's:   Recent Labs 06/17/21 2044   *   *   BILITOT 3.3*   ALKPHOS 164*     Troponin: No results for input(s): TROPONINI in the last 72 hours. BNP: No results for input(s): BNP in the last 72 hours. ABGs: No results for input(s): PHART, MAR3ZNZ, PO2ART in the last 72 hours. INR:   Recent Labs     06/17/21 2239   INR 1.87*       U/A:No results for input(s): NITRITE, COLORU, PHUR, LABCAST, WBCUA, RBCUA, MUCUS, TRICHOMONAS, YEAST, BACTERIA, CLARITYU, SPECGRAV, LEUKOCYTESUR, UROBILINOGEN, BILIRUBINUR, BLOODU, GLUCOSEU, AMORPHOUS in the last 72 hours. Invalid input(s): Enrique Brine     Rad:   XR CHEST PORTABLE   Final Result      Free intraperitoneal air. No acute cardiopulmonary findings. Attending in the ED was notified of the free air from the patient's CT study performed earlier. CT ABDOMEN PELVIS W IV CONTRAST Additional Contrast? None   Final Result      Free intraperitoneal air, predominantly in the upper abdomen and right upper quadrant. Site of perforation is not definitely identified. Distended proximal to mid small bowel. Distal small bowel not distended, consistent with small bowel obstruction. Stool throughout the colon. Distal colonic diverticulosis. Small fat-containing umbilical hernia. Air within the hernia, with a possible skin defect anteriorly at the umbilicus. Correlate clinically. Critical result findings were called to Edgard Castillo in the ED at 2150 hours on 6/17/2021. ASSESSMENT AND PLAN:   Tamika Park is a 65 y.o. female perforated sigmoid diverticulits s/p exploratory laparotomy and Nick's procedure 6/17. Neuro:   Pain/sedation/psych  - DC PCA as patient is unable to comprehend pressing button for pain control. Switched to PRN dilaudid pain panel.   - Increase Robaxin to 750 mg q8 hours. Cardiovascular:   - Remains tachycardic with a lactic acidosis of 4.1.   - Increase IVF to 250 cc/hr. Will monitor closely.      Pulmonary: - 15L non-rebreather  - Wean for SpO2 >90.  - Aggressive pulmonary toilet. GI:   - S/P ex lap and Nick's procedure for perforated diverticulitis. - Continue NPO with NGT decompression.   - Continue MIKE drains with strict measurement of output.   - Continue to monitor ostomy output.   - Continue wound vac to midline.   - Plan for wound vac changes M/W/F.     FEN:   - IVF with plasmalyte at 250 cc/hr. - Replace electrolytes as needed. - NPO    /Renal:   - Continue wooten catheter. - Strict I&Os. Hem/ID:   - WBC 16.7 this morning.   - Source control with Ciprofloxacin and Flagyl. Endo:   - Monitor glucose. - No history of diabetes. Prophylaxis:   - Lovenox daily.   - Protonix BID. Access:  - Peripheral Access x2                                - Wooten Date placed: 6/18  - NGT Date placed: 6/17    Mobility:  - OOB as tolerated.      Dispo:   ICU    Code Status: Full Code  -----------------------------  Alayna Alvarado MD  06/18/21  6:52 AM

## 2021-06-18 NOTE — CONSULTS
Hospital Medicine  Consult History & Physical        Chief Complaint:  Abdominal pain    History Of Present Illness:  Patient is a 68-year-old female with PMH of hypertension, emphysema and hepatitis C who presents to the ED with abdominal pain. She states the pain lasted for about 1 week duration. Patient was found to have a perforated diverticulitis and was admitted for an exploratory-laparotomy. Patient had no complications. Post-op, the patient was found to have increasing oxygen requirements. She is now on 10 liter of HFNC. She denies any chest pain, shortness of breath, cough, fever, chills, or wheezing. Medicine is consulted for medical management     65 y.o. female who we are asked to see/evaluate by Iliana Dahl MD for medical management    Past Medical History:        Diagnosis Date    Anxiety     Back pain     Bronchitis     Cancer ([address]     malignant neoplasm of bronchus and lung    Chronic pain     Dementia ([address]     Drug-seeking behavior     Em[facility] [address]     ESBL (extended spectrum beta-lactamase) producing bacteria infection 05/19/2021    left leg wound (Acinetobacter, Pseudomonas, and Klebsiella)    GERD (gastroesophageal reflux disease)     Hepatitis C     HTN (hypertension)     Insomnia     Lumbar disc disease     Multiple drug resistant organism (MDRO) culture positive 05/19/2021    left leg wound (Acinetobacter, Pseudomonas, and Klebsiella)    Osteoporosis     Spinal stenosis        Past Surgical History:        Procedure Laterality Date    CHOLECYSTECTOMY  2/2011    [place]    LAPAROTOMY N/A 6/17/2021    1. exploratory laparotomy 2. Nick's procedure performed by Iliana Dahl MD at [address]       Medications Prior to Admission:    Prior to Admission medications    Medication Sig Start Date End Date Taking? Authorizing Provider   gabapentin (NEURONTIN) 100 MG capsule Take 100 mg by mouth 3 times daily.    Yes Historical Provider, MD   furosemide (LASIX) 40 MG tablet Take 40 mg by mouth 2 times daily   Yes Historical Provider, MD   potassium chloride (KLOR-CON M) 20 MEQ extended release tablet Take 1 tablet by mouth daily 4/1/21  Yes Historical Provider, MD   traMADol (ULTRAM) 50 MG tablet Take 50 mg by mouth every 6 hours.    Yes Historical Provider, MD   metoprolol tartrate (LOPRESSOR) 25 MG tablet Take 0.5 tablets by mouth 2 times daily 5/4/21  Yes Sotrmy Parson MD   polyethylene glycol (GLYCOLAX) 17 g packet Take 17 g by mouth three times a week Every Monday, Wednesday and Friday for bowel regimen   Yes Historical Provider, MD   famotidine (PEPCID) 20 MG tablet Take 40 mg by mouth daily    Yes Historical Provider, MD   hydrOXYzine (ATARAX) 25 MG tablet Take 25 mg by mouth daily   Yes Historical Provider, MD   lactulose (CHRONULAC) 10 GM/15ML solution Take 30 mLs by mouth daily   Yes Historical Provider, MD   melatonin 5 MG TABS tablet Take 5 mg by mouth nightly as needed (insomnia)    Yes Historical Provider, MD   OLANZapine (ZYPREXA) 5 MG tablet Take 5 mg by mouth nightly   Yes Historical Provider, MD   carboxymethylcellulose (REFRESH TEARS) 0.5 % SOLN ophthalmic solution Place 1 drop into both eyes every 6 hours as needed (dry, red eyes)    Yes Historical Provider, MD   senna-docusate (SENNA-PLUS) 8.6-50 MG per tablet Take 2 tablets by mouth daily as needed for Constipation   Yes Historical Provider, MD   spironolactone (ALDACTONE) 50 MG tablet Take 50 mg by mouth daily   Yes Historical Provider, MD   Multiple Vitamins-Minerals (THERAPEUTIC MULTIVITAMIN-MINERALS) tablet Take 1 tablet by mouth daily   Yes Historical Provider, MD   Cholecalciferol (VITAMIN D3) 1.25 MG (33479 UT) CAPS Take 1 capsule by mouth every 30 days Every Month on the 15th   Yes Historical Provider, MD Kenya Gonzalez [place]-Castor Oil (VENELEX) OINT ointment Apply topically 2 times daily 5/4/21   Stormy Parson MD       Allergies:  Cyclobenzaprine, Penicillins, Acetaminophen, Codeine, Diphenhydramine hcl, Ketorolac, Naproxen, Diphenhydramine, Ketorolac tromethamine, Ketorolac tromethamine, and Sulfamethoxazole-trimethoprim    Social History:       TOBACCO:   reports that she has been smoking cigarettes. She has a 24.50 pack-year smoking history. She has never used smokeless tobacco.  ETOH:   reports no history of alcohol use. Family History:     Reviewed in detail and negative and noncontributory        Problem Relation Age of Onset    High Blood Pressure Father     Heart Disease Father        REVIEW OF SYSTEMS:   Pertinent positives as noted in the HPI. All other systems reviewed and negative. PHYSICAL EXAM PERFORMED:  /66   Pulse 117   Temp 98.2 °F (36.8 °C) (Oral)   Resp 18   Ht 5' 6\" (1.676 m)   Wt 252 lb 13.9 oz (114.7 kg)   SpO2 93%   BMI 40.81 kg/m²   General appearance: No apparent distress, patient is cooperative. HEENT: Normal cephalic, atraumatic without obvious deformity. Conjunctivae/corneas clear. Neck: Supple, with full range of motion. Respiratory:  Normal respiratory effort. Clear to auscultation, bilaterally without Rales/Wheezes/Rhonchi. Cardiovascular: Regular rate and rhythm with normal S1/S2 without murmurs, rubs or gallops. Abdomen: tender to palpation  Musculoskeletal: No cyanosis or edema bilaterally. Skin:  No rashes or lesions grossly visible  Neurologic:  Neurologically intact without any focal sensory/motor deficits.  grossly non-focal.  Psychiatric: Alert and oriented, normal mood  Peripheral Pulses: +2 palpable, equal bilaterally     Labs:     Recent Labs     06/17/21 2044 06/18/21  0458   WBC 17.2* 16.7*   HGB 11.5* 11.5*   HCT 33.6* 34.1*    227     Recent Labs     06/17/21 2044 06/18/21  0458   * 135*   K 3.5 3.8    103   CO2 24 21   BUN 17 17   CREATININE 0.9 0.8   CALCIUM 8.7 7.8*   PHOS  --  3.7     Recent Labs     06/17/21 2044   *   *   BILITOT 3.3*   ALKPHOS 164*     Recent Labs 06/17/21  2239   INR 1.87*     No results for input(s): CKTOTAL, TROPONINI in the last 72 hours. Urinalysis:    Lab Results   Component Value Date    NITRU Negative 04/28/2021    WBCUA 0-2 04/28/2021    BACTERIA 1+ 04/28/2021    RBCUA None seen 04/28/2021    BLOODU SMALL 04/28/2021    SPECGRAV 1.015 04/28/2021    GLUCOSEU Negative 04/28/2021    GLUCOSEU Negative 12/15/2011       Radiology:     XR CHEST PORTABLE   Final Result      Free intraperitoneal air. No acute cardiopulmonary findings. Attending in the ED was notified of the free air from the patient's CT study performed earlier. CT ABDOMEN PELVIS W IV CONTRAST Additional Contrast? None   Final Result      Free intraperitoneal air, predominantly in the upper abdomen and right upper quadrant. Site of perforation is not definitely identified. Distended proximal to mid small bowel. Distal small bowel not distended, consistent with small bowel obstruction. Stool throughout the colon. Distal colonic diverticulosis. Small fat-containing umbilical hernia. Air within the hernia, with a possible skin defect anteriorly at the umbilicus. Correlate clinically. Critical result findings were called to Edgard Castillo in the ED at 2150 hours on 6/17/2021.                    @scout@      ASSESSMENT:    Active Hospital Problems    Diagnosis Date Noted    Bowel perforation ([facility] [address] [K63.1]     Pneumoperitoneum [K66.8] 06/17/2021     Assessment  Acute Hypoxic Respiratory Failure  Perforated Sigmoid Diverticulitis s/p Ex-lap & Nick  Lactic acidosis      Plan  Hypoxia is Likely due to post-op atelectasis, Supplemental oxygen; SpO2 goal >90%, wean as tolerated  Monitor ostomy output and wound vac care by GS  Continue pain control regimen   Continue Ciprofloxacin & Flagyl   NPO  Continue IVF  Reviewed Medical reconciliation, Labs, vitals signs, previous records  Pain control per primary, stool softer  PT/OT when stable  DVT Prophylaxis: Diet: Diet NPO  Code Status: Full Code    PT/OT Eval Status: Active and ongoing    Dispo.  Discharge planning per primary team    Thank you for the consultation, will follow up as needed    Maxine Hansen MD MD

## 2021-06-18 NOTE — PROGRESS NOTES
54 Pt admited to ICU 4506 NRB Mask at [facility] report received from Dr Brandon Baker and Dr Babita Su

## 2021-06-18 NOTE — PROGRESS NOTES
PACU Transfer Note    Vitals:    06/18/21 0415   BP: (!) 145/75   Pulse: 112   Resp: 23   Temp: 98.4 °F (36.9 °C)   SpO2: 94%       In: 1350 [I.V.:1350]  Out: 975 [Urine:355; Drains:120]    Pain assessment:  receiving treatment  Pain Level: 7    Report given to Receiving unit JAMIN Barrera at bedside ICU 4506.    6/18/2021 4:30 AM

## 2021-06-18 NOTE — PROGRESS NOTES
4 Eyes Admission Assessment     I agree as the admission nurse that 2 RN's have performed a thorough Head to Toe Skin Assessment on the patient. ALL assessment sites listed below have been assessed on admission. Areas assessed by both nurses:   [x]   Head, Face, and Ears   [x]   Shoulders, Back, and Chest  excoriation under breasts  [x]   Arms, Elbows, and Hands   [x]   Coccyx, Sacrum, and Ischium  [x]   Legs, Feet, and Heels        Does the Patient have Skin Breakdown?   Yes a wound was noted on the Admission Assessment and an LDA was Initiated documentation include the Amanda-wound, Wound Assessment, Measurements, Dressing Treatment, Drainage, and Color\",         Cam Prevention initiated:  Yes   Wound Care Orders initiated:  Yes      [id number]  nurse consulted for Pressure Injury (Stage 3,4, Unstageable, DTI, NWPT, and Complex wounds) or Cam score 18 or lower:  Yes      Nurse 1 eSignature: Electronically signed by Whit Chan RN on 6/18/21 at 6:36 AM EDT    **SHARE this note so that the co-signing nurse is able to place an eSignature**    Nurse 2 eSignature: Electronically signed by Rodolfo Carolina RN on 6/18/21 at 6:37 AM EDT

## 2021-06-18 NOTE — OP NOTE
Operative Note      Patient: Vanessa Canales  YOB: 1950  MRN: [id number]    Date of Procedure: 6/17/2021    Pre-Op Diagnosis: Free air      Post-Op Diagnosis: Perforated sigmoid diverticulitis       Procedure(s):  1. Exploratory laparotomy 2. Nick's procedure     Surgeon(s):  Aj Vega MD     Assistant:  Resident: Tano Andre DO; Cinda Broderick MD     Anesthesia: General     Estimated Blood Loss (mL): 615     Complications: None     Specimens:   ID Type Source Tests Collected by Time Destination   A : Sigmoid Colon Specimen Abdomen SURGICAL PATHOLOGY Aj Vega MD 6/18/2021 0102           Drains:        Closed/Suction Drain Right RLQ;Abdomen Bulb 15 [place] (Active)   Site Description Healing 06/18/21 0148   Dressing Status Dry;Clean; Intact; Other (Comment) 06/18/21 0148   Status Compressed 06/18/21 0148       Closed/Suction Drain Right; Anterior Hip; Abdomen Bulb 15 [place] (Active)   Site Description Healing 06/18/21 0148   Dressing Status Intact;Dry;Clean 06/18/21 0148   Status Compressed 06/18/21 0148       Negative Pressure Wound Therapy Abdomen Medial;Upper (Active)   Wound Type Surgical 06/18/21 0205   Unit Type V. A.C. ULTA Therapy wound vac # [id number] 06/18/21 0205   Dressing Type Black foam 06/18/21 0205   Cycle Continuous 06/18/21 0205   Dressing Status Clean;Dry; Intact 06/18/21 0205   Dressing Changed Changed/New 06/18/21 0205       Colostomy LUQ (Active)   Stomal Appliance 1 piece;Clean;Dry; Intact 06/18/21 0226   Stoma  Assessment Pink 06/18/21 0226   Mucocutaneous Junction Intact 06/18/21 0226   Peristomal Assessment Clean; Intact 06/18/21 0226   Treatment Bag change;Site care 06/18/21 0226   Stool Amount Small 06/18/21 0226       Urethral Catheter Non-latex (Active)       [REMOVED] External Urinary Catheter (Removed)   Catheter changed  No 05/23/21 0432   Urine Color Yellow 05/24/21 1528   Urine Appearance Clear 05/24/21 1528   Urine Odor Other (Comment) 05/21/21 6419 Output (mL) 975 mL 05/24/21 1528   Suction 40 mmgHg continuous 05/24/21 1528   Placement Replaced 05/24/21 1528   Skin Assessment No Injury 05/23/21 0432         Findings: Cirrhotic liver; Multiple varices; Perforated sigmoid diverticulitis    Detailed Description of Procedure:     After informed consent was obtained, the patient was taken to the operating room. General anesthesia was given. The patient was placed in the supine position with appropriate padding. Hannon catheter was placed under sterile conditions. The patient was prepped and draped in the usual sterile fashion. She had received IV antibiotics. No bowel prep was given as this was an emergent case. We began by calling time out to confirm the key components of the operation. Given the free air, her obesity, and her liver disease we did not attempt a laparoscopic approach, and given the severe pathology it would not have been possible. We made a midline incision and entered the abdomen without injury. Wound protector was placed. There some varices in the omentum that were quite fragile and bled easily. We used the Ligasure to control the bleeding. We ran the small bowel twice from Ligament of Treitz to the cecum and did not see any perforation. The proximal bowel was dilated but there was no obstruction or transition point. The stomach was examined and appeared normal as did the pylorus and duodenum. There was not any inflammation or succus in this area consistent with PUD. The liver was severely cirrhotic. We turned our attention to the colon. Cecum and transverse were dilated and full of stool but healthy. Sigmoid colon was noted to be severely inflamed and folded onto itself and fused in the left lower quadrant. We could see a perforation in the colon with gross contamination confined to the LLQ. We irrigated this area. We mobilized the lateral attachments of the sigmoid colon.  We attempted to identify the left ureter but given the inflammation we could not see it. We brought sigmoid up out of the wound and hugged the colon wall to avoid any injury. We could feel her iliac vessels were quite far from where we were working and it seemed unlikely ureter would be anywhere near our dissection plane. We continued to divide mesentery with the Ligasure device. Given the severe inflammation and the patient's portal hypertension this was a difficult dissection. There was some bleeding from the mesentery that was controlled with clips. Division of the mesentery helped us straighten out the colon. The sigmoid colon was then divided proximally and distally with a stapler and this section of colon removed. We removed this perforated segment to facilitate exposure and saw some additional mesenteric bleeding treated with clips. We could see our rectal staple line was coming apart so we clamped it shut and divided more mesentery to get to softer rectosigmoid. We then took an additional segment of distal sigmoid with the Contour stapler to get down to a healthier rectal stump. Given the inflammation and the thickened rectal stump it was impossible to perform an anastomosis. We had a short rectal stump as well so we did not think we could deliver this up above the fascia. Given her overall health and the cirrhosis and varices we had encountered we did not think she would be a candidate to close her colostomy in the future so we did not leave a finder stitch. In any event the stump was well above the peritoneal reflection. We left drains in the pelvis. These were 15 Fr channel drains placed through separate stab incisions in the right abdomen. We then made a nickel sized opening in the left abdomen for an colostomy. Fascia was divided sharply in cruciate fashion and the muscle split bluntly. The proximal descending colon was oriented and brought through our ostomy site. There was no twisting. It had some reactive swelling but appeared perfused.  We irrigated the abdomen again. We then turned our attention to closing. We swept the abdomen to ensure no retained objects. Fascia was closed with loop 0 PDS suture and included repair of her pre-existing navel hernia. We then irrigated skin. Skin was irrigated and left open with a wound VAC given the perforated colon. All instrument counts were correct at the end of the case x 2. Stoma was matured with 3-0 chromic in Joellen fashion and was pink and healthy. Dr. Liz Hernandez was present and scrubbed throughout all key portions.      Simon Braga M.D.  6/18/21   2:34 AM

## 2021-06-19 NOTE — PROGRESS NOTES
Entered room and NG tube lying on patient's chest. Call to surgical residents to inform them of this and for BSWR order as she also continuously removes nasal cannula which is now at 10 L.

## 2021-06-19 NOTE — PROGRESS NOTES
ICU SURGERY DAILY PROGRESS NOTE    CC: Perforated sigmoid diverticulitis     SUBJECTIVE:   Interval Hx:   Patient removed NG tube overnight. Patient confused overnight. Menta ting well this morning Denies any abdominal pain. Denies nausea. ROS: A 10 point review of systems was conducted, significant findings as noted above. All other systems negative. OBJECTIVE:   Vitals:   Vitals:    06/19/21 0400 06/19/21 0500 06/19/21 0600 06/19/21 0700   BP: (!) 135/54 (!) 133/58 131/63 (!) 136/55   Pulse: 117 113 114 114   Resp: 20 17 18 17   Temp: 99.4 °F (37.4 °C)   98.7 °F (37.1 °C)   TempSrc: Oral   Rectal   SpO2: 97% 97%  97%   Weight:       Height:           I/O:     Intake/Output Summary (Last 24 hours) at 6/19/2021 0730  Last data filed at 6/19/2021 0723  Gross per 24 hour   Intake 6753 ml   Output 3220 ml   Net 3533 ml     I/O last 3 completed shifts: In: 18 [I.V.:6753]  Out: 3000 [Urine:1880; Emesis/NG output:200; Drains:870; Stool:50]    Diet: Diet NPO      Physical Examination:   General appearance: alert, NAD  HENT: NC/AT, MMM & intact, no JVD, neck supple, trachea midline  Eyes: no scleral icterus  Chest/Lungs: symmetric rise and fall of chest, normal effort, on 10L NC.    Cardiovascular: tachycardic, well perfused  Abdomen: tender, wound vac in place with adequate suction to the midline, superior MIKE drain with serosanguinous output, inferior MIKE drain with serosanguinous output, colostomy stoma pink and viable with small amount of stool in the bag  Extremities: no cyanosis or edema  Skin: No rashes, warm and dry  Neurologic: AAOx3    Labs:  CBC:   Recent Labs     06/17/21 2044 06/18/21 0458 06/19/21  0400   WBC 17.2* 16.7* 15.8*   HGB 11.5* 11.5* 8.8*   HCT 33.6* 34.1* 26.1*    227 214       BMP:   Recent Labs     06/17/21 2044 06/18/21  0458 06/19/21  0400   * 135* 139   K 3.5 3.8 3.7    103 106   CO2 24 21 24   BUN 17 17 22*   CREATININE 0.9 0.8 0.9   GLUCOSE 171* 203* 202* improving to 2.3 from 4.1 yesterday. Continue to monitor until lactic acid normalizes. - Decrease IVF to 150 cc/hr. Will monitor closely. Pulmonary:   - 10L NC  - Wean for SpO2 >90.  - Aggressive pulmonary toilet. - Follow-up pulmonology consult. GI:   - S/P ex lap and Nick's procedure for perforated diverticulitis. - Continue NPO. [facility] for 1/4 cup of ice chips per shift.   - Continue MIKE drains with strict measurement of output.   - Continue to monitor ostomy output.   - Continue wound vac to midline.   - Plan for wound vac changes M/W/F.     FEN:   - IVF with plasmalyte at 150 cc/hr. - Replace electrolytes as needed. - NPO    /Renal:   - Continue wooten catheter. - Strict I&Os. Hem/ID:   - WBC 15.8 this morning.   - Source control with Ciprofloxacin and Flagyl. Endo:   - Monitor glucose. - No history of diabetes. Prophylaxis:   - Lovenox daily.   - Protonix BID. Access:  - Peripheral Access x2                                - Wooten Date placed: 6/18    Mobility:  - OOB as tolerated. Dispo:   ICU    Code Status: Full Code  -----------------------------  Kadi Shin MD  06/19/21  7:30 AM    I have seen, examined, and reviewed the patients chart. I agree with the residents assessment and have made appropriate changes.     Cyrus Fatima

## 2021-06-19 NOTE — PROGRESS NOTES
Perfect serve to residents to inform them that patient is becoming increasingly confused, has not slept for a more than a few minutes at at time. Frequently awakens and is very anxious, often telling this RN that she \"doesn't trust anyone here. \" She asks for pain medication often but doesn't not seem in pain, rather has made the comment multiple times that she \"just wants to sleep. \" Will continue to monitor closely.

## 2021-06-19 NOTE — PROGRESS NOTES
Hospitalist Progress Note      PCP: No primary care provider on file. Chief Complaint. Presented to hospital for abd pain    Date of Admission: 6/17/2021    Subjective:   denies chest pain, nausea, vomiting, shortness of breath, fever or chills. mention feels overall better but still has abd pain    Medications:  Reviewed    Infusion Medications    sodium chloride      electrolyte-A 125 mL/hr at 06/19/21 1731     Scheduled Medications    melatonin  5 mg Oral Nightly    OLANZapine zydis  5 mg Oral Nightly    sodium chloride flush  5-40 mL Intravenous 2 times per day    methocarbamol IVPB  750 mg Intravenous Q8H    metroNIDAZOLE  500 mg Intravenous Q8H    ciprofloxacin  400 mg Intravenous Q12H    enoxaparin  40 mg Subcutaneous Daily    pantoprazole  40 mg Intravenous BID     PRN Meds: sodium chloride flush, sodium chloride, ondansetron **OR** ondansetron, HYDROmorphone **OR** HYDROmorphone, phenol      Intake/Output Summary (Last 24 hours) at 6/19/2021 1958  Last data filed at 6/19/2021 1817  Gross per 24 hour   Intake 8508 ml   Output 3113 ml   Net 5395 ml       Physical Exam Performed:    /61   Pulse 108   Temp 98.2 °F (36.8 °C) (Oral)   Resp 16   Ht 5' 6\" (1.676 m)   Wt 252 lb 13.9 oz (114.7 kg)   SpO2 92%   BMI 40.81 kg/m²     General appearance: No apparent distress,   HEENT:  Conjunctivae/corneas clear. Neck: Supple, with full range of motion. Respiratory:  Normal respiratory effort. Clear to auscultation, bilaterally without Rales/Wheezes/Rhonchi. Cardiovascular: Regular rate and rhythm with normal S1/S2 without murmurs or rubs  Abdomen: Soft, non-tender, non-distended, normal bowel sounds. Musculoskeletal: No cyanosis or edema bilaterally  Neurologic:  without any focal sensory/motor deficits.  grossly non-focal.  Psychiatric: Alert and oriented, Normal mood  Peripheral Pulses: +2 palpable, equal bilaterally       Labs:   Recent Labs     06/18/21  0458 06/19/21  0400 06/19/21  1601   WBC 16.7* 15.8* 14.5*   HGB 11.5* 8.8* 8.7*   HCT 34.1* 26.1* 25.4*    214 205     Recent Labs     06/18/21  0458 06/19/21  0400 06/19/21  1601   * 139 139   K 3.8 3.7 4.1    106 106   CO2 21 24 24   BUN 17 22* 20   CREATININE 0.8 0.9 0.7   CALCIUM 7.8* 7.3* 7.3*   PHOS 3.7 2.3* 2.3*     Recent Labs     06/17/21  2044 06/19/21  1601   * 135*   * 71*   BILIDIR  --  0.9*   BILITOT 3.3* 1.8*   ALKPHOS 164* 114     Recent Labs     06/17/21  2239 06/19/21  1601   INR 1.87* 2.06*     No results for input(s): Sandra Belts in the last 72 hours. Urinalysis:      Lab Results   Component Value Date    NITRU Negative 04/28/2021    WBCUA 0-2 04/28/2021    BACTERIA 1+ 04/28/2021    RBCUA None seen 04/28/2021    BLOODU SMALL 04/28/2021    SPECGRAV 1.015 04/28/2021    GLUCOSEU Negative 04/28/2021    GLUCOSEU Negative 12/15/2011       Radiology:  XR CHEST PORTABLE   Final Result      Hyperexpanded lungs with bibasilar minimal atelectasis, unchanged. XR CHEST 1 VIEW   Final Result   1. Low lung volumes with persistent bilateral lower lobe atelectasis   2. Nasogastric tube in stomach      XR CHEST PORTABLE   Final Result      Free intraperitoneal air. No acute cardiopulmonary findings. Attending in the ED was notified of the free air from the patient's CT study performed earlier. CT ABDOMEN PELVIS W IV CONTRAST Additional Contrast? None   Final Result      Free intraperitoneal air, predominantly in the upper abdomen and right upper quadrant. Site of perforation is not definitely identified. Distended proximal to mid small bowel. Distal small bowel not distended, consistent with small bowel obstruction. Stool throughout the colon. Distal colonic diverticulosis. Small fat-containing umbilical hernia. Air within the hernia, with a possible skin defect anteriorly at the umbilicus. Correlate clinically.       Critical result findings were

## 2021-06-19 NOTE — CONSULTS
MEDICATIONS:     No current facility-administered medications on file prior to encounter. Current Outpatient Medications on File Prior to Encounter   Medication Sig Dispense Refill    gabapentin (NEURONTIN) 100 MG capsule Take 100 mg by mouth 3 times daily.  furosemide (LASIX) 40 MG tablet Take 40 mg by mouth 2 times daily      potassium chloride (KLOR-CON M) 20 MEQ extended release tablet Take 1 tablet by mouth daily      traMADol (ULTRAM) 50 MG tablet Take 50 mg by mouth every 6 hours.       metoprolol tartrate (LOPRESSOR) 25 MG tablet Take 0.5 tablets by mouth 2 times daily 60 tablet 3    polyethylene glycol (GLYCOLAX) 17 g packet Take 17 g by mouth three times a week Every Monday, Wednesday and Friday for bowel regimen      famotidine (PEPCID) 20 MG tablet Take 40 mg by mouth daily       hydrOXYzine (ATARAX) 25 MG tablet Take 25 mg by mouth daily      lactulose (CHRONULAC) 10 GM/15ML solution Take 30 mLs by mouth daily      melatonin 5 MG TABS tablet Take 5 mg by mouth nightly as needed (insomnia)       OLANZapine (ZYPREXA) 5 MG tablet Take 5 mg by mouth nightly      carboxymethylcellulose (REFRESH TEARS) 0.5 % SOLN ophthalmic solution Place 1 drop into both eyes every 6 hours as needed (dry, red eyes)       senna-docusate (SENNA-PLUS) 8.6-50 MG per tablet Take 2 tablets by mouth daily as needed for Constipation      spironolactone (ALDACTONE) 50 MG tablet Take 50 mg by mouth daily      Multiple Vitamins-Minerals (THERAPEUTIC MULTIVITAMIN-MINERALS) tablet Take 1 tablet by mouth daily      Cholecalciferol (VITAMIN D3) 1.25 MG (14269 UT) CAPS Take 1 capsule by mouth every 30 days Every Month on the 15th      Balsam [place]-Castor Oil (VENELEX) OINT ointment Apply topically 2 times daily 1 Tube 0         Scheduled Meds:   potassium phosphate IVPB  15 mmol Intravenous Once    sodium chloride flush  5-40 mL Intravenous 2 times per day    methocarbamol IVPB  750 mg Intravenous Q8H    metroNIDAZOLE  500 mg Intravenous Q8H    ciprofloxacin  400 mg Intravenous Q12H    enoxaparin  40 mg Subcutaneous Daily    pantoprazole  40 mg Intravenous BID      Continuous Infusions:   sodium chloride      electrolyte-A 150 mL/hr at 06/19/21 0828     PRN Meds:sodium chloride flush, sodium chloride, ondansetron **OR** ondansetron, HYDROmorphone **OR** HYDROmorphone, phenol    Allergies: Allergies   Allergen Reactions    Cyclobenzaprine Shortness Of Breath and Other (See Comments)     \"cramping\"      Penicillins Shortness Of Breath, Itching, Swelling and Hives    Acetaminophen     Codeine Itching and Hives     Itching      Diphenhydramine Hcl Other (See Comments)     Heart racing    Ketorolac Itching and Nausea Only     Pt tolerated dose without side effects    Naproxen Nausea Only, Other (See Comments) and Itching     Stomach pain    Bothers her stomach  Bothers her stomach      Diphenhydramine Nausea Only, Hives and Palpitations     Heart racing  Also states it makes her heart race      Ketorolac Tromethamine Nausea And Vomiting    Ketorolac Tromethamine Itching, Nausea Only and Nausea And Vomiting    Sulfamethoxazole-Trimethoprim      Abdominal pain         OF SYSTEMS:       History obtained from the patient    Review of Systems   Constitutional: Positive for activity change, appetite change and chills. HENT: Negative. Eyes: Negative. Respiratory: Negative for chest tightness and shortness of breath. Cardiovascular: Negative for chest pain, palpitations and leg swelling. Gastrointestinal: Positive for abdominal pain, constipation, diarrhea and nausea. Endocrine: Negative. Genitourinary: Negative. Musculoskeletal: Negative. Skin: Negative. Allergic/Immunologic: Negative. Neurological: Negative. Hematological: Negative. Psychiatric/Behavioral: Negative.         PHYSICAL EXAM:       Vitals: BP (!) 131/58   Pulse 111   Temp 98.7 °F (37.1 °C) (Rectal)   Resp 18 Ht 5' 6\" (1.676 m)   Wt 252 lb 13.9 oz (114.7 kg)   SpO2 95%   BMI 40.81 kg/m²     I/O:      Intake/Output Summary (Last 24 hours) at 6/19/2021 1125  Last data filed at 6/19/2021 1000  Gross per 24 hour   Intake 6753 ml   Output 3455 ml   Net 3298 ml     I/O this shift:  In: -   Out: 620 [Urine:475; Drains:145]  I/O last 3 completed shifts: In: 18 [I.V.:6753]  Out: 3000 [Urine:1880; Emesis/NG output:200; Drains:870; Stool:50]    PhysicalExamination:     Physical Exam  Constitutional:       Appearance: Normal appearance. Cardiovascular:      Rate and Rhythm: Regular rhythm. Tachycardia present. Pulses: Normal pulses. Heart sounds: Normal heart sounds. Pulmonary:      Effort: Pulmonary effort is normal.      Breath sounds: Normal breath sounds. Abdominal:      General: Bowel sounds are normal.      Palpations: Abdomen is soft. Tenderness: There is abdominal tenderness (surgical site). Comments: Surgical site C/D/I   Skin:     General: Skin is warm and dry. Capillary Refill: Capillary refill takes less than 2 seconds. Neurological:      Mental Status: She is alert and oriented to person, place, and time. Mental status is at baseline. Access:                                   -Peripheral Access Day#:1  Hannon Day#:1               DATA:       Labs:  CBC:   Recent Labs     06/17/21 2044 06/18/21  0458 06/19/21  0400   WBC 17.2* 16.7* 15.8*   HGB 11.5* 11.5* 8.8*   HCT 33.6* 34.1* 26.1*    227 214       BMP:   Recent Labs     06/17/21 2044 06/18/21  0458 06/19/21  0400   * 135* 139   K 3.5 3.8 3.7    103 106   CO2 24 21 24   BUN 17 17 22*   CREATININE 0.9 0.8 0.9   GLUCOSE 171* 203* 202*     's:   Recent Labs     06/17/21 2044   *   *   BILITOT 3.3*   ALKPHOS 164*     Troponin: No results for input(s): TROPONINI in the last 72 hours.   BNP: No results for input(s): BNP in the last 72 hours.  : No results for input(s): PHART, TRL2DPK, PO2ART in the last 72 hours. INR:  Recent Labs     06/17/21 2239   INR 1.87*       U/A:No results for input(s): NITRITE, COLORU, PHUR, LABCAST, WBCUA, RBCUA, MUCUS, TRICHOMONAS, YEAST, BACTERIA, CLARITYU, SPECGRAV, LEUKOCYTESUR, UROBILINOGEN, BILIRUBINUR, BLOODU, GLUCOSEU, AMORPHOUS in the last 72 hours. Invalid input(s): KETONESU    XR CHEST PORTABLE   Final Result      Hyperexpanded lungs with bibasilar minimal atelectasis, unchanged. XR CHEST 1 VIEW   Final Result   1. Low lung volumes with persistent bilateral lower lobe atelectasis   2. Nasogastric tube in stomach      XR CHEST PORTABLE   Final Result      Free intraperitoneal air. No acute cardiopulmonary findings. Attending in the ED was notified of the free air from the patient's CT study performed earlier. CT ABDOMEN PELVIS W IV CONTRAST Additional Contrast? None   Final Result      Free intraperitoneal air, predominantly in the upper abdomen and right upper quadrant. Site of perforation is not definitely identified. Distended proximal to mid small bowel. Distal small bowel not distended, consistent with small bowel obstruction. Stool throughout the colon. Distal colonic diverticulosis. Small fat-containing umbilical hernia. Air within the hernia, with a possible skin defect anteriorly at the umbilicus. Correlate clinically. Critical result findings were called to Susy Stevens in the ED at 2150 hours on 6/17/2021. ASSESSMENT AND PLAN:   Toma Myers is a 65 y.o. female, who was admitted with bowel perforation and was admitted for exploratory laparotomy.     Acute Hypoxic Respiratory Failure  - Likely due to post-op atelectasis   - Obtaining d-dimer; possible CTA PA   - Unlikely fluid overload as appears dry, normal Echo in 5/2021 and mildly elevated BNP  - Supplemental oxygen; SpO2 goal >90%, wean as tolerated    Perforated Sigmoid Diverticulitis s/p Ex-lap & [place] Passer POD#3  - Monitor ostomy output and wound vac care by GS  - Continue pain control regimen   - Continue Ciprofloxacin & Flagyl   - NPO    Lactic Acidosis (improving)  - Trend q6h  - Continue IVF at 150 mL/hr    Leukocytosis (improving)  - Likely due to post-op  - Trending down & remains afebrile; continue to monitor    Code Status:Full Code  FEN:  mL/hr, NPO  PPX: Lovenox  DISPO: ICU    This patient has been staffed and discussed with Brayden Pride MD.  -----------------------------  Farheen Blanco MD, PGY-1  [unfilled]  11:25 AM       Patient was seen, examined and discussed with Dr. Cnidy Lloyd. I agree with the history of present illness, past medical/surgical histories, family history, social history, medication list and allergies as listed. The review of systems is as noted above. My physical exam confirms the findings listed  Chart was reviewed including labs, CXR, EKG and medical records confirm the findings noted      Post operative hypoxia likely due to atelectasis. As pain is better controlled O2 requirement is improving.     Lactic acidosis is improving   D-dimer will not be helpful and wound not base decisions based on its value     Pain control   IS  OOB if OK with surgery   Wean FiO2 to keep sats > 90%

## 2021-06-20 NOTE — PROGRESS NOTES
GENERAL SURGERY  ICU DAILY PROGRESS NOTE    CC: Perforated sigmoid diverticulitis     SUBJECTIVE:   Interval Hx:   Patient with some confusion overnight requiring restraints. Alert and oriented x3 this AM. Patient states that she is having abdominal pain. Denies any nausea. Patient has an appetite and is thirsty. ROS: A 10 point review of systems was conducted, significant findings as noted above. All other systems negative. OBJECTIVE:   Vitals:   Vitals:    06/20/21 0400 06/20/21 0500 06/20/21 0600 06/20/21 0700   BP: 122/67 (!) 140/69 126/63 96/76   Pulse: 99 98 95 96   Resp: 11 16 16 19   Temp: 98.4 °F (36.9 °C)   98.7 °F (37.1 °C)   TempSrc: Oral   Oral   SpO2: 91% 91% 91% 92%   Weight:       Height:         I/O:     Intake/Output Summary (Last 24 hours) at 6/20/2021 0746  Last data filed at 6/20/2021 0600  Gross per 24 hour   Intake 4416 ml   Output 2728 ml   Net 1688 ml     I/O last 3 completed shifts: In: 4416 [I.V.:4416]  Out: 2948 [Urine:2080; Drains:788; Stool:80]    Diet: Diet NPO      Physical Examination:   General appearance: alert, NAD  Chest/Lungs: Normal effort, breathing 2L supplemental oxygen via nasal cannula.    Cardiovascular: Normal rate at upper limit, well perfused  Abdomen: tender, wound vac in place with adequate suction to the midline, superior MIKE drain with serosanguinous output, inferior MIKE drain with serosanguinous output, colostomy stoma pink and viable with small amount of stool in the bag  Extremities: no cyanosis or edema  Skin: No rashes, warm and dry  Neurologic: AAOx3    Labs:  CBC:   Recent Labs     06/19/21  0400 06/19/21  1601 06/20/21  0442   WBC 15.8* 14.5* 12.0*   HGB 8.8* 8.7* 8.8*   HCT 26.1* 25.4* 26.2*    205 164       BMP:   Recent Labs     06/19/21  0400 06/19/21  1601 06/20/21  0442    139 139   K 3.7 4.1 3.7    106 107   CO2 24 24 25   BUN 22* 20 18   CREATININE 0.9 0.7 0.7   GLUCOSE 202* 194* 159*     LFT's:   Recent Labs 06/17/21 2044 06/19/21  1601 06/20/21  0442   * 135* 133*   * 71* 71*   BILITOT 3.3* 1.8* 1.7*   ALKPHOS 164* 114 131*     Troponin: No results for input(s): TROPONINI in the last 72 hours. BNP: No results for input(s): BNP in the last 72 hours. ABGs: No results for input(s): PHART, LBO1OLP, PO2ART in the last 72 hours. INR:   Recent Labs     06/17/21 2239 06/19/21  1601 06/20/21  0442   INR 1.87* 2.06* 1.88*       U/A:No results for input(s): NITRITE, COLORU, PHUR, LABCAST, WBCUA, RBCUA, MUCUS, TRICHOMONAS, YEAST, BACTERIA, CLARITYU, SPECGRAV, LEUKOCYTESUR, UROBILINOGEN, BILIRUBINUR, BLOODU, GLUCOSEU, AMORPHOUS in the last 72 hours. Invalid input(s): Corrina Major     Rad:   XR CHEST PORTABLE   Final Result      Hyperexpanded lungs with bibasilar minimal atelectasis, unchanged. XR CHEST 1 VIEW   Final Result   1. Low lung volumes with persistent bilateral lower lobe atelectasis   2. Nasogastric tube in stomach      XR CHEST PORTABLE   Final Result      Free intraperitoneal air. No acute cardiopulmonary findings. Attending in the ED was notified of the free air from the patient's CT study performed earlier. CT ABDOMEN PELVIS W IV CONTRAST Additional Contrast? None   Final Result      Free intraperitoneal air, predominantly in the upper abdomen and right upper quadrant. Site of perforation is not definitely identified. Distended proximal to mid small bowel. Distal small bowel not distended, consistent with small bowel obstruction. Stool throughout the colon. Distal colonic diverticulosis. Small fat-containing umbilical hernia. Air within the hernia, with a possible skin defect anteriorly at the umbilicus. Correlate clinically. Critical result findings were called to Zeynep Arellano in the ED at 2150 hours on 6/17/2021.               ASSESSMENT AND PLAN:   Hue Shrestha is a 65 y.o. female perforated sigmoid diverticulits s/p exploratory laparotomy and Nick's procedure 6/17. Neuro:   Pain/sedation/psych  - PRN dilaudid pain panel.   - Robaxin to 750 mg q8 hours. Cardiovascular:   - NSR with resolution of lactic acidosis. - Continue to monitor until lactic acid normalizes. - Decrease IVF to 125cc/hr. Will monitor closely. Pulmonary:   - 2L NC  - Wean for SpO2 >90.  - Aggressive pulmonary toilet. - Appreciate critical care input. GI:   - S/P ex lap and Nick's procedure for perforated diverticulitis. - Continue NPO. - Will advance to sips of clears today  - Continue MIKE drains with strict measurement of output.   - Continue to monitor ostomy output.   - Continue wound vac to midline.   - Plan for wound vac changes M/W/F.     FEN:   - IVF with plasmalyte at 125 cc/hr. - Replace electrolytes as needed. - NPO    /Renal:   - Continue wooten catheter. - Strict I&Os. Hem/ID:   - WBC 12.0 from 14.5  - Continue Ciprofloxacin and Flagyl. Endo:   - Monitor glucose. - No history of diabetes. Prophylaxis:   - Lovenox daily.   - Protonix daily. Access:  - Peripheral Access x2                                - Wooten Date placed: 6/18    Mobility:  - OOB as tolerated. Dispo:   ICU    Code Status: Full Code  -----------------------------  Seth Haile MD  06/20/21  7:46 AM     I have seen, examined, and reviewed the patients chart. I agree with the residents assessment and have made appropriate changes.     Norman Arreguin

## 2021-06-20 NOTE — PROGRESS NOTES
Hospitalist Progress Note      PCP: No primary care provider on file. Chief Complaint. Presented to hospital for abd pain    Date of Admission: 6/17/2021    Subjective:   has abdominal pain, denies chest pain, nausea, vomiting, shortness of breath, fever or chills. mention feels overall better, able to take sips of water    Medications:  Reviewed    Infusion Medications    sodium chloride      electrolyte-A 125 mL/hr at 06/20/21 0700     Scheduled Medications    potassium phosphate IVPB  20 mmol Intravenous Once    pantoprazole  40 mg Intravenous Daily    melatonin  5 mg Oral Nightly    OLANZapine zydis  5 mg Oral Nightly    sodium chloride flush  5-40 mL Intravenous 2 times per day    methocarbamol IVPB  750 mg Intravenous Q8H    metroNIDAZOLE  500 mg Intravenous Q8H    ciprofloxacin  400 mg Intravenous Q12H    enoxaparin  40 mg Subcutaneous Daily     PRN Meds: sodium chloride flush, sodium chloride, ondansetron **OR** ondansetron, HYDROmorphone **OR** HYDROmorphone, phenol      Intake/Output Summary (Last 24 hours) at 6/20/2021 1236  Last data filed at 6/20/2021 1136  Gross per 24 hour   Intake 4897 ml   Output 2507 ml   Net 2390 ml       Physical Exam Performed:    /82   Pulse 93   Temp 98.5 °F (36.9 °C) (Oral)   Resp 18   Ht 5' 6\" (1.676 m)   Wt 252 lb 13.9 oz (114.7 kg)   SpO2 92%   BMI 40.81 kg/m²     General appearance: NAD  HEENT:  Conjunctivae/corneas clear. Neck: Supple, with full range of motion. Respiratory:  Normal respiratory effort. Clear to auscultation, bilaterally without Rales/Wheezes/Rhonchi. Cardiovascular: Regular rate and rhythm with normal S1/S2 without murmurs or rubs  Abdomen: Soft, non-tender, non-distended, normal bowel sounds. Musculoskeletal: No cyanosis or edema bilaterally  Neurologic:  without any focal sensory/motor deficits.  grossly non-focal.  Psychiatric: Alert and oriented, Normal mood  Peripheral Pulses: +2 palpable, equal bilaterally Labs:   Recent Labs     06/19/21  0400 06/19/21  1601 06/20/21  0442   WBC 15.8* 14.5* 12.0*   HGB 8.8* 8.7* 8.8*   HCT 26.1* 25.4* 26.2*    205 164     Recent Labs     06/19/21  0400 06/19/21  1601 06/20/21  0442    139 139   K 3.7 4.1 3.7    106 107   CO2 24 24 25   BUN 22* 20 18   CREATININE 0.9 0.7 0.7   CALCIUM 7.3* 7.3* 7.3*   PHOS 2.3* 2.3* 2.1*     Recent Labs     06/17/21  2044 06/19/21  1601 06/20/21  0442   * 135* 133*   * 71* 71*   BILIDIR  --  0.9* 1.0*   BILITOT 3.3* 1.8* 1.7*   ALKPHOS 164* 114 131*     Recent Labs     06/17/21 2239 06/19/21  1601 06/20/21  0442   INR 1.87* 2.06* 1.88*     No results for input(s): CKTOTAL, TROPONINI in the last 72 hours. Urinalysis:      Lab Results   Component Value Date    NITRU Negative 04/28/2021    WBCUA 0-2 04/28/2021    BACTERIA 1+ 04/28/2021    RBCUA None seen 04/28/2021    BLOODU SMALL 04/28/2021    SPECGRAV 1.015 04/28/2021    GLUCOSEU Negative 04/28/2021    GLUCOSEU Negative 12/15/2011       Radiology:  XR CHEST PORTABLE   Final Result      Hyperexpanded lungs with bibasilar minimal atelectasis, unchanged. XR CHEST 1 VIEW   Final Result   1. Low lung volumes with persistent bilateral lower lobe atelectasis   2. Nasogastric tube in stomach      XR CHEST PORTABLE   Final Result      Free intraperitoneal air. No acute cardiopulmonary findings. Attending in the ED was notified of the free air from the patient's CT study performed earlier. CT ABDOMEN PELVIS W IV CONTRAST Additional Contrast? None   Final Result      Free intraperitoneal air, predominantly in the upper abdomen and right upper quadrant. Site of perforation is not definitely identified. Distended proximal to mid small bowel. Distal small bowel not distended, consistent with small bowel obstruction. Stool throughout the colon. Distal colonic diverticulosis. Small fat-containing umbilical hernia.  Air within the hernia, with a possible skin defect anteriorly at the umbilicus. Correlate clinically. Critical result findings were called to Terence Rushing in the ED at 2150 hours on 6/17/2021.                   Assessment/Plan:    Active Hospital Problems    Diagnosis     Bowel perforation (HCC) [K63.1]     Pneumoperitoneum [K66.8]      Assessment  Acute Hypoxic Respiratory Failure  Perforated Sigmoid Diverticulitis s/p Ex-lap & Nick  Lactic acidosis        Plan  Hypoxia is Likely due to post-op atelectasis, Supplemental oxygen; SpO2 goal >90%, wean as tolerated  Monitor ostomy output and wound vac care by GS  Continue pain control regimen   Continue Ciprofloxacin & Flagyl   Advance diet per GS  Continue IVF  Reviewed Medical reconciliation, Labs, vitals signs, previous records  Pain control per primary, stool softer  PT/OT when stable  DVT PPx - per primary  Diet: Diet NPO Exceptions are: Sips of Clear Liquids, Popsicles, Ice Chips  Code Status: Full Code    PT/OT Eval Status: ordered    Dispo - per primary    Mary Dumont MD

## 2021-06-20 NOTE — PLAN OF CARE
Problem: Pain:  Description: Pain management should include both nonpharmacologic and pharmacologic interventions. Goal: Pain level will decrease  Description: Pain level will decrease  Outcome: Ongoing  Note: Pt c/o 9-10/10 pain in abd this am and back this afternoon. Pain doesn't appear to affect ADLs. Dilaudid 1 mg given with pt reports provides little to relief. Goal: Control of acute pain  Description: Control of acute pain  Outcome: Ongoing  Goal: Control of chronic pain  Description: Control of chronic pain  Outcome: Ongoing     Problem: Non-Violent Restraints  Goal: Removal from restraints as soon as assessed to be safe  Outcome: Ongoing  Note: Pt is impulsive and continued to pull at MIKE drains and PIV. Goal: No harm/injury to patient while restraints in use  Outcome: Ongoing  Goal: Patient's dignity will be maintained  Outcome: Ongoing     Problem: Skin Integrity:  Goal: Will show no infection signs and symptoms  Description: Will show no infection signs and symptoms  Outcome: Ongoing  Goal: Absence of new skin breakdown  Description: Absence of new skin breakdown  Outcome: Ongoing  Note: Pt turned and repositioned q2h. Heels elevated off bed. Sacral dressing in place. Low air loss alternating pressure mattress in use. Problem: Falls - Risk of:  Goal: Will remain free from falls  Description: Will remain free from falls  Outcome: Ongoing  Note: Pt is at risk for falls; see Rosenbaum fall score. Bed is locked & in lowest position, side rails up x3. Fall risk bracelet applied, non-skid socks on. Hourly rounds done to anticipate needs. Bilateral wrist restraints remain in place. Goal: Absence of physical injury  Description: Absence of physical injury  Outcome: Ongoing     Problem: OXYGENATION/RESPIRATORY FUNCTION  Goal: Patient will achieve/maintain normal respiratory rate/effort  Outcome: Ongoing  Note: IS and flutter valve use encouraged. SpO2 92% on 1 LPM nasal cannula.       Problem: MOBILITY  Goal: Early mobilization is achieved  Outcome: Ongoing

## 2021-06-20 NOTE — PROGRESS NOTES
ICU Progress Note    Admit Date: 6/17/2021  Day: 3  IV Access:Peripheral  IV Fluids: Plasmalyte               Antibiotics: Ciprofloxacin & flagyl  Diet: Diet NPO    CC: abdominal pain    Interval history:  Patient had some bouts of confusion last night and required some restraints. Patient resting comfortably in bed this AM.  She is AAOx3. She has an appetite and is excited to have some food. Oxygen requirements improving. She denies any chest pain, shortness of breath, lower extremity swelling/tenderness, fever or chills. Medications:     Scheduled Meds:   melatonin  5 mg Oral Nightly    OLANZapine zydis  5 mg Oral Nightly    sodium chloride flush  5-40 mL Intravenous 2 times per day    methocarbamol IVPB  750 mg Intravenous Q8H    metroNIDAZOLE  500 mg Intravenous Q8H    ciprofloxacin  400 mg Intravenous Q12H    enoxaparin  40 mg Subcutaneous Daily    pantoprazole  40 mg Intravenous BID     Continuous Infusions:   sodium chloride      electrolyte-A 125 mL/hr at 06/20/21 0334     PRN Meds:sodium chloride flush, sodium chloride, ondansetron **OR** ondansetron, HYDROmorphone **OR** HYDROmorphone, phenol    Objective:   Vitals:   T-max:  Patient Vitals for the past 8 hrs:   BP Temp Temp src Pulse Resp SpO2   06/20/21 0700  98.7 °F (37.1 °C) Oral      06/20/21 0600 126/63   95 16 91 %   06/20/21 0500 (!) 140/69   98 16 91 %   06/20/21 0400 122/67 98.4 °F (36.9 °C) Oral 99 11 91 %   06/20/21 0300 134/66   98 15 92 %   06/20/21 0200 131/67   98 15 90 %   06/20/21 0100 122/63   100 17 93 %   06/20/21 0000 128/70   101 18 91 %       Intake/Output Summary (Last 24 hours) at 6/20/2021 0745  Last data filed at 6/20/2021 0600  Gross per 24 hour   Intake 4416 ml   Output 2728 ml   Net 1688 ml       Access:   -Peripheral Access Day#:3      Physical Exam  Constitutional:       Appearance: Normal appearance. She is obese. Cardiovascular:      Rate and Rhythm: Normal rate and regular rhythm. Pulses: Normal pulses. Heart sounds: Normal heart sounds. Pulmonary:      Effort: Pulmonary effort is normal.      Breath sounds: Normal breath sounds. Abdominal:      Tenderness: There is abdominal tenderness (surgical site C/D/I). Comments: Superior and inferior J tube with serosanguinous fluid    Skin:     General: Skin is warm and dry. Capillary Refill: Capillary refill takes less than 2 seconds. Neurological:      General: No focal deficit present. Mental Status: She is alert and oriented to person, place, and time. Mental status is at baseline. LABS:    CBC:   Recent Labs     06/19/21  0400 06/19/21  1601 06/20/21  0442   WBC 15.8* 14.5* 12.0*   HGB 8.8* 8.7* 8.8*   HCT 26.1* 25.4* 26.2*    205 164   MCV 92.3 90.9 92.6     Renal:    Recent Labs     06/19/21  0400 06/19/21  1601 06/20/21  0442    139 139   K 3.7 4.1 3.7    106 107   CO2 24 24 25   BUN 22* 20 18   CREATININE 0.9 0.7 0.7   GLUCOSE 202* 194* 159*   CALCIUM 7.3* 7.3* 7.3*   MG 2.30 2.40 2.50*   PHOS 2.3* 2.3* 2.1*   ANIONGAP 9 9 7     Hepatic:   Recent Labs     06/17/21  2044 06/19/21  0400 06/19/21  1601 06/20/21  0442   *  --  135* 133*   *  --  71* 71*   BILITOT 3.3*  --  1.8* 1.7*   BILIDIR  --   --  0.9* 1.0*   PROT 8.1  --  6.1* 5.8*   LABALBU 2.8* 2.1* 2.0* 2.1*   ALKPHOS 164*  --  114 131*     Troponin: No results for input(s): TROPONINI in the last 72 hours. BNP: No results for input(s): BNP in the last 72 hours. Lipids: No results for input(s): CHOL, HDL in the last 72 hours. Invalid input(s): LDLCALCU, TRIGLYCERIDE  ABGs:  No results for input(s): PHART, ISW8JPZ, PO2ART, NTS1JTP, BEART, THGBART, B7BFXQZL, NEJ7ZRG in the last 72 hours.     INR:   Recent Labs     06/17/21  2239 06/19/21  1601 06/20/21  0442   INR 1.87* 2.06* 1.88*     Lactate: No results for input(s): LACTATE in the last 72 hours.  Cultures:  -----------------------------------------------------------------  RAD:   XR CHEST PORTABLE   Final Result      Hyperexpanded lungs with bibasilar minimal atelectasis, unchanged. XR CHEST 1 VIEW   Final Result   1. Low lung volumes with persistent bilateral lower lobe atelectasis   2. Nasogastric tube in stomach      XR CHEST PORTABLE   Final Result      Free intraperitoneal air. No acute cardiopulmonary findings. Attending in the ED was notified of the free air from the patient's CT study performed earlier. CT ABDOMEN PELVIS W IV CONTRAST Additional Contrast? None   Final Result      Free intraperitoneal air, predominantly in the upper abdomen and right upper quadrant. Site of perforation is not definitely identified. Distended proximal to mid small bowel. Distal small bowel not distended, consistent with small bowel obstruction. Stool throughout the colon. Distal colonic diverticulosis. Small fat-containing umbilical hernia. Air within the hernia, with a possible skin defect anteriorly at the umbilicus. Correlate clinically. Critical result findings were called to Gonzalez Rizo in the ED at 2150 hours on 6/17/2021.                   Assessment/Plan:   Toya Dorantes is a 65 y.o. female, who was admitted with bowel perforation and was admitted for exploratory laparotomy.     Acute Hypoxic Respiratory Failure 2/2 Post-op Atelectasis  - Unlikely fluid overload as appears dry, normal Echo in 5/2021 and mildly elevated BNP  - Pain likely contributing to hypoxia  - Continue pain control regimen  - Supplemental oxygen; SpO2 goal >90%, wean as tolerated  - Incentive spirometry  - OOB when cleared  - PT/OT       Perforated Sigmoid Diverticulitis s/p Ex-lap & Nick POD#3  - Monitor ostomy output and wound vac care by GS  - Continue pain control regimen   - Continue Ciprofloxacin & Flagyl   - NPO  - Continue IVF at 125 mL/hr     Lactic Acidosis (resolved)  - Continue IVF at 125 mL/hr     Leukocytosis (improving)  - Likely due to post-op  - Trending down & remains afebrile; continue to monitor      Code Status:Full Code  FEN: Diet NPO  PPX:  Lovenox  DISPO: ICU    Shanice Thompson MD, PGY-1  06/20/21  7:45 AM    This patient has been staffed and discussed with Court Escalera MD.    Patient was seen, examined and discussed with Dr. Jeffrey Schulte. I agree with the interval history. My physical exam confirms the findings listed below  Chart was reviewed including labs and medical records confirm the findings noted    Post operative hypoxia likely due to atelectasis.    Lactic acidosis - resolved      Pain control   IS  OOB if OK with surgery   Wean FiO2 to keep sats > 90%

## 2021-06-20 NOTE — PROGRESS NOTES
Patient removed PIV, increased confusion overnight. Grabbing at MIKE drains as well. Surgical residents informed and decision to place BSWR placed.

## 2021-06-20 NOTE — PROGRESS NOTES
Pt c/o 9 out of 10 R sided abd pain with stabbing character; Dilaudid given. IS and flutter valve use encouraged. Ostomy stoma red & moist, small amount of watery brown output.

## 2021-06-21 NOTE — PROGRESS NOTES
Physician Progress Note      PATIENT:               Chuy Mclean  CSN #:                  [id number]  :                       1950  ADMIT DATE:       2021 8:19 PM  [address] [place] DATE:  RESPONDING  PROVIDER #:        Speedy Weinstein MD          QUERY TEXT:    Pt admitted with sepsis with perforated diverticulum. Pt noted to have   increased confusion and agitation. If possible, please document in the   progress notes and discharge summary if you are evaluating and / or treating   any of the following: The medical record reflects the following:  Risk Factors: sepsis, perforated diverticulum, acute respiratory failure  Clinical Indicators: -Perfect serve to residents to inform them that   patient is becoming increasingly confused, has not slept for a more than a few   minutes at at time. Frequently awakens and is very anxious, often telling   this RN that she \"doesn't trust anyone here. \" She asks for pain medication   often but doesn't not seem in pain, rather has made the comment multiple times   that she \"just wants to sleep. \"  Entered room and NG tube lying on patient's chest. Call to surgical residents   to inform them of this and for BSWR order as she also continuously removes   nasal cannula which is now at 10 L. Patient removed PIV, increased confusion overnight. Grabbing at [address] drains as   well  Patient had some bouts of confusion last night and required some restraints  Treatment: soft wrist restraints, increased nursing rounds, frequent   reorientation, bed wheels locked, call light in reach, fall precautions.   Options provided:  -- Metabolic encephalopathy  -- Septic encephalopathy  -- Toxic encephalopathy  -- Toxic metabolic encephalopathy  -- Other - I will add my own diagnosis  -- Disagree - Not applicable / Not valid  -- Disagree - Clinically unable to determine / Unknown  -- Refer to Clinical Documentation Reviewer    PROVIDER RESPONSE TEXT:    This patient has septic encephalopathy.     Query created by: Chance Mar on 6/21/2021 6:02 AM      Electronically signed by:  Gladis Welch MD 6/21/2021 6:52 AM

## 2021-06-21 NOTE — PLAN OF CARE
Problem: Pain:  Description: Pain management should include both nonpharmacologic and pharmacologic interventions. Goal: Pain level will decrease  Description: Pain level will decrease  Outcome: Ongoing  Note: Pt medicated with Dilaudid 1 mg x3 for R sided abd pain. Goal: Control of acute pain  Description: Control of acute pain  Outcome: Ongoing  Goal: Control of chronic pain  Description: Control of chronic pain  Outcome: Ongoing     Problem: Non-Violent Restraints  Goal: Removal from restraints as soon as assessed to be safe  Outcome: Completed  Goal: No harm/injury to patient while restraints in use  Outcome: Completed  Goal: Patient's dignity will be maintained  Outcome: Completed     Problem: Skin Integrity:  Goal: Will show no infection signs and symptoms  Description: Will show no infection signs and symptoms  Outcome: Ongoing  Goal: Absence of new skin breakdown  Description: Absence of new skin breakdown  Outcome: Ongoing  Note: Pt turned & repositioned q2h with turning wedge. Heels elevated off bed. Low air loss alternating pressure mattress in use. Sacral dressing in place for prevention. Problem: Falls - Risk of:  Goal: Will remain free from falls  Description: Will remain free from falls  Outcome: Ongoing  Note: Pt is at risk for falls; see Rosenbaum fall score. Bed is locked & in lowest position, side rails up x3. Fall risk bracelet applied, non-skid socks on. Call light and possessions within reach. Patient/family educated in fall risk protocol. Patient instructed to call prior to getting up. Hourly rounds done to anticipate needs. Bed alarm used this shift. Goal: Absence of physical injury  Description: Absence of physical injury  Outcome: Ongoing     Problem: OXYGENATION/RESPIRATORY FUNCTION  Goal: Patient will achieve/maintain normal respiratory rate/effort  Outcome: Ongoing  Note: IS and flutter valve use encouraged.       Problem: MOBILITY  Goal: Early mobilization is achieved  Outcome: Not Met This Shift

## 2021-06-21 NOTE — PROGRESS NOTES
Occupational Therapy   Occupational Therapy Initial Assessment/Treatment/Discharge  Date: 2021   Patient Name: Leidy Fitzgerald  MRN: [id number]     : 1950    Date of Service: 2021    Discharge Recommendations: Leidy Fitzgerald scored a 10 on the -[facility] ADL Inpatient form. At this time, no further OT is recommended upon discharge. Recommend patient returns to prior setting with prior services. OT Equipment Recommendations  Equipment Needed: No    Assessment   Performance deficits / Impairments: Decreased functional mobility ; Decreased ADL status; Decreased ROM; Decreased safe awareness;Decreased cognition;Decreased endurance  Assessment: Unclear of pt's baseline mobility function. Pt admitted from [facility]. Son reports pt has not walked since she broke her ankle approximately 9 months ago. Pt reports she gets up to a w/c however unclear whether lift equipment is used. Pt required 2 person total A for bed mobility and max A x 1 to sit EOB ~10 minutes. Pt likely at or close to baseline function. Rec cont OT once returns to LTC if not at baseline. No further acute acute OT needs identified. Anticipate d/c to LTC once Pt medically stable. Prognosis: Fair  Decision Making: Medium Complexity  OT Education: OT Role;Plan of Care  Patient Education: Requires reinforcement  No Skilled OT: At baseline function  REQUIRES OT FOLLOW UP: No  Activity Tolerance  Activity Tolerance: Patient limited by pain  Activity Tolerance: Pt with apprehension during mobility. Pt repeated \"that's all I can do\". Pt stated abdomen pain while sitting EOB. Safety Devices  Safety Devices in place: Yes  Type of devices: Left in bed;Bed alarm in place;Call light within reach;Nurse notified (son present)           Restrictions  Position Activity Restriction  Other position/activity restrictions: no activity restrictions    Subjective   General  Chart Reviewed:  Yes  Additional Pertinent Hx: Pt admitted 21 with abd pain. 6/17 Exploratory laparotomy,  Nick's procedure. Imaging:  CT abd:  Free intraperitoneal air, predominantly in the upper abdomen and right upper quadrant. Site of perforation is not definitely identified. Distended proximal to mid small bowel. Distal small bowel not distended, consistent with small bowel obstruction. PMH: lung CA, HTN, anxiety, dementia, GERD, hepatitis B, GERD, spinal stenosis, osteoporosis  Family / Caregiver Present: Yes (son present)  Referring Practitioner: Dr. Jon George  Diagnosis: Pneumoperitoneum  Subjective  Subjective: Pt in bed upon entry. Pt confused and nervous to move. \"I can't really sit up on my own. \"  Patient Currently in Pain: Yes (abdomen, not rated, RN aware)  Pain Assessment  Pain Level: 10  Vital Signs  Pulse: 100  Resp: 26  BP: 139/66  MAP (mmHg): 85  Patient Currently in Pain: Yes (abdomen, not rated, RN aware)  Oxygen Therapy  SpO2: 93 %  Pulse Oximeter Device Mode: Intermittent  Pulse Oximeter Device Location: Right;Finger  O2 Device: None (Room air)  Social/Functional History  Social/Functional History  Type of Home: Facility [facility])  Home Equipment: 4 wheeled walker  ADL Assistance: Independent  Ambulation Assistance: Needs assistance (with 4 wheeled walker - pt initially stating she walks but then stating it has been several months since she has walked)  Transfer Assistance: Needs assistance  Additional Comments: Denies falls. Pt poor historian. Son came in during session and stated pt fell about 9 months ago and broke ankle. Has not walked since then. Gets up to w/c but unclear how much help is needed or if lift is used.        Objective   Vision: Within Functional Limits  Hearing: Exceptions to [facility]  Hearing Exceptions: Hard of hearing/hearing concerns    Orientation  Overall Orientation Status: Impaired  Orientation Level: Oriented to place;Oriented to person;Oriented to situation;Disoriented to time     Balance  Sitting Balance: Dependent/Total (Sat EOB ~10 minutes with dependent assist x 1, post lean, initiates movement toward midline with cues but requires total assist)  Functional Mobility  Functional Mobility Comments: Did not assess standing balance/fxl mobility 2/2 poor sitting balance. ADL  Feeding: Beverage management;Setup (eat lunch while laying in bed)  Grooming: Setup;Supervision (wash face with warm wash cloth sitting EOB)  Tone RUE  RUE Tone: Normotonic  Tone LUE  LUE Tone: Normotonic  Coordination  Movements Are Fluid And Coordinated: No  Coordination and Movement description: Left UE;Decreased speed;Gross motor impairments     Bed mobility  Rolling to Left: Dependent/Total (assist x 2)  Rolling to Right: Dependent/Total (assist x 2)  Supine to Sit: Dependent/Total (assist x 2)  Sit to Supine: Dependent/Total (assist x 2)  Transfers  Transfer Comments: Did not assess transfers 2/2 poor sitting balance. Cognition  Overall Cognitive Status: Exceptions  Arousal/Alertness: Delayed responses to stimuli  Following Commands: Follows one step commands with increased time; Follows one step commands with repetition  Memory: Decreased short term memory  Safety Judgement: Decreased awareness of need for assistance;Decreased awareness of need for safety  Insights: Decreased awareness of deficits  Initiation: Requires cues for some  Sequencing: Requires cues for some                 LUE PROM (degrees)  LUE PROM: WFL  LUE AROM (degrees)  LUE AROM : Exceptions (shoulder flexion less than 90 degrees; Pt stated pain in forearm)  RUE AROM (degrees)  RUE AROM : WFL  LUE Strength  LUE Strength Comment: Did not assess 2/2 abdominal surgery. RUE Strength  RUE Strength Comment: Did not assess 2/2 abdominal surgery. Hand Dominance  Hand Dominance: Right         Pt seen for evaluation and treatment including ADL/transfer training.      Plan   Plan  Plan Comment: d/c OT      AM-PAC Score        AM-PAC Inpatient Daily Activity Raw Score: 10 (06/21/21 1529)  AM-PAC Inpatient ADL T-Scale Score : 27.31 (06/21/21 1529)  ADL Inpatient CMS 0-100% Score: 74.7 (06/21/21 1529)  ADL Inpatient CMS G-Code Modifier : CL (06/21/21 1529)    Goals      No goals indicated. Therapy Time   Individual Concurrent Group Co-treatment   Time In 1321         Time Out 1403         Minutes 42         Timed Code Treatment Minutes: 28 Minutes    Total Tx Min: 42 minutes     Therapist was present, directed the patient's care, made skilled judgment, and was responsible for assessment and treatment of the patient. If patient is discharged prior to next treatment, this not will serve as the discharge summary.     Radha Monahan S/OT

## 2021-06-21 NOTE — PROGRESS NOTES
AM labs sent, though very difficult to obtain. Patient has a 22G IV to left wrist without additional access.

## 2021-06-21 NOTE — CARE COORDINATION
Patient is from [facility] and is a long term care resident. She plans on returning there and with the wound vac may need SNF level of care which would require a precert. I called Tai Rooney in admissions [phone] and left a message with her. We are still awaiting return of GI function. Her diet has advanced to clear liquids. Possible transfer to [facility] later today.      Electronically signed by Amanda Lovett RN on 6/21/2021 at 10:53 AM  [phone]

## 2021-06-21 NOTE — PROGRESS NOTES
GENERAL SURGERY  ICU DAILY PROGRESS NOTE    CC: Perforated sigmoid diverticulitis     SUBJECTIVE:   Interval Hx:   Alert and oriented x3. Patient states that she is feeling better today. Pain is improving. Denies nausea and vomiting. Tolerating sips of clears. ROS: A 10 point review of systems was conducted, significant findings as noted above. All other systems negative. OBJECTIVE:   Vitals:   Vitals:    06/21/21 0200 06/21/21 0300 06/21/21 0400 06/21/21 0500   BP: (!) 112/58 (!) 127/58  (!) 125/52   Pulse: 82 82 84 81   Resp: 17 21 26 28   Temp:   98 °F (36.7 °C)    TempSrc:   Oral    SpO2: 93% 94% 96% 94%   Weight:    259 lb 7.7 oz (117.7 kg)   Height:    5' 6\" (1.676 m)     I/O:     Intake/Output Summary (Last 24 hours) at 6/21/2021 0644  Last data filed at 6/21/2021 0554  Gross per 24 hour   Intake 4793.01 ml   Output 2285 ml   Net 2508.01 ml     I/O last 3 completed shifts: In: 5992 [P.O.:1200; I.V.:4792]  Out: 2480 [Urine:1495; Drains:935; Stool:50]    Diet: Diet NPO Exceptions are: Sips of Clear Liquids, Popsicles, Ice Chips      Physical Examination:   General appearance: alert, NAD  Chest/Lungs: Normal effort, breathing 2L supplemental oxygen via nasal cannula.    Cardiovascular: Normal rate at upper limit, well perfused  Abdomen: tender, wound vac in place with adequate suction to the midline, superior MIKE drain with serosanguinous output, inferior MIKE drain with serosanguinous output, colostomy stoma pink and viable with small amount of stool in the bag  Extremities: no cyanosis or edema  Skin: No rashes, warm and dry  Neurologic: AAOx3    Labs:  CBC:   Recent Labs     06/19/21  1601 06/20/21  0442 06/21/21  0532   WBC 14.5* 12.0* 10.3   HGB 8.7* 8.8* 9.9*   HCT 25.4* 26.2* 29.6*    164 159       BMP:   Recent Labs     06/19/21  1601 06/20/21  0442 06/21/21  0532    139 139   K 4.1 3.7 4.0    107 107   CO2 24 25 24   BUN 20 18 12   CREATININE 0.7 0.7 <0.5*   GLUCOSE 194* 159* 130*     LFT's:   Recent Labs     06/19/21  1601 06/20/21  0442 06/21/21  0532   * 133* 133*   ALT 71* 71* 66*   BILITOT 1.8* 1.7* 1.7*   ALKPHOS 114 131* 121     Troponin: No results for input(s): TROPONINI in the last 72 hours. BNP: No results for input(s): BNP in the last 72 hours. ABGs: No results for input(s): PHART, UWK2SZA, PO2ART in the last 72 hours. INR:   Recent Labs     06/19/21  1601 06/20/21  0442   INR 2.06* 1.88*       U/A:No results for input(s): NITRITE, COLORU, PHUR, LABCAST, WBCUA, RBCUA, MUCUS, TRICHOMONAS, YEAST, BACTERIA, CLARITYU, SPECGRAV, LEUKOCYTESUR, UROBILINOGEN, BILIRUBINUR, BLOODU, GLUCOSEU, AMORPHOUS in the last 72 hours. Invalid input(s): Sheryle Myrtle     Rad:   XR CHEST PORTABLE   Final Result      Hyperexpanded lungs with bibasilar minimal atelectasis, unchanged. XR CHEST 1 VIEW   Final Result   1. Low lung volumes with persistent bilateral lower lobe atelectasis   2. Nasogastric tube in stomach      XR CHEST PORTABLE   Final Result      Free intraperitoneal air. No acute cardiopulmonary findings. Attending in the ED was notified of the free air from the patient's CT study performed earlier. CT ABDOMEN PELVIS W IV CONTRAST Additional Contrast? None   Final Result      Free intraperitoneal air, predominantly in the upper abdomen and right upper quadrant. Site of perforation is not definitely identified. Distended proximal to mid small bowel. Distal small bowel not distended, consistent with small bowel obstruction. Stool throughout the colon. Distal colonic diverticulosis. Small fat-containing umbilical hernia. Air within the hernia, with a possible skin defect anteriorly at the umbilicus. Correlate clinically. Critical result findings were called to Nicolle Hawk in the ED at 2150 hours on 6/17/2021.               ASSESSMENT AND PLAN:   Alireza Mancilla is a 65 y.o. female perforated sigmoid diverticulits s/p exploratory laparotomy and Nick's procedure 6/17. Neuro:   Pain/sedation/psych  - PRN dilaudid pain panel.   - Robaxin to 750 mg q8 hours. Cardiovascular:   - NSR with resolution of lactic acidosis. - Continue to monitor until lactic acid normalizes. - Decrease IVF to 50cc/hr. Will monitor closely. Pulmonary:   - 1L NC  - Wean for SpO2 >90.  - Aggressive pulmonary toilet. - Appreciate critical care input. GI:   - S/P ex lap and Nick's procedure for perforated diverticulitis. - Continue NPO. - Will advance to CLD. - Continue MIKE drains with strict measurement of output.   - Continue to monitor ostomy output.   - Continue wound vac to midline.   - Plan for wound vac changes M/W/F.     FEN:   - IVF with plasmalyte at 50 cc/hr. - Replace electrolytes as needed. - CLD    /Renal:   - Continue wooten catheter. - Strict I&Os. Hem/ID:   - WBC 10.3 from 12  - Continue Ciprofloxacin and Flagyl. Endo:   - Monitor glucose. - No history of diabetes. Prophylaxis:   - Lovenox daily.   - Protonix daily. Access:  - Peripheral Access x2                                - Wooten Date placed: 6/18    Mobility:  - OOB as tolerated. Dispo:   ICU    Code Status: Full Code  -----------------------------  Linda Quan MD  06/21/21  6:44 AM     I performed a history and physical examination of the patient and discussed management with the resident. I reviewed the resident's note and agree with the documented findings and plan of care.     Anat Santos M.D.  6/21/21   4:44 PM

## 2021-06-21 NOTE — PROGRESS NOTES
ICU Progress Note    Admit Date: 6/17/2021  ICU Day: 4  IV Access:Peripheral  IV Fluids: Plasmalyte               Antibiotics: Ciprofloxacin & flagyl  Diet: ADULT DIET; Clear Liquid    CC: abdominal pain    Interval history:  No acute events overnight. Patient denies CP, SOB, N/V. She endorses mild R abdominal pain. Patient is tolerating sips of clear liquids well, will advance diet to clears. Medications:     Scheduled Meds:   sodium phosphate IVPB  30 mmol Intravenous Once    pantoprazole  40 mg Intravenous Daily    melatonin  5 mg Oral Nightly    OLANZapine zydis  5 mg Oral Nightly    sodium chloride flush  5-40 mL Intravenous 2 times per day    metroNIDAZOLE  500 mg Intravenous Q8H    ciprofloxacin  400 mg Intravenous Q12H    enoxaparin  40 mg Subcutaneous Daily     Continuous Infusions:   sodium chloride      electrolyte-A 50 mL/hr at 06/21/21 0711     PRN Meds:sodium chloride flush, sodium chloride, ondansetron **OR** ondansetron, HYDROmorphone **OR** HYDROmorphone, phenol    Objective:   Vitals:   T-max:  Patient Vitals for the past 8 hrs:   BP Temp Temp src Pulse Resp SpO2 Height Weight   06/21/21 0700 (!) 147/58 98.1 °F (36.7 °C) Oral 85 23 91 %     06/21/21 0600 (!) 135/50   77 18 95 %     06/21/21 0500 (!) 125/52   81 28 94 % 5' 6\" (1.676 m) 259 lb 7.7 oz (117.7 kg)   06/21/21 0445    88 20 91 %     06/21/21 0400  98 °F (36.7 °C) Oral 84 26 96 %     06/21/21 0300 (!) 127/58   82 21 94 %     06/21/21 0200 (!) 112/58   82 17 93 %     06/21/21 0100 (!) 122/57   81 22 94 %         Intake/Output Summary (Last 24 hours) at 6/21/2021 0821  Last data filed at 6/21/2021 0756  Gross per 24 hour   Intake 4793.01 ml   Output 2525 ml   Net 2268.01 ml       Access:   -Peripheral Access Day#:3      Physical Exam  Constitutional:       Appearance: Normal appearance. She is obese. Cardiovascular:      Rate and Rhythm: Normal rate and regular rhythm. Pulses: Normal pulses. Heart sounds: Normal heart sounds. Pulmonary:      Effort: Pulmonary effort is normal.      Breath sounds: Normal breath sounds. Abdominal:      Tenderness: There is abdominal tenderness (surgical site C/D/I). Comments: Superior and inferior J tube with serosanguinous fluid    Skin:     General: Skin is warm and dry. Capillary Refill: Capillary refill takes less than 2 seconds. Neurological:      General: No focal deficit present. Mental Status: She is alert and oriented to person, place, and time. Mental status is at baseline. LABS:    CBC:   Recent Labs     06/19/21  1601 06/20/21  0442 06/21/21  0532   WBC 14.5* 12.0* 10.3   HGB 8.7* 8.8* 9.9*   HCT 25.4* 26.2* 29.6*    164 159   MCV 90.9 92.6 91.9     Renal:    Recent Labs     06/19/21  1601 06/20/21  0442 06/21/21  0532    139 139   K 4.1 3.7 4.0    107 107   CO2 24 25 24   BUN 20 18 12   CREATININE 0.7 0.7 <0.5*   GLUCOSE 194* 159* 130*   CALCIUM 7.3* 7.3* 7.3*   MG 2.40 2.50* 2.20   PHOS 2.3* 2.1* 2.3*   ANIONGAP 9 7 8     Hepatic:   Recent Labs     06/19/21  1601 06/20/21  0442 06/21/21  0532   * 133* 133*   ALT 71* 71* 66*   BILITOT 1.8* 1.7* 1.7*   BILIDIR 0.9* 1.0* 0.7*   PROT 6.1* 5.8* 6.1*   LABALBU 2.0* 2.1* 2.0*   ALKPHOS 114 131* 121     Troponin: No results for input(s): TROPONINI in the last 72 hours. BNP: No results for input(s): BNP in the last 72 hours. Lipids: No results for input(s): CHOL, HDL in the last 72 hours. Invalid input(s): LDLCALCU, TRIGLYCERIDE  ABGs:  No results for input(s): PHART, JTH8BZM, PO2ART, ZTW5TKV, BEART, THGBART, Z2PMHVRM, LMR3PJW in the last 72 hours. INR:   Recent Labs     06/19/21  1601 06/20/21  0442   INR 2.06* 1.88*     Lactate: No results for input(s): LACTATE in the last 72 hours.   Cultures:  -----------------------------------------------------------------  RAD:   XR CHEST PORTABLE   Final Result      Hyperexpanded lungs with bibasilar minimal atelectasis, unchanged. XR CHEST 1 VIEW   Final Result   1. Low lung volumes with persistent bilateral lower lobe atelectasis   2. Nasogastric tube in stomach      XR CHEST PORTABLE   Final Result      Free intraperitoneal air. No acute cardiopulmonary findings. Attending in the ED was notified of the free air from the patient's CT study performed earlier. CT ABDOMEN PELVIS W IV CONTRAST Additional Contrast? None   Final Result      Free intraperitoneal air, predominantly in the upper abdomen and right upper quadrant. Site of perforation is not definitely identified. Distended proximal to mid small bowel. Distal small bowel not distended, consistent with small bowel obstruction. Stool throughout the colon. Distal colonic diverticulosis. Small fat-containing umbilical hernia. Air within the hernia, with a possible skin defect anteriorly at the umbilicus. Correlate clinically. Critical result findings were called to Lori Burris in the ED at 2150 hours on 6/17/2021.                   Assessment/Plan:   Dawti Crowell is a 65 y.o. female, who was admitted with bowel perforation and was admitted for exploratory laparotomy.     Acute Hypoxic Respiratory Failure 2/2 Post-op Atelectasis  - Unlikely fluid overload as appears dry, normal Echo in 5/2021 and mildly elevated BNP  - Pain likely contributing to hypoxia  - Continue pain control regimen  - Supplemental oxygen; SpO2 goal >90%, wean as tolerated  - Incentive spirometry  - OOB as tolerated  - PT/OT    Perforated Sigmoid Diverticulitis s/p Ex-lap & Nick POD#4  - Monitor ostomy output and wound vac care by GS  - Continue pain control regimen (PRN dilaudid pain panel,Robaxin 750 mg q8)   - Continue Ciprofloxacin & Flagyl (6/18-)  - Clear liquid diet  - Continue IVF at 50 mL/hr     Leukocytosis - improving  - Likely due to post-op  - Trending down & remains afebrile; continue to monitor     Lactic Acidosis - resolved    Code Status:Full Code  FEN: ADULT DIET; Clear Liquid  PPX:  Lovenox, PPI  DISPO: ICU    Madai Kay MD, PGY-1  06/21/21  8:21 AM    This patient has been staffed and discussed with Joe Smith MD.    Patient seen, examined and discussed with the resident and I agree with the assessment and plan. Briefly, this is a 65 y.o. female  with perforated diverticulitis s/p ex-lap and Nick's with some increased oxygen requirements. Patient doing fairly well and her oxygen requirement has come down to 1L    Can transfer out of ICU from our perspective. ICU/Pulm will sign off.   Please call with questions        Caitlin Kerns MD

## 2021-06-21 NOTE — CONSULTS
Oncology / Hematology Care - Consultation      Patient name:   Masood Amen   Date:     6/21/2021           History of Present Illness:    Presented with abd pain, found to have perforated bowel due to diverticulitis  S/p ex lap with Nick's procedure 6/17. Pain improving, IVF's; advancing to clear liquid diet. Requiring oxygen supplementation. Monitoring ostomy output, wound vac to midline; remains on cipro/metronidazole. Asked to evaluate the patient for elevated INR, coagulopathy. WBC down to 10.3; hgb 9.9; plts 159. AST//66; total bili 1.7. INR 2.06 and 1.88. No abnormal bleeding during surgery reported in the op note. Current ostomy and midline wound without excessive bleeding. Past Medical History:    Past Medical History:   Diagnosis Date    Anxiety     Back pain     Bronchitis     Cancer ([address]     malignant neoplasm of bronchus and lung    Chronic pain     Dementia (HCC)     Drug-seeking behavior     Emphysema lung (HCC)     ESBL (extended spectrum beta-lactamase) producing bacteria infection 05/19/2021    left leg wound (Acinetobacter, Pseudomonas, and Klebsiella)    GERD (gastroesophageal reflux disease)     Hepatitis C     HTN (hypertension)     Insomnia     Lumbar disc disease     Multiple drug resistant organism (MDRO) culture positive 05/19/2021    left leg wound (Acinetobacter, Pseudomonas, and Klebsiella)    Osteoporosis     Spinal stenosis        Past Surgical History:    Past Surgical History:   Procedure Laterality Date    CHOLECYSTECTOMY  2/2011    [place]    LAPAROTOMY N/A 6/17/2021    1. exploratory laparotomy 2.  Nick's procedure performed by Brandon Major MD at [address] History:    Social History     Socioeconomic History    Marital status:      Spouse name: Not on file    Number of children: Not on file    Years of education: Not on file    Highest education level: Not on file Occupational History    Not on file   Tobacco Use    Smoking status: Current Every Day Smoker     Packs/day: 0.50     Years: 49.00     Pack years: 24.50     Types: Cigarettes    Smokeless tobacco: Never Used   Substance and Sexual Activity    Alcohol use: No    Drug use: No    Sexual activity: Not on file   Other Topics Concern    Not on file   Social History Narrative    Not on file     Social Determinants of Health     Financial Resource Strain:     Difficulty of Paying Living Expenses:    Food Insecurity:     Worried About Running Out of Food in the Last Year:     Ran Out of Food in the Last Year:    Transportation Needs:     Lack of Transportation (Medical):  Lack of Transportation (Non-Medical):    Physical Activity:     Days of Exercise per Week:     Minutes of Exercise per Session:    Stress:     Feeling of Stress :    Social Connections:     Frequency of Communication with Friends and Family:     Frequency of Social Gatherings with Friends and Family:     Attends Bahai Services:     Active Member of Clubs or Organizations:     Attends Club or Organization Meetings:     Marital Status:    Intimate Partner Violence:     Fear of Current or Ex-Partner:     Emotionally Abused:     Physically Abused:     Sexually Abused:         Family History:    Family History   Problem Relation Age of Onset    High Blood Pressure Father     Heart Disease Father         Allergies:     Allergies   Allergen Reactions    Cyclobenzaprine Shortness Of Breath and Other (See Comments)     \"cramping\"      Penicillins Shortness Of Breath, Itching, Swelling and Hives    Acetaminophen     Codeine Itching and Hives     Itching      Diphenhydramine Hcl Other (See Comments)     Heart racing    Ketorolac Itching and Nausea Only     Pt tolerated dose without side effects    Naproxen Nausea Only, Other (See Comments) and Itching     Stomach pain    Bothers her stomach  Bothers her stomach      Diphenhydramine Nausea Only, Hives and Palpitations     Heart racing  Also states it makes her heart race      Ketorolac Tromethamine Nausea And Vomiting    Ketorolac Tromethamine Itching, Nausea Only and Nausea And Vomiting    Sulfamethoxazole-Trimethoprim      Abdominal pain          Medications:    Current Facility-Administered Medications   Medication Dose Route Frequency Provider Last Rate Last Admin    ipratropium-albuterol (DUONEB) nebulizer solution 1 ampule  1 ampule Inhalation Q4H PRN Cinthia Hernández MD        pantoprazole (PROTONIX) injection 40 mg  40 mg Intravenous Daily Leida Victoria MD   40 mg at 06/21/21 6975    melatonin disintegrating tablet 5 mg  5 mg Oral Nightly Vasquez D Jobrandonki, DO   5 mg at 06/20/21 2220    OLANZapine zydis (ZYPREXA) disintegrating tablet 5 mg  5 mg Oral Nightly Rochelle Kerry, DO   5 mg at 06/20/21 2221    sodium chloride flush 0.9 % injection 5-40 mL  5-40 mL Intravenous 2 times per day Leida Victoria MD   10 mL at 06/21/21 0832    sodium chloride flush 0.9 % injection 5-40 mL  5-40 mL Intravenous PRN Leida Victoria MD        0.9 % sodium chloride infusion  25 mL Intravenous PRN Leida Victoria MD        ondansetron (ZOFRAN-ODT) disintegrating tablet 4 mg  4 mg Oral Q8H PRN Leida Victoria MD        Or    ondansetron [facility]) injection 4 mg  4 mg Intravenous Q6H PRN Leida Victoria MD   4 mg at 06/19/21 1144    HYDROmorphone (DILAUDID) injection 0.5 mg  0.5 mg Intravenous Q3H PRN Liane Stein MD   0.5 mg at 06/18/21 0906    Or    HYDROmorphone (DILAUDID) injection 1 mg  1 mg Intravenous Q3H PRN Liane Stein MD   1 mg at 06/21/21 1416    phenol 1.4 % mouth spray 1 spray  1 spray Mouth/Throat Q2H PRN Enrico Fish MD        electrolyte-[facility]) solution   Intravenous Continuous Enrico Fish MD 50 mL/hr at 06/21/21 0711 Rate Change at 06/21/21 0711    metronidazole (FLAGYL) 500 mg in NaCl 100 mL IVPB premix  500 mg Intravenous Char Jackson MD   Stopped at 06/21/21 1654    ciprofloxacin (CIPRO) IVPB 400 mg  400 mg Intravenous Q12H Shwetha Haines MD   Stopped at 06/21/21 1530    enoxaparin (LOVENOX) injection 40 mg  40 mg Subcutaneous Daily Shwetha Haines MD   40 mg at 06/21/21 8611          Review of Systems:    · History obtained from patient, otherwise, further 10 point ROS is negative        Physical Exam:    BP (!) 134/59   Pulse 94   Temp 98.2 °F (36.8 °C) (Oral)   Resp 20   Ht 5' 6\" (1.676 m)   Wt 259 lb 7.7 oz (117.7 kg)   SpO2 90%   BMI 41.88 kg/m²     General appearance: alert and cooperative; no acute distress  Head: NCAT, PERRLA, EOMI; oral and nasopharynx without lesions, dentition adequate repair  Neck: no JVD or thyromegaly  Lungs: clear to auscultation bilaterally; no audible rhonchi, rales, wheezes or crackles  Heart: regular rate and rhythm, S1, S2 normal; no murmurs, rubs or gallops  Abdomen: soft, non-tender and non-distended; normoactive, tympanic bowel sounds; no hepatosplenomegaly  Extremities: no cyanosis, clubbing, edema or asymmetry  Skin: no jaundice, purpura or petechiae  Lymph Nodes:  no auricular, cervical, supraclavicular, axillary, inguinal or popliteal lymphadenopathy  Neurologic:  CN 2-12 intact; no focal motor, sensory or cerebellar deficits        Laboratory Evaluation:      CBC:   Lab Results   Component Value Date    WBC 10.3 06/21/2021    NEUTROABS 7.7 06/21/2021    HGB 9.9 06/21/2021    MCV 91.9 06/21/2021     06/21/2021       BMP:   Lab Results   Component Value Date     06/21/2021    K 4.0 06/21/2021    K 3.5 06/17/2021    CO2 24 06/21/2021    BUN 12 06/21/2021    CREATININE <0.5 06/21/2021    MG 2.20 06/21/2021    TSH 3.97 04/06/2011       HEPATIC:  Lab Results   Component Value Date     06/21/2021    ALT 66 06/21/2021    ALKPHOS 121 06/21/2021    PROT 6.1 06/21/2021    PROT 8.0 08/16/2012    BILITOT 1.7 06/21/2021    BILIDIR 0.7 06/21/2021           Radiology Evaluation:    Reviewed      Assessment / Plan:    Elevated LFT's  Mildly elevated INR  Elevated ddimer  Leukocytosis improving  Anemia stable    - suspect a transient acquired coagulopathy from mild liver dysfunction  - may be a very mild nutrient component ( vit K deficiency )    - no overt bleeding and normal plt count  - ddimer related to normal process of homeostasis after procedure, etc    - recheck PT/INR and PTT, along with ddimer and fibrinogen in AM  - if INR still above 1.5, consider vit K 5 mg for 3 days    - if INR higher or PTT prolonged, then workup further with mixing studies, etc        As always, thank you for allowing me the opportunity to participate in the care of your patient. Please do not hesitate to contact me with questions or concerns regarding further management. Mario Huffman DO, MS  Oncology/Hematology Care    Please contact via:  1. [employer]  2.   Cell Phone:  [phone]    6/21/2021   7:50 PM

## 2021-06-21 NOTE — PROGRESS NOTES
Midline wound vac:    Pt was medicated with 1 mg diluadid. Wound vac drsg removed and wound irrigated with normal saline. Measurements obtained. Medium wound vac drsg kit used. Drape placed over skin and drape cut around abd defect. Black foam placed to wound bed and track pad offset to right lateral side of wound. New drape placed over foam and suction applied to 125 mmhg. Good seal with no leaks noted. Pt tolerated procedure well.      Measurements: 20 cm x 3 cm x 1 cm

## 2021-06-21 NOTE — PLAN OF CARE
Problem: Pain:  Description: Pain management should include both nonpharmacologic and pharmacologic interventions. Goal: Pain level will decrease  Description: Pain level will decrease  6/21/2021 0016 by Zenaida Magana RN  Outcome: Ongoing  6/20/2021 1213 by Sha Goldman RN  Outcome: Ongoing  Note: Pt c/o 9-10/10 pain in abd this am and back this afternoon. Pain doesn't appear to affect ADLs. Dilaudid 1 mg given with pt reports provides little to relief. Goal: Control of acute pain  Description: Control of acute pain  6/21/2021 0016 by Zenaida Magana RN  Outcome: Ongoing  6/20/2021 1213 by Sha Goldman RN  Outcome: Ongoing  Goal: Control of chronic pain  Description: Control of chronic pain  6/21/2021 0016 by Zenaida Magana RN  Outcome: Ongoing  6/20/2021 1213 by Sha Goldman RN  Outcome: Ongoing     Problem: Non-Violent Restraints  Goal: Removal from restraints as soon as assessed to be safe  6/21/2021 0016 by Zenaida Magana RN  Outcome: Ongoing  6/20/2021 1213 by Sha Goldman RN  Outcome: Ongoing  Note: Pt is impulsive and continued to pull at MIKE drains and PIV.   Goal: No harm/injury to patient while restraints in use  6/21/2021 0016 by Zenaida Magana RN  Outcome: Ongoing  6/20/2021 1213 by Sha Goldman RN  Outcome: Ongoing  Goal: Patient's dignity will be maintained  6/21/2021 0016 by Zenaida Magana RN  Outcome: Ongoing  6/20/2021 1213 by Sha Goldman RN  Outcome: Ongoing     Problem: Skin Integrity:  Goal: Will show no infection signs and symptoms  Description: Will show no infection signs and symptoms  6/21/2021 0016 by Zenaida Magana RN  Outcome: Ongoing  6/20/2021 1213 by Sha Goldman RN  Outcome: Ongoing  Goal: Absence of new skin breakdown  Description: Absence of new skin breakdown  6/21/2021 0016 by Zenaida Magana RN  Outcome: Ongoing  6/20/2021 1213 by Sha Goldman RN  Outcome: Ongoing  Note: Pt turned and repositioned q2h. Heels elevated off bed. Sacral dressing in place. Low air loss alternating pressure mattress in use. Problem: Falls - Risk of:  Goal: Will remain free from falls  Description: Will remain free from falls  6/21/2021 0016 by Debbie Askew RN  Outcome: Ongoing  6/20/2021 1213 by Dago Miranda RN  Outcome: Ongoing  Note: Pt is at risk for falls; see Rosenbaum fall score. Bed is locked & in lowest position, side rails up x3. Fall risk bracelet applied, non-skid socks on. Hourly rounds done to anticipate needs. Bilateral wrist restraints remain in place. Goal: Absence of physical injury  Description: Absence of physical injury  6/21/2021 0016 by Debbie Askew RN  Outcome: Ongoing  6/20/2021 1213 by Dago Miranda RN  Outcome: Ongoing     Problem: OXYGENATION/RESPIRATORY FUNCTION  Goal: Patient will achieve/maintain normal respiratory rate/effort  6/21/2021 0016 by Debbie Askew RN  Outcome: Ongoing  6/20/2021 1213 by Dago Miranda RN  Outcome: Ongoing  Note: IS and flutter valve use encouraged. SpO2 92% on 1 LPM nasal cannula.       Problem: MOBILITY  Goal: Early mobilization is achieved  6/21/2021 0016 by Debbie Askew RN  Outcome: Ongoing  6/20/2021 1213 by Dago Miranda RN  Outcome: Ongoing

## 2021-06-21 NOTE — PROGRESS NOTES
Hospitalist Progress Note      PCP: No primary care provider on file. Chief Complaint. Presented to hospital for abd pain    Date of Admission: 6/17/2021    Subjective:   has abdominal pain but is improving, tolerated liquids, denies chest pain, nausea, vomiting, shortness of breath, fever or chills. mention feels overall better than yesterday    Medications:  Reviewed    Infusion Medications    sodium chloride      electrolyte-A 50 mL/hr at 06/21/21 0711     Scheduled Medications    sodium phosphate IVPB  30 mmol Intravenous Once    pantoprazole  40 mg Intravenous Daily    melatonin  5 mg Oral Nightly    OLANZapine zydis  5 mg Oral Nightly    sodium chloride flush  5-40 mL Intravenous 2 times per day    metroNIDAZOLE  500 mg Intravenous Q8H    ciprofloxacin  400 mg Intravenous Q12H    enoxaparin  40 mg Subcutaneous Daily     PRN Meds: sodium chloride flush, sodium chloride, ondansetron **OR** ondansetron, HYDROmorphone **OR** HYDROmorphone, phenol      Intake/Output Summary (Last 24 hours) at 6/21/2021 1256  Last data filed at 6/21/2021 1129  Gross per 24 hour   Intake 4952.01 ml   Output 2795 ml   Net 2157.01 ml       Physical Exam Performed:    /62   Pulse 88   Temp 98.1 °F (36.7 °C) (Oral)   Resp 24   Ht 5' 6\" (1.676 m)   Wt 259 lb 7.7 oz (117.7 kg)   SpO2 (!) 89%   BMI 41.88 kg/m²     General appearance: NAD, lying in bed comfortably  HEENT:  Conjunctivae/corneas clear. Neck: Supple, with full range of motion. Respiratory:  Normal respiratory effort. Clear to auscultation, bilaterally without Rales/Wheezes/Rhonchi. Cardiovascular: Regular rate and rhythm with normal S1/S2 without murmurs or rubs  Abdomen: Soft, non-tender, non-distended, normal bowel sounds. Musculoskeletal: No cyanosis or edema bilaterally  Neurologic:  without any focal sensory/motor deficits.  grossly non-focal.  Psychiatric: Alert and oriented, Normal mood  Peripheral Pulses: +2 palpable, equal bilaterally       Labs:   Recent Labs     06/19/21  1601 06/20/21  0442 06/21/21  0532   WBC 14.5* 12.0* 10.3   HGB 8.7* 8.8* 9.9*   HCT 25.4* 26.2* 29.6*    164 159     Recent Labs     06/19/21  1601 06/20/21  0442 06/21/21  0532    139 139   K 4.1 3.7 4.0    107 107   CO2 24 25 24   BUN 20 18 12   CREATININE 0.7 0.7 <0.5*   CALCIUM 7.3* 7.3* 7.3*   PHOS 2.3* 2.1* 2.3*     Recent Labs     06/19/21  1601 06/20/21  0442 06/21/21  0532   * 133* 133*   ALT 71* 71* 66*   BILIDIR 0.9* 1.0* 0.7*   BILITOT 1.8* 1.7* 1.7*   ALKPHOS 114 131* 121     Recent Labs     06/19/21  1601 06/20/21  0442   INR 2.06* 1.88*     No results for input(s): Daryl Favre in the last 72 hours. Urinalysis:      Lab Results   Component Value Date    NITRU Negative 04/28/2021    WBCUA 0-2 04/28/2021    BACTERIA 1+ 04/28/2021    RBCUA None seen 04/28/2021    BLOODU SMALL 04/28/2021    SPECGRAV 1.015 04/28/2021    GLUCOSEU Negative 04/28/2021    GLUCOSEU Negative 12/15/2011       Radiology:  XR CHEST PORTABLE   Final Result      Hyperexpanded lungs with bibasilar minimal atelectasis, unchanged. XR CHEST 1 VIEW   Final Result   1. Low lung volumes with persistent bilateral lower lobe atelectasis   2. Nasogastric tube in stomach      XR CHEST PORTABLE   Final Result      Free intraperitoneal air. No acute cardiopulmonary findings. Attending in the ED was notified of the free air from the patient's CT study performed earlier. CT ABDOMEN PELVIS W IV CONTRAST Additional Contrast? None   Final Result      Free intraperitoneal air, predominantly in the upper abdomen and right upper quadrant. Site of perforation is not definitely identified. Distended proximal to mid small bowel. Distal small bowel not distended, consistent with small bowel obstruction. Stool throughout the colon. Distal colonic diverticulosis. Small fat-containing umbilical hernia.  Air within the hernia, with a

## 2021-06-21 NOTE — PROGRESS NOTES
Physical Therapy    Facility/Department: [facility] ICU  Initial Assessment and Treatment/Discharge  NAME: Halle Everett  :   MRN: [id number]    Date of Service: 2021    Discharge Recommendations: Halle Everett scored a  on the AM-PAC short mobility form. At this time, no further PT is recommended upon discharge due to pt close to baseline. Recommend patient returns to prior setting with prior services. PT Equipment Recommendations  Equipment Needed:  (defer)    Assessment   Assessment: Unclear of pt's baseline mobility function. Pt admitted from [address] [place] [address]. Son reports pt has not walked since she broke her ankle approximately 9 months ago. Pt reports she gets up to a w/c however unclear whether lift equipment is used. Pt likely at or close to baseline function. Rec cont PT once returns to LTC if not at baseline. No further acute PT needs identified. Defer OOB to nursing. Decision Making: Medium Complexity  PT Education: Goals;PT Role;General Safety; Functional Mobility Training  Patient Education: Pt verbalized understanding but may need reinforcement  REQUIRES PT FOLLOW UP: No       Patient Diagnosis(es): The primary encounter diagnosis was Bowel perforation ([address] A diagnosis of Small bowel obstruction (HCC) was also pertinent to this visit. has a past medical history of Anxiety, Back pain, Bronchitis, Cancer ([address] Chronic pain, Dementia ([address] Drug-seeking behavior, Emphysema lung ([address] ESBL (extended spectrum beta-lactamase) producing bacteria infection, GERD (gastroesophageal reflux disease), Hepatitis C, HTN (hypertension), Insomnia, Lumbar disc disease, Multiple drug resistant organism (MDRO) culture positive, Osteoporosis, and Spinal stenosis. has a past surgical history that includes Cholecystectomy (2011) and laparotomy (N/A, 2021).     Restrictions  Position Activity Restriction  Other position/activity restrictions: no activity restrictions  Vision/Hearing  Vision: Within Functional Limits  Hearing: Exceptions to [facility]  Hearing Exceptions: Hard of hearing/hearing concerns     Subjective  General  Chart Reviewed: Yes  Additional Pertinent Hx: Pt is a 65 y.o. female adm 6/17 with pneumoperitoneum. Pt presented to the ED with complaints of generalized abdominal pain associated with nausea and vomiting. CT abd:  Free intraperitoneal air, predominantly in the upper abdomen and right upper quadrant. Site of perforation is not definitely identified. Distended proximal to mid small bowel. Distal small bowel not distended, consistent with small bowel obstruction. Pt s/p Exploratory laparotomy, Nick's procedure on 6/17. PMH:lung CA, HTN, anxiety, dementia, GERD, hepatitis B, GERD, spinal stenosis, osteoporosis  Family / Caregiver Present: Yes (son)  Referring Practitioner: Tavo Brown MD  Referral Date : 06/19/21  Subjective  Subjective: Pt found supine. Agreeable to PT. \"That's all I'm going to do\"  Pain Screening  Patient Currently in Pain: Yes (abdomen, not rated, RN aware)  Vital Signs  Patient Currently in Pain: Yes (abdomen, not rated, RN aware)       Orientation  Orientation  Overall Orientation Status: Within Functional Limits  Social/Functional History  Social/Functional History  Type of Home: [facility])  Home Equipment: 4 wheeled walker  ADL Assistance: [address] [facility] [address]: Needs assistance (with 4 wheeled walker - pt initially stating she walks but then stating it has been several months since she has walked)  Transfer Assistance: Needs assistance  Additional Comments: Denies falls. Pt poor historian. Son came in during session and stated pt fell about 9 months ago and broke ankle. Has not walked since then. Gets up to w/c but unclear how much help is needed or if lift is used.   Cognition        Objective          AROM RLE (degrees)  RLE AROM:  (decreased grossly, decreased knee extension in supine- lacking approximately 20 degrees)  AROM LLE (degrees)  LLE AROM :  (decreased grossly, decreased knee extension in supine- lacking approximately 20 degrees)  Strength RLE  Strength RLE:  (2+/5 grossly)  Strength LLE  Strength LLE:  (2+/5 grossly)        Bed mobility  Rolling to Left: Dependent/Total (assist x 2)  Rolling to Right: Dependent/Total (assist x 2)  Supine to Sit: Dependent/Total (assist x 2)  Sit to Supine: Dependent/Total (assist x 2)           Balance  Sitting - Static:  (Sat EOB with dependent assist x 1, post lean, initiates movement toward midline with cues but requires total assist)    Performed AP and LAQ within limited range x 5 reps BLE in sitting.     Treatment included: bed mobility, sitting balance, pt education    Plan   Plan  Times per week: D/C PT  Safety Devices  Type of devices: Call light within reach, Nurse notified, Bed alarm in place, Left in bed    G-Code       OutComes Score                                                  AM-PAC Score  AM-PAC Inpatient Mobility Raw Score : 6 (06/21/21 1507)  AM-PAC Inpatient T-Scale Score : 23.55 (06/21/21 1507)  Mobility Inpatient CMS 0-100% Score: 100 (06/21/21 1507)  Mobility Inpatient CMS G-Code Modifier : CN (06/21/21 1507)          Goals  Short term goals  Time Frame for Short term goals: No goals set - pt likely at baseline       Therapy Time   Individual Concurrent Group Co-treatment   Time In 1320         Time Out 1403         Minutes 43               Timed Code Treatment Minutes:28       Total Treatment Minutes:  [address] PT

## 2021-06-21 NOTE — PROGRESS NOTES
Pt medicated with Dilaudid 1 mg prior to wound vac dsg change, pain level was 7. Abdomen tender to touch. Tolerated clear liquid breakfast.  SpO2 83% on RA; IS and flutter valve use encouraged, 1 LPM nasal cannula reapplied. Pt asleep in bed.

## 2021-06-22 NOTE — PROGRESS NOTES
Patient to CT.  states she is nauseous, PRN Zofran administered. Tolerating well. Will continue to monitor.

## 2021-06-22 NOTE — PROGRESS NOTES
RN made attempt to remove wooten catheter per orders, patient refused until after family visit. Will attempt again.

## 2021-06-22 NOTE — CARE COORDINATION
Patient is from [facility] and is a long term care resident there. She is able to return there when medically ready. Today her WBC is elevated and CT ordered. Awaiting return of GI function. Left a message with Armida Zavala in admissions at [facility] [phone] as well.      Electronically signed by Taylor Flores RN on 6/22/2021 at 10:51 AM  [phone]

## 2021-06-22 NOTE — PROGRESS NOTES
Patient confused but following some commands, alert to voice. Complains of pain, PRN dilaudid administered. Abdominal wound vac, dry intact. Both MIKE drains leaking, residents notified. Ostomy intact. Plan for CT today, patient is refusing oral contrast, will attempt to mix with juice. Will continue to monitor.

## 2021-06-22 NOTE — PROGRESS NOTES
Hospitalist Progress Note      PCP: No primary care provider on file. Chief Complaint. Presented to hospital for abd pain    Date of Admission: 6/17/2021    Subjective: tolerating liquids, denies chest pain, nausea, vomiting, shortness of breath, fever or chills. mention feels overall better than yesterday    Medications:  Reviewed    Infusion Medications    sodium chloride       Scheduled Medications    famotidine  40 mg Oral Daily    gabapentin  100 mg Oral TID    hydrOXYzine  25 mg Oral Daily    lactulose  30 mL Oral Daily    metoprolol tartrate  12.5 mg Oral BID    traMADol  50 mg Oral Q6H    spironolactone  50 mg Oral Daily    melatonin  5 mg Oral Nightly    OLANZapine zydis  5 mg Oral Nightly    sodium chloride flush  5-40 mL Intravenous 2 times per day    metroNIDAZOLE  500 mg Intravenous Q8H    ciprofloxacin  400 mg Intravenous Q12H    enoxaparin  40 mg Subcutaneous Daily     PRN Meds: ipratropium-albuterol, sodium chloride flush, sodium chloride, ondansetron **OR** ondansetron, HYDROmorphone **OR** HYDROmorphone, phenol      Intake/Output Summary (Last 24 hours) at 6/22/2021 1827  Last data filed at 6/22/2021 1500  Gross per 24 hour   Intake 1202 ml   Output 555 ml   Net 647 ml       Physical Exam Performed:    BP (!) 145/63   Pulse 80   Temp 98.7 °F (37.1 °C) (Oral)   Resp 20   Ht 5' 6\" (1.676 m)   Wt 259 lb 7.7 oz (117.7 kg)   SpO2 93%   BMI 41.88 kg/m²     General appearance: NAD, lying in bed comfortably  HEENT:  Conjunctivae/corneas clear. Neck: Supple, with full range of motion. Respiratory:  Normal respiratory effort. Clear to auscultation, bilaterally without Rales/Wheezes/Rhonchi. Cardiovascular: Regular rate and rhythm with normal S1/S2 without murmurs or rubs  Abdomen: Soft, non-tender, non-distended, normal bowel sounds. Musculoskeletal: No cyanosis or edema bilaterally  Neurologic:  without any focal sensory/motor deficits.  grossly non-focal.  Psychiatric: Alert and oriented, Normal mood  Peripheral Pulses: +2 palpable, equal bilaterally       Labs:   Recent Labs     06/20/21  0442 06/21/21  0532 06/22/21  0502   WBC 12.0* 10.3 16.1*   HGB 8.8* 9.9* 10.4*   HCT 26.2* 29.6* 30.8*    159 199     Recent Labs     06/20/21  0442 06/21/21  0532 06/22/21  0502    139 131*   K 3.7 4.0 3.5    107 99   CO2 25 24 24   BUN 18 12 10   CREATININE 0.7 <0.5* <0.5*   CALCIUM 7.3* 7.3* 7.4*   PHOS 2.1* 2.3* 2.7     Recent Labs     06/20/21  0442 06/21/21  0532   * 133*   ALT 71* 66*   BILIDIR 1.0* 0.7*   BILITOT 1.7* 1.7*   ALKPHOS 131* 121     Recent Labs     06/20/21  0442 06/22/21  0502   INR 1.88* 2.11*     No results for input(s): CKTOTAL, TROPONINI in the last 72 hours. Urinalysis:      Lab Results   Component Value Date    NITRU Negative 04/28/2021    WBCUA 0-2 04/28/2021    BACTERIA 1+ 04/28/2021    RBCUA None seen 04/28/2021    BLOODU SMALL 04/28/2021    SPECGRAV 1.015 04/28/2021    GLUCOSEU Negative 04/28/2021    GLUCOSEU Negative 12/15/2011       Radiology:  CT ABDOMEN PELVIS W IV CONTRAST Additional Contrast? Oral   Final Result      1. 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 2.  Interval development of mild perihepatic ascites with scattered small fluid collections within the abdomen and pelvis. No discrete findings for abscess formation on the current exam.      3.  Mildly distended proximal small bowel loops containing contrast, favoring ileus-type pattern. The degree of small bowel distention has improved when compared to prior exam.      4.  Diffuse body wall edema most pronounced over the left lateral abdomen, progressed from prior exam.      5.  Bibasilar airspace disease consistent with underlying atelectasis and/or infiltrate. XR CHEST PORTABLE   Final Result      Hyperexpanded lungs with bibasilar minimal atelectasis, unchanged. XR CHEST 1 VIEW   Final Result   1.  Low lung

## 2021-06-22 NOTE — PROGRESS NOTES
GENERAL SURGERY  ICU DAILY PROGRESS NOTE    CC: Perforated sigmoid diverticulitis     SUBJECTIVE:   Interval Hx:   No acute events overnight. Pain improving. Denies nausea and vomiting. Tolerating clears. ROS: A 10 point review of systems was conducted, significant findings as noted above. All other systems negative. OBJECTIVE:   Vitals:   Vitals:    06/22/21 0200 06/22/21 0300 06/22/21 0400 06/22/21 0500   BP: (!) 144/61  (!) 163/63    Pulse: 91 91 90 89   Resp: 27 23 25 28   Temp:   98 °F (36.7 °C)    TempSrc:   Axillary    SpO2: 91%      Weight:       Height:         I/O:     Intake/Output Summary (Last 24 hours) at 6/22/2021 0554  Last data filed at 6/21/2021 2200  Gross per 24 hour   Intake 2498 ml   Output 1710 ml   Net 788 ml     I/O last 3 completed shifts: In: 4247 [P.O.:1700; I.V.:2260]  Out: 7765 [Urine:1065; Drains:1180; Stool:95]    Diet: ADULT DIET;  Clear Liquid      Physical Examination:   General appearance: alert, NAD  Chest/Lungs: Normal effort  Cardiovascular: Normal rate at upper limit, well perfused  Abdomen: tender, wound vac in place with adequate suction to the midline, superior MIKE drain with serous output, inferior MIKE drain with serous output, colostomy stoma pink and viable with small amount of stool in the bag  Extremities: no cyanosis or edema  Skin: No rashes, warm and dry  Neurologic: AAOx3    Labs:  CBC:   Recent Labs     06/19/21  1601 06/20/21  0442 06/21/21  0532   WBC 14.5* 12.0* 10.3   HGB 8.7* 8.8* 9.9*   HCT 25.4* 26.2* 29.6*    164 159       BMP:   Recent Labs     06/19/21  1601 06/20/21  0442 06/21/21  0532    139 139   K 4.1 3.7 4.0    107 107   CO2 24 25 24   BUN 20 18 12   CREATININE 0.7 0.7 <0.5*   GLUCOSE 194* 159* 130*     LFT's:   Recent Labs     06/19/21  1601 06/20/21  0442 06/21/21  0532   * 133* 133*   ALT 71* 71* 66*   BILITOT 1.8* 1.7* 1.7*   ALKPHOS 114 131* 121     Troponin: No results for input(s): TROPONINI in the last 72 hours.  BNP: No results for input(s): BNP in the last 72 hours. ABGs: No results for input(s): PHART, CJG8AZK, PO2ART in the last 72 hours. INR:   Recent Labs     06/19/21  1601 06/20/21  0442   INR 2.06* 1.88*       U/A:No results for input(s): NITRITE, COLORU, PHUR, LABCAST, WBCUA, RBCUA, MUCUS, TRICHOMONAS, YEAST, BACTERIA, CLARITYU, SPECGRAV, LEUKOCYTESUR, UROBILINOGEN, BILIRUBINUR, BLOODU, GLUCOSEU, AMORPHOUS in the last 72 hours. Invalid input(s): Shani Shay     Rad:   XR CHEST PORTABLE   Final Result      Hyperexpanded lungs with bibasilar minimal atelectasis, unchanged. XR CHEST 1 VIEW   Final Result   1. Low lung volumes with persistent bilateral lower lobe atelectasis   2. Nasogastric tube in stomach      XR CHEST PORTABLE   Final Result      Free intraperitoneal air. No acute cardiopulmonary findings. Attending in the ED was notified of the free air from the patient's CT study performed earlier. CT ABDOMEN PELVIS W IV CONTRAST Additional Contrast? None   Final Result      Free intraperitoneal air, predominantly in the upper abdomen and right upper quadrant. Site of perforation is not definitely identified. Distended proximal to mid small bowel. Distal small bowel not distended, consistent with small bowel obstruction. Stool throughout the colon. Distal colonic diverticulosis. Small fat-containing umbilical hernia. Air within the hernia, with a possible skin defect anteriorly at the umbilicus. Correlate clinically. Critical result findings were called to [facility] in the ED at 2150 hours on 6/17/2021. ASSESSMENT AND PLAN:   Michelle Vaughan is a 65 y.o. female perforated sigmoid diverticulits s/p exploratory laparotomy and Nick's procedure 6/17. Neuro:   Pain/sedation/psych  - PRN dilaudid pain panel.   - Dc Robaxin.  - Restart gabapentin and tramadol. Cardiovascular:   - NSR with resolution of lactic acidosis.    - Continue to monitor until lactic acid normalizes. - Restart Lopressor. Pulmonary:   - SpO2 >90.  - Aggressive pulmonary toilet. - Appreciate critical care input. GI:   - S/P ex lap and Nick's procedure for perforated diverticulitis. - Leukocytosis, plan for CT today  - Continue superior MIKE drain. Will remove inferior MIKE drain today. - Continue to monitor ostomy output.   - Continue wound vac to midline.   - Plan for wound vac changes M/W/F.     FEN:   - SLIV  - Replace electrolytes as needed. - CLD     /Renal:   - Remove wooten catheter after CT scan today   - Strict I&Os. Hem/ID:   - F/U AM labs. - Continue Ciprofloxacin and Flagyl. Endo:   - Monitor glucose. - No history of diabetes. Prophylaxis:   - Lovenox daily.   - Protonix daily. Access:  - Peripheral Access x2             - Wooten Date placed: 6/18     Mobility:  - OOB as tolerated. Dispo:   Med/surg    Code Status: Full Code  -----------------------------  Colleen Ram MD  06/22/21  5:54 AM     I performed a history and physical examination of the patient and discussed management with the resident. I reviewed the resident's note and agree with the documented findings and plan of care.     Jared Lazar M.D.  6/22/21   8:26 PM

## 2021-06-22 NOTE — PLAN OF CARE
Problem: Pain:  Description: Pain management should include both nonpharmacologic and pharmacologic interventions. Goal: Control of acute pain  Description: Control of acute pain  6/21/2021 2143 by Bartolome Pal RN  Outcome: Ongoing  Note: Pain assessed q4 hrs and PRN using the 0-10 pain scale. Pain goal reviewed with patient. PRN pain medications given as needed and comfort provided non-pharmacologically (i.e. Repositioning). Pt instructed to report pain to RN. Problem: Skin Integrity:  Goal: Absence of new skin breakdown  Description: Absence of new skin breakdown  6/21/2021 2143 by Bartolome Pal RN  Outcome: Ongoing  Note: Pt at risk for skin breakdown. See Cam score. Pt remains on bedrest. Unable to reposition self in bed. Pillows used for repositioning q2hs. Will continue to monitor and assess for skin breakdown. Problem: Falls - Risk of:  Goal: Will remain free from falls  Description: Will remain free from falls  6/21/2021 2143 by Bartolome Pal RN  Outcome: Ongoing  Note: Pt is a fall risk. Fall risk protocol in place. See Nieto Mort Fall Score. Pt bed is in low position, bed alarm is on, side rails up, fall risk bracelet applied. , non-skid footwear in use. Patient/family educated on fall risk protocol, instructed to call for assistance when needed and belongings are in reach. assistance. Will continue with hourly rounds for po intake, pain needs, toileting and repositioning as needed. Will continue to monitor for needs.

## 2021-06-23 NOTE — PROGRESS NOTES
Hospitalist Progress Note      PCP: No primary care provider on file. Chief Complaint. Presented to hospital for abd pain    Date of Admission: 6/17/2021    Subjective: tolerating full liquids, denies chest pain, nausea, vomiting, shortness of breath, fever or chills. Medications:  Reviewed    Infusion Medications    sodium chloride       Scheduled Medications    famotidine  40 mg Oral Daily    gabapentin  100 mg Oral TID    hydrOXYzine  25 mg Oral Daily    lactulose  30 mL Oral Daily    metoprolol tartrate  12.5 mg Oral BID    traMADol  50 mg Oral Q6H    spironolactone  50 mg Oral Daily    melatonin  5 mg Oral Nightly    OLANZapine zydis  5 mg Oral Nightly    sodium chloride flush  5-40 mL Intravenous 2 times per day    metroNIDAZOLE  500 mg Intravenous Q8H    ciprofloxacin  400 mg Intravenous Q12H    enoxaparin  40 mg Subcutaneous Daily     PRN Meds: ipratropium-albuterol, sodium chloride flush, sodium chloride, ondansetron **OR** ondansetron, HYDROmorphone **OR** HYDROmorphone, phenol      Intake/Output Summary (Last 24 hours) at 6/23/2021 1215  Last data filed at 6/23/2021 0800  Gross per 24 hour   Intake 1433 ml   Output 1485 ml   Net -52 ml       Physical Exam Performed:    /61   Pulse 78   Temp 97.6 °F (36.4 °C) (Oral)   Resp 22   Ht 5' 6\" (1.676 m)   Wt 259 lb 7.7 oz (117.7 kg)   SpO2 91%   BMI 41.88 kg/m²     General appearance: NAD  HEENT:  Conjunctivae/corneas clear. Neck: Supple, with full range of motion. Respiratory:  Normal respiratory effort. Clear to auscultation, bilaterally without Rales/Wheezes/Rhonchi. Cardiovascular: Regular rate and rhythm with normal S1/S2 without murmurs or rubs  Abdomen: Soft, non-tender, non-distended, normal bowel sounds. Musculoskeletal: No cyanosis or edema bilaterally  Neurologic:  without any focal sensory/motor deficits.  grossly non-focal.  Psychiatric: Alert and oriented, Normal mood  Peripheral Pulses: +2 palpable, equal bilaterally       Labs:   Recent Labs     06/21/21  0532 06/22/21  0502 06/23/21  0854   WBC 10.3 16.1* 20.6*   HGB 9.9* 10.4* 11.4*   HCT 29.6* 30.8* 34.7*    199 216     Recent Labs     06/21/21  0532 06/22/21  0502 06/23/21  0854    131* 131*   K 4.0 3.5 4.3    99 99   CO2 24 24 25   BUN 12 10 14   CREATININE <0.5* <0.5* 0.5*   CALCIUM 7.3* 7.4* 7.6*   PHOS 2.3* 2.7 2.8     Recent Labs     06/21/21  0532   *   ALT 66*   BILIDIR 0.7*   BILITOT 1.7*   ALKPHOS 121     Recent Labs     06/22/21  0502   INR 2.11*     No results for input(s): CKTOTAL, TROPONINI in the last 72 hours. Urinalysis:      Lab Results   Component Value Date    NITRU Negative 04/28/2021    WBCUA 0-2 04/28/2021    BACTERIA 1+ 04/28/2021    RBCUA None seen 04/28/2021    BLOODU SMALL 04/28/2021    SPECGRAV 1.015 04/28/2021    GLUCOSEU Negative 04/28/2021    GLUCOSEU Negative 12/15/2011       Radiology:  CT ABDOMEN PELVIS W IV CONTRAST Additional Contrast? Oral   Final Result      1. 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 2.  Interval development of mild perihepatic ascites with scattered small fluid collections within the abdomen and pelvis. No discrete findings for abscess formation on the current exam.      3.  Mildly distended proximal small bowel loops containing contrast, favoring ileus-type pattern. The degree of small bowel distention has improved when compared to prior exam.      4.  Diffuse body wall edema most pronounced over the left lateral abdomen, progressed from prior exam.      5.  Bibasilar airspace disease consistent with underlying atelectasis and/or infiltrate. XR CHEST PORTABLE   Final Result      Hyperexpanded lungs with bibasilar minimal atelectasis, unchanged. XR CHEST 1 VIEW   Final Result   1. Low lung volumes with persistent bilateral lower lobe atelectasis   2.  Nasogastric tube in stomach      XR CHEST PORTABLE   Final Result Free intraperitoneal air. No acute cardiopulmonary findings. Attending in the ED was notified of the free air from the patient's CT study performed earlier. CT ABDOMEN PELVIS W IV CONTRAST Additional Contrast? None   Final Result      Free intraperitoneal air, predominantly in the upper abdomen and right upper quadrant. Site of perforation is not definitely identified. Distended proximal to mid small bowel. Distal small bowel not distended, consistent with small bowel obstruction. Stool throughout the colon. Distal colonic diverticulosis. Small fat-containing umbilical hernia. Air within the hernia, with a possible skin defect anteriorly at the umbilicus. Correlate clinically. Critical result findings were called to Kristofer Beth in the ED at 2150 hours on 6/17/2021. Assessment/Plan:    Active Hospital Problems    Diagnosis     Bowel perforation (HCC) [K63.1]     Pneumoperitoneum [K66.8]      Assessment  Acute Hypoxic Respiratory Failure  Perforated Sigmoid Diverticulitis s/p Ex-lap & Nick  Lactic acidosis  Elevated INR, coagulopathy        Plan  Monitor ostomy output and wound vac care by GS  Continue pain control regimen   Continue Ciprofloxacin & Flagyl   Advance diet per GS  Continue IVF  Reviewed Medical reconciliation, Labs, vitals signs, previous records  Pain control per primary, stool softer  PT/OT when stable  DVT PPx - per primary  Diet: ADULT DIET;  Full Liquid  Adult Oral Nutrition Supplement; Standard High Calorie/High Protein Oral Supplement  Code Status: Full Code    PT/OT Eval Status: ordered    Dispo - per primary, continue to advance diet, DC per primary    Keaton Morgan MD

## 2021-06-23 NOTE — CARE COORDINATION
Patient is from [facility] and can return at d/c. We do NOT have to wait for precert. Patient will need transport to return when ready. Patient's diet advanced to full liquid, WBC elevated, wound vac dressing changed today and MIKE removed. Hannon to be removed today as well.      Electronically signed by Taylor Flores RN on 6/23/2021 at 2:18 PM  [phone]

## 2021-06-23 NOTE — PROGRESS NOTES
Point of Care:    Inferior MIKE drain removed without complication. Gauze and paper tape applied to site. Continue drain care for superior MIKE drain. Bulb should be kept off of suction.      Poly Flores MD  General Surgery, PGY1  06/23/21  10:40 AM

## 2021-06-23 NOTE — PROGRESS NOTES
GENERAL SURGERY  ICU DAILY PROGRESS NOTE    CC: Perforated sigmoid diverticulitis     SUBJECTIVE:   Interval Hx:   No acute events overnight. Pain improving. Some nausea overnight with relief from medications. Ostomy with hard brown output. ROS: A 10 point review of systems was conducted, significant findings as noted above. All other systems negative. OBJECTIVE:   Vitals:   Vitals:    06/23/21 0000 06/23/21 0100 06/23/21 0200 06/23/21 0300   BP: (!) 120/53  (!) 142/65    Pulse: 80 87 85 95   Resp: (!) 33 29 30    Temp: 98.2 °F (36.8 °C)      TempSrc: Axillary      SpO2: 92% 92%     Weight:       Height:         I/O:     Intake/Output Summary (Last 24 hours) at 6/23/2021 0642  Last data filed at 6/23/2021 0500  Gross per 24 hour   Intake 1222 ml   Output 1495 ml   Net -273 ml     I/O last 3 completed shifts: In: 4780 [P.O.:360; I.V.:1475]  Out: 1230 [Urine:575; Drains:85; Stool:570]    Diet: ADULT DIET; Full Liquid  Adult Oral Nutrition Supplement; Standard High Calorie/High Protein Oral Supplement      Physical Examination:   General appearance: alert, NAD  Chest/Lungs: Normal effort  Cardiovascular: Normal rate at upper limit, well perfused  Abdomen: tender, wound vac in place not on suction, superior MIKE drain with serous output, inferior MIKE drain with serous output, colostomy stoma pink and viable with stool in the bag  Extremities: no cyanosis or edema  Skin: No rashes, warm and dry  Neurologic: AAOx3    Labs:  CBC:   Recent Labs     06/21/21  0532 06/22/21  0502   WBC 10.3 16.1*   HGB 9.9* 10.4*   HCT 29.6* 30.8*    199       BMP:   Recent Labs     06/21/21  0532 06/22/21  0502    131*   K 4.0 3.5    99   CO2 24 24   BUN 12 10   CREATININE <0.5* <0.5*   GLUCOSE 130* 150*     LFT's:   Recent Labs     06/21/21  0532   *   ALT 66*   BILITOT 1.7*   ALKPHOS 121     Troponin: No results for input(s): TROPONINI in the last 72 hours.   BNP: No results for input(s): BNP in the last 72 hours.  ABGs: No results for input(s): PHART, KYN0KLP, PO2ART in the last 72 hours. INR:   Recent Labs     06/22/21  0502   INR 2.11*       U/A:No results for input(s): NITRITE, COLORU, PHUR, LABCAST, WBCUA, RBCUA, MUCUS, TRICHOMONAS, YEAST, BACTERIA, CLARITYU, SPECGRAV, LEUKOCYTESUR, UROBILINOGEN, BILIRUBINUR, BLOODU, GLUCOSEU, AMORPHOUS in the last 72 hours. Invalid input(s): Elida Fleming     Rad:   CT ABDOMEN PELVIS W IV CONTRAST Additional Contrast? Oral   Final Result      1. 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 2.  Interval development of mild perihepatic ascites with scattered small fluid collections within the abdomen and pelvis. No discrete findings for abscess formation on the current exam.      3.  Mildly distended proximal small bowel loops containing contrast, favoring ileus-type pattern. The degree of small bowel distention has improved when compared to prior exam.      4.  Diffuse body wall edema most pronounced over the left lateral abdomen, progressed from prior exam.      5.  Bibasilar airspace disease consistent with underlying atelectasis and/or infiltrate. XR CHEST PORTABLE   Final Result      Hyperexpanded lungs with bibasilar minimal atelectasis, unchanged. XR CHEST 1 VIEW   Final Result   1. Low lung volumes with persistent bilateral lower lobe atelectasis   2. Nasogastric tube in stomach      XR CHEST PORTABLE   Final Result      Free intraperitoneal air. No acute cardiopulmonary findings. Attending in the ED was notified of the free air from the patient's CT study performed earlier. CT ABDOMEN PELVIS W IV CONTRAST Additional Contrast? None   Final Result      Free intraperitoneal air, predominantly in the upper abdomen and right upper quadrant. Site of perforation is not definitely identified. Distended proximal to mid small bowel. Distal small bowel not distended, consistent with small bowel obstruction.

## 2021-06-23 NOTE — PROGRESS NOTES
Oncology / Hematology Care      Patient name:   Toya Dorantes   Date:     6/23/2021           Interval History:    Resting comfortably  No abnormal bleeding  Tolerating liquids and PO medications          Allergies:     Allergies   Allergen Reactions    Cyclobenzaprine Shortness Of Breath and Other (See Comments)     \"cramping\"      Penicillins Shortness Of Breath, Itching, Swelling and Hives    Acetaminophen     Codeine Itching and Hives     Itching      Diphenhydramine Hcl Other (See Comments)     Heart racing    Ketorolac Itching and Nausea Only     Pt tolerated dose without side effects    Naproxen Nausea Only, Other (See Comments) and Itching     Stomach pain    Bothers her stomach  Bothers her stomach      Diphenhydramine Nausea Only, Hives and Palpitations     Heart racing  Also states it makes her heart race      Ketorolac Tromethamine Nausea And Vomiting    Ketorolac Tromethamine Itching, Nausea Only and Nausea And Vomiting    Sulfamethoxazole-Trimethoprim      Abdominal pain          Medications:    Current Facility-Administered Medications   Medication Dose Route Frequency Provider Last Rate Last Admin    famotidine (PEPCID) tablet 40 mg  40 mg Oral Daily Augusta Piedra MD   40 mg at 06/23/21 0906    gabapentin (NEURONTIN) capsule 100 mg  100 mg Oral TID Augusta Piedra MD   100 mg at 06/23/21 0906    hydrOXYzine (ATARAX) tablet 25 mg  25 mg Oral Daily Augusta Piedra MD   25 mg at 06/23/21 0908    lactulose (CHRONULAC) 10 GM/15ML solution 20 g  30 mL Oral Daily Augusta Piedra MD   20 g at 06/23/21 0907    metoprolol tartrate (LOPRESSOR) tablet 12.5 mg  12.5 mg Oral BID Augusta Piedra MD   12.5 mg at 06/23/21 0907    traMADol (ULTRAM) tablet 50 mg  50 mg Oral Q6H Augusta Piedra MD   50 mg at 06/23/21 0656    spironolactone (ALDACTONE) tablet 50 mg  50 mg Oral Daily Augusta Piedra MD   50 mg at 06/23/21 0906    ipratropium-albuterol (DUONEB) nebulizer solution 1 auscultation bilaterally; no audible rhonchi, rales, wheezes or crackles  Heart: regular rate and rhythm, S1, S2 normal; no murmurs, rubs or gallops  Abdomen: soft, non-tender and non-distended; normoactive, tympanic bowel sounds; no hepatosplenomegaly  Extremities: no cyanosis, clubbing, edema or asymmetry  Skin: no jaundice, purpura or petechiae  Lymph Nodes:  no auricular, cervical, supraclavicular, axillary, inguinal or popliteal lymphadenopathy  Neurologic:  CN 2-12 intact; no focal motor, sensory or cerebellar deficits        Laboratory Evaluation:      CBC:   Lab Results   Component Value Date    WBC 20.6 06/23/2021    NEUTROABS 17.1 06/23/2021    HGB 11.4 06/23/2021    MCV 91.4 06/23/2021     06/23/2021       BMP:   Lab Results   Component Value Date     06/23/2021    K 4.3 06/23/2021    K 3.5 06/17/2021    CO2 25 06/23/2021    BUN 14 06/23/2021    CREATININE 0.5 06/23/2021    MG 2.10 06/23/2021    TSH 3.97 04/06/2011       HEPATIC:  Lab Results   Component Value Date     06/21/2021    ALT 66 06/21/2021    ALKPHOS 121 06/21/2021    PROT 6.1 06/21/2021    PROT 8.0 08/16/2012    BILITOT 1.7 06/21/2021    BILIDIR 0.7 06/21/2021           Radiology Evaluation:    Reviewed      Assessment / Plan:    Mildly elevated INR    - PT, fibrinogen normal  - Vitamin K 5 mg PO daily for 3 days ( avoid IV due to ventricular arrhythmia AE's)    - ddimer remains elevated, on DVT prophylaxis        As always, thank you for allowing me the opportunity to participate in the care of your patient. Please do not hesitate to contact me with questions or concerns regarding further management. Mario Beck DO, MS  Oncology/Hematology Care    Please contact via:  1. [employer]  2.   Cell Phone:  [phone]    6/23/2021   2:03 PM

## 2021-06-23 NOTE — PROGRESS NOTES
Assessment complete/ alert O x2 person place/ at baseline per hand off report/ wound with dry dressing wound vac not in place / pending Sx team will re apply this am/ MIKE x 2 / dry pad in place/ leaking around tubes/ Sx team aware + possible DC them today per hand off/ am lab results not in computer/ lab draw per per hand off/ called lab confirmed will come to bedside to draw am labs/ pt is step down status/ see flow sheet/  Orders/ notes      0920 lab called again to follow up on am lab draw/ lab personnel stated labs were drawn at  and specimens running/ pendng results in epic at this time

## 2021-06-23 NOTE — PROGRESS NOTES
General Surgery POC  Wound Vac Change    Wound vac changed at bedside. Existing wound vac dressing removed. Wound bed appeared beefy red with good granulation tissue present. Adipose tissue pink and healthy. Wound measured 20 cm by 5 cm by 2.5 cm deep. No tunneling or tracking present. Amanda wound skin was protected with drape. Black foam was placed into the wound bed and covered with drape. Wound vac was placed to -125mmHg suction. A good seal was obtained without any leak. Patient tolerated the procedure well. Continue wound vac change T/Th/Saturday.             Haris Toney MD  General Surgery, PGY1  06/23/21  10:37 AM  [phone]

## 2021-06-23 NOTE — PLAN OF CARE
Problem: Pain:  Description: Pain management should include both nonpharmacologic and pharmacologic interventions. Goal: Control of acute pain  Description: Control of acute pain  6/23/2021 1940 by Francia Mccormick RN  Outcome: Ongoing  Note: Pain assessed q4 hrs and PRN using the 0-10 pain scale. Pain goal reviewed with patient. PRN pain medications given as needed and comfort provided non-pharmacologically (i.e. Repositioning). Pt instructed to report pain to RN. Problem: MOBILITY  Goal: Early mobilization is achieved  6/23/2021 1940 by Francia Mccormick RN  Outcome: Ongoing     Problem: Skin Integrity:  Goal: Absence of new skin breakdown  Description: Absence of new skin breakdown  6/23/2021 1940 by Francia Mccormick RN  Outcome: Ongoing  Note: Pt at risk for skin breakdown. See Cam score. Pt remains on bedrest. Unable to reposition self in bed. Heels elevated off bed. Sacral heart mepilex intact to protect,  site inspected and intact underneath. Will continue to turn and reposition patient every two hours and as needed. Will continue to keep patient clean and dry, applying skin care cream as needed. Pillows used for repositioning q2hs. Will continue to monitor and assess for skin breakdown. Problem: OXYGENATION/RESPIRATORY FUNCTION  Goal: Patient will achieve/maintain normal respiratory rate/effort  6/23/2021 1940 by Francia Mccormick RN  Outcome: Ongoing     Problem: Falls - Risk of:  Goal: Will remain free from falls  Description: Will remain free from falls  6/23/2021 1940 by Francia Mccormick RN  Outcome: Ongoing  Note: Pt is a fall risk. Fall risk protocol in place. See Nela Gallardo Fall Score. Pt bed is in low position, bed alarm is on, side rails up, fall risk bracelet applied. , non-skid footwear in use. Patient/family educated on fall risk protocol, instructed to call for assistance when needed and belongings are in reach. assistance.  Will continue with hourly rounds for po intake, pain needs, toileting and repositioning as needed. Will continue to monitor for needs.

## 2021-06-23 NOTE — PROGRESS NOTES
Pt alert. Confused to place and time. Is able to follow commands. VSS. Pt complains of nausea and vomited shortly after. PRN Zofran administered per MAR. MIKE drains continue to leak. Abdominal wound vac clean, dry and intact. Pt colostomy had large amount of hard, brown output. Hannon still in place.

## 2021-06-24 NOTE — RT PROTOCOL NOTE
RT Nebulizer Bronchodilator Protocol Note    There is a bronchodilator order in the chart from a provider indicating to follow the RT Bronchodilator Protocol and there is an Initiate RT Bronchodilator Protocol order as well (see protocol at bottom of note). The findings from the last RT Protocol Assessment were as follows:  Smoking: >15 Pack years  Surgical Status: General surgery/Lower abdominal  Xray: One lobe infiltrates/atelectasis/pleural effusion/edema  Respiratory Pattern: RR 12-20  Mental Status: Alert and Oriented  Breath Sounds: Diminished and/or crackles  Cough: Strong, spontaneous, non-productive  Activity Level: Non weight bearing- transfers bed to chair only  Oxygen Requirement: Room Air - 2LNC/28% or home setting  Indication for Bronchodilator Therapy: None  Bronchodilator Assessment Score: 5     Cont HHN with Duoneb Q4 PRN    Aerosolized bronchodilator medication orders have been revised according to the RT Bronchodilator Protocol. RT Bronchodilator Protocol:    Respiratory Therapist to perform RT Therapy Protocol Assessment then follow the protocol. No Indications  adjust the frequency to every 6 hours PRN wheezing or bronchospasm, if no treatments needed after 48 hours then discontinue using Per Protocol order mode. If indication present, adjust the RT bronchodilator orders based on the Bronchodilator Assessment Score as follows:    0-6  enter or revise RT Bronchodilator order to Albuterol Nebulizer order with frequency of every 2 hours PRN for wheezing or increased work of breathing using Per Protocol order mode. Repeat RT Therapy Protocol Assessment as needed. 7-10 - discontinue any other Inpatient aerosolized bronchodilator medication orders and enter or revise two Albuterol Nebulizer orders, one with BID frequency and one with frequency of every 2 hours PRN wheezing or increased work of breathing using Per Protocol order mode.   Repeat RT Therapy Protocol Assessment with

## 2021-06-24 NOTE — PROGRESS NOTES
Pt had 1 emesis occurrence. No complaints of nausea since. L PIV leaking at site. Replaced with another 22G in L hand.

## 2021-06-24 NOTE — PROGRESS NOTES
Physical Therapy/Occupational Therapy    Orders received. Pt seen previously this admission for PT/OT evals. Pt assist for transfers and w/c bound prior to this admission. Pt at baseline. Defer OOB to nursing staff. Poonam Wilson, PT [phone].  Marika Kemp, [address] [place] [address]

## 2021-06-24 NOTE — PROGRESS NOTES
Oncology / Hematology Care      Patient name:   Harry Silva   Date:     6/24/2021           Interval History:    No acute events overnight  Pain improving          Allergies:     Allergies   Allergen Reactions    Cyclobenzaprine Shortness Of Breath and Other (See Comments)     \"cramping\"      Penicillins Shortness Of Breath, Itching, Swelling and Hives    Acetaminophen     Codeine Itching and Hives     Itching      Diphenhydramine Hcl Other (See Comments)     Heart racing    Ketorolac Itching and Nausea Only     Pt tolerated dose without side effects    Naproxen Nausea Only, Other (See Comments) and Itching     Stomach pain    Bothers her stomach  Bothers her stomach      Diphenhydramine Nausea Only, Hives and Palpitations     Heart racing  Also states it makes her heart race      Ketorolac Tromethamine Nausea And Vomiting    Ketorolac Tromethamine Itching, Nausea Only and Nausea And Vomiting    Sulfamethoxazole-Trimethoprim      Abdominal pain          Medications:    Current Facility-Administered Medications   Medication Dose Route Frequency Provider Last Rate Last Admin    phytonadione (VITAMIN K) tablet 5 mg  5 mg Oral Daily Loulou Fess., DO   5 mg at 06/24/21 0930    famotidine (PEPCID) tablet 40 mg  40 mg Oral Daily Verna Jiménez MD   40 mg at 06/24/21 0930    gabapentin (NEURONTIN) capsule 100 mg  100 mg Oral TID Verna Jiménez MD   100 mg at 06/24/21 0930    hydrOXYzine (ATARAX) tablet 25 mg  25 mg Oral Daily Verna Jiménez MD   25 mg at 06/24/21 0930    lactulose (CHRONULAC) 10 GM/15ML solution 20 g  30 mL Oral Daily Verna Jiménez MD   20 g at 06/24/21 0930    metoprolol tartrate (LOPRESSOR) tablet 12.5 mg  12.5 mg Oral BID Verna Jiménez MD   12.5 mg at 06/24/21 0930    traMADol (ULTRAM) tablet 50 mg  50 mg Oral Q6H Verna Jiménez MD   50 mg at 06/24/21 0930    spironolactone (ALDACTONE) tablet 50 mg  50 mg Oral Daily Verna Jiménez MD   50 mg at 06/24/21 adequate repair  Neck: no JVD or thyromegaly  Lungs: clear to auscultation bilaterally; no audible rhonchi, rales, wheezes or crackles  Heart: regular rate and rhythm, S1, S2 normal; no murmurs, rubs or gallops  Abdomen: soft, non-tender and non-distended; normoactive, tympanic bowel sounds; no hepatosplenomegaly  Extremities: no cyanosis, clubbing, edema or asymmetry  Skin: no jaundice, purpura or petechiae  Lymph Nodes:  no auricular, cervical, supraclavicular, axillary, inguinal or popliteal lymphadenopathy  Neurologic:  CN 2-12 intact; no focal motor, sensory or cerebellar deficits        Laboratory Evaluation:      CBC:   Lab Results   Component Value Date    WBC 20.6 06/23/2021    NEUTROABS 17.1 06/23/2021    HGB 11.4 06/23/2021    MCV 91.4 06/23/2021     06/23/2021       BMP:   Lab Results   Component Value Date     06/24/2021    K 4.0 06/24/2021    K 3.5 06/17/2021    CO2 23 06/24/2021    BUN 20 06/24/2021    CREATININE 0.8 06/24/2021    MG 2.20 06/24/2021    TSH 3.97 04/06/2011       HEPATIC:  Lab Results   Component Value Date     06/21/2021    ALT 66 06/21/2021    ALKPHOS 121 06/21/2021    PROT 6.1 06/21/2021    PROT 8.0 08/16/2012    BILITOT 1.7 06/21/2021    BILIDIR 0.7 06/21/2021           Radiology Evaluation:    Reviewed      Assessment / Plan:    Mildly elevated INR    - PT, fibrinogen normal  - Vitamin K 5 mg PO daily for 3 days ( avoid IV due to ventricular arrhythmia AE's)    - ddimer remains elevated, on DVT prophylaxis    - recheck PT/INR tomorrow AM        As always, thank you for allowing me the opportunity to participate in the care of your patient. Please do not hesitate to contact me with questions or concerns regarding further management. Edward A. Delroy Dubin, DO, MS  Oncology/Hematology Care    Please contact via:  1. [employer]  2.   Cell Phone:  [phone]    6/24/2021   11:34 AM

## 2021-06-24 NOTE — PROGRESS NOTES
Hospitalist Progress Note      PCP: No primary care provider on file. Chief Complaint. Presented to hospital for abd pain    Date of Admission: 6/17/2021    Subjective: Michelle Aguirre, tolerating full liquids, denies chest pain, nausea, vomiting, shortness of breath, fever or chills. Medications:  Reviewed    Infusion Medications    sodium chloride      sodium chloride       Scheduled Medications    lidocaine 1 % injection  5 mL Intradermal Once    sodium chloride flush  5-40 mL Intravenous 2 times per day    phytonadione  5 mg Oral Daily    famotidine  40 mg Oral Daily    gabapentin  100 mg Oral TID    hydrOXYzine  25 mg Oral Daily    lactulose  30 mL Oral Daily    metoprolol tartrate  12.5 mg Oral BID    traMADol  50 mg Oral Q6H    spironolactone  50 mg Oral Daily    melatonin  5 mg Oral Nightly    OLANZapine zydis  5 mg Oral Nightly    sodium chloride flush  5-40 mL Intravenous 2 times per day    metroNIDAZOLE  500 mg Intravenous Q8H    ciprofloxacin  400 mg Intravenous Q12H    enoxaparin  40 mg Subcutaneous Daily     PRN Meds: sodium chloride flush, sodium chloride, ipratropium-albuterol, sodium chloride flush, sodium chloride, ondansetron **OR** ondansetron, HYDROmorphone **OR** HYDROmorphone, phenol      Intake/Output Summary (Last 24 hours) at 6/24/2021 1349  Last data filed at 6/24/2021 0600  Gross per 24 hour   Intake 1194 ml   Output 395 ml   Net 799 ml       Physical Exam Performed:    BP (!) 158/67   Pulse 77   Temp 97.8 °F (36.6 °C) (Axillary)   Resp 19   Ht 5' 6\" (1.676 m)   Wt 259 lb 7.7 oz (117.7 kg)   SpO2 93%   BMI 41.88 kg/m²     General appearance: NAD, lying in bed comfortably  HEENT:  Conjunctivae/corneas clear. Neck: Supple, with full range of motion. Respiratory:  Normal respiratory effort. Clear to auscultation, bilaterally without Rales/Wheezes/Rhonchi.   Cardiovascular: Regular rate and rhythm with normal S1/S2 without murmurs or rubs  Abdomen: Soft, non-tender, non-distended, normal bowel sounds. Musculoskeletal: No cyanosis or edema bilaterally  Neurologic:  without any focal sensory/motor deficits. grossly non-focal.  Psychiatric: Alert and oriented, Normal mood  Peripheral Pulses: +2 palpable, equal bilaterally       Labs:   Recent Labs     06/22/21  0502 06/23/21  0854   WBC 16.1* 20.6*   HGB 10.4* 11.4*   HCT 30.8* 34.7*    216     Recent Labs     06/22/21  0502 06/23/21  0854 06/24/21  0507   * 131* 130*   K 3.5 4.3 4.0   CL 99 99 96*   CO2 24 25 23   BUN 10 14 20   CREATININE <0.5* 0.5* 0.8   CALCIUM 7.4* 7.6* 7.9*   PHOS 2.7 2.8 3.2     No results for input(s): AST, ALT, BILIDIR, BILITOT, ALKPHOS in the last 72 hours. Recent Labs     06/22/21  0502   INR 2.11*     No results for input(s): Zettie Binet in the last 72 hours. Urinalysis:      Lab Results   Component Value Date    NITRU Negative 04/28/2021    WBCUA 0-2 04/28/2021    BACTERIA 1+ 04/28/2021    RBCUA None seen 04/28/2021    BLOODU SMALL 04/28/2021    SPECGRAV 1.015 04/28/2021    GLUCOSEU Negative 04/28/2021    GLUCOSEU Negative 12/15/2011       Radiology:  CT ABDOMEN PELVIS W IV CONTRAST Additional Contrast? Oral   Final Result      1. 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 2.  Interval development of mild perihepatic ascites with scattered small fluid collections within the abdomen and pelvis. No discrete findings for abscess formation on the current exam.      3.  Mildly distended proximal small bowel loops containing contrast, favoring ileus-type pattern. The degree of small bowel distention has improved when compared to prior exam.      4.  Diffuse body wall edema most pronounced over the left lateral abdomen, progressed from prior exam.      5.  Bibasilar airspace disease consistent with underlying atelectasis and/or infiltrate.       XR CHEST PORTABLE   Final Result      Hyperexpanded lungs with bibasilar minimal atelectasis, unchanged. XR CHEST 1 VIEW   Final Result   1. Low lung volumes with persistent bilateral lower lobe atelectasis   2. Nasogastric tube in stomach      XR CHEST PORTABLE   Final Result      Free intraperitoneal air. No acute cardiopulmonary findings. Attending in the ED was notified of the free air from the patient's CT study performed earlier. CT ABDOMEN PELVIS W IV CONTRAST Additional Contrast? None   Final Result      Free intraperitoneal air, predominantly in the upper abdomen and right upper quadrant. Site of perforation is not definitely identified. Distended proximal to mid small bowel. Distal small bowel not distended, consistent with small bowel obstruction. Stool throughout the colon. Distal colonic diverticulosis. Small fat-containing umbilical hernia. Air within the hernia, with a possible skin defect anteriorly at the umbilicus. Correlate clinically. Critical result findings were called to Hollice Duverney in the ED at 2150 hours on 6/17/2021. Assessment/Plan:    Active Hospital Problems    Diagnosis     Bowel perforation (HCC) [K63.1]     Pneumoperitoneum [K66.8]      Assessment  Acute Hypoxic Respiratory Failure  Perforated Sigmoid Diverticulitis s/p Ex-lap & Nick  Lactic acidosis  Elevated INR, coagulopathy        Plan  Monitor ostomy output and wound vac care by GS  Continue pain control regimen   Continue Ciprofloxacin & Flagyl   Advance diet per GS  Continue IVF  Reviewed Medical reconciliation, Labs, vitals signs, previous records  Pain control per primary, stool softer  PT/OT when stable  DVT PPx - per primary  Diet: Adult Oral Nutrition Supplement; Standard High Calorie/High Protein Oral Supplement  ADULT DIET;  Regular; Low Fiber  Code Status: Full Code    PT/OT Eval Status: ordered    Dispo - ok to transfer out of ICU, continue to advance diet, DC per primary    Lexi Rivas MD

## 2021-06-24 NOTE — PROGRESS NOTES
AM  [phone]    I performed a history and physical examination of the patient and discussed management with the resident. I reviewed the resident's note and agree with the documented findings and plan of care.     Africa Cummins M.D.  6/24/21   1:53 PM

## 2021-06-24 NOTE — PROGRESS NOTES
Pt transferred to room 5322. Oriented to room and all light system. All questions answered. Wound vac,MIKE drain intact. VSS. Safety maintained.      Electronically signed by Milly Cohen RN on 6/24/2021 at 6:55 PM

## 2021-06-24 NOTE — PROCEDURES
Difficult IV insertion, pt without veins to support midline in upper arms, veins small and deep,  able to place extended dwell LFA. Pt RN notified.

## 2021-06-25 PROBLEM — E43 SEVERE MALNUTRITION (HCC): Chronic | Status: ACTIVE | Noted: 2021-01-01

## 2021-06-25 NOTE — PROGRESS NOTES
Updated patient's son. Verbalized understanding and answered questions appropriately.  Will cont to monitor

## 2021-06-25 NOTE — PROGRESS NOTES
Wound vac point of care note:    Black foam wound vac changed at bedside. Removed existing track pad and drape to expose wound. Base of wound with beefy granulation tissue, appropriate bleeding, no odor or fibrinous tissue. New measurements of the wound are 20 cm x 5 cm x 1.5cm. 3 pieces of black foam placed In wound after draping the skin edges for protection. Drape applied and vac set to 125 mm Hg suction, no evidence of leak. Will continue to monitor with anticipation of changing wound vac on Monday. Please ensure vac is plugged in, charging, and functional at all times without a leak. Call surgery residents with concerns/questions.     Emily Lama,   PGY2, General Surgery  06/25/21  10:43 AM  [phone]

## 2021-06-25 NOTE — PROGRESS NOTES
NUTRITION ASSESSMENT  Admission Date: 6/17/2021     Type and Reason for Visit: Initial    NUTRITION RECOMMENDATIONS:   1. PO Diet: Continue current diet, low fiber per MD   2. ONS: Continue with standard/ High calorie TID. Consider altering types and flavours per nursing judgement to improve intakes. 3. Continue with calorie count which has been initiated per surgery team.     NUTRITION ASSESSMENT:  Nutritional summary & status: RD assessment for LOS s/p exploratory laparotomy and Nick's procedure 6/17. Pt sleeping at time of assessment, calorie count \"pocket\" in pt's room noted which indicates MD's concerning with poor PO intakes and ONS ordered QID. Significantly weight loss of 24 lb within past month noted per EMR which places pt at severe malnutrition status. RD recommends continuing with ONS and calorie count until adequate PO intakes is confirmed as well as return of GI function.       Patient admitted d/t emergent GI surgery     PMH significant for: hypertension, emphysema, Hepatitis C, anxiety, gastritis, recurrent cellulitis, dementia, and recent hospitalization for sepsis secondary     MALNUTRITION ASSESSMENT  Context of Malnutrition: Acute Illness   Malnutrition Status: Severe malnutrition  Findings of the 6 clinical characteristics of malnutrition (Minimum of 2 out of 6 clinical characteristics is required to make the diagnosis of moderate or severe Protein Calorie Malnutrition based on AND/ASPEN Guidelines):  Energy Intake: Less than/equal to 50% of estimated energy requirements    Energy Intake Time: Greater than or equal to 7 days    Weight Loss %: 7.5% loss or greater    Weight loss Time: Greater than or equal to 1 month    Body Fat Loss: No significant loss    Body Fat Location: No Significant   Body Muscle Loss: No significant loss    Body Muscle Loss Location: No significant    Fluid Accumulation: Unable to assess    Fluid Accumulation Location: Unable to assess     Strength: Not Performed; Not Measured     NUTRITION DIAGNOSIS  Problem: Problem #1: Inadequate oral intake   Etiology: Altered GI function  Signs & Symptoms: Intake 0-25% and Intake 26-50%    NUTRITION INTERVENTION  Food and/or Nutrient Delivery: Continue Current Diet, Continue Oral Nutrition Supplement, Continue Calorie Count   Nutrition Education/Counseling: No recommendation at this time   Coordination of Nutrition Care: Continue to monitor while inpatient     NUTRITION MONITORING & EVALUATION:  Evaluation:Goals set   Goals: Pt to meet >50% of nutritional requirements from diet and ONS   Monitoring: Meal Intake , Supplement Intake  or Wound Healing      OBJECTIVE DATA: Significant to nutrition assessment  · Nutrition-Focused Physical Findings: colostomy bag 425 ml on 6/24. Generalized non-pitting edema   · Labs: Reviewed;  Na 129, glucose 121  · Meds: Reviewed;   · Wounds Surgical Wound  and Wound Vac     ANTHROPOMETRICS  Current Height: 5' 6\" (167.6 cm)  Current Weight: 259 lb 7.7 oz (117.7 kg)    Admission weight: 252 lb 13.9 oz (114.7 kg)  Ideal Bodyweight 130 lb   Usual Bodyweight CARLOS   Weight Changes -24 lb within past month       BMI BMI (Calculated): 42    Wt Readings from Last 50 Encounters:   06/21/21 259 lb 7.7 oz (117.7 kg)   05/26/21 285 lb (129.3 kg)   05/26/21 260 lb (117.9 kg)   05/25/21 283 lb 15.2 oz (128.8 kg)   05/04/21 267 lb (121.1 kg)       COMPARATIVE STANDARDS  Estimated Total Kcals/Day : 8-15 Current Bodyweight (118 kg) 950-1800 kcal/day    Estimated Total Protein (g/day) : 2.0-2.5 Ideal Bodyweight  (59 kg) 118-148 g/day  Estimated Daily Total Fluid (ml/day): >1500 mL per day     Food / Nutrition-Related History  Pre-Admission / Home Diet:  Pre-Admission/Home Diet: General   Home Supplements / Herbals:    none noted  Food Restrictions / Cultural Requests:    none noted    Current Nutrition Therapies   Adult Oral Nutrition Supplement; Standard High Calorie/High Protein Oral Supplement  ADULT DIET; Regular; Low Fiber     PO Intake: 1-25% and 26-50%  PO Supplement: Standard High Calorie    PO Supplement Intake: ACRLOS  IVF: NS at 75 ml/hr     NUTRITION RISK LEVEL: Risk Level: [address] [place], RD, LD  [place]:  [phone]  Office:  [phone]

## 2021-06-25 NOTE — PROGRESS NOTES
4 Eyes Admission Assessment     I agree as the admission nurse that 2 RN's have performed a thorough Head to Toe Skin Assessment on the patient. ALL assessment sites listed below have been assessed on admission. Areas assessed by both nurses:   [x]   Head, Face, and Ears   [x]   Shoulders, Back, and Chest  [x]   Arms, Elbows, and Hands   [x]   Coccyx, Sacrum, and Ischium  [x]   Legs, Feet, and Heels        Does the Patient have Skin Breakdown? Patient has scattered bruising,redness,scabs, abrasion ,excoriation. Open areas to BLE.  Upper L thigh,   Cam Prevention initiated:  Yes   Wound Care Orders initiated:  Yes      [id number] nurse consulted for Pressure Injury (Stage 3,4, Unstageable, DTI, NWPT, and Complex wounds) or Cam score 18 or lower:  Yes      Nurse 1 eSignature: Electronically signed by Yanira Carreno RN on 6/25/21 at 5:38 AM EDT    **SHARE this note so that the co-signing nurse is able to place an eSignature**    Nurse 2 eSignature: Electronically signed by Wai Freire RN on 6/25/21 at 5:39 AM EDT

## 2021-06-25 NOTE — PROGRESS NOTES
Surgery Daily Progress Note  Toya Dorantes  CC:  diverticulitis  Subjective :  Had improved appetite yesterday evening. Denies pain this morning. Urine output decreased. Fluid bolus ordered overnight with good response. Objective    Infusions:   sodium chloride      sodium chloride          I/O:I/O last 3 completed shifts: In: 625 [P.O.:160; I.V.:20; IV Piggyback:445]  Out: 1890 [Urine:680; Drains:860; Stool:350]           Wt Readings from Last 1 Encounters:   06/21/21 259 lb 7.7 oz (117.7 kg)                 LABS:    Recent Labs     06/23/21  0854 06/24/21  1405   WBC 20.6* 28.4*   HGB 11.4* 11.7*   HCT 34.7* 36.2   MCV 91.4 93.0    237        Recent Labs     06/23/21  0854 06/24/21  0507   * 130*   K 4.3 4.0   CL 99 96*   CO2 25 23   PHOS 2.8 3.2   BUN 14 20   CREATININE 0.5* 0.8      No results for input(s): AST, ALT, ALB, BILIDIR, BILITOT, ALKPHOS in the last 72 hours. No results for input(s): LIPASE, AMYLASE in the last 72 hours. No results for input(s): PROT, INR, APTT in the last 72 hours. No results for input(s): CKTOTAL, CKMB, CKMBINDEX, TROPONINI in the last 72 hours. Exam:/65   Pulse 75   Temp 97 °F (36.1 °C) (Oral)   Resp 16   Ht 5' 6\" (1.676 m)   Wt 259 lb 7.7 oz (117.7 kg)   SpO2 96%   BMI 41.88 kg/m²   General appearance: alert, appears stated age and cooperative  Lungs: symmetric chest excursion  Heart: regular rate and rhythm, S1, S2   Abdomen: soft, appropriately-tender; bowel sounds present, Wound vac intact to abd with good seal, Stoma moist pink and viable with brown formed stool      ASSESSMENT/PLAN: Pt. is a 65 y.o. female with perforated sigmoid diverticulits s/p exploratory laparotomy and Nick's procedure 6/17. Follow-up morning labs. If continues to have increase WBC, will broaden antibiotic coverage. Wound vac changes to abdominal wound MWF  Follow up morning labs. On flagyl and Cipro for perforation (started 6/17).    UA

## 2021-06-25 NOTE — PROGRESS NOTES
VSS. A&Ox4. Intermittent confusion throughout shift. Wound vac changed by surg and in place. No leaking from wound vac on assessment. MIKE drain not to suction, per orders from resident. Having low PO intake, minimal appetite. purewick in place. Rochelle colored, clear. Repositioned in bed q2h as patient agrees. Resting with eyes closed most of shift. freq rounding. IV abx infusing intermittently. Scheduled tramadol given as ordered. Tolerating well.  Will cont to monitor

## 2021-06-25 NOTE — PROGRESS NOTES
Hospitalist Progress Note      PCP: No primary care provider on file. Chief Complaint. Presented to hospital for abd pain    Date of Admission: 6/17/2021    Subjective: Froilannatalia Loredo, tolerating diet, has no questions, denies chest pain, nausea, vomiting, shortness of breath, fever or chills. Medications:  Reviewed    Infusion Medications    sodium chloride 75 mL/hr at 06/25/21 1042    sodium chloride      sodium chloride       Scheduled Medications    sodium chloride flush  5-40 mL Intravenous 2 times per day    famotidine  40 mg Oral Daily    gabapentin  100 mg Oral TID    hydrOXYzine  25 mg Oral Daily    lactulose  30 mL Oral Daily    metoprolol tartrate  12.5 mg Oral BID    traMADol  50 mg Oral Q6H    spironolactone  50 mg Oral Daily    melatonin  5 mg Oral Nightly    OLANZapine zydis  5 mg Oral Nightly    sodium chloride flush  5-40 mL Intravenous 2 times per day    metroNIDAZOLE  500 mg Intravenous Q8H    ciprofloxacin  400 mg Intravenous Q12H    enoxaparin  40 mg Subcutaneous Daily     PRN Meds: oxyCODONE **OR** oxyCODONE, sodium chloride flush, sodium chloride, ipratropium-albuterol, sodium chloride flush, sodium chloride, ondansetron **OR** ondansetron, phenol      Intake/Output Summary (Last 24 hours) at 6/25/2021 1515  Last data filed at 6/25/2021 0944  Gross per 24 hour   Intake 130 ml   Output 2155 ml   Net -2025 ml       Physical Exam Performed:    /70   Pulse 92   Temp 97 °F (36.1 °C) (Oral)   Resp 16   Ht 5' 6\" (1.676 m)   Wt 259 lb 7.7 oz (117.7 kg)   SpO2 97%   BMI 41.88 kg/m²     General appearance: lying in bed comfortably, NAD  HEENT:  Conjunctivae/corneas clear. Neck: Supple, with full range of motion. Respiratory:  Normal respiratory effort. Clear to auscultation, bilaterally without Rales/Wheezes/Rhonchi.   Cardiovascular: Regular rate and rhythm with normal S1/S2 without murmurs or rubs  Abdomen: Soft, non-tender, non-distended, normal bowel sounds. Musculoskeletal: No cyanosis or edema bilaterally  Neurologic:  without any focal sensory/motor deficits. grossly non-focal.  Psychiatric: Alert and orientedx3, Normal mood  Peripheral Pulses: +2 palpable, equal bilaterally       Labs:   Recent Labs     06/23/21  0854 06/24/21  1405 06/25/21  0546   WBC 20.6* 28.4* 14.8*   HGB 11.4* 11.7* 15.3   HCT 34.7* 36.2 46.4    237 139     Recent Labs     06/23/21  0854 06/24/21  0507 06/25/21  0546   * 130* 129*   K 4.3 4.0 3.8   CL 99 96* 95*   CO2 25 23 22   BUN 14 20 28*   CREATININE 0.5* 0.8 0.9   CALCIUM 7.6* 7.9* 7.6*   PHOS 2.8 3.2 3.1     No results for input(s): AST, ALT, BILIDIR, BILITOT, ALKPHOS in the last 72 hours. Recent Labs     06/25/21  0546   INR 2.05*     No results for input(s): Ja Beery in the last 72 hours. Urinalysis:      Lab Results   Component Value Date    NITRU Negative 06/24/2021    WBCUA 21-50 06/24/2021    BACTERIA 3+ 06/24/2021    RBCUA 21-50 06/24/2021    BLOODU MODERATE 06/24/2021    SPECGRAV >=1.030 06/24/2021    GLUCOSEU Negative 06/24/2021    GLUCOSEU Negative 12/15/2011       Radiology:  XR CHEST PORTABLE   Final Result      Low level of inspiration with more prominent bibasilar and right perihilar opacities felt to be most likely atelectasis. CT ABDOMEN PELVIS W IV CONTRAST Additional Contrast? Oral   Final Result      1. 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 2.  Interval development of mild perihepatic ascites with scattered small fluid collections within the abdomen and pelvis. No discrete findings for abscess formation on the current exam.      3.  Mildly distended proximal small bowel loops containing contrast, favoring ileus-type pattern.   The degree of small bowel distention has improved when compared to prior exam.      4.  Diffuse body wall edema most pronounced over the left lateral abdomen, progressed from prior exam.      5.  Bibasilar airspace disease consistent with underlying atelectasis and/or infiltrate. XR CHEST PORTABLE   Final Result      Hyperexpanded lungs with bibasilar minimal atelectasis, unchanged. XR CHEST 1 VIEW   Final Result   1. Low lung volumes with persistent bilateral lower lobe atelectasis   2. Nasogastric tube in stomach      XR CHEST PORTABLE   Final Result      Free intraperitoneal air. No acute cardiopulmonary findings. Attending in the ED was notified of the free air from the patient's CT study performed earlier. CT ABDOMEN PELVIS W IV CONTRAST Additional Contrast? None   Final Result      Free intraperitoneal air, predominantly in the upper abdomen and right upper quadrant. Site of perforation is not definitely identified. Distended proximal to mid small bowel. Distal small bowel not distended, consistent with small bowel obstruction. Stool throughout the colon. Distal colonic diverticulosis. Small fat-containing umbilical hernia. Air within the hernia, with a possible skin defect anteriorly at the umbilicus. Correlate clinically. Critical result findings were called to Niranjan Ricketts in the ED at 2150 hours on 6/17/2021.                   Assessment/Plan:    Active Hospital Problems    Diagnosis     Severe malnutrition ([facility] [address] [E43]     Bowel perforation (HCC) [K63.1]     Pneumoperitoneum [K66.8]      Assessment  Acute Hypoxic Respiratory Failure  Perforated Sigmoid Diverticulitis s/p Ex-lap & Nick  Lactic acidosis  Elevated INR, coagulopathy  Lactic acidosis        Plan  Monitor ostomy output and wound vac care by GS  Continue pain control regimen   Continue Ciprofloxacin & Flagyl per primary  Advance diet per GS  Continue IVF, monitor LA  Reviewed Medical reconciliation, Labs, vitals signs, previous records  Pain control per primary, stool softer  PT/OT when stable  DVT PPx - per primary  Diet: Adult Oral Nutrition Supplement; Standard High Calorie/High Protein Oral Supplement  ADULT DIET;  Regular; Low Fiber  Code Status: Full Code    PT/OT Eval Status: ordered    Dispo - wound vac in place    Josemanuel Sotomayor MD

## 2021-06-25 NOTE — PLAN OF CARE
Problem: Nutrition  Goal: Optimal nutrition therapy  Outcome: Ongoing   Nutrition Problem #1: Inadequate oral intake  Intervention: Food and/or Nutrient Delivery: Continue Current Diet, Continue Oral Nutrition Supplement, Continue Calorie Count  Nutritional Goals: Pt to meet >50% of nutritional requirements from diet and ONS

## 2021-06-25 NOTE — CARE COORDINATION
CM following, pt with abd [address] changed today by surgery team.  WBC 14.8 today cont IV ABX, new ostomy getting education and pending return of GI function calorie ct Low fiber diet at this time. +US pending urine cx, low UOP received bolus +response. Will return to [facility] care once pt medically cleared for DC no precert needed.    Electronically signed by Ignacio Abernathy RN on 6/25/2021 at 11:32 AM  [phone]

## 2021-06-25 NOTE — PROGRESS NOTES
Pt. With intact colostomy with soft tan stool in pouch. Attempt to assess to teach, pt. Very confused, oriented only to self, asking for phone and son's number when informed present to teach about ostomy. Plan is for pt. To discharge to SNF who will care for ostomy for pt.

## 2021-06-26 NOTE — PLAN OF CARE
Pt free from falls this shift. Fall precautions in place at all times. Call light always within reach. Pt able and agreeable to contact for safety appropriately. Pain/discomfort being managed with PRN analgesics per MD orders. Pt able to express presence and absence of pain and rate pain appropriately using numerical scale. Patient currently being treated for nausea at this time.

## 2021-06-26 NOTE — PROGRESS NOTES
Patient in bed resting comfortably. Respirations steady and unlabored. No signs of respiratory or cardiac distress. No complaints of pain at this time. IVF infusing as ordered. No needs at this time. Call light in reach and bed alarm in place. Will continue to monitor.   Electronically signed by Rosetta Cross RN on 6/26/2021 at 3:26 AM

## 2021-06-26 NOTE — PROGRESS NOTES
Surgery Daily Progress Note  Siddharth Minors  CC:  diverticulitis  Subjective :  Minimal intake overnight. Denies pain this morning. Admits to nausea with minimal vomiting. Afebrile    Objective    Infusions:   sodium chloride 75 mL/hr at 06/25/21 2258    sodium chloride      sodium chloride          I/O:I/O last 3 completed shifts: In: 1908.3 [P.O.:320; I.V.:1588.3]  Out: 1285 [Urine:1100; Drains:35; Stool:150]           Wt Readings from Last 1 Encounters:   06/26/21 257 lb 11.5 oz (116.9 kg)                 LABS:    Recent Labs     06/25/21  0546 06/26/21  0545   WBC 14.8* 33.7*   HGB 15.3 11.5*   HCT 46.4 33.8*   MCV 91.9 91.0    327        Recent Labs     06/25/21  0546 06/26/21  0545   * 128*   K 3.8 4.1   CL 95* 93*   CO2 22 20*   PHOS 3.1 4.3   BUN 28* 39*   CREATININE 0.9 1.7*      No results for input(s): AST, ALT, ALB, BILIDIR, BILITOT, ALKPHOS in the last 72 hours. No results for input(s): LIPASE, AMYLASE in the last 72 hours. Recent Labs     06/25/21  0546   INR 2.05*      No results for input(s): CKTOTAL, CKMB, CKMBINDEX, TROPONINI in the last 72 hours.             Exam:BP (!) 185/92   Pulse 94   Temp 97.4 °F (36.3 °C) (Axillary)   Resp 18   Ht 5' 6\" (1.676 m)   Wt 257 lb 11.5 oz (116.9 kg)   SpO2 92%   BMI 41.60 kg/m²   General appearance: alert, appears stated age and cooperative  Lungs: symmetric chest excursion  Heart: regular rate and rhythm, S1, S2   Abdomen: soft, non tender; bowel sounds present, Wound vac intact to abd with good seal, Stoma moist pink and viable with brown formed stool, MIKE drain with serous output      ASSESSMENT/PLAN: Pt. is a 65 y.o. female with perforated sigmoid diverticulits s/p exploratory laparotomy and Nick's procedure 6/17.     - WBC 33.7 from 14.8, UA positive for klebsiella, will follow up on sensitivities will transition to [address] [place] [address] from cipro/flagyl started (6/17)  - Blood cultures NGT (6/24), CXR (6/24) normal, CT scan done 6/22 without concern for intraabdominal pathology  - Wound vac changes MWF, wound looked clean on 6/25  - MIKE drain in place to gravity  - Calorie counts and encourage oral intake and supplementation      Deepti Anderson DO  PGY1, General Surgery  06/26/21  7:33 AM  [phone]

## 2021-06-26 NOTE — PROCEDURES
Alireza Mancilla is a 65 y.o. female patient. 1. Bowel perforation ([address]    2. Small bowel obstruction (HCC)      Past Medical History:   Diagnosis Date    Anxiety     Back pain     Bronchitis     Cancer (HCC)     malignant neoplasm of bronchus and lung    Chronic pain     Dementia (HCC)     Drug-seeking behavior     Emphysema lung (HCC)     ESBL (extended spectrum beta-lactamase) producing bacteria infection 05/19/2021    left leg wound (Acinetobacter, Pseudomonas, and Klebsiella)    GERD (gastroesophageal reflux disease)     Hepatitis C     HTN (hypertension)     Insomnia     Lumbar disc disease     Multiple drug resistant organism (MDRO) culture positive 05/19/2021    left leg wound (Acinetobacter, Pseudomonas, and Klebsiella)    Osteoporosis     Spinal stenosis      Blood pressure (!) 103/42, pulse 88, temperature 97.4 °F (36.3 °C), temperature source Axillary, resp. rate 26, height 5' 6\" (1.676 m), weight 264 lb 12.4 oz (120.1 kg), SpO2 97 %, not currently breastfeeding. Insert Arterial Line    Date/Time: 6/26/2021 6:59 PM  Performed by: Julian Fonseca DO  Authorized by: Julian Fonseca DO   Consent: Written consent obtained. Risks and benefits: risks, benefits and alternatives were discussed  Consent given by: power of   Required items: required blood products, implants, devices, and special equipment available  Patient identity confirmed: hospital-assigned identification number  Time out: Immediately prior to procedure a \"time out\" was called to verify the correct patient, procedure, equipment, support staff and site/side marked as required. Preparation: Patient was prepped and draped in the usual sterile fashion.   Indications: hemodynamic monitoring  Location: right radial  Anesthesia: local infiltration    Anesthesia:  Local Anesthetic: lidocaine 1% without epinephrine  Anesthetic total: 3 mL    Sedation:  Patient sedated: no    Otis's test normal: yes  Needle gauge: 20  Seldinger technique: Seldinger technique used  Number of attempts: 2  Post-procedure: line sutured and dressing applied  Post-procedure CMS: normal and unchanged  Patient tolerance: patient tolerated the procedure well with no immediate complications  Comments: EBL 8 cc          Gregory Pena DO  6/26/2021

## 2021-06-26 NOTE — PROCEDURES
sterile barriers used - cap, mask, sterile gown, sterile gloves, and large sterile sheet  Hand hygiene: hand hygiene performed prior to central venous catheter insertion  Location details: right internal jugular  Patient position: Trendelenburg  Catheter type: triple lumen  Catheter size: 7 Fr  Pre-procedure: landmarks identified  Ultrasound guidance: yes  Sterile ultrasound techniques: sterile gel and sterile probe covers were used  Number of attempts: 1  Successful placement: yes  Post-procedure: line sutured and dressing applied  Assessment: blood return through all ports,  free fluid flow and placement verified by x-ray  Patient tolerance: patient tolerated the procedure well with no immediate complications  Comments: EBL 5 cc  Awaiting CXR          Bjorn Cushing, DO  6/26/2021

## 2021-06-26 NOTE — PROGRESS NOTES
Meropenem 1g IV q8h ordered for patient. This medication is renally eliminated. Will change to meropenem 1g IV q12h per renal dose adjustment policy. Estimated Creatinine Clearance: 39 mL/min (A) (based on SCr of 1.7 mg/dL (H)). Pharmacy will continue to monitor renal function and adjust dose as necessary. Please call with any questions.   Valeria Peterson, PharmD, BCPS  Main pharmacy: [id number]  6/26/2021 9:04 AM

## 2021-06-26 NOTE — PROGRESS NOTES
Hospitalist Progress Note      PCP: No primary care provider on file. Chief Complaint. Presented to hospital for abd pain    Date of Admission: 6/17/2021    Subjective: Ronelle Lombard, complaining abd pain,  tolerating diet, denies chest pain, nausea, vomiting, shortness of breath, fever or chills. Medications:  Reviewed    Infusion Medications    sodium chloride 100 mL/hr at 06/26/21 1049    sodium chloride      sodium chloride       Scheduled Medications    meropenem  1,000 mg Intravenous Q12H    [START ON 6/27/2021] heparin (porcine)  5,000 Units Subcutaneous 3 times per day    sodium chloride  500 mL Intravenous Once    sodium chloride flush  5-40 mL Intravenous 2 times per day    famotidine  40 mg Oral Daily    gabapentin  100 mg Oral TID    hydrOXYzine  25 mg Oral Daily    lactulose  30 mL Oral Daily    metoprolol tartrate  12.5 mg Oral BID    traMADol  50 mg Oral Q6H    [Held by provider] spironolactone  50 mg Oral Daily    melatonin  5 mg Oral Nightly    OLANZapine zydis  5 mg Oral Nightly    sodium chloride flush  5-40 mL Intravenous 2 times per day     PRN Meds: oxyCODONE **OR** oxyCODONE, sodium chloride flush, sodium chloride, ipratropium-albuterol, sodium chloride flush, sodium chloride, ondansetron **OR** ondansetron, phenol      Intake/Output Summary (Last 24 hours) at 6/26/2021 1241  Last data filed at 6/26/2021 0853  Gross per 24 hour   Intake 1788.25 ml   Output 1285 ml   Net 503.25 ml       Physical Exam Performed:    BP (!) 96/57   Pulse 92   Temp 97.2 °F (36.2 °C) (Axillary)   Resp 16   Ht 5' 6\" (1.676 m)   Wt 257 lb 11.5 oz (116.9 kg)   SpO2 90%   BMI 41.60 kg/m²     General appearance: lying in bed comfortably, NAD  HEENT:  Conjunctivae/corneas clear. Neck: Supple, with full range of motion. Respiratory:  Normal respiratory effort. Clear to auscultation, bilaterally without Rales/Wheezes/Rhonchi.   Cardiovascular: Regular rate and rhythm with normal S1/S2 without murmurs or rubs  Abdomen: Soft,  non-distended, normal bowel sounds. Musculoskeletal: No cyanosis or edema bilaterally  Neurologic:  without any focal sensory/motor deficits. grossly non-focal.  Psychiatric: Alert and orientedx3, Normal mood  Peripheral Pulses: +2 palpable, equal bilaterally       Labs:   Recent Labs     06/24/21  1405 06/25/21  0546 06/26/21  0545   WBC 28.4* 14.8* 33.7*   HGB 11.7* 15.3 11.5*   HCT 36.2 46.4 33.8*    139 327     Recent Labs     06/24/21  0507 06/25/21  0546 06/26/21  0545   * 129* 128*   K 4.0 3.8 4.1   CL 96* 95* 93*   CO2 23 22 20*   BUN 20 28* 39*   CREATININE 0.8 0.9 1.7*   CALCIUM 7.9* 7.6* 7.6*   PHOS 3.2 3.1 4.3     No results for input(s): AST, ALT, BILIDIR, BILITOT, ALKPHOS in the last 72 hours. Recent Labs     06/25/21  0546   INR 2.05*     No results for input(s): Chelsie Salina in the last 72 hours. Urinalysis:      Lab Results   Component Value Date    NITRU Negative 06/24/2021    WBCUA 21-50 06/24/2021    BACTERIA 3+ 06/24/2021    RBCUA 21-50 06/24/2021    BLOODU MODERATE 06/24/2021    SPECGRAV >=1.030 06/24/2021    GLUCOSEU Negative 06/24/2021    GLUCOSEU Negative 12/15/2011       Radiology:  XR CHEST PORTABLE   Final Result      Low level of inspiration with more prominent bibasilar and right perihilar opacities felt to be most likely atelectasis. CT ABDOMEN PELVIS W IV CONTRAST Additional Contrast? Oral   Final Result      1. 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 2.  Interval development of mild perihepatic ascites with scattered small fluid collections within the abdomen and pelvis. No discrete findings for abscess formation on the current exam.      3.  Mildly distended proximal small bowel loops containing contrast, favoring ileus-type pattern.   The degree of small bowel distention has improved when compared to prior exam.      4.  Diffuse body wall edema most pronounced over the left lateral abdomen, progressed from prior exam.      5.  Bibasilar airspace disease consistent with underlying atelectasis and/or infiltrate. XR CHEST PORTABLE   Final Result      Hyperexpanded lungs with bibasilar minimal atelectasis, unchanged. XR CHEST 1 VIEW   Final Result   1. Low lung volumes with persistent bilateral lower lobe atelectasis   2. Nasogastric tube in stomach      XR CHEST PORTABLE   Final Result      Free intraperitoneal air. No acute cardiopulmonary findings. Attending in the ED was notified of the free air from the patient's CT study performed earlier. CT ABDOMEN PELVIS W IV CONTRAST Additional Contrast? None   Final Result      Free intraperitoneal air, predominantly in the upper abdomen and right upper quadrant. Site of perforation is not definitely identified. Distended proximal to mid small bowel. Distal small bowel not distended, consistent with small bowel obstruction. Stool throughout the colon. Distal colonic diverticulosis. Small fat-containing umbilical hernia. Air within the hernia, with a possible skin defect anteriorly at the umbilicus. Correlate clinically. Critical result findings were called to [facility] in the ED at 2150 hours on 6/17/2021.                   Assessment/Plan:    Active Hospital Problems    Diagnosis     Severe malnutrition ([address] [E43]     Bowel perforation (HCC) [K63.1]     Pneumoperitoneum [K66.8]      Assessment  Acute Hypoxic Respiratory Failure  Perforated Sigmoid Diverticulitis s/p Ex-lap & Nick  Lactic acidosis  Elevated INR, coagulopathy  Lactic acidosis  Leukkocytosis  LAVINIA        Plan  Will order bolus for low BP, holding aldactone for LAVINIA, continue IV fluid, will repeat cultures, agree with meropenem, U cx - pending sensitivitis  Monitor ostomy output and wound vac care by GS  Continue pain control regimen   Advance diet per GS  Reviewed Medical reconciliation, Labs, vitals signs, previous

## 2021-06-26 NOTE — PROGRESS NOTES
Oncology / Hematology Care      Patient name:   Peter Alejandra   Date:     6/25/2021           Interval History:    Patient transferred from ICU to for floor. This evening she is resting comfortably, no abnormal bleeding    Occasional bruising on her forearms    Tolerating current diet, requesting pain meds          Allergies:     Allergies   Allergen Reactions    Cyclobenzaprine Shortness Of Breath and Other (See Comments)     \"cramping\"      Penicillins Shortness Of Breath, Itching, Swelling and Hives    Acetaminophen     Codeine Itching and Hives     Itching      Diphenhydramine Hcl Other (See Comments)     Heart racing    Ketorolac Itching and Nausea Only     Pt tolerated dose without side effects    Naproxen Nausea Only, Other (See Comments) and Itching     Stomach pain    Bothers her stomach  Bothers her stomach      Diphenhydramine Nausea Only, Hives and Palpitations     Heart racing  Also states it makes her heart race      Ketorolac Tromethamine Nausea And Vomiting    Ketorolac Tromethamine Itching, Nausea Only and Nausea And Vomiting    Sulfamethoxazole-Trimethoprim      Abdominal pain          Medications:    Current Facility-Administered Medications   Medication Dose Route Frequency Provider Last Rate Last Admin    0.9 % sodium chloride infusion   Intravenous Continuous Liane Stein MD 75 mL/hr at 06/25/21 1042 New Bag at 06/25/21 1042    oxyCODONE (ROXICODONE) immediate release tablet 5 mg  5 mg Oral Q4H PRN Jyothi Clinton DO        Or    oxyCODONE (ROXICODONE) immediate release tablet 10 mg  10 mg Oral Q4H PRN Rochelle Payne DO   10 mg at 06/25/21 1838    sodium chloride flush 0.9 % injection 5-40 mL  5-40 mL Intravenous 2 times per day Arno Lipoma, APRN - CNP   10 mL at 06/25/21 1955    sodium chloride flush 0.9 % injection 5-40 mL  5-40 mL Intravenous PRN Arno Lipoma, APRN - CNP        0.9 % sodium chloride infusion  25 mL Intravenous PRN Arno Lipoma, APRN - CNP        ipratropium-albuterol (DUONEB) nebulizer solution 1 ampule  1 ampule Inhalation Q4H PRN Vijaya Manzano MD        famotidine (PEPCID) tablet 40 mg  40 mg Oral Daily Claudia Lamar MD   40 mg at 06/25/21 0827    gabapentin (NEURONTIN) capsule 100 mg  100 mg Oral TID Claudia Lamar MD   100 mg at 06/25/21 1355    hydrOXYzine (ATARAX) tablet 25 mg  25 mg Oral Daily Claudia Lamar MD   25 mg at 06/25/21 0828    lactulose (CHRONULAC) 10 GM/15ML solution 20 g  30 mL Oral Daily Claudia Lamar MD   20 g at 06/25/21 0828    metoprolol tartrate (LOPRESSOR) tablet 12.5 mg  12.5 mg Oral BID Claudia Lamar MD   12.5 mg at 06/25/21 0827    traMADol (ULTRAM) tablet 50 mg  50 mg Oral Q6H Claudia Lamar MD   50 mg at 06/25/21 1943    spironolactone (ALDACTONE) tablet 50 mg  50 mg Oral Daily Claudia Lamar MD   50 mg at 06/25/21 8158    melatonin disintegrating tablet 5 mg  5 mg Oral Nightly Rochelle Kerry, DO   5 mg at 06/23/21 2111    OLANZapine zydis (ZYPREXA) disintegrating tablet 5 mg  5 mg Oral Nightly Rochelle Kerry, DO   5 mg at 06/25/21 0026    sodium chloride flush 0.9 % injection 5-40 mL  5-40 mL Intravenous 2 times per day Dyane Miners, DO   10 mL at 06/25/21 0829    sodium chloride flush 0.9 % injection 5-40 mL  5-40 mL Intravenous PRN Rochelle Kerry, DO        0.9 % sodium chloride infusion  25 mL Intravenous PRN Dyane Miners, DO        ondansetron (ZOFRAN-ODT) disintegrating tablet 4 mg  4 mg Oral Q8H PRN Rochelle Kerry, DO        Or    ondansetron (ZOFRAN) injection 4 mg  4 mg Intravenous Q6H PRN Rochelle Kerry, DO   4 mg at 06/1948    phenol 1.4 % mouth spray 1 spray  1 spray Mouth/Throat Q2H PRN Rochelle Kerry, DO        metronidazole (FLAGYL) 500 mg in NaCl 100 mL IVPB premix  500 mg Intravenous Q8H Rochelle Kerry, DO   Stopped at 06/25/21 1542    ciprofloxacin (CIPRO) IVPB 400 mg  400 mg Intravenous Q12H Rochelle Kerry, DO   Stopped at 06/25/21 1213    enoxaparin (LOVENOX) injection 40 mg  40 mg Subcutaneous Daily Elroy Manges, DO   40 mg at 06/25/21 8660          Review of Systems:    · History obtained from patient, otherwise, further 10 point ROS is negative        Physical Exam:    /65   Pulse 90   Temp 97 °F (36.1 °C) (Oral)   Resp 18   Ht 5' 6\" (1.676 m)   Wt 259 lb 7.7 oz (117.7 kg)   SpO2 92%   BMI 41.88 kg/m²     General appearance: alert and cooperative; no acute distress  Head: NCAT, PERRLA, EOMI; oral and nasopharynx without lesions, dentition adequate repair  Neck: no JVD or thyromegaly  Lungs: clear to auscultation bilaterally; no audible rhonchi, rales, wheezes or crackles  Heart: regular rate and rhythm, S1, S2 normal; no murmurs, rubs or gallops  Abdomen: soft, non-tender and non-distended; normoactive, tympanic bowel sounds; no hepatosplenomegaly  Extremities: no cyanosis, clubbing, edema or asymmetry  Skin: no jaundice, purpura or petechiae  Lymph Nodes:  no auricular, cervical, supraclavicular, axillary, inguinal or popliteal lymphadenopathy  Neurologic:  CN 2-12 intact; no focal motor, sensory or cerebellar deficits        Laboratory Evaluation:      CBC:   Lab Results   Component Value Date    WBC 14.8 06/25/2021    NEUTROABS 12.6 06/25/2021    HGB 15.3 06/25/2021    MCV 91.9 06/25/2021     06/25/2021       BMP:   Lab Results   Component Value Date     06/25/2021    K 3.8 06/25/2021    K 3.5 06/17/2021    CO2 22 06/25/2021    BUN 28 06/25/2021    CREATININE 0.9 06/25/2021    MG 2.10 06/25/2021    TSH 3.97 04/06/2011       HEPATIC:  Lab Results   Component Value Date     06/21/2021    ALT 66 06/21/2021    ALKPHOS 121 06/21/2021    PROT 6.1 06/21/2021    PROT 8.0 08/16/2012    BILITOT 1.7 06/21/2021    BILIDIR 0.7 06/21/2021           Radiology Evaluation:    Reviewed      Assessment / Plan:    Mildly elevated INR    Received 3 days of vitamin K replacement    INR still mildly prolonged around 2    This appears to be a subacute issue, as the patient is having no abnormal bleeding    Will repeat INR in a few days for full effect of vitamin K replacement    Along the way check thrombin time, factor VII, factor X, factor V levels as well as fibrinogen    Low-grade DIC a possibility, but normal fibrinogen level and a normal PTT argues against that. It also argues against acquired hypofibrinogenemia, but not a factor deficiency      Dr. Johny Saha covering for the weekend available via [employer]        As always, thank you for allowing me the opportunity to participate in the care of your patient. Please do not hesitate to contact me with questions or concerns regarding further management. Mario Hansen DO, MS  Oncology/Hematology Care    Please contact via:  1. [employer]  2.   Cell Phone:  [phone]    6/25/2021   9:35 PM

## 2021-06-26 NOTE — PROGRESS NOTES
Patient reassessed. Second bolus given. SBP slowing trending down to SBPs 70-80s. Will place CVC and a line and start on pressors.     Jose Terry MD PGY-4  06/26/21  5:38 PM  [phone]

## 2021-06-26 NOTE — PROGRESS NOTES
BP SBP 80s, patient lethargic but arousal. HR 80s. Temp 95.0 rectal. NG in place, wooten placed, with rust color urine. IV bolus completed on transfer to ICU by MD. Jose Antonio Neri report to ICU at bedside. Updated son of change and plan of care. Answer questions, verbalized understanding. Will cont to monitor

## 2021-06-26 NOTE — PROGRESS NOTES
NUTRITION NOTE: Calorie Count      Type and Reason for Visit: Initial    Diet Orders / Intake / Nutrition Support  Current diet/supplement order: ADULT DIET; Regular; Low Fiber  Adult Oral Nutrition Supplement; Standard High Calorie/High Protein Oral Supplement       COMPARATIVE STANDARDS  Estimated Total Kcals/Day : 8-15 Current Bodyweight (118 kg) 950-1800 kcal/day  -% intake compared to median of 1375  Estimated Total Protein (g/day) : 2.0-2.5 Ideal Bodyweight  (59 kg) 118-148 g/day  Estimated Daily Total Fluid (ml/day): >1500 mL per day     Date Consumed PO Intake Kcal %   Kcal met PO Intake grams protein %  Protein met   Comments   6/25 80 kcal 6% 12 g 10% B- no tickets recorded  L-100% yogurt  D - 0%   6/26 350 kcal   20 g  B- 100% Ensure Enlive                           **Results will be posted as available.      Rolando Rebolledo, [address], [address] [place] [address] Drive:  [phone]  Office:  [phone]

## 2021-06-26 NOTE — PROGRESS NOTES
Patient is alert to self, place, and situation but can be forgetful to place and situation. RA, sat 97%. No complaints of SOB. Medicated for c/o abdominal pain as needed. Respirations appear to be easy and unlabored. Lungs clear and diminished. Respirations easy with c/o non-productive cough. Cardiac monitor in place, current rhythm NSR.  IVF infusing per left upper arm PIV as ordered. Medicated as needed for c/o nausea with emesis. Up with lift to the bathroom/BSC as needed. Wound vacc intact to abdomen. Left colostomy intact. Right MIKE drain, not to suction. Pure wick in place. Cream placed to red buttocks. Tolerating regular diet. Plan of care and safety measures reviewed with the patient. Call light in reach and bed alarm in place. Will continue to monitor.   Electronically signed by Cleve Hightower RN on 6/25/2021 at 10:27 PM

## 2021-06-26 NOTE — PROGRESS NOTES
Patient complaining of nausea, medicated with torsten IVP. Will continue to monitor.   Electronically signed by Dheeraj Mathew RN on 6/26/2021 at 4:41 AM

## 2021-06-26 NOTE — PROGRESS NOTES
Notified by nursing that patient hypotensive to 82/52 and concern for NG tube output becoming bloody. Bolus ordered. Patient assessed bedside. Was noted to be hypotensive to SBP 70s. HR in the 80s. Remains on 2L O2. NGT was noted to be more bilious, no blood noted. Hannon placed for accurate I/Os. Rectal temp noted to be 95. Transferred to ICU. On arrival, SBP was 105, HR remained in the 80s. Dez hugger placed. Dr. Tori Lucas updated.      Leisa Arzate MD PGY-4  06/26/21  4:22 PM  [phone]

## 2021-06-26 NOTE — PROGRESS NOTES
Notified by nursing that patient had increasing O2 requirement and a large amount of emesis and AMS. CXR and KUB ordered. Assessed patient bedside. She was alert and oriented. Complaining of increasing abdominal pain and nausea/vomiting. She also had an acute increase in O2, noted to have increased work of breathing. On 4L of O2 sat at 90%, increased to 6L. Abd xray seen bedside showing large gastric bubble. NGT placed with immediate return of 4.5L of brown output. Patients work of breathing immediately improved. Able to wean to 2L. Labs ordered. CO2 noted to be 13 on renal panel. ABG ordered. Ostomy with brown stool. Patient made NPO. IVF ordered. All oral medications held or converted to IV.     Génesis Wagner MD PGY-4  06/26/21  2:00 PM  [phone]

## 2021-06-26 NOTE — PROGRESS NOTES
BP 82/52, 1L bolus given, patient disoriented at baseline, hallucinating. IV infusing. 95% on 2L. Patient complaining of abdominal pain, repositioned in bed. NPO.  NG in place

## 2021-06-27 NOTE — PROGRESS NOTES
ICU SURGERY DAILY PROGRESS NOTE    CC: Perforated Diverticulitis     SUBJECTIVE:   Interval Hx: Patient required NG placement yesterday for emesis, with over 5L of feculent output. Was transferred to the ICU for hypotension unresponsive to fluid boluses on the floor. CVC and arterial line placed, and patient was initiated on levo. Required over 3L of IVF. Lactate trending down to 5.0 from 5.7, worsening LAVINIA with stable UOP. Patient overall improved this morning. Pain controlled, having some nausea. Currently on 2L NC, levo has been weaned down to 5, slightly tachycardic to low 100s. ROS: A 14 point review of systems was conducted, significant findings as noted above. All other systems negative. OBJECTIVE:   Vitals:   Vitals:    06/27/21 0400 06/27/21 0500 06/27/21 0600 06/27/21 0700   BP:       Pulse: 88 103 97 96   Resp: 21 30 28 28   Temp: 97.7 °F (36.5 °C)      TempSrc: Axillary      SpO2: 97% 99% 100% 98%   Weight:       Height:           I/O:     Intake/Output Summary (Last 24 hours) at 6/27/2021 0711  Last data filed at 6/27/2021 0700  Gross per 24 hour   Intake 1186.77 ml   Output 7653 ml   Net -6466.23 ml     I/O last 3 completed shifts:   In: 1186.8 [I.V.:95.6; IV Piggyback:1091.2]  Out: 3536 [Urine:553; Emesis/NG output:6350; Drains:500; Stool:250]    Diet: Diet NPO Exceptions are: Ice Chips      Physical Examination:   General appearance: elderly, ill -appearing female, lying in bed, in NAD  HEENT: EOMI, no scleral icterus, trachea midline, no JVD, R IJ CVC in place with dressing c/d/i, NG in place with feculent/bilious output  Chest/Lungs: mildly increased work of breathing, equal chest rise, on 2L NC  Cardiovascular: RRR   Abdomen: obese, distention improved, midline wound vac intact with adequate suction, MIKE site pouched with mesfin ascites in bag, colostomy pink and viable with loose/liquid stool in bag  Skin: warm and dry, no rashes  Extremities: no edema, no cyanosis, R radial arterial line in place  Neuro: alert, oriented to self     Labs:  CBC:   Recent Labs     06/26/21  0545 06/26/21  1327 06/27/21  0250   WBC 33.7* 33.0* 31.6*   HGB 11.5* 11.5* 10.9*   HCT 33.8* 35.5* 31.7*    347 330       BMP:   Recent Labs     06/26/21  1327 06/26/21  2110 06/27/21  0250   * 128* 131*   K 4.0 4.0 3.8   CL 93* 96* 96*   CO2 14* 17* 19*   BUN 42* 46* 48*   CREATININE 2.3* 2.3* 2.5*   GLUCOSE 169* 177* 179*     LFT's: No results for input(s): AST, ALT, ALB, BILITOT, ALKPHOS in the last 72 hours. Troponin: No results for input(s): TROPONINI in the last 72 hours. BNP: No results for input(s): BNP in the last 72 hours. ABGs:   Recent Labs     06/26/21  1437   PHART 7.410   RAN7EAW 24.2*   PO2ART 65.4*     INR:   Recent Labs     06/25/21  0546   INR 2.05*       U/A:  Recent Labs     06/24/21  1830   COLORU Yellow   PHUR 6.0   WBCUA 21-50*   RBCUA 21-50*   BACTERIA 3+*   CLARITYU Clear   SPECGRAV >=1.030   LEUKOCYTESUR MODERATE*   UROBILINOGEN 1.0   BILIRUBINUR SMALL*   BLOODU MODERATE*   GLUCOSEU Negative        Rad:   XR CHEST PORTABLE   Final Result      1. No acute process or consolidation   2. Right IJ central venous catheter appreciated with the tip in the right atrium. NG tube in place with the tip in the upper stomach               XR ABDOMEN (KUB) (SINGLE AP VIEW)   Final Result      AP abdomen shows extensive gastric distention. XR CHEST PORTABLE   Final Result      Mild left basilar infiltrate or atelectasis. XR CHEST PORTABLE   Final Result      Low level of inspiration with more prominent bibasilar and right perihilar opacities felt to be most likely atelectasis. CT ABDOMEN PELVIS W IV CONTRAST Additional Contrast? Oral   Final Result      1. 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 2.  Interval development of mild perihepatic ascites with scattered small fluid collections within the abdomen and pelvis.   No discrete findings for abscess formation on the current exam.      3.  Mildly distended proximal small bowel loops containing contrast, favoring ileus-type pattern. The degree of small bowel distention has improved when compared to prior exam.      4.  Diffuse body wall edema most pronounced over the left lateral abdomen, progressed from prior exam.      5.  Bibasilar airspace disease consistent with underlying atelectasis and/or infiltrate. XR CHEST PORTABLE   Final Result      Hyperexpanded lungs with bibasilar minimal atelectasis, unchanged. XR CHEST 1 VIEW   Final Result   1. Low lung volumes with persistent bilateral lower lobe atelectasis   2. Nasogastric tube in stomach      XR CHEST PORTABLE   Final Result      Free intraperitoneal air. No acute cardiopulmonary findings. Attending in the ED was notified of the free air from the patient's CT study performed earlier. CT ABDOMEN PELVIS W IV CONTRAST Additional Contrast? None   Final Result      Free intraperitoneal air, predominantly in the upper abdomen and right upper quadrant. Site of perforation is not definitely identified. Distended proximal to mid small bowel. Distal small bowel not distended, consistent with small bowel obstruction. Stool throughout the colon. Distal colonic diverticulosis. Small fat-containing umbilical hernia. Air within the hernia, with a possible skin defect anteriorly at the umbilicus. Correlate clinically. Critical result findings were called to Casandra Galarza in the ED at 2150 hours on 6/17/2021. ASSESSMENT AND PLAN:   Jason Brady is a 65 y.o. female with perforated sigmoid diverticulits s/p exploratory laparotomy and Nick's procedure 6/17. Post-operative course complicated by ileus type picture, UTI, and increasing leukocytosis.      Neuro:   Pain/sedation/psych  Gabapentin, Tramadol, Melatonin held in setting of ileus/NG tube      Cardiovascular:   Septic versus hypovolemic shock in the setting of rapid fluid shifts  - Requiring low dose levo - currently on 5  - MAP goal > 60  - IV Lopressor in place with hold orders given current pressor requirements    Lactic Acidosis -  Peak of5.7 yesterday---> 5.0  - continue Plasmalyte at 125  - s/p 3 L NS bolus yesterday  - continue to trend Q6H    Pulmonary:   Continued oxygen requirement - currently at 2L NC  CXR 06/26 without new consolidation   High risk for aspiration PNA given emesis yesterday  HOB to remain elevated >30 at all times  Aggressive pulmonary toilet    GI:   Perforated Diverticulitis s/p Nick's procedure (06/17)  - CT scan on 06/22 without concern for leak or abscess  - Ileus - continue NG decompression to CLWS  - Wound vac in place - changes MWF  - Keep MIKE to gravity, with site pouched for control of leakage  - Ostomy with continued loose output - monitor   - plan for CT abd/pelvis without contrast today       FEN:   Hyponatremia - Sodium 131 from 260  Metabolic acidosis   - bicarb 14 yesterday, increased to 19 s/p 2 amps     - continue renal panels Q6H for above electrolyte derangements      /Renal:   LAVINIA  - Cr 2.5 from recent baseline of 0.9  - wooten placed for strict I/Os  - continues to make 0.2-0.5 cc/kg/hr  - urine lytes sent  - will consult nephrology for electrolyte imbalances as above and LAVINIA    Hem/ID:   Hgb stable 10.9 from 11.5    Leukocytosis  - 31 from 33 this AM  - remains afebrile  - Cdiff ordered but not sent per nursing manager  - UA positive for Klebsiella, sensitivities suggest intermediate response to Merrem (started 06/16)  - will consult ID given sensitivities   - follow up blood cultures  - will obtain CT abdomen pelvis without IV contrast today    Endo:   No acute issues    Prophylaxis:   SQH    Access:  Central Access: R IJ CVC (06/27)  Peripheral Access: L forearm midline (06/24), PIV L forearm 06/24  Arterial Line Access: R radial (06/26)                                Wooten Date placed: 06/26  NGT Date placed: 06/26      Mobility:  Bedrest until more stable    Dispo:   ICU    Code Status: Full Code  -----------------------------  Desi Cortez DO  06/27/21  7:11 AM    I performed a history and physical examination of the patient and discussed management with the resident. I reviewed the resident's note and agree with the documented findings and plan of care.  No obvious infection source on initial CT today will repeat with oral contrast    Poli Everett M.D.  6/27/21   2:00 PM

## 2021-06-27 NOTE — CONSULTS
Infectious Diseases   Consult Note      Reason for Consult:  perf diverticulitis, MDR UTI, sepsis   Requesting Physician:  Luda Cortez MD       Date of Admission: 6/17/2021  Subjective:   CHIEF COMPLAINT:  None given       HPI:    Susan Mcdonald is a 63yoF with history of HTN, COPD, HCV infection NOS (seropositive 2011), possible cirrhosis (coagulopathy, normal liver contour on CT), PUD, dementia                 ED 6/17/21 with acute abdomen. OR 6/17/21 - perforated sigmoid diverticulitis, Nick's procedure, VAC  Started on cipro, flagyl post-op  Post-op acute on chronic hypoxemic respiratory failure. Rising WBC prompted CT 6/22/21  CT with ascites, no discrete abscess, ileus  UC with heavy growth ESBL producing Rufinallpeterson   Developed ileus, NGT replaced yesterday - 5L feculent output.   Yesterday transferred to ICU with hypotension, CVC and art line placed, started on levophed  On 6/26/21, changed form cipro, flagyl to meropenem     Today,   No fever   On levophed 10mcg  2L NC   WBC has been as high as 33.7 6/26/21, today 31  New LAVINIA, oliguric        Current abx:  Meropenem 1g q12 started 6/26    Cipro, flagyl 6/17-6/26/21       Past Surgical History:       Diagnosis Date    Anxiety     Back pain     Bronchitis     Cancer ([address]     malignant neoplasm of bronchus and lung    Chronic pain     D[facility] [address]     Drug-seeking behavior     Emphysema lung ([facility] [address]     ESBL (extended spectrum beta-lactamase) producing bacteria infection 05/19/2021    left leg wound (Acinetobacter, Pseudomonas, and Klebsiella)    GERD (gastroesophageal reflux disease)     Hepatitis C     HTN (hypertension)     Insomnia     Lumbar disc disease     Multiple drug resistant organism (MDRO) culture positive 05/19/2021    left leg wound (Acinetobacter, Pseudomonas, and Klebsiella)    Osteoporosis     Spinal stenosis          Procedure Laterality Date    CHOLECYSTECTOMY  2/2011    Fegelman    LAPAROTOMY N/A 6/17/2021 1. exploratory laparotomy 2. Nick's procedure performed by Foster Barlow MD at [address]  History:    TOBACCO:   reports that she has been smoking cigarettes. She has a 24.50 pack-year smoking history. She has never used smokeless tobacco.  ETOH:   reports no history of alcohol use. There is no history of illicit drug use or other significant epidemiologic exposures.       Family History:       Problem Relation Age of Onset    High Blood Pressure Father     Heart Disease Father        Current Medications:    Current Facility-Administered Medications: electrolyte-A (PLASMALYTE-A) solution, , Intravenous, Continuous  meropenem (MERREM) 1,000 mg in sodium chloride 0.9 % 100 mL IVPB (mini-bag), 1,000 mg, Intravenous, Q12H  heparin (porcine) injection 5,000 Units, 5,000 Units, Subcutaneous, 3 times per day  metoprolol (LOPRESSOR) injection 5 mg, 5 mg, Intravenous, Q6H  pantoprazole (PROTONIX) injection 40 mg, 40 mg, Intravenous, BID  norepinephrine (LEVOPHED) 16 mg in dextrose 5 % 250 mL infusion, 2-100 mcg/min, Intravenous, Continuous  electrolyte-A (PLASMALYTE-A) solution, , Intravenous, Continuous  sodium chloride flush 0.9 % injection 5-40 mL, 5-40 mL, Intravenous, 2 times per day  sodium chloride flush 0.9 % injection 5-40 mL, 5-40 mL, Intravenous, PRN  0.9 % sodium chloride infusion, 25 mL, Intravenous, PRN  ipratropium-albuterol (DUONEB) nebulizer solution 1 ampule, 1 ampule, Inhalation, Q4H PRN  [Held by provider] gabapentin (NEURONTIN) capsule 100 mg, 100 mg, Oral, TID  [Held by provider] hydrOXYzine (ATARAX) tablet 25 mg, 25 mg, Oral, Daily  [Held by provider] lactulose (CHRONULAC) 10 GM/15ML solution 20 g, 30 mL, Oral, Daily  [Held by provider] traMADol (ULTRAM) tablet 50 mg, 50 mg, Oral, Q6H  [Held by provider] spironolactone (ALDACTONE) tablet 50 mg, 50 mg, Oral, Daily  [Held by provider] melatonin disintegrating tablet 5 mg, 5 mg, Oral, Nightly  OLANZapine zydis (ZYPREXA) disintegrating protrudes midline  NECK:  Supple, symmetrical, trachea midline, no adenopathy  LUNGS:  no increased work of breathing, CTA upper lobes anju   CARDIOVASCULAR:  RRR  ABDOMEN:  hypoactive bowel sounds, soft  Midline VAC, pink ostomy LLQ, MIKE with serous effluent, wound managed R side   MUSCULOSKELETAL: No obvious misalignment or effusion of the joints. No clubbing, cyanosis of the digits. SKIN:   Stasis changes, scale  Thick eschar L 2nd toe  NEUROLOGIC: nonfocal exam  ACCESS:   R IJ TLC, R rad art line - both placed 6/26/21       DATA:    Old records have been reviewed    CBC:  Recent Labs     06/25/21  0546 06/25/21  0546 06/26/21  0545 06/26/21  1327 06/27/21  0250   WBC 14.8*   < > 33.7* 33.0* 31.6*   RBC 5.05   < > 3.72* 3.81* 3.51*   HGB 15.3   < > 11.5* 11.5* 10.9*   HCT 46.4   < > 33.8* 35.5* 31.7*      < > 327 347 330   MCV 91.9   < > 91.0 93.3 90.4   MCH 30.2   < > 31.0 30.3 31.1   MCHC 32.9   < > 34.1 32.5 34.4   RDW 16.1*   < > 15.4 15.4 15.4   BANDSPCT 2  --  8*  --   --     < > = values in this interval not displayed. BMP:  Recent Labs     06/26/21  2110 06/27/21  0250 06/27/21  0929   * 131* 133*   K 4.0 3.8 3.9   CL 96* 96* 98*   CO2 17* 19* 18*   BUN 46* 48* 50*   CREATININE 2.3* 2.5* 2.9*   CALCIUM 7.4* 7.3* 7.2*   GLUCOSE 177* 179* 136*        Cultures:   6/24 UC >100k ESBL K pneumoniae   BC x2 NGTD   6/26 BC x1 NGTD        Radiology Review:  All pertinent images / reports were reviewed as a part of this visit.    CT abd 6/27/21  Impression       Patchy bibasilar infiltrates or atelectasis, left greater than right.       Postoperative changes noted from prior distal colonic resection with left lower quadrant diverting colostomy and Troncoso's pouch.       Percutaneous surgically placed drainage catheter seen in the lower abdomen.       Small amount of ascites is seen with fluid seen in the right subphrenic space.       Colon is relatively decompressed but shows no significant distention or definite wall thickening.       There are distended loops of small bowel most suggestive of ileus.       Focally distended bowel loop is seen in the left flank region anterior to the colon in an area of previous seen noted distended loop. There is areas of mixed air-fluid attenuation seen. This likely represents luminal contents. However, the possibility of    an immediately adjacent developing abscess would be difficult to exclude entirely and correlation with repeat evaluation with oral contrast in this area would be helpful to better evaluate.       There is otherwise no evidence of free air or extraluminal gas collection with no significant free pelvic fluid.       Focal area of strandy opacity of the left flank region in the subcutaneous fat may indicate focal anasarca or cellulitis.       Otherwise no acute process seen. CT abd 6/22/21  Impression       1.  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       2.  Interval development of mild perihepatic ascites with scattered small fluid collections within the abdomen and pelvis.  No discrete findings for abscess formation on the current exam.       3.  Mildly distended proximal small bowel loops containing contrast, favoring ileus-type pattern.  The degree of small bowel distention has improved when compared to prior exam.       4.  Diffuse body wall edema most pronounced over the left lateral abdomen, progressed from prior exam.       5.  Bibasilar airspace disease consistent with underlying atelectasis and/or infiltrate. CT abd 6/17/21  Impression       Free intraperitoneal air, predominantly in the upper abdomen and right upper quadrant. Site of perforation is not definitely identified.       Distended proximal to mid small bowel. Distal small bowel not distended, consistent with small bowel obstruction.       Stool throughout the colon.  Distal colonic diverticulosis.       Small fat-containing umbilical hernia. Air within the hernia, with a possible skin defect anteriorly at the umbilicus. Correlate clinically. Assessment:     Patient Active Problem List   Diagnosis    Hypertension    Anxiety    Lumbar disc disease    Drug-seeking behavior    GI bleed    Peptic ulcer disease    Erosive gastritis    Recurrent cellulitis of lower extremity    Cellulitis of left lower extremity    Leukocytosis    Bullous dermatitis    LAVINIA (acute kidney injury) ([address]    Sepsis ([address]    Pneumoperitoneum    Bowel perforation (HCC)    Severe malnutrition ([address]       Sanket Garcia is a 63yoF who is evaluated for the following:    Admitted 6/17/21 perforated diverticulitis, sepsis   S/p Nick's procedure 6/17/21    Evolving sepsis with leukocytosis, hypotension despite cipro and flagyl, changed to meropenem yesterday 6/26/21    ESBL Klebsiella pneumoniae in UC    Oliguric LAVINIA     HCV seropositive, suspected cirrhosis   Demonstrated immunity to HAV, HBV   HIV test was negative 2015     Listed allergy to pcn, Bactrim      Recs:  Agree with change to meropenem, dose reduced for LAVINIA   Add antifungal coverage   BC recently collected and pending   At this time, no clear focal abscess on CT. Continue to monitor closely for complication including intra-ab abcess   For completeness check HCV RNA and HIV screen     Case, CT reviewed with Gen Amparo  D/w RN     ID will follow        Makenzie Brown M.D. Thank you for the opportunity to participate in the care of your patient.     Please do not hesitate to contact me:   [phone] office      Addendum  Called re +BC with strep sp and Acinetobacter  Also now see that the ESBL in UC has reduced sensitivity to meropenem   -DC meropenem and give ceftaz-avibactam + flagyl   -no amikacin   D/w pharmacy  Carlos Green MD

## 2021-06-27 NOTE — PROGRESS NOTES
Due to cdiff collection policy patient does not meet criteria for cdiff collection and testing at this time. Patient has had loose watery stools from existing colostomy bag with no new onset. Patient currently does not symptomatic criteria that is not related to another condition/disease process.

## 2021-06-27 NOTE — CONSULTS
Clinical Pharmacy Progress Note  Pharmacy to dose Amikacin    Admit date: 6/17/2021     Subjective:  Patient is a Justine King old female from [facility] with a PMHx significant for HTN, COPD, HCV, pssible cirrhosis, PUD, and dementia. Admitted with perf sigmoid diverticulitis, now s/p Troncoso's procedure on 4/59, complicated by post-op ileus and UTI with pressor requirement that developed on 6/26. Now with Klebsiella ESBL in urine, and Acinetobater in blood. Pharmacy has been consulted by Dr. Edinson Galarza to dose Amikacin. Current antibiotic regimen:   Ciprofloxacin 500mg IV q12h (6/17-6/26)  Metronidazole 500mg IV q8h (6/17-6/26)  Meropenem 1g IV q12h (6/26-- ) -- day #2  Amikacin -- pharmacy to dose -- day #1   Intermittent via levels (6/27-- )  Date: Amikacin level: Amikacin dose:   6/27  850mg IV ordered                 Objective:  Lab Results   Component Value Date    INR 2.05 (H) 06/25/2021       Recent Labs     06/27/21  0250   WBC 31.6*   HGB 10.9*   HCT 31.7*   MCV 90.4          Recent Labs     06/27/21  0929   *   K 3.9   CL 98*   CO2 18*   PHOS 4.3   BUN 50*   CREATININE 2.9*       Estimated Creatinine Clearance: 23 mL/min (A) (based on SCr of 2.9 mg/dL (H)). Height = 66 in  Weight = 122.5 kg (84.6kg adjusted body weight)    Micro:  6/24 Blood x2 = NGTD  6/24 Urine = Klebsiella ESBL -- Intermediate to meropenem, Sensitive to amikacin  6/26 Blood #1 = Acinetobacter baumannii, sensitivity pending    Prophylaxis:    VTE: heparin sq   SUP: not indicated    Assessment/Plan:    1. Acinetobacter Bacteremia, Kleb ESBL UTI:     Amikacin -- pharmacy to dose:  · Will dose intermittently via levels, given LAVINIA. Scr worsening today to 2.9.  · Will order 850mg IV x1 for today. Dose provides ~10mg/kg, using adjusted body weight. · Plan to obtain a random level tomorrow, aiming for ~24 hrs after dose given, recognizing that this is likely a send out lab and will take time to result. · Called [facility] lab and asked for Avycaz sensitivities to be added to the [id number]. This will take ~24 hrs. · Will follow renal fxn and clinical picture, adjusting dose as needed. Update at 15:40 -  · Per ID Dr Belle Gibbons, would change to Avycaz 0.94g IV q12h. Stop amikacin. Notified Surgery. Thank you, please call with questions.    Sandip MackD., BCPS   6/27/2021 2:48 PM  Wireless: [phone]

## 2021-06-27 NOTE — CONSULTS
Nephrology Consult Note                                                                                                                                                                                                                                                                                                                                                               Office : [phone]     Fax :[phone]              Patient's Name: Yaritza Song  6/27/2021    Reason for Consult: LAVINIA   Requesting Physician:  Dr Reji Green     Chief Complaint:  Abd pain    History of Present Ilness: Rachelle Keane is a 63yoF with history of HTN, COPD, HCV infection NOS (seropositive 2011), PUD, dementia   Came with acute abdomen sec to perforated sigmoid diverticulitis, Nick's procedure, VAC  Started on cipro, flagyl post-op  Post-op acute on chronic hypoxemic respiratory failure. Rising WBC prompted CT 6/22/21  CT with ascites, no discrete abscess, ileus  Urine cx with heavy growth ESBL producing Klebisella   Developed ileus, NGT replaced yesterday - 5L feculent output.   Yesterday transferred to ICU with hypotension, CVC and art line placed, started on levophed  On 6/26/21, changed form cipro, flagyl to meropenem     UO dec   On pressors   Getting saline bolus   Past Medical History:   Diagnosis Date    Anxiety     Back pain     Bronchitis     Cancer ([address]     malignant neoplasm of bronchus and lung    Chronic pain     Dementia ([facility]     Drug-seeking behavior     Emphysema lung ([facility]     ESBL (extended spectrum beta-lactamase) producing bacteria infection 05/19/2021    left leg wound (Acinetobacter, Pseudomonas, and Klebsiella)    GERD (gastroesophageal reflux disease)     Hepatitis C     HTN (hypertension)     Insomnia     Lumbar disc disease     Multiple drug resistant organism (MDRO) culture positive 05/19/2021    left leg wound (Acinetobacter, Pseudomonas, and Klebsiella)    Osteoporosis     Spinal stenosis        Past Surgical History:   Procedure Laterality Date    CHOLECYSTECTOMY  2/2011    [place]    LAPAROTOMY N/A 6/17/2021    1. exploratory laparotomy 2. Nick's procedure performed by Jeannie Canchola MD at [facility] OR       Family History   Problem Relation Age of Onset    High Blood Pressure Father     Heart Disease Father         reports that she has been smoking cigarettes. She has a 24.50 pack-year smoking history. She has never used smokeless tobacco. She reports that she does not drink alcohol and does not use drugs.     Allergies:  Cyclobenzaprine, Penicillins, Acetaminophen, Codeine, Diphenhydramine hcl, Ketorolac, Naproxen, Diphenhydramine, Ketorolac tromethamine, Ketorolac tromethamine, and Sulfamethoxazole-trimethoprim    Current Medications:    [START ON 6/28/2021] anidulafungin (ERAXIS) 100 mg in dextrose 5 % 130 mL IVPB, Q24H  metronidazole (FLAGYL) 500 mg in NaCl 100 mL IVPB premix, Q8H  ceftazidime-avibactam (AVYCAZ) 0.94 g in dextrose 5 % 100 mL IVPB, Q12H  methocarbamol (ROBAXIN) 500 mg in dextrose 5 % 100 mL IVPB, Q8H  electrolyte-A (PLASMALYTE-A) solution, Continuous  sodium bicarbonate 75 mEq in sodium chloride 0.45 % 1,000 mL infusion, Continuous  heparin (porcine) injection 5,000 Units, 3 times per day  metoprolol (LOPRESSOR) injection 5 mg, Q6H  pantoprazole (PROTONIX) injection 40 mg, BID  norepinephrine (LEVOPHED) 16 mg in dextrose 5 % 250 mL infusion, Continuous  sodium chloride flush 0.9 % injection 5-40 mL, 2 times per day  sodium chloride flush 0.9 % injection 5-40 mL, PRN  0.9 % sodium chloride infusion, PRN  ipratropium-albuterol (DUONEB) nebulizer solution 1 ampule, Q4H PRN  OLANZapine zydis (ZYPREXA) disintegrating tablet 5 mg, Nightly  sodium chloride flush 0.9 % injection 5-40 mL, 2 times per day  sodium chloride flush 0.9 % injection 5-40 mL, PRN  0.9 % sodium chloride infusion, PRN  ondansetron (ZOFRAN-ODT) disintegrating tablet 4 mg, Q8H PRN Or  ondansetron (ZOFRAN) injection 4 mg, Q6H PRN  phenol 1.4 % mouth spray 1 spray, Q2H PRN        Review of Systems:   14 point ROS obtained but were negative except mentioned in HPI      Physical exam:     Vitals:  BP (!) 110/49   Pulse 94   Temp 96.9 °F (36.1 °C) (Axillary)   Resp 21   Ht 5' 6\" (1.676 m)   Wt 270 lb 1 oz (122.5 kg)   SpO2 97%   BMI 43.59 kg/m²   Constitutional:  AA  Skin: no rash, turgor wnl  Heent:  eomi, mmm  Neck: no bruits or jvd noted  Cardiovascular:  S1, S2 without m/r/g  Respiratory: CTA B without w/r/r  Abdomen:  +bs, soft, ttp   Ext: + lower extremity edema  Psychiatric: mood and affect flat   Musculoskeletal:  Rom, muscular strength intact    Data:   Labs:  CBC:   Recent Labs     06/26/21  0545 06/26/21  1327 06/27/21  0250   WBC 33.7* 33.0* 31.6*   HGB 11.5* 11.5* 10.9*    347 330     BMP:    Recent Labs     06/27/21  0929 06/27/21  1522 06/27/21  2030   * 134* 132*   K 3.9 4.1 3.7   CL 98* 95* 95*   CO2 18* 18* 16*   BUN 50* 52* 51*   CREATININE 2.9* 2.8* 2.9*   GLUCOSE 136* 163* 176*     Ca/Mg/Phos:   Recent Labs     06/25/21  0546 06/25/21  0546 06/26/21  0545 06/26/21  1327 06/27/21  0250 06/27/21  0250 06/27/21  0929 06/27/21  1522 06/27/21  2030   CALCIUM 7.6*   < > 7.6*   < > 7.3*   < > 7.2* 7.4* 7.1*   MG 2.10  --  2.20  --  2.20  --   --   --   --    PHOS 3.1   < > 4.3   < > 4.4   < > 4.3 5.3* 4.7    < > = values in this interval not displayed. Hepatic: No results for input(s): AST, ALT, ALB, BILITOT, ALKPHOS in the last 72 hours. Troponin: No results for input(s): TROPONINI in the last 72 hours. BNP: No results for input(s): BNP in the last 72 hours. Lipids: No results for input(s): CHOL, TRIG, HDL, LDLCALC, LABVLDL in the last 72 hours.   ABGs:   Recent Labs     06/26/21  1437   PHART 7.410   PO2ART 65.4*   VGC0OJS 24.2*     INR:   Recent Labs     06/25/21  0546   INR 2.05*     UA:No results for input(s): Donato Orozco, Eliseo Burns, BLOODU, PHUR, PROTEINU, UROBILINOGEN, NITRU, LEUKOCYTESUR, Mert Oz in the last 72 hours. Urine Microscopic: No results for input(s): LABCAST, BACTERIA, COMU, HYALCAST, WBCUA, RBCUA, EPIU in the last 72 hours. Urine Culture: No results for input(s): LABURIN in the last 72 hours. Urine Chemistry:   Recent Labs     06/27/21  0620 06/27/21  1803   LABCREA 95.5  --    NAUR  --  <20             IMAGING:  CT ABDOMEN PELVIS WO CONTRAST Additional Contrast? Oral   Final Result      No evidence of abscess or significant bowel wall thickening. No significant change in distention of small bowel loops suggestive of ileus. Extensive asymmetric anasarca in the left lateral abdominal wall again seen. Small volume perihepatic ascites again seen. Mild inflammatory fat stranding adjacent to the urinary bladder which could represent sequela of infection/inflammation. CT ABDOMEN PELVIS WO CONTRAST Additional Contrast? None   Final Result      Patchy bibasilar infiltrates or atelectasis, left greater than right. Postoperative changes noted from prior distal colonic resection with left lower quadrant diverting colostomy and Troncoso's pouch. Percutaneous surgically placed drainage catheter seen in the lower abdomen. Small amount of ascites is seen with fluid seen in the right subphrenic space. Colon is relatively decompressed but shows no significant distention or definite wall thickening. There are distended loops of small bowel most suggestive of ileus. Focally distended bowel loop is seen in the left flank region anterior to the colon in an area of previous seen noted distended loop. There is areas of mixed air-fluid attenuation seen. This likely represents luminal contents.  However, the possibility of    an immediately adjacent developing abscess would be difficult to exclude entirely and correlation with repeat evaluation with oral contrast in this area would be helpful to better evaluate. There is otherwise no evidence of free air or extraluminal gas collection with no significant free pelvic fluid. Focal area of strandy opacity of the left flank region in the subcutaneous fat may indicate focal anasarca or cellulitis. Otherwise no acute process seen. XR CHEST PORTABLE   Final Result      1. No acute process or consolidation   2. Right IJ central venous catheter appreciated with the tip in the right atrium. NG tube in place with the tip in the upper stomach               XR ABDOMEN (KUB) (SINGLE AP VIEW)   Final Result      AP abdomen shows extensive gastric distention. XR CHEST PORTABLE   Final Result      Mild left basilar infiltrate or atelectasis. XR CHEST PORTABLE   Final Result      Low level of inspiration with more prominent bibasilar and right perihilar opacities felt to be most likely atelectasis. CT ABDOMEN PELVIS W IV CONTRAST Additional Contrast? Oral   Final Result      1. 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 2.  Interval development of mild perihepatic ascites with scattered small fluid collections within the abdomen and pelvis. No discrete findings for abscess formation on the current exam.      3.  Mildly distended proximal small bowel loops containing contrast, favoring ileus-type pattern. The degree of small bowel distention has improved when compared to prior exam.      4.  Diffuse body wall edema most pronounced over the left lateral abdomen, progressed from prior exam.      5.  Bibasilar airspace disease consistent with underlying atelectasis and/or infiltrate. XR CHEST PORTABLE   Final Result      Hyperexpanded lungs with bibasilar minimal atelectasis, unchanged. XR CHEST 1 VIEW   Final Result   1. Low lung volumes with persistent bilateral lower lobe atelectasis   2.  Nasogastric tube in stomach      XR CHEST PORTABLE   Final Result Free intraperitoneal air. No acute cardiopulmonary findings. Attending in the ED was notified of the free air from the patient's CT study performed earlier. CT ABDOMEN PELVIS W IV CONTRAST Additional Contrast? None   Final Result      Free intraperitoneal air, predominantly in the upper abdomen and right upper quadrant. Site of perforation is not definitely identified. Distended proximal to mid small bowel. Distal small bowel not distended, consistent with small bowel obstruction. Stool throughout the colon. Distal colonic diverticulosis. Small fat-containing umbilical hernia. Air within the hernia, with a possible skin defect anteriorly at the umbilicus. Correlate clinically. Critical result findings were called to Sheryl Perales in the ED at 2150 hours on 6/17/2021. Assessment/Plan   1. LAVINIA     2. HTN    3. Anemia    4. Acid- base/ Electrolyte imbalance - acidosis     5.  Perforated Sigmoid Diverticulitis s/p Ex-lap & Akosua Gracie    Plan   - need fluid resuscitation  - Pressors   - abx   - ID following   - Ur studies   - replace Bicarb   - monitor lytes   - No need for CRRT at this point   - will follow closely       Recommend to dose adjust all medications  based on renal functions  Maintain SBP> 90 mmHg   Daily weights   AVOID NSAIDs  Avoid Nephrotoxins  Monitor Intake/Output  Call if significant decrease in urine output                Thank you for allowing us to participate in care of Anastacio Elizondo MD  Feel free to contact me   [facility] [id number]  Office : [phone]  Fax :[phone]

## 2021-06-27 NOTE — PLAN OF CARE
Problem: Pain:  Goal: Pain level will decrease  Description: Pain level will decrease  Outcome: Ongoing  Goal: Control of acute pain  Description: Control of acute pain  Outcome: Ongoing     Problem: Skin Integrity:  Goal: Absence of new skin breakdown  Description: Absence of new skin breakdown  Outcome: Ongoing     Problem: Falls - Risk of:  Goal: Will remain free from falls  Description: Will remain free from falls  Outcome: Ongoing  Goal: Absence of physical injury  Description: Absence of physical injury  Outcome: Ongoing     Problem: OXYGENATION/RESPIRATORY FUNCTION  Goal: Patient will achieve/maintain normal respiratory rate/effort  Outcome: Ongoing     Problem: Injury - Risk of, Physical Injury:  Goal: Will remain free from falls  Description: Will remain free from falls  Outcome: Ongoing  Goal: Absence of physical injury  Description: Absence of physical injury  Outcome: Ongoing     Problem: Mood - Altered:  Goal: Absence of abusive behavior  Description: Absence of abusive behavior  Outcome: Ongoing     Problem: Nutrition  Goal: Optimal nutrition therapy  Outcome: Ongoing     Problem: Nausea/Vomiting:  Goal: Absence of nausea/vomiting  Description: Absence of nausea/vomiting  Outcome: Ongoing

## 2021-06-27 NOTE — PROGRESS NOTES
General Surgery POC  Wound Vac Change    Wound vac taken down and changed at bedside to evaluate the surgical wound due significantly increased leukocytosis without identifiable cause. Existing wound vac dressing removed. Wound bed appeared beefy red with good granulation tissue present. Adipose tissue pink and healthy. Wound measured 20 cm by 5 cm by 1.5 cm deep. No tunneling or tracking present. Amanda wound skin was protected with drape. 2 pieces of black foam were placed into the wound bed and covered with drape. Wound vac was placed to -125mmHg suction. A good seal was obtained without any leak. Patient tolerated the procedure well.   Continue wound vac change T/Th/Saturday            Jared Gorman MD  General Surgery, PGY1  06/27/21  12:24 PM  [phone]

## 2021-06-27 NOTE — PROGRESS NOTES
Gen Surg  POC    Patient now at levo of 20, will add vasopressin. Nurse called by lab BCx growing Acinetobacter cloacae baumannii. Discussed with pharmacy, given Klebsiella ESBL resistant to almost everything including intermediate to merrem, will add amikacin which will cover both and Klebsiella sensitive. Discussed leaving merrem on for now for double coverage with ID to re-evaluate in the morning with possible discontinuation at that time. Called over to micro at [facility], no updates for 2nd set of blood cultures. Pharmacy consulted to dose amikacin given LAVINIA and current renal function. Dr. Nuvia Diana updated and aware.     Candy Enrique MD   Gen Surg PGY 3  6/27/2021  2:36 PM  [phone]

## 2021-06-27 NOTE — PROGRESS NOTES
Gen Surg  POC    Patient with persistent hypotension, currently on Levo of 10mmHg. Patient without complaints, states she is hungry asking for food. Patient is A&Ox2, unaware of place, but states she agrees she is in the hospital, aware of person and time. Abd: ostomy with small amount of stool in the bag, MIKE draining pink tinged cloudy serous fluid, pouched. Wound vac midline. 1L bolus finishing. Discussed situation with son bedside. Discussed the persistent hypotension and need for pressor support, currently increased. Discussed concern for intra-abdominal insult, and CT Scan results without significant findings that would explain WBC, lactate and hypotension. Discussed patient's wishes per son, discussed code status. Per son he would like intubation and medication support, however does not want chest compressions given discussion of broken ribs, pneumothorax and painful recovery. I discussed that we would notify him if it came to this point. Son is in agreement and understands.      Read MD Fred   Gen Surg PGY 3  6/27/2021  12:06 PM  [phone]

## 2021-06-27 NOTE — PROGRESS NOTES
Hospitalist Progress Note      PCP: No primary care provider on file. Chief Complaint. Presented to hospital for abd pain    Date of Admission: 6/17/2021    Subjective: low BP overnight, moved to ICU,  tolerating diet, denies chest pain, nausea, vomiting, shortness of breath, fever or chills.      Medications:  Reviewed    Infusion Medications    electrolyte-A 1,000 mL/hr at 06/27/21 1706    norepinephrine 20 mcg/min (06/27/21 1418)    electrolyte-A 125 mL/hr at 06/27/21 1323    sodium chloride      sodium chloride       Scheduled Medications    [START ON 6/28/2021] anidulafungin  100 mg Intravenous Q24H    metroNIDAZOLE  500 mg Intravenous Q8H    ceftazidime-acibactam (AVYCAZ) infusion  0.94 g Intravenous Q12H    heparin (porcine)  5,000 Units Subcutaneous 3 times per day    metoprolol  5 mg Intravenous Q6H    pantoprazole  40 mg Intravenous BID    sodium chloride flush  5-40 mL Intravenous 2 times per day    [Held by provider] gabapentin  100 mg Oral TID    [Held by provider] hydrOXYzine  25 mg Oral Daily    [Held by provider] lactulose  30 mL Oral Daily    [Held by provider] traMADol  50 mg Oral Q6H    [Held by provider] spironolactone  50 mg Oral Daily    [Held by provider] melatonin  5 mg Oral Nightly    OLANZapine zydis  5 mg Oral Nightly    sodium chloride flush  5-40 mL Intravenous 2 times per day     PRN Meds: sodium chloride flush, sodium chloride, ipratropium-albuterol, sodium chloride flush, sodium chloride, ondansetron **OR** ondansetron, phenol      Intake/Output Summary (Last 24 hours) at 6/27/2021 1727  Last data filed at 6/27/2021 1530  Gross per 24 hour   Intake 1176.77 ml   Output 3243 ml   Net -2066.23 ml       Physical Exam Performed:    BP (!) 111/59   Pulse 92   Temp 96.2 °F (35.7 °C) (Axillary)   Resp 22   Ht 5' 6\" (1.676 m)   Wt 270 lb 1 oz (122.5 kg)   SpO2 99%   BMI 43.59 kg/m²     General appearance: lying in bed comfortably, NAD  HEENT: Conjunctivae/corneas clear. Neck: Supple, with full range of motion. Respiratory:  Normal respiratory effort. Clear to auscultation, bilaterally without Rales/Wheezes/Rhonchi. Cardiovascular: Regular rate and rhythm with normal S1/S2 without murmurs or rubs  Abdomen: Soft,  non-distended, normal bowel sounds. Musculoskeletal: No cyanosis or edema bilaterally  Neurologic:  without any focal sensory/motor deficits. grossly non-focal.  Psychiatric: Alert and orientedx3, Normal mood  Peripheral Pulses: +2 palpable, equal bilaterally       Labs:   Recent Labs     06/26/21  0545 06/26/21  1327 06/27/21  0250   WBC 33.7* 33.0* 31.6*   HGB 11.5* 11.5* 10.9*   HCT 33.8* 35.5* 31.7*    347 330     Recent Labs     06/27/21  0250 06/27/21  0929 06/27/21  1522   * 133* 134*   K 3.8 3.9 4.1   CL 96* 98* 95*   CO2 19* 18* 18*   BUN 48* 50* 52*   CREATININE 2.5* 2.9* 2.8*   CALCIUM 7.3* 7.2* 7.4*   PHOS 4.4 4.3 5.3*     No results for input(s): AST, ALT, BILIDIR, BILITOT, ALKPHOS in the last 72 hours. Recent Labs     06/25/21  0546   INR 2.05*     No results for input(s): Milad Seed in the last 72 hours. Urinalysis:      Lab Results   Component Value Date    NITRU Negative 06/24/2021    WBCUA 21-50 06/24/2021    BACTERIA 3+ 06/24/2021    RBCUA 21-50 06/24/2021    BLOODU MODERATE 06/24/2021    SPECGRAV >=1.030 06/24/2021    GLUCOSEU Negative 06/24/2021    GLUCOSEU Negative 12/15/2011       Radiology:  CT ABDOMEN PELVIS WO CONTRAST Additional Contrast? Oral   Final Result      No evidence of abscess or significant bowel wall thickening. No significant change in distention of small bowel loops suggestive of ileus. Extensive asymmetric anasarca in the left lateral abdominal wall again seen. Small volume perihepatic ascites again seen. Mild inflammatory fat stranding adjacent to the urinary bladder which could represent sequela of infection/inflammation.       CT ABDOMEN PELVIS WO CONTRAST Additional Contrast? None   Final Result      Patchy bibasilar infiltrates or atelectasis, left greater than right. Postoperative changes noted from prior distal colonic resection with left lower quadrant diverting colostomy and Troncoso's pouch. Percutaneous surgically placed drainage catheter seen in the lower abdomen. Small amount of ascites is seen with fluid seen in the right subphrenic space. Colon is relatively decompressed but shows no significant distention or definite wall thickening. There are distended loops of small bowel most suggestive of ileus. Focally distended bowel loop is seen in the left flank region anterior to the colon in an area of previous seen noted distended loop. There is areas of mixed air-fluid attenuation seen. This likely represents luminal contents. However, the possibility of    an immediately adjacent developing abscess would be difficult to exclude entirely and correlation with repeat evaluation with oral contrast in this area would be helpful to better evaluate. There is otherwise no evidence of free air or extraluminal gas collection with no significant free pelvic fluid. Focal area of strandy opacity of the left flank region in the subcutaneous fat may indicate focal anasarca or cellulitis. Otherwise no acute process seen. XR CHEST PORTABLE   Final Result      1. No acute process or consolidation   2. Right IJ central venous catheter appreciated with the tip in the right atrium. NG tube in place with the tip in the upper stomach               XR ABDOMEN (KUB) (SINGLE AP VIEW)   Final Result      AP abdomen shows extensive gastric distention. XR CHEST PORTABLE   Final Result      Mild left basilar infiltrate or atelectasis. XR CHEST PORTABLE   Final Result      Low level of inspiration with more prominent bibasilar and right perihilar opacities felt to be most likely atelectasis.        CT ABDOMEN PELVIS W IV CONTRAST Additional Contrast? Oral   Final Result      1. 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 2.  Interval development of mild perihepatic ascites with scattered small fluid collections within the abdomen and pelvis. No discrete findings for abscess formation on the current exam.      3.  Mildly distended proximal small bowel loops containing contrast, favoring ileus-type pattern. The degree of small bowel distention has improved when compared to prior exam.      4.  Diffuse body wall edema most pronounced over the left lateral abdomen, progressed from prior exam.      5.  Bibasilar airspace disease consistent with underlying atelectasis and/or infiltrate. XR CHEST PORTABLE   Final Result      Hyperexpanded lungs with bibasilar minimal atelectasis, unchanged. XR CHEST 1 VIEW   Final Result   1. Low lung volumes with persistent bilateral lower lobe atelectasis   2. Nasogastric tube in stomach      XR CHEST PORTABLE   Final Result      Free intraperitoneal air. No acute cardiopulmonary findings. Attending in the ED was notified of the free air from the patient's CT study performed earlier. CT ABDOMEN PELVIS W IV CONTRAST Additional Contrast? None   Final Result      Free intraperitoneal air, predominantly in the upper abdomen and right upper quadrant. Site of perforation is not definitely identified. Distended proximal to mid small bowel. Distal small bowel not distended, consistent with small bowel obstruction. Stool throughout the colon. Distal colonic diverticulosis. Small fat-containing umbilical hernia. Air within the hernia, with a possible skin defect anteriorly at the umbilicus. Correlate clinically. Critical result findings were called to Damaris Banegas in the ED at 2150 hours on 6/17/2021.                   Assessment/Plan:    Active Hospital Problems    Diagnosis     Severe malnutrition ([address] Azul Romero  Bowel perforation (HCC) [K63.1]     Pneumoperitoneum [K66.8]      Assessment  Acute Hypoxic Respiratory Failure  Perforated Sigmoid Diverticulitis s/p Ex-lap & Nick  Lactic acidosis  Elevated INR, coagulopathy  Lactic acidosis  Leukkocytosis  LAVINIA  Septic shock        Plan  Blood cx positive for Strep, acinetobacter  ID consulted for septic shock, Continue IV fluids, abx per ID  ostomy output and wound vac care by GS  Continue pain control regimen   Advance diet per GS  Reviewed Medical reconciliation, Labs, vitals signs, previous records  Pain control per primary, stool softer  PT/OT when stable  DVT PPx - per primary  Diet: Diet NPO Exceptions are: Ice Chips  Code Status: Full Code    PT/OT Eval Status: ordered    Dispo - moved to ICU for septic shock picture, Blood cx positive for Strep, acinetobacter  Lexi Rivas MD

## 2021-06-28 NOTE — PROGRESS NOTES
ID Follow-up NOTE    CC:   Peritonitis secondary to perforated sigmoid diverticulitis, UTI, bacteremia,   Antibiotics: Ceftazidime-avibactam (Emmalene Back), Metronidazole, Anidulafungin    Admit Date: 6/17/2021   Hospital Day: 12    Subjective:     Patient c/o abd pain assoc with dressing change      Objective:     Patient Vitals for the past 8 hrs:   BP Temp Temp src Pulse Resp SpO2   06/28/21 0815      91 %   06/28/21 0800 (!) 98/48   101 23 90 %   06/28/21 0745 (!) 110/51   89 (!) 31 93 %   06/28/21 0730 114/66   103 (!) 32 91 %   06/28/21 0715 (!) 117/47   101 30 94 %   06/28/21 0700 115/74 97.8 °F (36.6 °C) Oral 94 25 94 %   06/28/21 0645 (!) 118/51   94 23 94 %   06/28/21 0630 (!) 113/54   94 21 93 %   06/28/21 0615 (!) 119/55   110 28 93 %   06/28/21 0600 (!) 109/44   92 24 92 %   06/28/21 0545 (!) 108/46   93 26 93 %   06/28/21 0530 84/64   89 26 92 %   06/28/21 0515 (!) 107/45   101 28 93 %   06/28/21 0500 123/73   104 24 94 %   06/28/21 0445 (!) 106/55   91 23 92 %   06/28/21 0430 121/76   91 25 93 %   06/28/21 0415 (!) 125/57   95 20 93 %   06/28/21 0400 (!) 118/55 97.4 °F (36.3 °C) Oral 99 28 94 %   06/28/21 0345 126/79   91 23 93 %   06/28/21 0330 (!) 122/56   91 26 94 %   06/28/21 0315 (!) 101/46   92 26 94 %   06/28/21 0300 110/84   95 25 94 %   06/28/21 0245 120/60   90 24 94 %   06/28/21 0230 (!) 122/50   89 26 94 %   06/28/21 0215 117/72   95 30 94 %   06/28/21 0200 (!) 102/51   93 21 95 %   06/28/21 0145 (!) 73/50   94 22 95 %   06/28/21 0130 (!) 112/53   92 21 94 %   06/28/21 0115 115/84   98 23 98 %   06/28/21 0113    99 21 95 %   06/28/21 0110    95 22 95 %   06/28/21 0100 (!) 128/47   93 25 96 %   06/28/21 0045 105/68   105 20 95 %     I/O last 3 completed shifts: In: 8801.4 [I.V.:8749.4;  IV Piggyback:52]  Out: 5931 [Urine:1100; Emesis/NG output:1075; Drains:2070; Stool:75]  I/O this shift:  In: 20 [I.V.:20]  Out: 323 [Urine:23; Drains:300]    EXAM:  GENERAL: No apparent distress.   RA.   HEENT: Membranes dry, no oral lesion - NGT  NECK:  Supple, no lymphadenopathy  LUNGS: Clear b/l, no rales, no dullness  CARDIAC: RRR, no murmur appreciated  ABD:  Scant BS, soft -  colostomy, midline wound clean (saw wound at time of dressing change)  EXT:  No rash, no edema, no lesions - bilateral LE venous stasis  NEURO: No focal neurologic findings  LINES:  R IJ line, R radial a-line, peripheral iv       Data Review:  Lab Results   Component Value Date    WBC 14.1 (H) 06/28/2021    HGB 9.2 (L) 06/28/2021    HCT 26.6 (L) 06/28/2021    MCV 91.1 06/28/2021     06/28/2021     Lab Results   Component Value Date    CREATININE 2.5 (H) 06/28/2021    BUN 52 (H) 06/28/2021     (L) 06/28/2021    K 3.6 06/28/2021    CL 96 (L) 06/28/2021    CO2 20 (L) 06/28/2021       Hepatic Function Panel:   Lab Results   Component Value Date    ALKPHOS 111 06/28/2021    ALT 34 06/28/2021    AST 77 06/28/2021    PROT 4.9 06/28/2021    PROT 8.0 08/16/2012    BILITOT 2.6 06/28/2021    BILIDIR 1.6 06/28/2021    IBILI 1.0 06/28/2021    LABALBU 1.9 06/28/2021    LABALBU 2.0 06/28/2021       Cultures:   6/24     UC >100k ESBL K pneumoniae  Klebsiella pneumoniae (esbl)   Antibiotic Interpretation JENNIFER   amikacin Sensitive <=8 mcg/mL   amoxicillin-clavulanate Resistant >16/8 mcg/mL   ampicillin Resistant >16 mcg/mL   ceFAZolin Resistant >16 mcg/mL   cefepime Resistant >16 mcg/mL   cefTRIAXone Resistant >32 mcg/mL   cefuroxime Resistant >16 mcg/mL   ciprofloxacin Resistant >2 mcg/mL   ertapenem Resistant >1 mcg/mL   gentamicin Sensitive <=4 mcg/mL   meropenem Intermediate 8 mcg/mL   nitrofurantoin Resistant >64 mcg/mL   trimethoprim-sulfamethoxazole Resistant >2/38 mcg/mL   cefTAZidime-avibactam Sensitive 1 ug/ml       6/26     C diff indeterminate                 6/26     BC x1 GPC, GNR, Acinetobacter by PCR        Radiology Review:  All pertinent images / reports were reviewed as a part of this visit. CT abd 6/27/21  Impression   Patchy bibasilar infiltrates or atelectasis, left greater than right.       Postoperative changes noted from prior distal colonic resection with left lower quadrant diverting colostomy and Troncoso's pouch.       Percutaneous surgically placed drainage catheter seen in the lower abdomen.       Small amount of ascites is seen with fluid seen in the right subphrenic space.       Colon is relatively decompressed but shows no significant distention or definite wall thickening.       There are distended loops of small bowel most suggestive of ileus.       Focally distended bowel loop is seen in the left flank region anterior to the colon in an area of previous seen noted distended loop. There is areas of mixed air-fluid attenuation seen. This likely represents luminal contents. However, the possibility of    an immediately adjacent developing abscess would be difficult to exclude entirely and correlation with repeat evaluation with oral contrast in this area would be helpful to better evaluate.       There is otherwise no evidence of free air or extraluminal gas collection with no significant free pelvic fluid.       Focal area of strandy opacity of the left flank region in the subcutaneous fat may indicate focal anasarca or cellulitis.       Otherwise no acute process seen     CT abd 6/17/21  Impression   Free intraperitoneal air, predominantly in the upper abdomen and right upper quadrant. Site of perforation is not definitely identified.       Distended proximal to mid small bowel. Distal small bowel not distended, consistent with small bowel obstruction.       Stool throughout the colon. Distal colonic diverticulosis.       Small fat-containing umbilical hernia. Air within the hernia, with a possible skin defect anteriorly at the umbilicus. Correlate clinically.          Scheduled Meds:   anidulafungin  100 mg Intravenous Q24H    metroNIDAZOLE  500 mg Intravenous Q8H    ceftazidime-acibactam (AVYCAZ) infusion  0.94 g Intravenous Q12H    methocarbamol IVPB  500 mg Intravenous Q8H    heparin (porcine)  5,000 Units Subcutaneous 3 times per day    metoprolol  5 mg Intravenous Q6H    pantoprazole  40 mg Intravenous BID    sodium chloride flush  5-40 mL Intravenous 2 times per day    OLANZapine zydis  5 mg Oral Nightly    sodium chloride flush  5-40 mL Intravenous 2 times per day       Continuous Infusions:   IV infusion builder 125 mL/hr at 06/28/21 0041    norepinephrine 16 mcg/min (06/28/21 0812)    sodium chloride      sodium chloride         PRN Meds:  HYDROmorphone **OR** HYDROmorphone, sodium chloride flush, sodium chloride, ipratropium-albuterol, sodium chloride flush, sodium chloride, ondansetron **OR** ondansetron, phenol      Assessment:     Obesity (BMI 43), HTN, COPD, dementia, PUD, HCV    Admit 6/17, acute abd / free air  OR 6/17, Nick's procedure  Resp failure - resolved     Ileus  Hypotension  UTI vs bacteriuria, cult + Klebsiella ESBL (intermediate to meropenem)  + BC 1 set with GPC / GNR. GNR is Acinetobacter by PCR  R/o C diff - toxin indeterminate, await toxin B PCR      Plan:     Cont Ceftazidime-avibactam (Earmon Freeze), Metronidazole, Anidulafungin  Await f/u BC results - await Acinetobacter, gram pos bacteria ID / sensitivitie  Await C diff PCR    Will review abd CT with Radiology  Wound care / postop care     Medical Decision Making:   The following items were considered in medical decision making:  Discussion of patient care with other providers  Reviewed clinical lab tests  Reviewed radiology tests  Reviewed other diagnostic tests/interventions  Independent review of radiologic images  Microbiology cultures and other micro tests reviewed      Spend over 35 minutes on visit    Discussed with pt, Surgical Residents  Rob Marx MD

## 2021-06-28 NOTE — PROGRESS NOTES
Pt A/O to self, provided wrong , seeing \"bug crawling\" on her pillow. NG to LWS. /43 (62) on Levophed 19 mcg/min. C/O L sided abd pain, Dilaudid 0.5 mg given prior to wound vac dsg change.

## 2021-06-28 NOTE — PROGRESS NOTES
General Surgery  Interval Note:    Erica Law currently requires the use of a negative pressure wound therapy device for aid in healing of her midline abdominal wound. The patient was seen at the bedside for her wound vac exchange. The vacuum device was switched off, the wound dressing was taken down, and the black sponge was removed. The wound was then measured at 20 cm x 5 cm, approximately 2 cm deep. The wound appeared as below with a pink granulating base. The sponge dressing was reconstructed and placed into the wound space, and the supplied adhesive dressing was placed on top. Negative pressure at -125 was applied via the vacuum device, and it was ensured that no air leaks existed at this time. We will tentatively plan to change her dressing again on Wednesday 6/30. Jaquelin Deleon MD  PGY1, General Surgery  06/28/21  10:22 AM  [phone]

## 2021-06-28 NOTE — PROCEDURES
Pranav Robles is a 65 y.o. female patient. 1. Bowel perforation ([address]    2. Small bowel obstruction (HCC)      Past Medical History:   Diagnosis Date    Anxiety     Back pain     Bronchitis     Cancer (HCC)     malignant neoplasm of bronchus and lung    Chronic pain     Dementia (HCC)     Drug-seeking behavior     Emphysema lung (HCC)     ESBL (extended spectrum beta-lactamase) producing bacteria infection 06/24/2021    left leg wound (Acinetobacter, Pseudomonas, and Klebsiella) on 5/19/2021; urine 6/24/2021    GERD (gastroesophageal reflux disease)     Hepatitis C     HTN (hypertension)     Insomnia     Lumbar disc disease     Multiple drug resistant organism (MDRO) culture positive 05/19/2021    left leg wound (Acinetobacter, Pseudomonas, and Klebsiella)    Osteoporosis     Spinal stenosis      Blood pressure (!) 103/51, pulse 93, temperature 98.3 °F (36.8 °C), temperature source Axillary, resp. rate 21, height 5' 6\" (1.676 m), weight 270 lb 1 oz (122.5 kg), SpO2 (!) 86 %, not currently breastfeeding. Insert Arterial Line    Date/Time: 6/28/2021 4:38 PM  Performed by: Erasmo Ward MD  Authorized by: Roberto Carlos Chowdhury DO   Patient identity confirmed: verbally with patient and arm band  Indications: hemodynamic monitoring  Location: right radial    Anesthesia:  Local Anesthetic: lidocaine 1% with epinephrine    Sedation:  Patient sedated: no    Seldinger technique: Seldinger technique used  Number of attempts: 1  Post-procedure: line sutured and dressing applied  Patient tolerance: patient tolerated the procedure well with no immediate complications  Comments: Previous arterial line catheter fractured. New right radial arterial line placed over a wire. Good blood return and waveform post procedure. Patient tolerated well.  EBL 3cc          Roberto Carlos Chowdhury DO  6/28/2021

## 2021-06-28 NOTE — PROGRESS NOTES
Nephrology Note                                                                                                                                                                                                                                                                                                                                                               Office : [phone]     Fax :[phone]              Patient's Name: Gabriella Valera    Renal function better  Good UO   Cr better   On pressors       Past Medical History:   Diagnosis Date    Anxiety     Back pain     Bronchitis     [address] [address]     malignant neoplasm of bronchus and lung    Chronic pain     Dementia ([facility] [facility]     Drug-seeking behavior     Emphysema lung ([facility] [address]     ESBL (extended spectrum beta-lactamase) producing bacteria infection 06/24/2021    left leg wound (Acinetobacter, Pseudomonas, and Klebsiella) on 5/19/2021; urine 6/24/2021    GERD (gastroesophageal reflux disease)     Hepatitis C     HTN (hypertension)     Insomnia     Lumbar disc disease     Multiple drug resistant organism (MDRO) culture positive 05/19/2021    left leg wound (Acinetobacter, Pseudomonas, and Klebsiella)    Osteoporosis     Spinal stenosis        Past Surgical History:   Procedure Laterality Date    CHOLECYSTECTOMY  2/2011    Feg[place]    LAPAROTOMY N/A 6/17/2021    1. exploratory laparotomy 2. Nick's procedure performed by Yojana Vásquez MD at [address] OR       Family History   Problem Relation Age of Onset    High Blood Pressure Father     Heart Disease Father         reports that she has been smoking cigarettes. She has a 24.50 pack-year smoking history. She has never used smokeless tobacco. She reports that she does not drink alcohol and does not use drugs.     Allergies:  Cyclobenzaprine, Penicillins, Acetaminophen, Codeine, Diphenhydramine hcl, Ketorolac, Naproxen, Diphenhydramine, Ketorolac tromethamine, Ketorolac tromethamine, and Sulfamethoxazole-trimethoprim    Current Medications:    HYDROmorphone (DILAUDID) injection 0.25 mg, Q3H PRN   Or  HYDROmorphone (DILAUDID) injection 0.5 mg, Q3H PRN  PN-Adult Premix 5/15 - Standard Electrolytes - Central Line, Continuous TPN  fat emulsion 20 % infusion 250 mL, Continuous TPN  anidulafungin (ERAXIS) 100 mg in dextrose 5 % 130 mL IVPB, Q24H  metronidazole (FLAGYL) 500 mg in NaCl 100 mL IVPB premix, Q8H  ceftazidime-avibactam (AVYCAZ) 0.94 g in dextrose 5 % 100 mL IVPB, Q12H  methocarbamol (ROBAXIN) 500 mg in dextrose 5 % 100 mL IVPB, Q8H  sodium bicarbonate 75 mEq in sodium chloride 0.45 % 1,000 mL infusion, Continuous  heparin (porcine) injection 5,000 Units, 3 times per day  metoprolol (LOPRESSOR) injection 5 mg, Q6H  pantoprazole (PROTONIX) injection 40 mg, BID  norepinephrine (LEVOPHED) 16 mg in dextrose 5 % 250 mL infusion, Continuous  sodium chloride flush 0.9 % injection 5-40 mL, 2 times per day  sodium chloride flush 0.9 % injection 5-40 mL, PRN  0.9 % sodium chloride infusion, PRN  ipratropium-albuterol (DUONEB) nebulizer solution 1 ampule, Q4H PRN  OLANZapine zydis (ZYPREXA) disintegrating tablet 5 mg, Nightly  sodium chloride flush 0.9 % injection 5-40 mL, 2 times per day  sodium chloride flush 0.9 % injection 5-40 mL, PRN  0.9 % sodium chloride infusion, PRN  ondansetron (ZOFRAN-ODT) disintegrating tablet 4 mg, Q8H PRN   Or  ondansetron (ZOFRAN) injection 4 mg, Q6H PRN  phenol 1.4 % mouth spray 1 spray, Q2H PRN        Physical exam:     Vitals:  BP (!) 117/49   Pulse 92   Temp 97.8 °F (36.6 °C) (Oral)   Resp 25   Ht 5' 6\" (1.676 m)   Wt 270 lb 1 oz (122.5 kg)   SpO2 90%   BMI 43.59 kg/m²   Constitutional:  AA  Skin: no rash, turgor wnl  Heent:  eomi, mmm  Neck: no bruits or jvd noted  Cardiovascular:  S1, S2 without m/r/g  Respiratory: CTA B without w/r/r  Abdomen:  +bs, soft, ttp   Ext: + lower extremity edema  Psychiatric: mood and affect flat   Musculoskeletal:  Rom, muscular strength intact    Data:   Labs:  CBC:   Recent Labs     06/26/21  1327 06/27/21 0250 06/28/21  0320   WBC 33.0* 31.6* 14.1*   HGB 11.5* 10.9* 9.2*    330 215     BMP:    Recent Labs     06/27/21 2030 06/28/21 0320 06/28/21  0923   * 133* 134*   K 3.7 3.6 3.6   CL 95* 96* 97*   CO2 16* 20* 21   BUN 51* 52* 55*   CREATININE 2.9* 2.5* 2.5*   GLUCOSE 176* 180* 187*     Ca/Mg/Phos:   Recent Labs     06/26/21  0545 06/26/21  1327 06/27/21 0250 06/27/21  0929 06/27/21 2030 06/28/21 0320 06/28/21  0923   CALCIUM 7.6*   < > 7.3*   < > 7.1* 6.9* 7.3*   MG 2.20  --  2.20  --   --  2.40  --    PHOS 4.3   < > 4.4   < > 4.7 4.4 4.2    < > = values in this interval not displayed. Hepatic:   Recent Labs     06/28/21 0320   AST 77*   ALT 34   BILITOT 2.6*   ALKPHOS 111     Troponin: No results for input(s): TROPONINI in the last 72 hours. BNP: No results for input(s): BNP in the last 72 hours. Lipids: No results for input(s): CHOL, TRIG, HDL, LDLCALC, LABVLDL in the last 72 hours. ABGs:   Recent Labs     06/26/21  1437   PHART 7.410   PO2ART 65.4*   NPU1EVL 24.2*     INR:   Recent Labs     06/28/21 0320   INR 3.62*     UA:No results for input(s): Gerold Charanjit, GLUCOSEU, BILIRUBINUR, [place]ell [place], BLOODU, PHUR, PROTEINU, UROBILINOGEN, NITRU, LEUKOCYTESUR, Amanda Sames in the last 72 hours. Urine Microscopic: No results for input(s): LABCAST, BACTERIA, COMU, HYALCAST, WBCUA, RBCUA, EPIU in the last 72 hours. Urine Culture: No results for input(s): LABURIN in the last 72 hours. Urine Chemistry:   Recent Labs     06/27/21  0620 06/27/21  1803   LABCREA 95.5  --    NAUR  --  <20             IMAGING:  CT ABDOMEN PELVIS WO CONTRAST Additional Contrast? Oral   Final Result      No evidence of abscess or significant bowel wall thickening. No significant change in distention of small bowel loops suggestive of ileus.       Extensive asymmetric anasarca in the left lateral abdominal wall again seen.      Small volume perihepatic ascites again seen. Mild inflammatory fat stranding adjacent to the urinary bladder which could represent sequela of infection/inflammation. CT ABDOMEN PELVIS WO CONTRAST Additional Contrast? None   Final Result      Patchy bibasilar infiltrates or atelectasis, left greater than right. Postoperative changes noted from prior distal colonic resection with left lower quadrant diverting colostomy and Troncoso's pouch. Percutaneous surgically placed drainage catheter seen in the lower abdomen. Small amount of ascites is seen with fluid seen in the right subphrenic space. Colon is relatively decompressed but shows no significant distention or definite wall thickening. There are distended loops of small bowel most suggestive of ileus. Focally distended bowel loop is seen in the left flank region anterior to the colon in an area of previous seen noted distended loop. There is areas of mixed air-fluid attenuation seen. This likely represents luminal contents. However, the possibility of    an immediately adjacent developing abscess would be difficult to exclude entirely and correlation with repeat evaluation with oral contrast in this area would be helpful to better evaluate. There is otherwise no evidence of free air or extraluminal gas collection with no significant free pelvic fluid. Focal area of strandy opacity of the left flank region in the subcutaneous fat may indicate focal anasarca or cellulitis. Otherwise no acute process seen. XR CHEST PORTABLE   Final Result      1. No acute process or consolidation   2. Right IJ central venous catheter appreciated with the tip in the right atrium. NG tube in place with the tip in the upper stomach               XR ABDOMEN (KUB) (SINGLE AP VIEW)   Final Result      AP abdomen shows extensive gastric distention.       XR CHEST PORTABLE   Final Result      Mild left basilar infiltrate or atelectasis. XR CHEST PORTABLE   Final Result      Low level of inspiration with more prominent bibasilar and right perihilar opacities felt to be most likely atelectasis. CT ABDOMEN PELVIS W IV CONTRAST Additional Contrast? Oral   Final Result      1. 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 2.  Interval development of mild perihepatic ascites with scattered small fluid collections within the abdomen and pelvis. No discrete findings for abscess formation on the current exam.      3.  Mildly distended proximal small bowel loops containing contrast, favoring ileus-type pattern. The degree of small bowel distention has improved when compared to prior exam.      4.  Diffuse body wall edema most pronounced over the left lateral abdomen, progressed from prior exam.      5.  Bibasilar airspace disease consistent with underlying atelectasis and/or infiltrate. XR CHEST PORTABLE   Final Result      Hyperexpanded lungs with bibasilar minimal atelectasis, unchanged. XR CHEST 1 VIEW   Final Result   1. Low lung volumes with persistent bilateral lower lobe atelectasis   2. Nasogastric tube in stomach      XR CHEST PORTABLE   Final Result      Free intraperitoneal air. No acute cardiopulmonary findings. Attending in the ED was notified of the free air from the patient's CT study performed earlier. CT ABDOMEN PELVIS W IV CONTRAST Additional Contrast? None   Final Result      Free intraperitoneal air, predominantly in the upper abdomen and right upper quadrant. Site of perforation is not definitely identified. Distended proximal to mid small bowel. Distal small bowel not distended, consistent with small bowel obstruction. Stool throughout the colon. Distal colonic diverticulosis. Small fat-containing umbilical hernia. Air within the hernia, with a possible skin defect anteriorly at the umbilicus.  Correlate clinically. Critical result findings were called to Edgard Castillo in the ED at 2150 hours on 6/17/2021. Assessment/Plan   1. LAVINIA     2. HTN    3. Anemia    4. Acid- base/ Electrolyte imbalance - acidosis     5.  Perforated Sigmoid Diverticulitis s/p Ex-lap & Andres Tee    Plan   - dec IVF   - Bicarb better   - Pressors   - abx   - ID following   - Ur studies - reviewed   - monitor lytes   - No need for CRRT at this point   - will follow closely       Recommend to dose adjust all medications  based on renal functions  Maintain SBP> 90 mmHg   Daily weights   AVOID NSAIDs  Avoid Nephrotoxins  Monitor Intake/Output  Call if significant decrease in urine output                Thank you for allowing us to participate in care of Draya Parker MD  Feel free to contact me   [facility] [address]  Office : [phone]  Fax :[phone]

## 2021-06-28 NOTE — CONSULTS
Clinical Pharmacy Progress Note  Pharmacy to dose PN    Admit date: 6/17/2021     Subjective/Objective:  Patient is a Lonza Bougie old female from [facility] with a PMHx significant for HTN, COPD, HCV, possible cirrhosis, PUD, and dementia. Admitted with perf sigmoid diverticulitis, now s/p Troncoso's procedure on 9/61, complicated by post-op ileus, UTI, and bactermia with MDRO. Pharmacy is consulted to dose PN per Dr. Megan Priest. Today is PN day #1  Current diet order:  NPO    Height:  5' 6\" (167.6 cm)  Weight: 270 lb 1 oz (122.5 kg)    Recent Labs     06/28/21  0320 06/28/21  0923   * 134*   K 3.6 3.6   CL 96* 97*   CO2 20* 21   BUN 52* 55*   CREATININE 2.5* 2.5*   GLUCOSE 180* 187*     Calcium 7.3   Albumin 2.4   Corrected Calcium 8.6  Phosphorus 4.2  Magnesium 2.4    Glucoses:  [phone]      Recent Labs     06/27/21  0250 06/28/21  0320   WBC 31.6* 14.1*   HGB 10.9* 9.2*    215        6/29   Triglycerides ordered       Assessment/Plan:  1)  Parenteral Nutrition:  Pharmacy to dose -- Day #1  · Ordered Clinimix E 5/15 at 41.7mL/hr (total volume = 1000mL/day) per dietary recommendations. Ordered Lipids 20% 250mL to infuse over 12 hrs. Regimen provides AA 50g, Dextrose 150g, Lipids 50g, 710kcal per day. Appreciate dietitian recommendations. · Clinimix-E includes standard electrolyte formulation. No additional electrolytes added. Will monitor electrolytes daily and will replete with supplemental IVPBs as needed. · Pt will receive the following electrolytes via supplemental IVPB today:  · Potassium chloride 40 mEq IVPB -- given    Glucose control: Potentially increasing, with last two BG > 180. Will recommend to start SS with Humalog when PN bag starts tonight. Will monitor need to put insulin in PN bag.  Patient will receive MVI 10 mL/day on MWF only (dur to national shortage) & Trace Elements 1 mL/day with PN daily.     Labs will be monitored daily and PN will be re-ordered daily.  Weekly triglyceride ordered per PN protocol. Thank you, please call with questions.    Glen MackD., BCPS   6/28/2021 10:48 AM  Wireless: [phone]

## 2021-06-28 NOTE — PROGRESS NOTES
ICU DAILY PROGRESS NOTE    CC: Perforated sigmoid diverticulitis  SUBJECTIVE:   Interval Hx:  Afebrile. MAPs 53-64. Slightly tachycardic to low 100s. Patient is alert, oriented to self only. No nausea or vomiting overnight. Pain controlled. Patient has had adequate urine output. LINES/ACCESS:   Central Access: R IJ CVC (06/27)  Peripheral Access: L forearm midline (06/24), PIV L forearm 06/24  Arterial Line Access: R radial (06/26)                                Hannon Date placed: 06/26  NGT Date placed: 06/26      MEDICATIONS:     Continuous Infusions:    IV infusion builder 125 mL/hr at 06/28/21 0041    norepinephrine 17 mcg/min (06/28/21 0457)    sodium chloride      sodium chloride       Scheduled Meds:    albumin human  25 g Intravenous Once    potassium chloride  20 mEq Intravenous Q1H    anidulafungin  100 mg Intravenous Q24H    metroNIDAZOLE  500 mg Intravenous Q8H    ceftazidime-acibactam (AVYCAZ) infusion  0.94 g Intravenous Q12H    methocarbamol IVPB  500 mg Intravenous Q8H    heparin (porcine)  5,000 Units Subcutaneous 3 times per day    metoprolol  5 mg Intravenous Q6H    pantoprazole  40 mg Intravenous BID    sodium chloride flush  5-40 mL Intravenous 2 times per day    OLANZapine zydis  5 mg Oral Nightly    sodium chloride flush  5-40 mL Intravenous 2 times per day        PRN Meds: sodium chloride flush, sodium chloride, ipratropium-albuterol, sodium chloride flush, sodium chloride, ondansetron **OR** ondansetron, phenol    OBJECTIVE:   Vitals: BP (!) 107/45   Pulse 101   Temp 97.4 °F (36.3 °C) (Oral)   Resp 28   Ht 5' 6\" (1.676 m)   Wt 270 lb 1 oz (122.5 kg)   SpO2 93%   BMI 43.59 kg/m²     I/O:     Intake/Output Summary (Last 24 hours) at 6/28/2021 0546  Last data filed at 6/28/2021 0504  Gross per 24 hour   Intake 6904.39 ml   Output 3900 ml   Net 3004.39 ml     I/O last 3 completed shifts:   In: 6821.2 [I.V.:5678; IV Piggyback:1143.2]  Out: 3633 [Urine:1065; Emesis/NG output:1600; Drains:1545; Stool:175]    Diet: Diet NPO Exceptions are: Ice Chips      Physical Examination:   General appearance: elderly, ill -appearing female, lying in bed, in NAD  HEENT: EOMI, no scleral icterus, trachea midline, no JVD, R IJ CVC in place with dressing c/d/i, NG in place with bilious output  Chest/Lungs: mildly increased work of breathing, equal chest rise  Cardiovascular: RRR   Abdomen: obese, distention improved, midline wound vac intact with adequate suction, MIKE site pouched with ascites in bag, colostomy pink and viable with loose/liquid stool in bag  Skin: warm and dry, no rashes  Extremities: no edema, no cyanosis, R radial arterial line in place  Neuro: alert, oriented to self only    Labs:  CBC:   Recent Labs     06/26/21  1327 06/27/21  0250 06/28/21  0320   WBC 33.0* 31.6* 14.1*   HGB 11.5* 10.9* 9.2*   HCT 35.5* 31.7* 26.6*    330 215       BMP:   Recent Labs     06/27/21  1522 06/27/21  2030 06/28/21  0320   * 132* 133*   K 4.1 3.7 3.6   CL 95* 95* 96*   CO2 18* 16* 20*   BUN 52* 51* 52*   CREATININE 2.8* 2.9* 2.5*   GLUCOSE 163* 176* 180*     LFT's:   Recent Labs     06/28/21  0320   AST 77*   ALT 34   BILITOT 2.6*   ALKPHOS 111     ABGs:   Recent Labs     06/26/21  1437   PHART 7.410   OBH6OPB 24.2*   PO2ART 65.4*     INR:   Recent Labs     06/28/21  0320   INR 3.62*       ASSESSMENT AND PLAN:   Pranav Robles is a 65 y.o. female with perforated sigmoid diverticulits s/p exploratory laparotomy and Nick's procedure 6/17. Post-operative course complicated by ileus type picture, UTI, and increasing leukocytosis.      Neuro:   - Robaxin; Dilaudid pain panel  - Zyprexa 5mg qhs      Cardiovascular:   Septic versus hypovolemic shock in the setting of rapid fluid shifts  - Requiring Levophed - currently on 18  - MAP goal > 60  - IV Lopressor in place with hold orders given current pressor requirements     Lactic Acidosis -  5.7 from 7.8  - continue bicarb drip  - continue to trend Q6H    Pulmonary:  CXR 06/26 without new consolidation   High risk for aspiration PNA  HOB to remain elevated >30 at all times  Aggressive pulmonary toilet: Acapella, EZPap, IS  Prn Donna    GI:  Perforated Diverticulitis s/p Nick's procedure (06/27)  - CT scan on 06/22 without concern for leak or abscess  - Wound vac in place - changes MWF  - NGT with 1075 mL output   - Keep MIKE to gravity, with site pouched for control of leakage  - Ostomy with continued loose output - monitor   - Will discuss initiating TPN with staff    :  LAVINIA  - Cr 2.5 from recent baseline of 0.9  - Hannon placed for strict I/Os  - continues to make 0.2-0.5 cc/kg/hr  - Nephrology onboard; continue to follow up recommendations       Hem/ID:   Anemia  - 9.2 from 10.9, likely dilutional given +4.5L over past 24 hours     Leukocytosis  - WBC 14.1 from 31.6  - remains afebrile  - Urinalysis (06/24): positive for Klebsiella pneumoniae  - Blood culture (06/26): positive for Acinetobacter calcoac baumannii  - C diff toxin indeterminate  - ID following; follow up recommendations   - currently on Ceftazidime/Avibactam, Anidulafungin, Metronidazole    Access:  Central Access: R IJ CVC (06/26)  Peripheral Access: L forearm midline (06/24), PIV L forearm 06/24  Arterial Line Access: R radial (06/26)                                Hannon Date placed: 06/26  NGT Date placed: 06/26    Prophylaxis:  SQH    Mobility:  Bedrest until more stable     Dispo:   ICU    Code Status: Full Code  -----------------------------  Broderick Ruth  6/28/2021 5:46 AM   Pager [phone]    I have reviewed and discussed with student about the patient and agree with his/her physical exam findings and assessment/plan. Appropriate changes made above. Fili Gilman MD  PGY1, General Surgery  6:45 AM  6/28/2021  [phone]    I performed a history and physical examination of the patient and discussed management with the resident.  I reviewed the resident's note and agree with the documented findings and plan of care.     Court Huma BROOKS  6/28/21   1:19 PM

## 2021-06-28 NOTE — PROGRESS NOTES
Hospitalist Progress Note      PCP: No primary care provider on file. Chief Complaint. Presented to hospital for abd pain    Moved to ICU for septic shock picture, Blood cx positive for Strep, acinetobacter      Date of Admission: 6/17/2021    Subjective:   Slightly tachycardic to low 100s. Patient is alert, oriented to self only. No nausea or vomiting overnight. Medications:  Reviewed    Infusion Medications    PN-Adult Premix 5/15 - Standard Electrolytes - Central Line      fat emulsion      IV infusion builder 75 mL/hr at 06/28/21 1109    norepinephrine 16 mcg/min (06/28/21 1426)    sodium chloride      sodium chloride       Scheduled Medications    anidulafungin  100 mg Intravenous Q24H    metroNIDAZOLE  500 mg Intravenous Q8H    ceftazidime-acibactam (AVYCAZ) infusion  0.94 g Intravenous Q12H    methocarbamol IVPB  500 mg Intravenous Q8H    heparin (porcine)  5,000 Units Subcutaneous 3 times per day    metoprolol  5 mg Intravenous Q6H    pantoprazole  40 mg Intravenous BID    sodium chloride flush  5-40 mL Intravenous 2 times per day    OLANZapine zydis  5 mg Oral Nightly    sodium chloride flush  5-40 mL Intravenous 2 times per day     PRN Meds: HYDROmorphone **OR** HYDROmorphone, sodium chloride flush, sodium chloride, ipratropium-albuterol, sodium chloride flush, sodium chloride, ondansetron **OR** ondansetron, phenol      Intake/Output Summary (Last 24 hours) at 6/28/2021 1627  Last data filed at 6/28/2021 1416  Gross per 24 hour   Intake 67141.89 ml   Output 4888 ml   Net 5716.89 ml       Physical Exam Performed:    BP (!) 103/51   Pulse 93   Temp 98.3 °F (36.8 °C) (Axillary)   Resp 21   Ht 5' 6\" (1.676 m)   Wt 270 lb 1 oz (122.5 kg)   SpO2 (!) 86%   BMI 43.59 kg/m²     General appearance: lying in bed comfortably, NAD  HEENT:  Conjunctivae/corneas clear. Neck: Supple, with full range of motion. Respiratory:  Normal respiratory effort.  Clear to auscultation, bilaterally  Cardiovascular: Regular rate and rhythm with normal S1/S2 without murmurs or rubs  Abdomen: obese, distention improved, midline wound vac intact with adequate suction, MIKE site pouched with ascites in bag, colostomy pink and viable with loose/liquid stool in bagMusculoskeletal: No cyanosis or edema bilaterally  Neurologic:  without any focal sensory/motor deficits. grossly non-focal.  Psychiatric: Alert and orientedx3, Normal mood  Peripheral Pulses: +2 palpable, equal bilaterally       Labs:   Recent Labs     06/26/21  1327 06/27/21  0250 06/28/21  0320   WBC 33.0* 31.6* 14.1*   HGB 11.5* 10.9* 9.2*   HCT 35.5* 31.7* 26.6*    330 215     Recent Labs     06/27/21 2030 06/28/21  0320 06/28/21  0923   * 133* 134*   K 3.7 3.6 3.6   CL 95* 96* 97*   CO2 16* 20* 21   BUN 51* 52* 55*   CREATININE 2.9* 2.5* 2.5*   CALCIUM 7.1* 6.9* 7.3*   PHOS 4.7 4.4 4.2     Recent Labs     06/28/21  0320   AST 77*   ALT 34   BILIDIR 1.6*   BILITOT 2.6*   ALKPHOS 111     Recent Labs     06/28/21  0320   INR 3.62*     No results for input(s): CKTOTAL, TROPONINI in the last 72 hours. Urinalysis:      Lab Results   Component Value Date    NITRU Negative 06/24/2021    WBCUA 21-50 06/24/2021    BACTERIA 3+ 06/24/2021    RBCUA 21-50 06/24/2021    BLOODU MODERATE 06/24/2021    SPECGRAV >=1.030 06/24/2021    GLUCOSEU Negative 06/24/2021    GLUCOSEU Negative 12/15/2011       Radiology:  CT ABDOMEN PELVIS WO CONTRAST Additional Contrast? Oral   Final Result      No evidence of abscess or significant bowel wall thickening. No significant change in distention of small bowel loops suggestive of ileus. Extensive asymmetric anasarca in the left lateral abdominal wall again seen. Small volume perihepatic ascites again seen. Mild inflammatory fat stranding adjacent to the urinary bladder which could represent sequela of infection/inflammation.       CT ABDOMEN PELVIS WO CONTRAST Additional Contrast? None Final Result      Patchy bibasilar infiltrates or atelectasis, left greater than right. Postoperative changes noted from prior distal colonic resection with left lower quadrant diverting colostomy and Troncoso's pouch. Percutaneous surgically placed drainage catheter seen in the lower abdomen. Small amount of ascites is seen with fluid seen in the right subphrenic space. Colon is relatively decompressed but shows no significant distention or definite wall thickening. There are distended loops of small bowel most suggestive of ileus. Focally distended bowel loop is seen in the left flank region anterior to the colon in an area of previous seen noted distended loop. There is areas of mixed air-fluid attenuation seen. This likely represents luminal contents. However, the possibility of    an immediately adjacent developing abscess would be difficult to exclude entirely and correlation with repeat evaluation with oral contrast in this area would be helpful to better evaluate. There is otherwise no evidence of free air or extraluminal gas collection with no significant free pelvic fluid. Focal area of strandy opacity of the left flank region in the subcutaneous fat may indicate focal anasarca or cellulitis. Otherwise no acute process seen. XR CHEST PORTABLE   Final Result      1. No acute process or consolidation   2. Right IJ central venous catheter appreciated with the tip in the right atrium. NG tube in place with the tip in the upper stomach               XR ABDOMEN (KUB) (SINGLE AP VIEW)   Final Result      AP abdomen shows extensive gastric distention. XR CHEST PORTABLE   Final Result      Mild left basilar infiltrate or atelectasis. XR CHEST PORTABLE   Final Result      Low level of inspiration with more prominent bibasilar and right perihilar opacities felt to be most likely atelectasis.        CT ABDOMEN PELVIS W IV CONTRAST Additional Contrast? Oral   Final Result      1. 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 2.  Interval development of mild perihepatic ascites with scattered small fluid collections within the abdomen and pelvis. No discrete findings for abscess formation on the current exam.      3.  Mildly distended proximal small bowel loops containing contrast, favoring ileus-type pattern. The degree of small bowel distention has improved when compared to prior exam.      4.  Diffuse body wall edema most pronounced over the left lateral abdomen, progressed from prior exam.      5.  Bibasilar airspace disease consistent with underlying atelectasis and/or infiltrate. XR CHEST PORTABLE   Final Result      Hyperexpanded lungs with bibasilar minimal atelectasis, unchanged. XR CHEST 1 VIEW   Final Result   1. Low lung volumes with persistent bilateral lower lobe atelectasis   2. Nasogastric tube in stomach      XR CHEST PORTABLE   Final Result      Free intraperitoneal air. No acute cardiopulmonary findings. Attending in the ED was notified of the free air from the patient's CT study performed earlier. CT ABDOMEN PELVIS W IV CONTRAST Additional Contrast? None   Final Result      Free intraperitoneal air, predominantly in the upper abdomen and right upper quadrant. Site of perforation is not definitely identified. Distended proximal to mid small bowel. Distal small bowel not distended, consistent with small bowel obstruction. Stool throughout the colon. Distal colonic diverticulosis. Small fat-containing umbilical hernia. Air within the hernia, with a possible skin defect anteriorly at the umbilicus. Correlate clinically. Critical result findings were called to Gonzalez Rizo in the ED at 2150 hours on 6/17/2021.                   Assessment/Plan:      Perforated Sigmoid Diverticulitis s/p Ex-lap & [facility] (06/27)  Ileus  Wound vac in place   Wound vac care per GS  Continue pain control regimen: Pain control per primary  Advance diet per GS  Mgt per Surgery      Septic shock  Lactic acidosis  Hypotension  UTI  Urine cult + Klebsiella ESBL (intermediate to meropenem)  + BC 1 set with GPC / GNR. GNR is Acinetobacter by PCR  R/o C diff - toxin indeterminate, toxin B PCR pending  ID consulted  Antibx: per ID      LAVINIA: improving  Nephrology following      Reviewed Medical reconciliation, Labs, vitals signs, previous records    PT/OT when stable  DVT PPx - per primary  Diet: Diet NPO Exceptions are: Ice Chips  PN-Adult Premix 5/15 - Standard Electrolytes - Central Line  Code Status: Full Code    PT/OT Eval Status: ordered    Dispo - Pending progress.      Burak Basilio MD

## 2021-06-28 NOTE — PLAN OF CARE
Problem: Pain:  Description: Pain management should include both nonpharmacologic and pharmacologic interventions. Goal: Pain level will decrease  Description: Pain level will decrease  Outcome: Ongoing  Note: Pt medicated with Dilaudid 0.5 mg x2 for abd pain; pt fell asleep after each dose. Goal: Control of acute pain  Description: Control of acute pain  Outcome: Ongoing  Goal: Control of chronic pain  Description: Control of chronic pain  Outcome: Ongoing     Problem: Non-Violent Restraints  Goal: Removal from restraints as soon as assessed to be safe  Outcome: Ongoing  Note: Bilateral wrist restraints in place. Goal: No harm/injury to patient while restraints in use  Outcome: Ongoing  Goal: Patient's dignity will be maintained  Outcome: Ongoing     Problem: Skin Integrity:  Goal: Will show no infection signs and symptoms  Description: Will show no infection signs and symptoms  Outcome: Ongoing  Goal: Absence of new skin breakdown  Description: Absence of new skin breakdown  Outcome: Ongoing  Note: BLE edematous, weeping, and bertin. Sacral dressing replaced, skin underneath intact. Low air loss alternating pressure mattress in use. Pt turned & repositioned q2h. Bilateral heel offloading boots applied. Wound vac changed this AM by surgery. Problem: Falls - Risk of:  Goal: Will remain free from falls  Description: Will remain free from falls  Outcome: Ongoing  Note: Pt has remained in bed this shift. Bed alarm in use. Goal: Absence of physical injury  Description: Absence of physical injury  Outcome: Ongoing     Problem: OXYGENATION/RESPIRATORY FUNCTION  Goal: Patient will achieve/maintain normal respiratory rate/effort  Outcome: Ongoing  Note: SpO2 >90% on RA.      Problem: MOBILITY  Goal: Early mobilization is achieved  Outcome: Not Met This Shift     Problem: Confusion - Acute:  Goal: Absence of continued neurological deterioration signs and symptoms  Description: Absence of continued neurological deterioration signs and symptoms  Outcome: Not Met This Shift  Note: Pt is seeing cockroaches on pillow (most likely bilious NG output being suctioned) and believes the boxes of gloves are cookies. Goal: Mental status will be restored to baseline  Description: Mental status will be restored to baseline  Outcome: Not Met This Shift     Problem: Discharge Planning:  Goal: Ability to perform activities of daily living will improve  Description: Ability to perform activities of daily living will improve  Outcome: Not Met This Shift  Goal: Participates in care planning  Description: Participates in care planning  Outcome: Not Met This Shift     Problem: Injury - Risk of, Physical Injury:  Goal: Will remain free from falls  Description: Will remain free from falls  Outcome: Ongoing  Note: Pt has remained in bed this shift. Bed alarm in use.   Goal: Absence of physical injury  Description: Absence of physical injury  Outcome: Ongoing     Problem: Mood - Altered:  Goal: Mood stable  Description: Mood stable  Outcome: Ongoing  Goal: Absence of abusive behavior  Description: Absence of abusive behavior  Outcome: Ongoing  Goal: Verbalizations of feeling emotionally comfortable while being cared for will increase  Description: Verbalizations of feeling emotionally comfortable while being cared for will increase  Outcome: Ongoing     Problem: Psychomotor Activity - Altered:  Goal: Absence of psychomotor disturbance signs and symptoms  Description: Absence of psychomotor disturbance signs and symptoms  Outcome: Ongoing     Problem: Sensory Perception - Impaired:  Goal: Demonstrations of improved sensory functioning will increase  Description: Demonstrations of improved sensory functioning will increase  Outcome: Ongoing  Goal: Decrease in sensory misperception frequency  Description: Decrease in sensory misperception frequency  Outcome: Ongoing  Goal: Able to refrain from responding to false sensory perceptions  Description: Able to refrain from responding to false sensory perceptions  Outcome: Ongoing  Goal: Demonstrates accurate environmental perceptions  Description: Demonstrates accurate environmental perceptions  Outcome: Ongoing  Goal: Able to distinguish between reality-based and nonreality-based thinking  Description: Able to distinguish between reality-based and nonreality-based thinking  Outcome: Ongoing  Goal: Able to interrupt nonreality-based thinking  Description: Able to interrupt nonreality-based thinking  Outcome: Ongoing     Problem: Sleep Pattern Disturbance:  Goal: Appears well-rested  Description: Appears well-rested  Outcome: Not Met This Shift     Problem: Nutrition  Goal: Optimal nutrition therapy  6/28/2021 1935 by Yvon Valles RN  Outcome: Ongoing  Note: TPN started.      Problem: Nausea/Vomiting:  Goal: Absence of nausea/vomiting  Description: Absence of nausea/vomiting  Outcome: Ongoing  Note: NG to cLWS  Goal: Able to drink  Description: Able to drink  Outcome: Not Met This Shift  Goal: Able to eat  Description: Able to eat  Outcome: Not Met This Shift  Goal: Ability to achieve adequate nutritional intake will improve  Description: Ability to achieve adequate nutritional intake will improve  Outcome: Not Met This Shift

## 2021-06-28 NOTE — CARE COORDINATION
Patient is from [facility] and when I spoke with her and her son at bedside they have agreed for her to return there at d/c. She transferred back to ICU when BP dropped and is now on a Levophed drip. No precert to return to facility at d/c.      Electronically signed by Shayy Lucas RN on 6/28/2021 at 9:48 AM  [phone]

## 2021-06-28 NOTE — CONSULTS
NUTRITION ASSESSMENT  Admission Date: 6/17/2021     Type and Reason for Visit: Reassess, Consult    NUTRITION RECOMMENDATIONS:   1. PO Diet: Pt now NPO, TPN to start per MD; recommendations provided below and pharmacy to order and manage  Clinimix   1. Day 1: Start PN Clinimix 5/15E at goal rate 41.7 mL per hour to provide 1000 mL total volume, 710 calories, 50 g protein, 150 g dextrose. 2. Day 2: Advance PN Clinimix 5/15E to goal rate 70 mL per hour to provide 1680 mL total volume, 1193 calories, 84 g protein, 252 g dextrose and 1.6 mg/kg/min GIR   3. Provide 250 ml of 20% lipids daily to provide 50 g lipids and 500 calories per day. Total Nutrition provided =1693 kcal; 84g protein, 50 g lipids and 252 g dextrose. (100 % kcal and 80 % protein needs met). 4. Obtain Labs: (Phos,Mg,K+) to monitor risk of refeeding syndrome. Weekly TG recommended. 3. Pharmacy to adjust electrolytes, MVI and Trace Elements as appropriate. NUTRITION ASSESSMENT:  Nutritional summary & status: Consult for TPN recommendations. Patient transferred to ICU for pressor support, currently NPO and pt has had poor po intake throughout admission. S/P Hartmanns procedure 6/17. Patient is alert and oriented to self only. Will provide EN recommendations and monitor tolerance.      Patient admitted d/t emergent GI surgery     PMH significant for: hypertension, emphysema, Hepatitis C, anxiety, gastritis, recurrent cellulitis, dementia, and recent hospitalization for sepsis secondary     MALNUTRITION ASSESSMENT  Context of Malnutrition: Acute Illness   Malnutrition Status: Severe malnutrition  Findings of the 6 clinical characteristics of malnutrition (Minimum of 2 out of 6 clinical characteristics is required to make the diagnosis of moderate or severe Protein Calorie Malnutrition based on AND/ASPEN Guidelines):  Energy Intake: Less than/equal to 50% of estimated energy requirements    Energy Intake Time: Greater than or equal to 7 days Weight Loss %: 7.5% loss or greater    Weight loss Time: Greater than or equal to 1 month    Body Fat Loss: No significant loss    Body Fat Location: No Significant   Body Muscle Loss: No significant loss    Body Muscle Loss Location: No significant    Fluid Accumulation: Unable to assess    Fluid Accumulation Location: Unable to assess     Strength: Not Performed; Not Measured     NUTRITION DIAGNOSIS  Problem: Problem #1: Inadequate oral intake   Etiology: Altered GI function  Signs & Symptoms: Intake 0-25% and Intake 26-50%    NUTRITION INTERVENTION  Food and/or Nutrient Delivery: Continue NPO, Start Parenteral Nutrition   Nutrition Education/Counseling: No recommendation at this time   Coordination of Nutrition Care: Continue to monitor while inpatient     NUTRITION MONITORING & EVALUATION:  Evaluation:Goals set  or No progress towards goal   Goals: Pt to meet >50% of nutritional requirements from diet and ONS   Monitoring: Meal Intake , Supplement Intake  or Wound Healing      OBJECTIVE DATA: Significant to nutrition assessment  · Nutrition-Focused Physical Findings: colostomy bag 75 ml on 6/27. Generalized non-pitting edema   · Labs: Reviewed;  Na 133, glucose 180  · Meds: Reviewed;   · Wounds Surgical Wound  and Wound Vac     ANTHROPOMETRICS  Current Height: 5' 6\" (167.6 cm)  Current Weight: 270 lb 1 oz (122.5 kg)    Admission weight: 252 lb 13.9 oz (114.7 kg)  Ideal Bodyweight 130 lb   Usual Bodyweight CARLOS   Weight Changes 8% loss within past month       BMI BMI (Calculated): 43.7    Wt Readings from Last 50 Encounters:   06/21/21 259 lb 7.7 oz (117.7 kg)   05/26/21 285 lb (129.3 kg)   05/26/21 260 lb (117.9 kg)   05/25/21 283 lb 15.2 oz (128.8 kg)   05/04/21 267 lb (121.1 kg)     COMPARATIVE STANDARDS  Estimated Total Kcals/Day : 11-14  Admission Bodyweight  (118 kg) 2882-0827 kcal/day    Estimated Total Protein (g/day) : 1.8-2.0 Ideal Bodyweight  (59 kg) 106-118 g/day  Estimated Daily Total Fluid (ml/day): >1500 mL per day     Food / Nutrition-Related History  Pre-Admission / Home Diet:  Pre-Admission/Home Diet: General   Home Supplements / Herbals:    none noted  Food Restrictions / Cultural Requests:    none noted    Current Nutrition Therapies   Diet NPO Exceptions are: Ice Chips     PO Intake: NPO  PO Supplement: Standard High Calorie   NPO   PO Supplement Intake: NPO  IVF: Na+ bicarb in 1/2 NS at 75 ml/hr     NUTRITION RISK LEVEL: Risk Level: [address], LD  [place]:  [phone]  Office:  [phone]

## 2021-06-28 NOTE — PLAN OF CARE
Problem: Nutrition  Goal: Optimal nutrition therapy  6/28/2021 0932 by Merril Baumgarten, RD, LD  Outcome: Ongoing  Note: Nutrition Problem #1: Inadequate oral intake  Intervention: Food and/or Nutrient Delivery: Continue NPO, Start Parenteral Nutrition  Nutritional Goals: Pt to meet >50% of nutritional requirements from diet and ONS    6/28/2021 0932 by Merril Baumgarten, RD, LD  Outcome: Ongoing

## 2021-06-29 NOTE — PROGRESS NOTES
R radial a-line site bleeding (saturating pillow case). Lexus dsg changed and thrombin dsg applied but line continued to bleed. Dr. Jennifer Galeano down to thread new a-line over a guidewire. Wrist immobilized & restraint reapplied. Scant amount of blood on new dsg.

## 2021-06-29 NOTE — PROGRESS NOTES
Oncology / Hematology Care      Patient name:   Toya Dorantes   Date:     6/29/2021           Interval History:    Transferred to ICU for Kelbsiella and C diff infections, sepsis  Bleeding fro a line, but no further catheters, no spontaneous bleeding such as epistaxis, etc          Allergies:     Allergies   Allergen Reactions    Cyclobenzaprine Shortness Of Breath and Other (See Comments)     \"cramping\"      Penicillins Shortness Of Breath, Itching, Swelling and Hives    Acetaminophen     Codeine Itching and Hives     Itching      Diphenhydramine Hcl Other (See Comments)     Heart racing    Ketorolac Itching and Nausea Only     Pt tolerated dose without side effects    Naproxen Nausea Only, Other (See Comments) and Itching     Stomach pain    Bothers her stomach  Bothers her stomach      Diphenhydramine Nausea Only, Hives and Palpitations     Heart racing  Also states it makes her heart race      Ketorolac Tromethamine Nausea And Vomiting    Ketorolac Tromethamine Itching, Nausea Only and Nausea And Vomiting    Sulfamethoxazole-Trimethoprim      Abdominal pain          Medications:    Current Facility-Administered Medications   Medication Dose Route Frequency Provider Last Rate Last Admin    electrolyte-A (PLASMALYTE-A) solution   Intravenous Continuous Dhaval MD Sonido 50 mL/hr at 06/29/21 1015 New Bag at 06/29/21 1015    potassium chloride 20 mEq/50 mL IVPB (Central Line)  20 mEq Intravenous Q1H Shwetha Ortega MD 50 mL/hr at 06/29/21 1017 20 mEq at 06/29/21 1017    fat emulsion 20 % infusion 250 mL  250 mL Intravenous Continuous TPN Job Ruelas,         insulin regular (HUMULIN R;NOVOLIN R) injection 0-15 Units  0-15 Units Subcutaneous 6 times per day Shwetha Ortega MD        glucose (GLUTOSE) 40 % oral gel 15 g  15 g Oral PRN Shwetha Ortega MD        dextrose 50 % IV solution  12.5 g Intravenous PRN Shwetha Ortega MD        glucagon (rDNA) injection 1 mg  1 mg Intramuscular PRN Stefani Ibarra MD        dextrose 5 % solution  100 mL/hr Intravenous PRN Stefani Ibarra MD        PN-Adult Premix 5/15 - Standard Electrolytes - Central Line   Intravenous Continuous TPN Esperanza Alvarez DO        vancomycin compareit4me) oral solution 250 mg  250 mg Per NG tube 4 times per day Amadeo Tijerina MD        HYDROmorphone (DILAUDID) injection 0.25 mg  0.25 mg Intravenous Q3H PRN Marisela Barber MD        Or    HYDROmorphone (DILAUDID) injection 0.5 mg  0.5 mg Intravenous Q3H PRN Marisela Barber MD   0.5 mg at 06/29/21 0811    PN-Adult Premix 5/15 - Standard Electrolytes - Central Line   Intravenous Continuous TPN Esperanza Alvarez DO 41.7 mL/hr at 06/28/21 1757 New Bag at 06/28/21 1757    anidulafungin (ERAXIS) 100 mg in dextrose 5 % 130 mL IVPB  100 mg Intravenous Q24H Yoanna Sutherland MD   Stopped at 06/28/21 1330    metronidazole (FLAGYL) 500 mg in NaCl 100 mL IVPB premix  500 mg Intravenous Q8H Yoanna Sutherland MD   Stopped at 06/29/21 0915    ceftazidime-avibactam (AVYCAZ) 0.94 g in dextrose 5 % 100 mL IVPB  0.94 g Intravenous Q12H Yoanna Sutherland MD   Stopped at 06/29/21 0619    methocarbamol (ROBAXIN) 500 mg in dextrose 5 % 100 mL IVPB  500 mg Intravenous Q8H Gloria Antony MD   Stopped at 06/29/21 0709    heparin (porcine) injection 5,000 Units  5,000 Units Subcutaneous 3 times per day Fátima Killian MD   5,000 Units at 06/29/21 0541    metoprolol (LOPRESSOR) injection 5 mg  5 mg Intravenous Q6H Fátima Killian MD        pantoprazole (PROTONIX) injection 40 mg  40 mg Intravenous BID Esperanza Alvarez DO   40 mg at 06/29/21 0813    norepinephrine (LEVOPHED) 16 mg in dextrose 5 % 250 mL infusion  2-100 mcg/min Intravenous Continuous Esperanza Alvarez DO 9.4 mL/hr at 06/29/21 0647 10 mcg/min at 06/29/21 0647    sodium chloride flush 0.9 % injection 5-40 mL  5-40 mL Intravenous 2 times per day TOSHIA Montague - CNP   10 mL at 06/29/21 0812    sodium chloride flush 0.9 % injection 5-40 mL  5-40 mL Intravenous PRN Maegan Coral, APRN - CNP        0.9 % sodium chloride infusion  25 mL Intravenous PRN Maegan Coral, APRN - CNP        ipratropium-albuterol (DUONEB) nebulizer solution 1 ampule  1 ampule Inhalation Q4H PRN Abril Coley MD   1 ampule at 06/26/21 1425    OLANZapine zydis (ZYPREXA) disintegrating tablet 5 mg  5 mg Oral Nightly Rochelle Kerry, DO   5 mg at 06/28/21 2037    sodium chloride flush 0.9 % injection 5-40 mL  5-40 mL Intravenous 2 times per day Kimberley Power, DO   10 mL at 06/28/21 2049    sodium chloride flush 0.9 % injection 5-40 mL  5-40 mL Intravenous PRN Rochelle Kerry, DO        0.9 % sodium chloride infusion  25 mL Intravenous PRN Kimberley Power, DO        ondansetron (ZOFRAN-ODT) disintegrating tablet 4 mg  4 mg Oral Q8H PRN Kimberley Power, DO        Or    ondansetron (ZOFRAN) injection 4 mg  4 mg Intravenous Q6H PRN Rochelle Kerry, DO   4 mg at 06/27/21 1858    phenol 1.4 % mouth spray 1 spray  1 spray Mouth/Throat Q2H PRN Kimberley Power, DO              Review of Systems:    · History obtained from patient, otherwise, further 10 point ROS is negative        Physical Exam:    /64   Pulse 107   Temp 97.3 °F (36.3 °C) (Axillary)   Resp 23   Ht 5' 6\" (1.676 m)   Wt 270 lb 1 oz (122.5 kg)   SpO2 92%   BMI 43.59 kg/m²     General appearance: alert and cooperative; no acute distress  Head: NCAT, PERRLA, EOMI; oral and nasopharynx without lesions, dentition adequate repair  Neck: no JVD or thyromegaly  Lungs: clear to auscultation bilaterally; no audible rhonchi, rales, wheezes or crackles  Heart: regular rate and rhythm, S1, S2 normal; no murmurs, rubs or gallops  Abdomen: soft, non-tender and non-distended; normoactive, tympanic bowel sounds; no hepatosplenomegaly  Extremities: no cyanosis, clubbing, edema or asymmetry  Skin: no jaundice, purpura or petechiae  Lymph Nodes:  no auricular, cervical, supraclavicular, axillary, inguinal or popliteal lymphadenopathy  Neurologic:  CN 2-12 intact; no focal motor, sensory or cerebellar deficits        Laboratory Evaluation:      CBC:   Lab Results   Component Value Date    WBC 16.0 06/29/2021    NEUTROABS 12.7 06/29/2021    HGB 8.9 06/29/2021    MCV 92.4 06/29/2021     06/29/2021       BMP:   Lab Results   Component Value Date     06/29/2021    K 3.3 06/29/2021    K 3.5 06/17/2021    CO2 21 06/29/2021    BUN 57 06/29/2021    CREATININE 2.4 06/29/2021    MG 2.40 06/29/2021    TSH 3.97 04/06/2011       HEPATIC:  Lab Results   Component Value Date    AST 65 06/29/2021    ALT 14 06/29/2021    ALKPHOS 104 06/29/2021    PROT 4.9 06/29/2021    PROT 8.0 08/16/2012    BILITOT 2.5 06/29/2021    BILIDIR 1.5 06/29/2021           Radiology Evaluation:    Reviewed      Assessment / Plan:    INR increased - acquired:  Differential DIC vs factor deficiency such as factor 7    Received 3 days of vitamin K replacement, without improvement  Current infection playing a role, ie, DIC    Recheck fibrinogen, ddimer, PTT  Check Factor 7 level, thrombin time normal - factor V and X levels    Possible she has acquired a mild deficiency to factor VII from liver issues ( this factor has shortest half life, so would see first)    Until factor levels return, can replace with FFP, 2 units and then check coags - if acute or spontaneous bleeding should occur      Discussed with patient shift nurse      As always, thank you for allowing me the opportunity to participate in the care of your patient. Please do not hesitate to contact me with questions or concerns regarding further management. Mario Connor, , MS  Oncology/Hematology Care    Please contact via:  1. [employer]  2.   Cell Phone:  [phone]    6/29/2021   11:25 AM

## 2021-06-29 NOTE — PROGRESS NOTES
ID Follow-up NOTE    CC:   Peritonitis secondary to perforated sigmoid diverticulitis, UTI, bacteremia, C diff  Antibiotics: Ceftazidime-avibactam (Luis List), Metronidazole, Anidulafungin    Admit Date: 6/17/2021   Hospital Day: 13    Subjective:     Patient is confused  Abd pain with exam  Little stool output    Objective:     Patient Vitals for the past 8 hrs:   BP Temp Temp src Pulse Resp SpO2   06/29/21 1000 112/64   107 23 92 %   06/29/21 0945 (!) 103/59   105 21    06/29/21 0930 (!) 71/57   108 18    06/29/21 0929     19 93 %   06/29/21 0915 (!) 96/53   96 17 (!) 88 %   06/29/21 0900 110/66   95 16 (!) 87 %   06/29/21 0845 (!) 109/51   93 17 93 %   06/29/21 0830 (!) 92/52   95 21 91 %   06/29/21 0815 (!) 100/47   101 30 (!) 89 %   06/29/21 0800 108/63 97.3 °F (36.3 °C) Axillary 106 24    06/29/21 0745 (!) 102/54   98 26    06/29/21 0730 (!) 98/53   105 23    06/29/21 0715 113/63   105 (!) 36 97 %   06/29/21 0700 (!) 108/55   98 (!) 31 94 %   06/29/21 0645 (!) 103/55   100 20 91 %   06/29/21 0630 (!) 104/59   104 28 98 %   06/29/21 0615 (!) 120/48   103 (!) 34    06/29/21 0600 (!) 106/59   106 19 93 %   06/29/21 0545 (!) 108/54   104 29 92 %   06/29/21 0530 101/66   101 20 93 %   06/29/21 0515 122/61   103 27    06/29/21 0500 (!) 99/48   90 15    06/29/21 0445 (!) 99/51   91 15    06/29/21 0430 (!) 115/54   94 15    06/29/21 0415 118/67   106 28    06/29/21 0400 (!) 101/51 97 °F (36.1 °C) Axillary 90 15 99 %   06/29/21 0345 (!) 110/57   133 29    06/29/21 0330 (!) 124/57   101 26    06/29/21 0315 (!) 103/49   103 29    06/29/21 0300 (!) 111/57   108 22    06/29/21 0245 (!) 118/59   111 (!) 33 90 %   06/29/21 0230 118/65   108 24 91 %   06/29/21 0215 120/63   112 28 91 %     I/O last 3 completed shifts:   In: 4662.5 [I.V.:4072.5; IV Piggyback:590]  Out: 5835 [PTWWB:1270; Emesis/NG output:1200; Drains:2225; VLFEV:502]  I/O this shift:  In: -   Out: 45 [Urine:45]    EXAM:  GENERAL: No apparent distress. RA.  Levofed 10  HEENT: Membranes dry, no oral lesion - NGT on suction  NECK:  Supple, no lymphadenopathy  LUNGS: Clear b/l, no rales, no dullness  CARDIAC: RRR, no murmur appreciated  ABD:  Scant BS, soft -  colostomy, midline wound clean (saw wound at time of dressing change)  EXT:  No rash, no edema, no lesions - bilateral LE venous stasis  NEURO: No focal neurologic findings  LINES:  R IJ line, R radial a-line, peripheral iv       Data Review:  Lab Results   Component Value Date    WBC 16.0 (H) 06/29/2021    HGB 8.9 (L) 06/29/2021    HCT 26.6 (L) 06/29/2021    MCV 92.4 06/29/2021     06/29/2021     Lab Results   Component Value Date    CREATININE 2.4 (H) 06/29/2021    BUN 57 (H) 06/29/2021     (L) 06/29/2021    K 3.3 (L) 06/29/2021    CL 95 (L) 06/29/2021    CO2 21 06/29/2021       Hepatic Function Panel:   Lab Results   Component Value Date    ALKPHOS 104 06/29/2021    ALT 14 06/29/2021    AST 65 06/29/2021    PROT 4.9 06/29/2021    PROT 8.0 08/16/2012    BILITOT 2.5 06/29/2021    BILIDIR 1.5 06/29/2021    IBILI 1.0 06/29/2021    LABALBU 2.2 06/29/2021       Cultures:   6/24     UC >100k ESBL K pneumoniae  Klebsiella pneumoniae (esbl)   Antibiotic Interpretation JENNIFER   amikacin Sensitive <=8 mcg/mL   amoxicillin-clavulanate Resistant >16/8 mcg/mL   ampicillin Resistant >16 mcg/mL   ceFAZolin Resistant >16 mcg/mL   cefepime Resistant >16 mcg/mL   cefTRIAXone Resistant >32 mcg/mL   cefuroxime Resistant >16 mcg/mL   ciprofloxacin Resistant >2 mcg/mL   ertapenem Resistant >1 mcg/mL   gentamicin Sensitive <=4 mcg/mL   meropenem Intermediate 8 mcg/mL   nitrofurantoin Resistant >64 mcg/mL   trimethoprim-sulfamethoxazole Resistant >2/38 mcg/mL   cefTAZidime-avibactam Sensitive 1 ug/ml       6/26     C diff indeterminate - toxin B positive                6/26     BC x1 GNR, Acinetobacter by PCR        Radiology Review:  All pertinent images / reports were reviewed as a part of this visit. CT abd 6/27/21  Impression   Patchy bibasilar infiltrates or atelectasis, left greater than right.       Postoperative changes noted from prior distal colonic resection with left lower quadrant diverting colostomy and Troncoso's pouch.       Percutaneous surgically placed drainage catheter seen in the lower abdomen.       Small amount of ascites is seen with fluid seen in the right subphrenic space.       Colon is relatively decompressed but shows no significant distention or definite wall thickening.       There are distended loops of small bowel most suggestive of ileus.       Focally distended bowel loop is seen in the left flank region anterior to the colon in an area of previous seen noted distended loop. There is areas of mixed air-fluid attenuation seen. This likely represents luminal contents. However, the possibility of    an immediately adjacent developing abscess would be difficult to exclude entirely and correlation with repeat evaluation with oral contrast in this area would be helpful to better evaluate.       There is otherwise no evidence of free air or extraluminal gas collection with no significant free pelvic fluid.       Focal area of strandy opacity of the left flank region in the subcutaneous fat may indicate focal anasarca or cellulitis.       Otherwise no acute process seen     CT abd 6/17/21  Impression   Free intraperitoneal air, predominantly in the upper abdomen and right upper quadrant. Site of perforation is not definitely identified.       Distended proximal to mid small bowel. Distal small bowel not distended, consistent with small bowel obstruction.       Stool throughout the colon. Distal colonic diverticulosis.       Small fat-containing umbilical hernia. Air within the hernia, with a possible skin defect anteriorly at the umbilicus. Correlate clinically.        Scheduled Meds:   potassium chloride  20 mEq Intravenous Q1H    insulin regular  0-15 Units Subcutaneous 6 times per day    vancomycin  125 mg Per NG tube Q6H    anidulafungin  100 mg Intravenous Q24H    metroNIDAZOLE  500 mg Intravenous Q8H    ceftazidime-acibactam (AVYCAZ) infusion  0.94 g Intravenous Q12H    methocarbamol IVPB  500 mg Intravenous Q8H    heparin (porcine)  5,000 Units Subcutaneous 3 times per day    metoprolol  5 mg Intravenous Q6H    pantoprazole  40 mg Intravenous BID    sodium chloride flush  5-40 mL Intravenous 2 times per day    OLANZapine zydis  5 mg Oral Nightly    sodium chloride flush  5-40 mL Intravenous 2 times per day       Continuous Infusions:   electrolyte-A      fat emulsion      dextrose      PN-Adult Premix 5/15 - Standard Electrolytes - Central Line      PN-Adult Premix 5/15 - Standard Electrolytes - Central Line 41.7 mL/hr at 06/28/21 1757    norepinephrine 10 mcg/min (06/29/21 0647)    sodium chloride      sodium chloride         PRN Meds:  glucose, dextrose, glucagon (rDNA), dextrose, HYDROmorphone **OR** HYDROmorphone, sodium chloride flush, sodium chloride, ipratropium-albuterol, sodium chloride flush, sodium chloride, ondansetron **OR** ondansetron, phenol      Assessment:     Obesity (BMI 43), HTN, COPD, dementia, PUD, HCV    Admit 6/17, acute abd / free air  OR 6/17, Nick's procedure  Resp failure - resolved     Ileus  Encephalopathy   Hypotension / lactic acidosis  UTI vs bacteriuria, cult + Klebsiella ESBL (intermediate to meropenem)  + BC 1 set with GNR, Acinetobacter by PCR (no GPC, Micro report corrected)  C diff positive - toxin indeterminate,  toxin B PCR positive      Plan:     Cont Ceftazidime-avibactam (Avacaz), Metronidazole, Anidulafungin  ADD PO Vancomycin 250 qid - will need to clamp NGT  May need to add rectal Vanco   Await f/u BC results - await Acinetobacter ID and sensitivity    Will review abd CT with Radiology  Wound care / postop care     Medical Decision Making:   The following items were considered in medical decision making:  Discussion of patient care with other providers  Reviewed clinical lab tests  Reviewed radiology tests  Reviewed other diagnostic tests/interventions  Independent review of radiologic images  Microbiology cultures and other micro tests reviewed      Spend 26 minutes on visit    Discussed with pt, RN  Vania Hansen MD

## 2021-06-29 NOTE — PROGRESS NOTES
Nephrology Note                                                                                                                                                                                                                                                                                                                                                               Office : [phone]     Fax :[phone]              Patient's Name: Kerri Mcneal    Renal function stable    UO decent   On pressors   On IVF       Past Medical History:   Diagnosis Date    Anxiety     Back pain     Bronchitis     C. difficile colitis 06/26/2021    Cancer ([facility] [address]     malignant neoplasm of bronchus and lung    Chronic pain     Dementia ([facility] [address]     Drug-seeking behavior     Emphysema lung ([facility] [address]     ESBL (extended spectrum beta-lactamase) producing bacteria infection 06/24/2021    left leg wound (Acinetobacter, Pseudomonas, and Klebsiella) on 5/19/2021; urine 6/24/2021 (Klebsiella)    GERD (gastroesophageal reflux disease)     Hepatitis C     HTN (hypertension)     Insomnia     Lumbar disc disease     Multiple drug resistant organism (MDRO) culture positive 05/19/2021    left leg wound (Acinetobacter, Pseudomonas, and Klebsiella)    Osteoporosis     Spinal stenosis        Past Surgical History:   Procedure Laterality Date    CHOLECYSTECTOMY  2/2011    F[place]    LAPAROTOMY N/A 6/17/2021    1. exploratory laparotomy 2. Nick's procedure performed by Antonette Perez MD at [facility] OR       Family History   Problem Relation Age of Onset    High Blood Pressure Father     Heart Disease Father         reports that she has been smoking cigarettes. She has a 24.50 pack-year smoking history. She has never used smokeless tobacco. She reports that she does not drink alcohol and does not use drugs.     Allergies:  Cyclobenzaprine, Penicillins, Acetaminophen, Codeine, Diphenhydramine hcl, Ketorolac, Naproxen, Diphenhydramine, Ketorolac tromethamine, Ketorolac tromethamine, and Sulfamethoxazole-trimethoprim    Current Medications:    HYDROmorphone (DILAUDID) injection 0.25 mg, Q3H PRN   Or  HYDROmorphone (DILAUDID) injection 0.5 mg, Q3H PRN  PN-Adult Premix 5/15 - Standard Electrolytes - Central Line, Continuous TPN  anidulafungin (ERAXIS) 100 mg in dextrose 5 % 130 mL IVPB, Q24H  metronidazole (FLAGYL) 500 mg in NaCl 100 mL IVPB premix, Q8H  ceftazidime-avibactam (AVYCAZ) 0.94 g in dextrose 5 % 100 mL IVPB, Q12H  methocarbamol (ROBAXIN) 500 mg in dextrose 5 % 100 mL IVPB, Q8H  sodium bicarbonate 75 mEq in sodium chloride 0.45 % 1,000 mL infusion, Continuous  heparin (porcine) injection 5,000 Units, 3 times per day  metoprolol (LOPRESSOR) injection 5 mg, Q6H  pantoprazole (PROTONIX) injection 40 mg, BID  norepinephrine (LEVOPHED) 16 mg in dextrose 5 % 250 mL infusion, Continuous  sodium chloride flush 0.9 % injection 5-40 mL, 2 times per day  sodium chloride flush 0.9 % injection 5-40 mL, PRN  0.9 % sodium chloride infusion, PRN  ipratropium-albuterol (DUONEB) nebulizer solution 1 ampule, Q4H PRN  OLANZapine zydis (ZYPREXA) disintegrating tablet 5 mg, Nightly  sodium chloride flush 0.9 % injection 5-40 mL, 2 times per day  sodium chloride flush 0.9 % injection 5-40 mL, PRN  0.9 % sodium chloride infusion, PRN  ondansetron (ZOFRAN-ODT) disintegrating tablet 4 mg, Q8H PRN   Or  ondansetron (ZOFRAN) injection 4 mg, Q6H PRN  phenol 1.4 % mouth spray 1 spray, Q2H PRN        Physical exam:     Vitals:  BP (!) 108/55   Pulse 98   Temp 97.3 °F (36.3 °C) (Axillary)   Resp (!) 31   Ht 5' 6\" (1.676 m)   Wt 270 lb 1 oz (122.5 kg)   SpO2 94%   BMI 43.59 kg/m²   Constitutional:  AA  Skin: no rash, turgor wnl  Heent:  eomi, mmm  Neck: no bruits or jvd noted  Cardiovascular:  S1, S2 without m/r/g  Respiratory: CTA B without w/r/r  Abdomen:  +bs, soft, ttp   Ext: + lower extremity edema  Psychiatric: mood and affect flat   Musculoskeletal:  Rom, muscular strength intact    Data:   Labs:  CBC:   Recent Labs     06/27/21  0250 06/28/21 0320 06/29/21  0342   WBC 31.6* 14.1* 21.6*   HGB 10.9* 9.2* 9.0*    215 216     BMP:    Recent Labs     06/28/21  0923 06/28/21 1629 06/28/21 2226   * 131* 130*   K 3.6 3.6 3.5   CL 97* 95* 94*   CO2 21 21 21   BUN 55* 54* 55*   CREATININE 2.5* 2.8* 2.4*   GLUCOSE 187* 180* 251*     Ca/Mg/Phos:   Recent Labs     06/27/21  0250 06/27/21  0929 06/28/21 0320 06/28/21 0320 06/28/21 0923 06/28/21 1629 06/28/21 2226 06/29/21  0342   CALCIUM 7.3*   < > 6.9*   < > 7.3* 7.3* 7.3*  --    MG 2.20  --  2.40  --   --   --   --  2.40   PHOS 4.4   < > 4.4   < > 4.2 4.2 4.5  --     < > = values in this interval not displayed. Hepatic:   Recent Labs     06/28/21 0320 06/29/21 0342   AST 77* 65*   ALT 34 14   BILITOT 2.6* 2.5*   ALKPHOS 111 104     Troponin: No results for input(s): TROPONINI in the last 72 hours. BNP: No results for input(s): BNP in the last 72 hours. Lipids: No results for input(s): CHOL, TRIG, HDL, LDLCALC, LABVLDL in the last 72 hours. ABGs:   Recent Labs     06/26/21  1437   PHART 7.410   PO2ART 65.4*   QFM8STK 24.2*     INR:   Recent Labs     06/28/21 0320   INR 3.62*     UA:No results for input(s): Natali Bun, GLUCOSEU, BILIRUBINUR, Stalin Karla, BLOODU, PHUR, PROTEINU, UROBILINOGEN, NITRU, LEUKOCYTESUR, Tonnie Boer in the last 72 hours. Urine Microscopic: No results for input(s): LABCAST, BACTERIA, COMU, HYALCAST, WBCUA, RBCUA, EPIU in the last 72 hours. Urine Culture: No results for input(s): LABURIN in the last 72 hours. Urine Chemistry:   Recent Labs     06/27/21  0620 06/27/21  1803   LABCREA 95.5  --    NAUR  --  <20             IMAGING:  CT ABDOMEN PELVIS WO CONTRAST Additional Contrast? Oral   Final Result      No evidence of abscess or significant bowel wall thickening. No significant change in distention of small bowel loops suggestive of ileus. Extensive asymmetric anasarca in the left lateral abdominal wall again seen. Small volume perihepatic ascites again seen. Mild inflammatory fat stranding adjacent to the urinary bladder which could represent sequela of infection/inflammation. CT ABDOMEN PELVIS WO CONTRAST Additional Contrast? None   Final Result      Patchy bibasilar infiltrates or atelectasis, left greater than right. Postoperative changes noted from prior distal colonic resection with left lower quadrant diverting colostomy and Troncoso's pouch. Percutaneous surgically placed drainage catheter seen in the lower abdomen. Small amount of ascites is seen with fluid seen in the right subphrenic space. Colon is relatively decompressed but shows no significant distention or definite wall thickening. There are distended loops of small bowel most suggestive of ileus. Focally distended bowel loop is seen in the left flank region anterior to the colon in an area of previous seen noted distended loop. There is areas of mixed air-fluid attenuation seen. This likely represents luminal contents. However, the possibility of    an immediately adjacent developing abscess would be difficult to exclude entirely and correlation with repeat evaluation with oral contrast in this area would be helpful to better evaluate. There is otherwise no evidence of free air or extraluminal gas collection with no significant free pelvic fluid. Focal area of strandy opacity of the left flank region in the subcutaneous fat may indicate focal anasarca or cellulitis. Otherwise no acute process seen. XR CHEST PORTABLE   Final Result      1. No acute process or consolidation   2. Right IJ central venous catheter appreciated with the tip in the right atrium.  NG tube in place with the tip in the upper stomach               XR ABDOMEN (KUB) (SINGLE AP VIEW)   Final Result      AP abdomen shows extensive gastric distention. XR CHEST PORTABLE   Final Result      Mild left basilar infiltrate or atelectasis. XR CHEST PORTABLE   Final Result      Low level of inspiration with more prominent bibasilar and right perihilar opacities felt to be most likely atelectasis. CT ABDOMEN PELVIS W IV CONTRAST Additional Contrast? Oral   Final Result      1. 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 2.  Interval development of mild perihepatic ascites with scattered small fluid collections within the abdomen and pelvis. No discrete findings for abscess formation on the current exam.      3.  Mildly distended proximal small bowel loops containing contrast, favoring ileus-type pattern. The degree of small bowel distention has improved when compared to prior exam.      4.  Diffuse body wall edema most pronounced over the left lateral abdomen, progressed from prior exam.      5.  Bibasilar airspace disease consistent with underlying atelectasis and/or infiltrate. XR CHEST PORTABLE   Final Result      Hyperexpanded lungs with bibasilar minimal atelectasis, unchanged. XR CHEST 1 VIEW   Final Result   1. Low lung volumes with persistent bilateral lower lobe atelectasis   2. Nasogastric tube in stomach      XR CHEST PORTABLE   Final Result      Free intraperitoneal air. No acute cardiopulmonary findings. Attending in the ED was notified of the free air from the patient's CT study performed earlier. CT ABDOMEN PELVIS W IV CONTRAST Additional Contrast? None   Final Result      Free intraperitoneal air, predominantly in the upper abdomen and right upper quadrant. Site of perforation is not definitely identified. Distended proximal to mid small bowel. Distal small bowel not distended, consistent with small bowel obstruction. Stool throughout the colon. Distal colonic diverticulosis. Small fat-containing umbilical hernia.  Air within the hernia, with a possible skin defect anteriorly at the umbilicus. Correlate clinically. Critical result findings were called to Kina Sexton in the ED at 2150 hours on 6/17/2021. Assessment/Plan   1. LAVINIA     2. HTN    3. Anemia    4. Acid- base/ Electrolyte imbalance - acidosis     5.  Perforated Sigmoid Diverticulitis s/p Ex-lap & Lyn     Plan   - dec IVF   - Bicarb better   - Pressors   - abx   - ID following   - Ur studies - reviewed   - monitor lytes   - No need for CRRT at this point   - will follow closely       Recommend to dose adjust all medications  based on renal functions  Maintain SBP> 90 mmHg   Daily weights   AVOID NSAIDs  Avoid Nephrotoxins  Monitor Intake/Output  Call if significant decrease in urine output                Thank you for allowing us to participate in care of Manas Santana MD  Feel free to contact me   [facility] [address]  Office : [phone]  Fax :[phone]

## 2021-06-29 NOTE — PROGRESS NOTES
ICU DAILY PROGRESS NOTE  PGY-1  CC:  SUBJECTIVE:   Interval Hx: Afebrile. MAPs 63-78. On 10 of Levo. Tachycardic improved. Patient is alert and oriented to self only. No nausea or vomiting overnight. Pain controlled. LINES/ACCESS:   Central Access: R IJ CVC (06/27)  Peripheral Access: L forearm midline (06/24), PIV L forearm 06/24  Arterial Line Access: R radial (06/26)                                Hannon Date placed: 06/26  NGT Date placed: 06/26  VENT SETTINGS:     / / /FiO2 : 32 %    Continuous Infusions:    PN-Adult Premix 5/15 - Standard Electrolytes - Central Line 41.7 mL/hr at 06/28/21 1757    fat emulsion 250 mL (06/28/21 1748)    IV infusion builder 75 mL/hr at 06/28/21 2236    norepinephrine 12 mcg/min (06/29/21 0544)    sodium chloride      sodium chloride         Scheduled Meds:    lactated ringers bolus  1,000 mL Intravenous Once    anidulafungin  100 mg Intravenous Q24H    metroNIDAZOLE  500 mg Intravenous Q8H    ceftazidime-acibactam (AVYCAZ) infusion  0.94 g Intravenous Q12H    methocarbamol IVPB  500 mg Intravenous Q8H    heparin (porcine)  5,000 Units Subcutaneous 3 times per day    metoprolol  5 mg Intravenous Q6H    pantoprazole  40 mg Intravenous BID    sodium chloride flush  5-40 mL Intravenous 2 times per day    OLANZapine zydis  5 mg Oral Nightly    sodium chloride flush  5-40 mL Intravenous 2 times per day          OBJECTIVE:   Vitals: BP (!) 99/48   Pulse 90   Temp 97.2 °F (36.2 °C) (Axillary)   Resp 15   Ht 5' 6\" (1.676 m)   Wt 270 lb 1 oz (122.5 kg)   SpO2 99%   BMI 43.59 kg/m²     I/O:     Intake/Output Summary (Last 24 hours) at 6/29/2021 0551  Last data filed at 6/29/2021 0345  Gross per 24 hour   Intake 2397.5 ml   Output 4138 ml   Net -1740.5 ml     I/O last 3 completed shifts:   In: 5554.5 [I.V.:4964.5; IV Piggyback:590]  Out: 7757 [Urine:853; Emesis/NG output:1025; Drains:3250; Stool:150]    Diet: Diet NPO Exceptions are: Ice Chips  PN-Adult Premix 5/15 - Standard Electrolytes - Central Line      Physical Examination:   General appearance: elderly, ill -appearing female, lying in bed, in NAD  HEENT: no scleral icterus, trachea midline, no JVD, R IJ CVC in place, NGT in place with bilious output  Chest/Lungs: mildly increased work of breathing, equal chest rise  Cardiovascular: RRR   Abdomen: obese, distention improved, midline wound vac intact with adequate suction, colostomy pink and viable with loose/liquid stool in bag. Old MIKE drain site c/d/i   Skin: warm and dry, no rashes  Extremities: no edema, no cyanosis, R radial arterial line in place  Neuro: alert, oriented to self only    Labs:  CBC:   Recent Labs     06/27/21  0250 06/28/21  0320 06/29/21  0342   WBC 31.6* 14.1* 21.6*   HGB 10.9* 9.2* 9.0*   HCT 31.7* 26.6* 26.8*    215 216       BMP:   Recent Labs     06/28/21  0923 06/28/21  1629 06/28/21  2226   * 131* 130*   K 3.6 3.6 3.5   CL 97* 95* 94*   CO2 21 21 21   BUN 55* 54* 55*   CREATININE 2.5* 2.8* 2.4*   GLUCOSE 187* 180* 251*     LFT's:   Recent Labs     06/28/21  0320 06/29/21  0342   AST 77* 65*   ALT 34 14   BILITOT 2.6* 2.5*   ALKPHOS 111 104     Troponin: No results for input(s): TROPONINI in the last 72 hours. BNP: No results for input(s): BNP in the last 72 hours. ABGs:   Recent Labs     06/26/21  1437   PHART 7.410   [id number] 24.2*   PO2ART 65.4*     INR:   Recent Labs     06/28/21  0320   INR 3.62*       U/A:No results for input(s): NITRITE, COLORU, PHUR, LABCAST, WBCUA, RBCUA, MUCUS, TRICHOMONAS, YEAST, BACTERIA, CLARITYU, SPECGRAV, LEUKOCYTESUR, UROBILINOGEN, BILIRUBINUR, BLOODU, GLUCOSEU, AMORPHOUS in the last 72 hours. Invalid input(s): KETONESU       ASSESSMENT AND PLAN:   Cem Mcdowell is a 65 y. o. female with perforated sigmoid diverticulits s/p exploratory laparotomy and Nick's procedure 4/69. [id number] course complicated by ileus type picture, UTI, and increasing leukocytosis.       Neuro:   - Robaxin; placed: 06/26  NGT Date placed: 06/26     Prophylaxis:  SQH     Mobility:  Bedrest until more stable     Dispo:   ICU  Code Status: Full Code  -----------------------------  Diego Marrufo  6/29/2021 5:51 AM   Pager [phone]    I have reviewed and discussed with student about the patient and agree with his/her physical exam findings and assessment/plan.     America Parekh MD  General Surgery, PGY1  06/29/21  6:50 AM

## 2021-06-29 NOTE — PROCEDURES
General Surgery  Interval Note:    Arterial line noted to be leaking. Line prepped and draped. Arterial line replaced over guidewire without complication. Line draws back blood and flushes easily. Sutured in place. Sterile dressing applied. Good waveform noted.     Yecenia Arias MD  General Surgery, PGY1  06/29/21  1:17 PM

## 2021-06-29 NOTE — DISCHARGE SUMMARY
Discharge Summary      Patient:  Gabriella Valera    Admit Date: 6/17/2021  8:19 PM    Discharge Date: No discharge date for patient encounter. Admitting Physician: Yojana Vásquez MD     Discharge Physician: same    Admitting Diagnosis:  Perforated viscus     Discharge Diagnosis: same     Past Medical History:   Diagnosis Date    Anxiety     Back pain     Bronchitis     C. difficile colitis 06/26/2021    Cancer ([facility] [address]     malignant neoplasm of bronchus and lung    Chronic pain     Dementia [facility] [address]     Drug-seeking behavior     Emphysema lung ([facility] [address]     ESBL (extended spectrum beta-lactamase) producing bacteria infection 06/24/2021    left leg wound (Acinetobacter, Pseudomonas, and Klebsiella) on 5/19/2021; urine 6/24/2021 (Klebsiella)    GERD (gastroesophageal reflux disease)     Hepatitis C     HTN (hypertension)     Insomnia     Lumbar disc disease     Multiple drug resistant organism (MDRO) culture positive 05/19/2021    left leg wound (Acinetobacter, Pseudomonas, and Klebsiella)    Osteoporosis     Spinal stenosis         Indication for Admission:   Gabriella Valera is a 65 y.o. female with hypertension, emphysema, Hepatitis C (fibroscan consistent with cirrhosis 2018), anxiety, gastritis, PUD (last EGD on file with improved gastric ulcer and duodenitis in 2013), recurrent cellulitis, dementia, and recent hospitalization for sepsis secondary to pseudomonas bacteremia who presents to the ED secondary to abdominal pain. The patient is unable to provide a detailed history; however, she does report that she has been experiencing pain for approximately one week with acute unprovoked exacerbation of the pain for 1 day. The patient is peritonitic, tachycardic, with a leukocytosis to 17.2, elevated lactate to 3.3, hyperglycemia, and hyponatremia. CT scan reveals pneumoperitoneum without a readily identifiable source for perforation.  She was taken to the OR for emergent exploratory laparotomy, possible bowel resection, possible ostomy, possible CUNHA ST LINDY patch, possible temporary abdominal closure     Hospital Course:   Patient was taken to the operating room for exploratory laparotomy and was found to have perforated sigmoid diverticulitis. Troncoso's procedure was completed. The medicine was consulted for medical management and critical care was consulted for increasing oxygen requirement. Diet was advanced to liquid diet on postop day 3 which per patient is tolerating well. On postop day 2, midline wound VAC was changed with improvements of the midline wound with granulation tissue at the base. Due to worsening coagulopathy, hematology was consulted and recommended vitamin K supplementation. On , patient has leukocytosis and CT scan was obtained without evidence of intra-abdominal infections or abscess. On 2021, inferior MIKE drain was removed without complication. Fever work-up was obtained. On , patient has increasing O2 requirement and large amount of emesis with altered mental status. NG tube was placed for decompression. Patient was transferred to the ICU and CVC and arterial lines were placed for access and continuous monitoring. Additionally, pressor was started. Over the course of the next few days, patient was found to have C Diff infection as well as Acinetobacter bacteremia and ESBL klebsiella UTI, ID consulted and appropriate antibiotics initiated, despite these pressor requirements continued to wax and wane, kidney function declined and patient was started on CRRT attempting to run even and over the course of a few days pressures continued to drop. On  a 3rd pressor was added for additional hemodynamic support. The family then requested patient to be transitioned to comfort care measure in the evening. The patient  at 2201. Procedures:  1. Exploratory laparotomy 2.  Nick's procedure    Consulting services:  Critical care  Infectious disease  Dietitian  Palliative care  Pharmacy  Nephrology  Hematology and oncology  Internal medicine    Discharge physical exam:  General: unresponsive  HEENT: No pupillary reflexes, NG tube in place  Respiratory: absent breathing sound on auscultation  Cardiac: no pulse, asystole  Abdominal: wound vac in place, ostomy  : wooten catheter in place  Extremity: no movement, edematous bilateral LE  Neuro: unresponsive, no reflexes    Disposition:      Condition at discharge:      Discharge Instructions:  N/A    Patient Instructions:   N/A      Kaia Jacob MD  21  12:54 PM

## 2021-06-29 NOTE — PROGRESS NOTES
Hospitalist Progress Note      PCP: No primary care provider on file. Chief Complaint. Presented to hospital for abd pain    Moved to ICU for septic shock, Blood cx positive for Strep, acinetobacter      Date of Admission: 6/17/2021    Subjective:   Patient is alert, oriented to self only. No nausea or vomiting overnight.          Medications:  Reviewed    Infusion Medications    electrolyte-A 50 mL/hr at 06/29/21 1015    fat emulsion      dextrose      PN-Adult Premix 5/15 - Standard Electrolytes - Central Line      PN-Adult Premix 5/15 - Standard Electrolytes - Central Line 41.7 mL/hr at 06/28/21 1757    norepinephrine 10 mcg/min (06/29/21 0647)    sodium chloride      sodium chloride       Scheduled Medications    potassium chloride  20 mEq Intravenous Q1H    insulin regular  0-15 Units Subcutaneous 6 times per day    vancomycin  125 mg Per NG tube Q6H    anidulafungin  100 mg Intravenous Q24H    metroNIDAZOLE  500 mg Intravenous Q8H    ceftazidime-acibactam (AVYCAZ) infusion  0.94 g Intravenous Q12H    methocarbamol IVPB  500 mg Intravenous Q8H    heparin (porcine)  5,000 Units Subcutaneous 3 times per day    metoprolol  5 mg Intravenous Q6H    pantoprazole  40 mg Intravenous BID    sodium chloride flush  5-40 mL Intravenous 2 times per day    OLANZapine zydis  5 mg Oral Nightly    sodium chloride flush  5-40 mL Intravenous 2 times per day     PRN Meds: glucose, dextrose, glucagon (rDNA), dextrose, HYDROmorphone **OR** HYDROmorphone, sodium chloride flush, sodium chloride, ipratropium-albuterol, sodium chloride flush, sodium chloride, ondansetron **OR** ondansetron, phenol      Intake/Output Summary (Last 24 hours) at 6/29/2021 1035  Last data filed at 6/29/2021 0800  Gross per 24 hour   Intake 4642.5 ml   Output 4305 ml   Net 337.5 ml       Physical Exam Performed:    /64   Pulse 107   Temp 97.3 °F (36.3 °C) (Axillary)   Resp 23   Ht 5' 6\" (1.676 m)   Wt 270 lb 1 oz (122.5 kg)   SpO2 92%   BMI 43.59 kg/m²     General appearance: lying in bed comfortably, NAD  HEENT:  Conjunctivae/corneas clear. Neck: Supple, with full range of motion. Respiratory:  Normal respiratory effort. Clear to auscultation, bilaterally  Cardiovascular: Regular rate and rhythm with normal S1/S2 without murmurs or rubs  Abdomen: obese, distention improved, midline wound vac intact with adequate suction, MIKE site pouched with ascites in bag, colostomy pink and viable with loose/liquid stool in bagMusculoskeletal: No cyanosis or edema bilaterally  Neurologic:  without any focal sensory/motor deficits. grossly non-focal.  Psychiatric: Alert and orientedx3, Normal mood  Peripheral Pulses: +2 palpable, equal bilaterally       Labs:   Recent Labs     06/28/21  0320 06/29/21  0342 06/29/21  0850   WBC 14.1* 21.6* 16.0*   HGB 9.2* 9.0* 8.9*   HCT 26.6* 26.8* 26.6*    216 173     Recent Labs     06/28/21  1629 06/28/21  2226 06/29/21  0850   * 130* 133*   K 3.6 3.5 3.3*   CL 95* 94* 95*   CO2 21 21 21   BUN 54* 55* 57*   CREATININE 2.8* 2.4* 2.4*   CALCIUM 7.3* 7.3* 7.6*   PHOS 4.2 4.5 4.4     Recent Labs     06/28/21  0320 06/29/21  0342   AST 77* 65*   ALT 34 14   BILIDIR 1.6* 1.5*   BILITOT 2.6* 2.5*   ALKPHOS 111 104     Recent Labs     06/28/21  0320   INR 3.62*     No results for input(s): CKTOTAL, TROPONINI in the last 72 hours. Urinalysis:      Lab Results   Component Value Date    NITRU Negative 06/24/2021    WBCUA 21-50 06/24/2021    BACTERIA 3+ 06/24/2021    RBCUA 21-50 06/24/2021    BLOODU MODERATE 06/24/2021    SPECGRAV >=1.030 06/24/2021    GLUCOSEU Negative 06/24/2021    GLUCOSEU Negative 12/15/2011       Radiology:  CT ABDOMEN PELVIS WO CONTRAST Additional Contrast? Oral   Final Result      No evidence of abscess or significant bowel wall thickening. No significant change in distention of small bowel loops suggestive of ileus.       Extensive asymmetric anasarca in the left lateral abdominal wall again seen. Small volume perihepatic ascites again seen. Mild inflammatory fat stranding adjacent to the urinary bladder which could represent sequela of infection/inflammation. CT ABDOMEN PELVIS WO CONTRAST Additional Contrast? None   Final Result      Patchy bibasilar infiltrates or atelectasis, left greater than right. Postoperative changes noted from prior distal colonic resection with left lower quadrant diverting colostomy and Troncoso's pouch. Percutaneous surgically placed drainage catheter seen in the lower abdomen. Small amount of ascites is seen with fluid seen in the right subphrenic space. Colon is relatively decompressed but shows no significant distention or definite wall thickening. There are distended loops of small bowel most suggestive of ileus. Focally distended bowel loop is seen in the left flank region anterior to the colon in an area of previous seen noted distended loop. There is areas of mixed air-fluid attenuation seen. This likely represents luminal contents. However, the possibility of    an immediately adjacent developing abscess would be difficult to exclude entirely and correlation with repeat evaluation with oral contrast in this area would be helpful to better evaluate. There is otherwise no evidence of free air or extraluminal gas collection with no significant free pelvic fluid. Focal area of strandy opacity of the left flank region in the subcutaneous fat may indicate focal anasarca or cellulitis. Otherwise no acute process seen. XR CHEST PORTABLE   Final Result      1. No acute process or consolidation   2. Right IJ central venous catheter appreciated with the tip in the right atrium. NG tube in place with the tip in the upper stomach               XR ABDOMEN (KUB) (SINGLE AP VIEW)   Final Result      AP abdomen shows extensive gastric distention.       XR CHEST PORTABLE   Final Result Mild left basilar infiltrate or atelectasis. XR CHEST PORTABLE   Final Result      Low level of inspiration with more prominent bibasilar and right perihilar opacities felt to be most likely atelectasis. CT ABDOMEN PELVIS W IV CONTRAST Additional Contrast? Oral   Final Result      1. 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 2.  Interval development of mild perihepatic ascites with scattered small fluid collections within the abdomen and pelvis. No discrete findings for abscess formation on the current exam.      3.  Mildly distended proximal small bowel loops containing contrast, favoring ileus-type pattern. The degree of small bowel distention has improved when compared to prior exam.      4.  Diffuse body wall edema most pronounced over the left lateral abdomen, progressed from prior exam.      5.  Bibasilar airspace disease consistent with underlying atelectasis and/or infiltrate. XR CHEST PORTABLE   Final Result      Hyperexpanded lungs with bibasilar minimal atelectasis, unchanged. XR CHEST 1 VIEW   Final Result   1. Low lung volumes with persistent bilateral lower lobe atelectasis   2. Nasogastric tube in stomach      XR CHEST PORTABLE   Final Result      Free intraperitoneal air. No acute cardiopulmonary findings. Attending in the ED was notified of the free air from the patient's CT study performed earlier. CT ABDOMEN PELVIS W IV CONTRAST Additional Contrast? None   Final Result      Free intraperitoneal air, predominantly in the upper abdomen and right upper quadrant. Site of perforation is not definitely identified. Distended proximal to mid small bowel. Distal small bowel not distended, consistent with small bowel obstruction. Stool throughout the colon. Distal colonic diverticulosis. Small fat-containing umbilical hernia. Air within the hernia, with a possible skin defect anteriorly at the umbilicus. Correlate clinically. Critical result findings were called to Jose Manuel Olivo in the ED at 2150 hours on 6/17/2021. Assessment/Plan:      Perforated Sigmoid Diverticulitis s/p Ex-lap & Saskia Case (06/27)  Ileus  Wound vac in place   Wound vac care per GS  Continue pain control regimen: Pain control per primary  Diet per GS  Mgt per Surgery      Septic shock  AGMA sec to Lactic acidosis  Peritonitis secondary to perforated sigmoid diverticulitis  Complicated UTI  Bacteremia  C. Diff colitis  Urine cult + Klebsiella ESBL (intermediate to meropenem)  + BC 1 set with GPC / GNR. GNR is Acinetobacter by PCR   C diff - toxin indeterminate,  toxin B PCR positive  ID consulted  Currently on Ceftazidime/Avibactam, Anidulafungin, Metronidazole; enteral vancomycin  Antibx: per ID  Requiring Levophed - currently on 10. Continue to wean pressors      LAVINIA: improving/stable  Nephrology following      Acute normocytic anemia  - Likely blood loss related to surgery as well as dilutional component  - No source of active bleeding       Coagulopathy  Elevated INR noted  - Hamatology consulted. Reviewed Medical reconciliation, Labs, vitals signs, previous records    PT/OT when stable  DVT PPx - per primary  Diet: Diet NPO Exceptions are: Ice Chips  PN-Adult Premix 5/15 - Standard Electrolytes - Central Line  PN-Adult Premix 5/15 - Standard Electrolytes - Central Line  Code Status: Full Code    PT/OT Eval Status: ordered    Dispo - Per primary Service.         Lucía Chen MD

## 2021-06-29 NOTE — PROGRESS NOTES
recommendations. · Clinimix-E includes standard electrolyte formulation. No additional electrolytes added. Will monitor electrolytes daily and will replete with supplemental IVPBs as needed. · Pt will receive the following electrolytes via supplemental IVPB today:  · Potassium chloride 60mEq IVPB   Glucose control: Increasing, with last . Will recommend to start SS q4h today. Given advancement of PN, will add 12 units of insulin to today's bag. Will monitor.  Patient will receive MVI 10 mL/day on MWF only (dur to national shortage) & Trace Elements 1 mL/day with PN daily.  Labs will be monitored daily and PN will be re-ordered daily. Weekly triglyceride ordered per PN protocol. Thank you, please call with questions.    Morene Shires PharmD., BCPS   6/29/2021 10:09 AM  Wireless: [phone]

## 2021-06-29 NOTE — CARE COORDINATION
Patient is from [facility] and the plan is for her to return there at d/c. She is now on Levophed drip, NG, restraints and TPN starting. WBC is elevated. I messaged the resident about possibly needing LTACH at d/c.      Electronically signed by Taylor Flores RN on 6/29/2021 at 10:10 AM  [phone]

## 2021-06-29 NOTE — PROCEDURES
General Surgery  Interval Note:    Given continuing hemodynamic changes in light of large fluid shifts, plan made for MIKE drain removal and wound closure. Patient's right-sided abdominal drain removed at bedside without issue. Wound prepped and draped prior to administration of ~3cc of 1% lidocaine. Subcutaneous tissues closed with figure of eight 3-0 vicryl. Skin closed with two interrupted 4-0 chromic sutures. Decrease in ascitic output noted upon wound closure. Wound dressed with gauze/tape. [address] Radha Lee MD  PGY1, General Surgery  06/28/21  9:50 PM  [phone]

## 2021-06-30 NOTE — PROGRESS NOTES
Hospitalist Progress Note      PCP: No primary care provider on file. Chief Complaint. Presented to hospital for abd pain    Moved to ICU for septic shock, Blood cx positive for Strep, acinetobacter      Date of Admission: 6/17/2021    Subjective:   Patient is alert, oriented to self only. No nausea or vomiting overnight. On 10 of Levophed. Tachycardic.    Hypotensive  Complained of pain overnight per nursing staff        Medications:  Reviewed    Infusion Medications    norepinephrine 8 mcg/min (06/30/21 2552)    fat emulsion      PN-Adult Premix 5/15 - Standard Electrolytes - Central Line      electrolyte-A 50 mL/hr at 06/30/21 0434    dextrose      PN-Adult Premix 5/15 - Standard Electrolytes - Central Line 70 mL/hr at 06/29/21 1749    sodium chloride      sodium chloride       Scheduled Medications    sodium chloride  1,000 mL Intravenous Once    insulin regular  0-18 Units Subcutaneous Q4H    methocarbamol IVPB  500 mg Intravenous Q8H    ceftazidime-acibactam (AVYCAZ) infusion  0.94 g Intravenous Q12H    vancomycin  250 mg Per NG tube 4 times per day    anidulafungin  100 mg Intravenous Q24H    metroNIDAZOLE  500 mg Intravenous Q8H    heparin (porcine)  5,000 Units Subcutaneous 3 times per day    metoprolol  5 mg Intravenous Q6H    pantoprazole  40 mg Intravenous BID    sodium chloride flush  5-40 mL Intravenous 2 times per day    OLANZapine zydis  5 mg Oral Nightly    sodium chloride flush  5-40 mL Intravenous 2 times per day     PRN Meds: glucose, dextrose, glucagon (rDNA), dextrose, HYDROmorphone **OR** HYDROmorphone, sodium chloride flush, sodium chloride, ipratropium-albuterol, sodium chloride flush, sodium chloride, ondansetron **OR** ondansetron, phenol      Intake/Output Summary (Last 24 hours) at 6/30/2021 0901  Last data filed at 6/30/2021 0800  Gross per 24 hour   Intake 5597.75 ml   Output 1947 ml   Net 3650.75 ml       Physical Exam Performed:    /63 Pulse 110   Temp 96.6 °F (35.9 °C) (Axillary)   Resp 26   Ht 5' 6\" (1.676 m)   Wt 288 lb 2.3 oz (130.7 kg)   SpO2 (!) 89%   BMI 46.51 kg/m²     General appearance: lying in bed comfortably, NAD  HEENT:  Conjunctivae/corneas clear. Neck: Supple, with full range of motion. Respiratory:  Normal respiratory effort. Clear to auscultation, bilaterally  Cardiovascular: Regular rate and rhythm with normal S1/S2 without murmurs or rubs  Abdomen: obese, distention improved, midline wound vac intact with adequate suction, MIKE site pouched with ascites in bag, colostomy pink and viable with loose/liquid stool in bagMusculoskeletal: No cyanosis or edema bilaterally  Neurologic:  without any focal sensory/motor deficits. grossly non-focal.  Psychiatric: Alert and orientedx3, Normal mood  Peripheral Pulses: +2 palpable, equal bilaterally       Labs:   Recent Labs     06/29/21  0342 06/29/21  0850 06/30/21  0350   WBC 21.6* 16.0* 21.5*   HGB 9.0* 8.9* 9.1*   HCT 26.8* 26.6* 27.7*    173 174     Recent Labs     06/29/21  1506 06/29/21  1957 06/30/21  0350   * 130* 131*   K 4.0 3.7 3.5   CL 95* 95* 96*   CO2 21 22 22   BUN 56* 56* 58*   CREATININE 2.6* 2.5* 2.6*   CALCIUM 7.5* 7.5* 7.8*   PHOS 3.9 3.8 3.8     Recent Labs     06/28/21  0320 06/29/21  0342 06/30/21  0350   AST 77* 65* 56*   ALT 34 14 22   BILIDIR 1.6* 1.5* 1.4*   BILITOT 2.6* 2.5* 2.3*   ALKPHOS 111 104 127     Recent Labs     06/28/21  0320 06/29/21  1335   INR 3.62* 2.97*     No results for input(s): CKTOTAL, TROPONINI in the last 72 hours.     Urinalysis:      Lab Results   Component Value Date    NITRU Negative 06/24/2021    WBCUA 21-50 06/24/2021    BACTERIA 3+ 06/24/2021    RBCUA 21-50 06/24/2021    BLOODU MODERATE 06/24/2021    SPECGRAV >=1.030 06/24/2021    GLUCOSEU Negative 06/24/2021    GLUCOSEU Negative 12/15/2011       Radiology:  CT ABDOMEN PELVIS WO CONTRAST Additional Contrast? Oral   Final Result      No evidence of abscess or significant bowel wall thickening. No significant change in distention of small bowel loops suggestive of ileus. Extensive asymmetric anasarca in the left lateral abdominal wall again seen. Small volume perihepatic ascites again seen. Mild inflammatory fat stranding adjacent to the urinary bladder which could represent sequela of infection/inflammation. CT ABDOMEN PELVIS WO CONTRAST Additional Contrast? None   Final Result      Patchy bibasilar infiltrates or atelectasis, left greater than right. Postoperative changes noted from prior distal colonic resection with left lower quadrant diverting colostomy and Troncoso's pouch. Percutaneous surgically placed drainage catheter seen in the lower abdomen. Small amount of ascites is seen with fluid seen in the right subphrenic space. Colon is relatively decompressed but shows no significant distention or definite wall thickening. There are distended loops of small bowel most suggestive of ileus. Focally distended bowel loop is seen in the left flank region anterior to the colon in an area of previous seen noted distended loop. There is areas of mixed air-fluid attenuation seen. This likely represents luminal contents. However, the possibility of    an immediately adjacent developing abscess would be difficult to exclude entirely and correlation with repeat evaluation with oral contrast in this area would be helpful to better evaluate. There is otherwise no evidence of free air or extraluminal gas collection with no significant free pelvic fluid. Focal area of strandy opacity of the left flank region in the subcutaneous fat may indicate focal anasarca or cellulitis. Otherwise no acute process seen. XR CHEST PORTABLE   Final Result      1. No acute process or consolidation   2. Right IJ central venous catheter appreciated with the tip in the right atrium.  NG tube in place with the tip in the upper stomach               XR ABDOMEN (KUB) (SINGLE AP VIEW)   Final Result      AP abdomen shows extensive gastric distention. XR CHEST PORTABLE   Final Result      Mild left basilar infiltrate or atelectasis. XR CHEST PORTABLE   Final Result      Low level of inspiration with more prominent bibasilar and right perihilar opacities felt to be most likely atelectasis. CT ABDOMEN PELVIS W IV CONTRAST Additional Contrast? Oral   Final Result      1. 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 2.  Interval development of mild perihepatic ascites with scattered small fluid collections within the abdomen and pelvis. No discrete findings for abscess formation on the current exam.      3.  Mildly distended proximal small bowel loops containing contrast, favoring ileus-type pattern. The degree of small bowel distention has improved when compared to prior exam.      4.  Diffuse body wall edema most pronounced over the left lateral abdomen, progressed from prior exam.      5.  Bibasilar airspace disease consistent with underlying atelectasis and/or infiltrate. XR CHEST PORTABLE   Final Result      Hyperexpanded lungs with bibasilar minimal atelectasis, unchanged. XR CHEST 1 VIEW   Final Result   1. Low lung volumes with persistent bilateral lower lobe atelectasis   2. Nasogastric tube in stomach      XR CHEST PORTABLE   Final Result      Free intraperitoneal air. No acute cardiopulmonary findings. Attending in the ED was notified of the free air from the patient's CT study performed earlier. CT ABDOMEN PELVIS W IV CONTRAST Additional Contrast? None   Final Result      Free intraperitoneal air, predominantly in the upper abdomen and right upper quadrant. Site of perforation is not definitely identified. Distended proximal to mid small bowel. Distal small bowel not distended, consistent with small bowel obstruction.       Stool throughout the colon. Distal colonic diverticulosis. Small fat-containing umbilical hernia. Air within the hernia, with a possible skin defect anteriorly at the umbilicus. Correlate clinically. Critical result findings were called to Helen Strickland in the ED at 2150 hours on 6/17/2021. Assessment/Plan:      Perforated Sigmoid Diverticulitis s/p Ex-lap & John Bud (06/27)  Ileus  Wound vac in place   Wound vac care per GS  Continue pain control regimen: Pain control per primary  Diet per GS  Mgt per Surgery      Septic shock  AGMA sec to Lactic acidosis  Peritonitis secondary to perforated sigmoid diverticulitis  Complicated UTI  Bacteremia  C. Diff colitis  Urine cult + Klebsiella ESBL (intermediate to meropenem)  + BC 1 set with GPC / GNR. GNR is Acinetobacter by PCR   C diff - toxin indeterminate,  toxin B PCR positive  ID consulted  Currently on Ceftazidime/Avibactam, Anidulafungin, Metronidazole; enteral vancomycin  Antibx: per ID  Requiring Levophed - currently on 10. Critical Care team consulted. LAVINIA: improving/stable  Nephrology following      Acute normocytic anemia  - Likely blood loss related to surgery as well as dilutional component  - No source of active bleeding       Coagulopathy  Elevated INR noted  - Hamatology consulted. Patient being followed at this time by multiple consultants and critical care service currently being consulted  -We will follow peripherally for now; and resume actively, when transferred from the ICU  -Please call with any questions or concerns. Reviewed Medical reconciliation, Labs, vitals signs, previous records    PT/OT when stable  DVT PPx - per primary  Diet: Diet NPO Exceptions are: Ice Chips  PN-Adult Premix 5/15 - Standard Electrolytes - Central Line  PN-Adult Premix 5/15 - Standard Electrolytes - Central Line  Code Status: Full Code    PT/OT Eval Status: ordered    Dispo - Per primary Service.             Sabrina Magana MD

## 2021-06-30 NOTE — CONSULTS
Clinical Pharmacy Consult Note    65 y.o. female admitted with perforated sigmoid diverticulitis, septic shock. Pharmacy has been asked by Dr. Edgard Farmer to adjust all drips to normal saline as appropriate based on compatibility to avoid hyperglycemia. The following intermittent IV drips/infusions have been adjusted to saline:  Methocarbamol  Ceftazidime-avibactam  Norepinepherine      The following medications must remain in D5W due to incompatibility with normal saline:  Amphotericin  Mycophenolate  Nitroprusside  Penicillin G Potassium    Please be aware that patient has D5W ordered as part of hypoglycemia orderset. [facility] will follow daily to ensure all new IVPBs + drips are in NS. Please call with questions.   Ty Malagon PharmD, [facility] 6/30/2021 6:25 AM

## 2021-06-30 NOTE — PROGRESS NOTES
ICU DAILY PROGRESS NOTE  PGY-1  CC:  SUBJECTIVE:   Interval Hx: Afebrile. MAPs 68-78 on 10 of Levophed. Tachycardic to 110s. No nausea or vomiting overnight. Complained of pain overnight per nursing staff.      LINES/ACCESS:   Central Access: R IJ CVC (06/27)  Peripheral Access: L forearm midline (06/24), PIV L forearm 06/24  Arterial Line Access: R radial (06/26)                                Hannon Date placed: 06/26  NGT Date placed: 06/26    Continuous Infusions:    electrolyte-A 50 mL/hr at 06/30/21 0434    dextrose      PN-Adult Premix 5/15 - Standard Electrolytes - Central Line 70 mL/hr at 06/29/21 1749    norepinephrine 10 mcg/min (06/29/21 2046)    sodium chloride      sodium chloride         Scheduled Meds:    sodium chloride  1,000 mL Intravenous Once    albumin human  25 g Intravenous Once    insulin regular  0-18 Units Subcutaneous Q4H    vancomycin  250 mg Per NG tube 4 times per day    anidulafungin  100 mg Intravenous Q24H    metroNIDAZOLE  500 mg Intravenous Q8H    ceftazidime-acibactam (AVYCAZ) infusion  0.94 g Intravenous Q12H    methocarbamol IVPB  500 mg Intravenous Q8H    heparin (porcine)  5,000 Units Subcutaneous 3 times per day    metoprolol  5 mg Intravenous Q6H    pantoprazole  40 mg Intravenous BID    sodium chloride flush  5-40 mL Intravenous 2 times per day    OLANZapine zydis  5 mg Oral Nightly    sodium chloride flush  5-40 mL Intravenous 2 times per day        PRN Meds: glucose, dextrose, glucagon (rDNA), dextrose, HYDROmorphone **OR** HYDROmorphone, sodium chloride flush, sodium chloride, ipratropium-albuterol, sodium chloride flush, sodium chloride, ondansetron **OR** ondansetron, phenol    OBJECTIVE:   Vitals: /60   Pulse 111   Temp 98.1 °F (36.7 °C)   Resp 30   Ht 5' 6\" (1.676 m)   Wt 270 lb 1 oz (122.5 kg)   SpO2 91%   BMI 43.59 kg/m²     I/O:     Intake/Output Summary (Last 24 hours) at 6/30/2021 0604  Last data filed at 6/30/2021 0436  Gross per 24 hour   Intake 5567.75 ml   Output 1716 ml   Net 3851.75 ml     I/O last 3 completed shifts: In: 6680.9 [I.V.:4561.2; NG/GT:130; IV Piggyback:1100]  Out: 56 [Urine:761; Emesis/NG output:1250; Drains:50; Stool:65]    Diet: Diet NPO Exceptions are: Ice Chips  PN-Adult Premix 5/15 - Standard Electrolytes - Central Line      Physical Examination:   General appearance: elderly, ill -appearing female, lying in bed, in NAD  HEENT: no scleral icterus, trachea midline, no JVD, R IJ CVC in place, NGT in place with bilious output  Chest/Lungs: mildly increased work of breathing, equal chest rise  Cardiovascular: Tachycardic, regular rhythm  Abdomen: obese, distended abdomen, midline wound vac intact with adequate suction, colostomy pink and viable with loose/liquid stool in bag. Old MIKE drain site c/d/i   Skin: warm and dry, no rashes  Extremities: 2+ bilateral LE pitting edema, no cyanosis, R radial arterial line in place  Neuro: alert, oriented to self only    Labs:  CBC:   Recent Labs     06/29/21  0342 06/29/21  0850 06/30/21  0350   WBC 21.6* 16.0* 21.5*   HGB 9.0* 8.9* 9.1*   HCT 26.8* 26.6* 27.7*    173 174       BMP:   Recent Labs     06/29/21  1506 06/29/21  1957 06/30/21  0350   * 130* 131*   K 4.0 3.7 3.5   CL 95* 95* 96*   CO2 21 22 22   BUN 56* 56* 58*   CREATININE 2.6* 2.5* 2.6*   GLUCOSE 301* 262* 236*     LFT's:   Recent Labs     06/28/21  0320 06/29/21  0342 06/30/21  0350   AST 77* 65* 56*   ALT 34 14 22   BILITOT 2.6* 2.5* 2.3*   ALKPHOS 111 104 127     Troponin: No results for input(s): TROPONINI in the last 72 hours. BNP: No results for input(s): BNP in the last 72 hours. ABGs: No results for input(s): PHART, BNG9EAR, PO2ART in the last 72 hours.   INR:   Recent Labs     06/28/21  0320 06/29/21  1335   INR 3.62* 2.97*       U/A:No results for input(s): NITRITE, COLORU, PHUR, LABCAST, WBCUA, RBCUA, MUCUS, TRICHOMONAS, YEAST, BACTERIA, CLARITYU, SPECGRAV, LEUKOCYTESUR, UROBILINOGEN, BILIRUBINUR, BLOODU, Christie Marroquin in the last 72 hours. Invalid input(s): Jennifer Delgado         ASSESSMENT AND PLAN:   Elvis Arzate is a 65 y.o. female with perforated sigmoid diverticulits s/p exploratory laparotomy and Nick's procedure 6/17. Post-operative course complicated by ileus type picture, UTI, and increasing leukocytosis. Neuro:   - Robaxin; Dilaudid pain panel  - Zyprexa 5mg qhs        Cardiovascular:   Septic versus hypovolemic shock in the setting of rapid fluid shifts  - Requiring Levophed - currently on 10. Continue to wean pressors.  - MAP goal > 60  - Septic shock unresponsive to fluid and unable to wean pressor requirements; will start stress dose steroids today  - IV Lopressor in place with hold orders given current pressor requirements  - EKG 6/29 with sinus tachycardia and PVCs. Pulmonary:  - CXR 06/26 without new consolidation   - High risk for aspiration PNA  - HOB to remain elevated >30 at all times  - Aggressive pulmonary toilet: Acapella, EZPap, IS  - Prn DuoNeshayla       FEN/GI:  Perforated Diverticulitis s/p Nick's procedure (06/17)  - CT scan on 06/27 without concern for leak or abscess  - Wound vac in place - changes MWF - will plan to change today.   - NGT with 1050 mL output over 24 hrs. Continue to CLW suction. - MIKE drain pulled and wound closed (06/28)  - Ostomy with minimal loose output - monitor   - Continue TPN; follow dietician recommendation. Lactic Acidosis  - 4.2 from 4.3  - Continue to trend q6hr  - Plasmalyte 50/hr        :  LAVINIA  - Cr 2.6 from 2.5 (baseline of 0.9)  - Hannon placed for strict I/Os  - progressively oliguric   - Nephrology onboard; continue to follow up recommendations   - Pt would benefit from Lasix; however BP may not be able to tolerate.         Hem/ID:   Anemia  - Hgb 9.1 from 9.2, stable  - no source of active bleeding      Leukocytosis  - WBC 21.5 from 16.0  - Remains afebrile  - Urinalysis (06/24): positive for Klebsiella pneumoniae  -

## 2021-06-30 NOTE — PROGRESS NOTES
Intravenous Continuous TPN Desi Prost, DO        anidulafungin (ERAXIS) 100 mg in sodium chloride 0.9 % 130 mL IVPB  100 mg Intravenous Daily Bennie Warren MD        electrolyte-A [facility]) solution   Intravenous Continuous Jose Ramon Navarrete MD 50 mL/hr at 06/30/21 0730 Rate Verify at 06/30/21 0730    glucose (GLUTOSE) 40 % oral gel 15 g  15 g Oral PRN Waleska Guerrero MD        dextrose 50 % IV solution  12.5 g Intravenous PRN Waleska Guerrero MD        glucagon (rDNA) injection 1 mg  1 mg Intramuscular PRN Waleska Guerrero MD        dextrose 5 % solution  100 mL/hr Intravenous PRN Waleska Guerrero MD        PN-Adult Premix 5/15 - Standard Electrolytes - Central Line   Intravenous Continuous TPN Desi Cortez DO 70 mL/hr at 06/29/21 1749 New Bag at 06/29/21 1749    vancomycin (VANCOCIN) oral solution 250 mg  250 mg Per NG tube 4 times per day Marily Hernandez MD   250 mg at 06/30/21 0602    HYDROmorphone (DILAUDID) injection 0.25 mg  0.25 mg Intravenous Q3H PRN Stewart Dumont MD        Or    HYDROmorphone (DILAUDID) injection 0.5 mg  0.5 mg Intravenous Q3H PRN Stewart Dumont MD   0.5 mg at 06/30/21 0936    metronidazole (FLAGYL) 500 mg in NaCl 100 mL IVPB premix  500 mg Intravenous Q8H Bennie Warren MD   Stopped at 06/30/21 1050    heparin (porcine) injection 5,000 Units  5,000 Units Subcutaneous 3 times per day Pankaj العلي MD   5,000 Units at 06/30/21 0546    metoprolol (LOPRESSOR) injection 5 mg  5 mg Intravenous Q6H Pankaj العلي MD   5 mg at 06/30/21 0949    pantoprazole (PROTONIX) injection 40 mg  40 mg Intravenous BID Desi Prost, DO   40 mg at 06/30/21 0934    sodium chloride flush 0.9 % injection 5-40 mL  5-40 mL Intravenous 2 times per day Sharri Comp, APRN - CNP   10 mL at 06/30/21 0934    sodium chloride flush 0.9 % injection 5-40 mL  5-40 mL Intravenous PRN Sharri Comp, APRN - CNP        0.9 % sodium chloride infusion  25 mL Intravenous PRN Sharri Comp, APRN - CNP  ipratropium-albuterol (DUONEB) nebulizer solution 1 ampule  1 ampule Inhalation Q4H PRN Doug Ganser, MD   1 ampule at 06/26/21 1425    OLANZapine zydis (ZYPREXA) disintegrating tablet 5 mg  5 mg Oral Nightly Rochelle Kerry, DO   5 mg at 06/29/21 2223    sodium chloride flush 0.9 % injection 5-40 mL  5-40 mL Intravenous 2 times per day Liddie Ruffini, DO   10 mL at 06/29/21 2253    sodium chloride flush 0.9 % injection 5-40 mL  5-40 mL Intravenous PRN Rochelle Kerry, DO        0.9 % sodium chloride infusion  25 mL Intravenous PRN Liddie Ruffini, DO        ondansetron (ZOFRAN-ODT) disintegrating tablet 4 mg  4 mg Oral Q8H PRN Liddie Ruffini, DO        Or    ondansetron (ZOFRAN) injection 4 mg  4 mg Intravenous Q6H PRN Rochelle Kerry, DO   4 mg at 06/27/21 1858    phenol 1.4 % mouth spray 1 spray  1 spray Mouth/Throat Q2H PRN Liddie Ruffini, DO              Review of Systems:    · History obtained from patient, otherwise, further 10 point ROS is negative        Physical Exam:    BP (!) 101/57   Pulse 96   Temp 96.6 °F (35.9 °C) (Axillary)   Resp 26   Ht 5' 6\" (1.676 m)   Wt 288 lb 2.3 oz (130.7 kg)   SpO2 92%   BMI 46.51 kg/m²     General appearance: alert and cooperative; no acute distress  Head: NCAT, PERRLA, EOMI; oral and nasopharynx without lesions, dentition adequate repair  Neck: no JVD or thyromegaly  Lungs: clear to auscultation bilaterally; no audible rhonchi, rales, wheezes or crackles  Heart: regular rate and rhythm, S1, S2 normal; no murmurs, rubs or gallops  Abdomen: soft, non-tender and non-distended; normoactive, tympanic bowel sounds; no hepatosplenomegaly  Extremities: no cyanosis, clubbing, edema or asymmetry  Skin: no jaundice, purpura or petechiae  Lymph Nodes:  no auricular, cervical, supraclavicular, axillary, inguinal or popliteal lymphadenopathy  Neurologic:  CN 2-12 intact; no focal motor, sensory or cerebellar deficits        Laboratory Evaluation:      CBC:   Lab Results   Component Value Date WBC 21.5 06/30/2021    NEUTROABS 17.2 06/30/2021    HGB 9.1 06/30/2021    MCV 93.5 06/30/2021     06/30/2021       BMP:   Lab Results   Component Value Date     06/30/2021    K 3.5 06/30/2021    K 3.5 06/17/2021    CO2 22 06/30/2021    BUN 58 06/30/2021    CREATININE 2.6 06/30/2021    MG 2.40 06/30/2021    TSH 3.97 04/06/2011       HEPATIC:  Lab Results   Component Value Date    AST 56 06/30/2021    ALT 22 06/30/2021    ALKPHOS 127 06/30/2021    PROT 4.8 06/30/2021    PROT 8.0 08/16/2012    BILITOT 2.3 06/30/2021    BILIDIR 1.4 06/30/2021           Radiology Evaluation:    Reviewed      Assessment / Plan:    INR increased - acquired:  Differential DIC vs factor deficiency such as factor 7    Received 3 days of vitamin K replacement, without improvement  INR trending down  No acute bleeding, outside of a-line    ddimer decreasing    Factor levels pending    For acute bleeding, FFP replacement      As always, thank you for allowing me the opportunity to participate in the care of your patient. Please do not hesitate to contact me with questions or concerns regarding further management. Mario Medrano DO, MS  [facility]    Please contact via:  1. [employer]  2.   Cell Phone:  [phone]    6/30/2021   11:48 AM

## 2021-06-30 NOTE — PROGRESS NOTES
Clinical Pharmacy Progress Note  Pharmacy to dose PN    Admit date: 2021     Subjective/Objective:  Patient is a Katie Grapes old female from [facility] with a PMHx significant for HTN, COPD, HCV, possible cirrhosis, PUD, and dementia. Admitted with perf sigmoid diverticulitis, now s/p Troncoso's procedure on , complicated by post-op ileus, UTI, and bactermia with MDRO. Interval Update:  C diff positive, now on PO vancomycin. A-line bleeding noted by RN, surgery replaced yesterday. Pharmacy is consulted to dose PN per Dr. Db Saunders and asked to convert all IVs to NS per Dr. Dandre Pelaez. Today is PN day #3  Current diet order:  NPO    Antibiotic regimen:    (-)  Metronidazole 500mg IV q8h (-, resumed - )  (-)  Ceftazidime/avibactam 0.94g IV q12h (-- ) -- day #4  Anidulafungin 200mg IV x1, then 100mg IV q24h (-- ) -- day #4  Vancomycin ORAL 125mg per NG tube Q6h (-- ) -- day #2    Height:  5' 6\" (167.6 cm)  Weight: 288 lb 2.3 oz (130.7 kg)    Recent Labs     21  1957 21  0350   * 131*   K 3.7 3.5   CL 95* 96*   CO2 22 22   BUN 56* 58*   CREATININE 2.5* 2.6*   GLUCOSE 262* 236*     Calcium 7.8   Albumin 2.0   Corrected Calcium 9.4  Phosphorus 3.8  Magnesium 2.4    Glucoses since TPN bag hun - 262 - 241 - 226 - 236 - 245      Recent Labs     21  0850 21  0350   WBC 16.0* 21.5*   HGB 8.9* 9.1*    174           Triglycerides collected     Micro:   Blood x2 = NGTD   Urine = Klebsiella ESBL -- Intermediate to meropenem, Sensitive to amikacin   Blood #1 = Acinetobacter baumannii, sensitivity pending. GNR   C.diff toxin moleculuar = positive      Assessment/Plan:  1)  Parenteral Nutrition:  Pharmacy to dose -- Day #3  · Will advance PN to Clinimix E 5/15 @ 70mL/hr (total volume = 1680mL/day) per dietary recommendations. Ordered Lipids 20% 250mL to infuse over 12 hrs.   Regimen provides AA 70g, Dextrose 252g, Lipids 50g. Total nutrition provides 1693 kcal per day. Appreciate dietitian recommendations. · Clinimix-E includes standard electrolyte formulation. No additional electrolytes added. Will monitor electrolytes daily and will replete with supplemental IVPBs as needed. · Patient received the following electrolytes additionally outside of TPN bag today:  · KCl 20 mEq IV x1 dose   Glucose control: All BG since last bag hung > 180. Recommend continuing SSI. Will increase to 20 units of insulin in today's bag. Will monitor.  Patient will receive MVI 10 mL/day on MWF only (due to national shortage) & Trace Elements 1 mL/day with PN daily.  Labs will be monitored daily and PN will be re-ordered daily. Weekly triglyceride ordered per PN protocol. 2) All IVs in NS for hyperglycemia  · The following IVs have been adjusted to NS:  · Methocarbamol  · Metronidazole (already in NS)  · Norepinephrine  · Ceftazidime-avibactam  · Anidulafungin       Thank you, please call with questions.    Cassandra Jacinto, PharmD, BCPS  Wireless: [phone]  Main pharmacy: [id number]  6/30/2021 8:11 AM

## 2021-06-30 NOTE — PROGRESS NOTES
Pt c/o pain throughout night. Dilaudid ineffective per pt. Surgery states unable to give PO analgesia d/t NG to LIWS. Pt very edematous, low UOP, colostomy productive of 100ml bilious fluid. Did receive albumin to attempt to help bring third spaced fluid into vasculature. Pt very fluid positive. Redressed midline as it was leaking serosanguinous fluid. Blood glucose remains high. Restraints remain in place.

## 2021-06-30 NOTE — PROGRESS NOTES
Progress Note  PGY-1    CC: abdominal pain  Patient's PCP: No primary care provider on file. Interval History    64 y/o female with PMHx of HTN, emphysema (no prior PFT), dementia, PUD, recurrent cellulitis, recurrent cellulitis and hepatitis C (>150 hepatitis C ab 05/19/2011) who was transferred to the ICU for septic shock. She was admitted to the hospital on 06/17 and she was found to have perforated diverticulitis and admitted for an exploratory laparotomy and Nick's procedure. Patient had postoperative hypoxia that was assumed to be due to atelectasis. Started on ciprofloxacin and Flagyl post op. Patient was transferred out of the ICU on 06/21. White blood cells kept on rising which prompts a CT abdomen to be done (06/22)-CT with ascites, no abscess, ileus thus NG tube was placed. Urine culture grew ESBL producing Klebsiella and ciprofloxacin was changed to meropenem (06/26). Vancomycin was added yesterday (06/29). Patient was given fluid boluses for hypotension-CVC and A-line were placed and patient was started on Levophed. He also developed an LAVINIA and nephrology was consulted. C. difficile came back positive and blood culture grew Acinetobacter. ID was consulted. Meropenem was changed to Ceftazidime-avibactam because the ESBL Klebsiella sensitivities were intermediate to Meropenem. MEDICATIONS:     No current facility-administered medications on file prior to encounter. Current Outpatient Medications on File Prior to Encounter   Medication Sig Dispense Refill    gabapentin (NEURONTIN) 100 MG capsule Take 100 mg by mouth 3 times daily.  furosemide (LASIX) 40 MG tablet Take 40 mg by mouth 2 times daily      potassium chloride (KLOR-CON M) 20 MEQ extended release tablet Take 1 tablet by mouth daily      traMADol (ULTRAM) 50 MG tablet Take 50 mg by mouth every 6 hours.       metoprolol tartrate (LOPRESSOR) 25 MG tablet Take 0.5 tablets by mouth 2 times daily 60 tablet 3    polyethylene glycol (GLYCOLAX) 17 g packet Take 17 g by mouth three times a week Every Monday, Wednesday and Friday for bowel regimen      famotidine (PEPCID) 20 MG tablet Take 40 mg by mouth daily       hydrOXYzine (ATARAX) 25 MG tablet Take 25 mg by mouth daily      lactulose (CHRONULAC) 10 GM/15ML solution Take 30 mLs by mouth daily      melatonin 5 MG TABS tablet Take 5 mg by mouth nightly as needed (insomnia)       OLANZapine (ZYPREXA) 5 MG tablet Take 5 mg by mouth nightly      carboxymethylcellulose (REFRESH TEARS) 0.5 % SOLN ophthalmic solution Place 1 drop into both eyes every 6 hours as needed (dry, red eyes)       senna-docusate (SENNA-PLUS) 8.6-50 MG per tablet Take 2 tablets by mouth daily as needed for Constipation      spironolactone (ALDACTONE) 50 MG tablet Take 50 mg by mouth daily      Multiple Vitamins-Minerals (THERAPEUTIC MULTIVITAMIN-MINERALS) tablet Take 1 tablet by mouth daily      Cholecalciferol (VITAMIN D3) 1.25 MG (12805 UT) CAPS Take 1 capsule by mouth every 30 days Every Month on the 15th      Balsam [place]-Castor Oil (VENELEX) OINT ointment Apply topically 2 times daily 1 Tube 0         Scheduled Meds:   sodium chloride  1,000 mL Intravenous Once    insulin regular  0-18 Units Subcutaneous Q4H    methocarbamol IVPB  500 mg Intravenous Q8H    ceftazidime-acibactam (AVYCAZ) infusion  0.94 g Intravenous Q12H    anidulafungin (ERAXIS) 100 mg IVPB  100 mg Intravenous Daily    vancomycin  250 mg Per NG tube 4 times per day    metroNIDAZOLE  500 mg Intravenous Q8H    heparin (porcine)  5,000 Units Subcutaneous 3 times per day    metoprolol  5 mg Intravenous Q6H    pantoprazole  40 mg Intravenous BID    sodium chloride flush  5-40 mL Intravenous 2 times per day    OLANZapine zydis  5 mg Oral Nightly    sodium chloride flush  5-40 mL Intravenous 2 times per day      Continuous Infusions:   norepinephrine 8 mcg/min (06/30/21 9059)    fat emulsion      PN-Adult Premix 5/15 - Standard Electrolytes - Central Line      electrolyte-A 50 mL/hr at 06/30/21 0730    dextrose      PN-Adult Premix 5/15 - Standard Electrolytes - Central Line 70 mL/hr at 06/29/21 1749    sodium chloride      sodium chloride       PRN Meds:glucose, dextrose, glucagon (rDNA), dextrose, HYDROmorphone **OR** HYDROmorphone, sodium chloride flush, sodium chloride, ipratropium-albuterol, sodium chloride flush, sodium chloride, ondansetron **OR** ondansetron, phenol    Allergies: Allergies   Allergen Reactions    Cyclobenzaprine Shortness Of Breath and Other (See Comments)     \"cramping\"      Penicillins Shortness Of Breath, Itching, Swelling and Hives    Acetaminophen     Codeine Itching and Hives     Itching      Diphenhydramine Hcl Other (See Comments)     Heart racing    Ketorolac Itching and Nausea Only     Pt tolerated dose without side effects    Naproxen Nausea Only, Other (See Comments) and Itching     Stomach pain    Bothers her stomach  Bothers her stomach      Diphenhydramine Nausea Only, Hives and Palpitations     Heart racing  Also states it makes her heart race      Ketorolac Tromethamine Nausea And Vomiting    Ketorolac Tromethamine Itching, Nausea Only and Nausea And Vomiting    Sulfamethoxazole-Trimethoprim      Abdominal pain         PHYSICAL EXAM:       Vitals: BP (!) 101/57   Pulse 96   Temp 96.6 °F (35.9 °C) (Axillary)   Resp 29   Ht 5' 6\" (1.676 m)   Wt 288 lb 2.3 oz (130.7 kg)   SpO2 92%   BMI 46.51 kg/m²     I/O:      Intake/Output Summary (Last 24 hours) at 6/30/2021 1221  Last data filed at 6/30/2021 1147  Gross per 24 hour   Intake 5567.75 ml   Output 1821 ml   Net 3746.75 ml     I/O this shift:  In: 10 [I.V.:10]  Out: 142 [Urine:142]  I/O last 3 completed shifts: In: 5597.8 [I.V.:2723.2; NG/GT:310; IV Piggyback:1100]  Out: 1966 [Urine:466; Emesis/NG output:1200; Drains:50; Stool:250]    Physical Examination:   ? General appearance: ill appearing.   ? HEENT: Head: Normocephalic. ? Lungs: clear to auscultation bilaterally  ? Heart: no murmur, click, rub or gallop. ? Abdomen: soft, non-tender; bowel sounds normal.  ? Extremities: B/l LE edema. ? Neurologic: Alert, orientated to self only. DATA:       Labs:  CBC:   Recent Labs     06/29/21  0342 06/29/21  0850 06/30/21  0350   WBC 21.6* 16.0* 21.5*   HGB 9.0* 8.9* 9.1*   HCT 26.8* 26.6* 27.7*    173 174       BMP:   Recent Labs     06/29/21  1957 06/30/21  0350 06/30/21  1045   * 131* 132*   K 3.7 3.5 3.5   CL 95* 96* 99   CO2 22 22 21   BUN 56* 58* 58*   CREATININE 2.5* 2.6* 2.2*   GLUCOSE 262* 236* 246*     LFT's:   Recent Labs     06/28/21  0320 06/29/21  0342 06/30/21  0350   AST 77* 65* 56*   ALT 34 14 22   BILITOT 2.6* 2.5* 2.3*   ALKPHOS 111 104 127     Troponin: No results for input(s): TROPONINI in the last 72 hours. BNP: No results for input(s): BNP in the last 72 hours. ABGs: No results for input(s): PHART, VSM9CEY, PO2ART in the last 72 hours. INR:   Recent Labs     06/28/21  0320 06/29/21  1335   INR 3.62* 2.97*     Lipids: No results for input(s): CHOL, HDL in the last 72 hours. Invalid input(s): LDLCALCU    U/A:No results for input(s): NITRITE, COLORU, PHUR, LABCAST, WBCUA, RBCUA, MUCUS, TRICHOMONAS, YEAST, BACTERIA, CLARITYU, SPECGRAV, LEUKOCYTESUR, UROBILINOGEN, BILIRUBINUR, BLOODU, GLUCOSEU, AMORPHOUS in the last 72 hours. Invalid input(s): Enrique Brine    Rad:  CT ABDOMEN PELVIS WO CONTRAST Additional Contrast? Oral   Final Result      No evidence of abscess or significant bowel wall thickening. No significant change in distention of small bowel loops suggestive of ileus. Extensive asymmetric anasarca in the left lateral abdominal wall again seen. Small volume perihepatic ascites again seen. Mild inflammatory fat stranding adjacent to the urinary bladder which could represent sequela of infection/inflammation.       CT ABDOMEN PELVIS WO CONTRAST Additional Contrast? None   Final Result      Patchy bibasilar infiltrates or atelectasis, left greater than right. Postoperative changes noted from prior distal colonic resection with left lower quadrant diverting colostomy and Troncoso's pouch. Percutaneous surgically placed drainage catheter seen in the lower abdomen. Small amount of ascites is seen with fluid seen in the right subphrenic space. Colon is relatively decompressed but shows no significant distention or definite wall thickening. There are distended loops of small bowel most suggestive of ileus. Focally distended bowel loop is seen in the left flank region anterior to the colon in an area of previous seen noted distended loop. There is areas of mixed air-fluid attenuation seen. This likely represents luminal contents. However, the possibility of    an immediately adjacent developing abscess would be difficult to exclude entirely and correlation with repeat evaluation with oral contrast in this area would be helpful to better evaluate. There is otherwise no evidence of free air or extraluminal gas collection with no significant free pelvic fluid. Focal area of strandy opacity of the left flank region in the subcutaneous fat may indicate focal anasarca or cellulitis. Otherwise no acute process seen. XR CHEST PORTABLE   Final Result      1. No acute process or consolidation   2. Right IJ central venous catheter appreciated with the tip in the right atrium. NG tube in place with the tip in the upper stomach               XR ABDOMEN (KUB) (SINGLE AP VIEW)   Final Result      AP abdomen shows extensive gastric distention. XR CHEST PORTABLE   Final Result      Mild left basilar infiltrate or atelectasis. XR CHEST PORTABLE   Final Result      Low level of inspiration with more prominent bibasilar and right perihilar opacities felt to be most likely atelectasis.        CT ABDOMEN PELVIS W IV CONTRAST Additional Contrast? Oral   Final Result      1. 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 2.  Interval development of mild perihepatic ascites with scattered small fluid collections within the abdomen and pelvis. No discrete findings for abscess formation on the current exam.      3.  Mildly distended proximal small bowel loops containing contrast, favoring ileus-type pattern. The degree of small bowel distention has improved when compared to prior exam.      4.  Diffuse body wall edema most pronounced over the left lateral abdomen, progressed from prior exam.      5.  Bibasilar airspace disease consistent with underlying atelectasis and/or infiltrate. XR CHEST PORTABLE   Final Result      Hyperexpanded lungs with bibasilar minimal atelectasis, unchanged. XR CHEST 1 VIEW   Final Result   1. Low lung volumes with persistent bilateral lower lobe atelectasis   2. Nasogastric tube in stomach      XR CHEST PORTABLE   Final Result      Free intraperitoneal air. No acute cardiopulmonary findings. Attending in the ED was notified of the free air from the patient's CT study performed earlier. CT ABDOMEN PELVIS W IV CONTRAST Additional Contrast? None   Final Result      Free intraperitoneal air, predominantly in the upper abdomen and right upper quadrant. Site of perforation is not definitely identified. Distended proximal to mid small bowel. Distal small bowel not distended, consistent with small bowel obstruction. Stool throughout the colon. Distal colonic diverticulosis. Small fat-containing umbilical hernia. Air within the hernia, with a possible skin defect anteriorly at the umbilicus. Correlate clinically. Critical result findings were called to Kina Sexton in the ED at 2150 hours on 6/17/2021.                 ASSESSMENT AND PLAN:   Kerri Mcneal is a 65 y.o. female, who was admitted with perforated sigmoid diverticulitis. Perforated sigmoid diverticulitis status post  exploratory laparotomy and Troncoso's procedure (06/27)  MIKE drain pulled and wound closed (06/28). Ostomy in place.  - Wound VAC was exchanged today  - TPN  - As below    Ileus 2/2 above  - NG tube placed. Follow-up daily output  - Per primary    Peritonitis 2/2 perforated sigmoid diverticulitis  Septic shock 2/2 above  Bacteremia  Complicated UTI  C. difficile colitis  Meds SIRS urine culture positive for ESBL Klebsiella (intermediate to meropenem), C. difficile toxin indeterminate, toxin B PCR positive, Acinetobacter positive. Wound VAC was exchanged today. - ID following  - Continue Anidulafungin 100 mg IV, Ceftazidime-avibactam 0.94 g IV, Flagyl 500 mg Q8hr and Vancomycin 250 mg Q6hr  - Next wound VAC exchanged 07/02  - MAP goal above 60   - Follow-up blood cultures    Anion gap metabolic acidosis 2/2 lactic acidosis (resolved)  Lactic acidosis secondary to demand ischemia (resolved)  LA 7.8 (highest). - Normalized today. Will stop trending    Acute metabolic encephalopathy 2/2 septic shock   Dementia  Per her son, patient is usually go home and with conversation. LAVINIA -prerenal likely from hypotension  Roland 0.5% (prerenal). - Nephrology following  - Continue Levophed  - Patient's fluid overload. Will restrict fluid intake  - Holding nephrotoxic medications  - Strict I's and O's  - Fluid overload. Cannot start Lasix due to hypotension    Coagulopathy  Elevated INR (acquired). DIC versus factor deficiency. S/p 3 days of vitamin K replacement without improvement. No acute bleeding.  - Heme following  - For acute bleeding, FFP replacement  - Factor level pending    HTN  Patient is hypotensive. Requiring Levophed. - Holding home Lasix 40 mg p.o. daily, Lopressor 25 mg p.o. daily, spironolactone 50 mg p.o. daily    Acute normocytic anemia  Stable. - Daily CBC    Hepatitis C, cirrhosis  Stable. FibroScan consistent with cirrhosis 2018.     LUIS ENRIQUE -    Hyperglycemia likely from stress  Hemoglobin A1c (06/29): 5.3%. - High sliding scale insulin started today    Code Status:Full Code  FEN: TPN  PPX: Heparin Q8hr  DISPO: ICU    This patient has been staffed and discussed with Seth Zhou MD.  -----------------------------  Brittany Arellano MD, PGY-1  Internal Medicine    Pulm/CC    Patient seen and examined. I agree with Dr. Noreen Miner history, physical, lab findings, assessment and plan. Critical care service asked to see Danielle Walter again for persistent septic shock. Her course has been complicated by multiple infections including Klebsiella ESBL in the urine, Acetobacter in the blood and now C. Difficile with treatment started yesterday with oral Vanco.    This is on a background of perforated sigmoid diverticulitis and cirrhosis. She has a coagulopathy - DIC versus liver failure. Abdomen quite low at 2 and bilirubin is 2.3.    -We will check factor VIII to try to differentiate consumption versus production problem  -Check cortisol to evaluate for relative adrenal insufficiency  -Continue Levophed with goal map of 65  -Continue oral vancomycin.   Agree she may need vancomycin [place]  -Continue other antibiotics per Dr. Kyle Wiley  -At times C. difficile requires significant volume resuscitation, more so than your average septic shock - fluid challenge may help differentiate if she has been volume resuscitated enough  -Check chest x-ray    Seth Zhou MD

## 2021-06-30 NOTE — PROGRESS NOTES
ID Follow-up NOTE    CC:   Peritonitis secondary to perforated sigmoid diverticulitis, UTI, bacteremia, C diff  Antibiotics: Ceftazidime-avibactam (Opal Bethea), Metronidazole, Anidulafungin    Admit Date: 6/17/2021   Hospital Day: 14    Subjective:     Patient is lethargic. Confused at time per RN  Abd pain with exam  Little stool output. 300 ml for NGT all day    Objective:     Patient Vitals for the past 8 hrs:   BP Temp Temp src Pulse Resp SpO2   06/30/21 1700 99/60 97.5 °F (36.4 °C) Axillary 101 19 (!) 88 %   06/30/21 1600 99/60   102 (!) 31 91 %   06/30/21 1500    102 24 (!) 89 %   06/30/21 1400 (!) 101/54   94 17 92 %   06/30/21 1300 (!) 116/51   100 27 91 %   06/30/21 1200 (!) 105/43   99 24 92 %   06/30/21 1148     29 92 %   06/30/21 1100 (!) 101/57   96 26 92 %     I/O last 3 completed shifts: In: 3713.2 [I.V.:2202.6; NG/GT:270; IV Piggyback:100]  Out: 5653 [Urine:431; Emesis/NG output:1050; Drains:50; Stool:250]  I/O this shift:  In: 2441 [I.V.:2441]  Out: 475 [Urine:75; Emesis/NG output:300; Drains:100]    EXAM:  GENERAL: No apparent distress.   4L. Levofed 15  HEENT: Membranes dry, no oral lesion - NGT on suction  NECK:  Supple, no lymphadenopathy  LUNGS: Clear b/l, no rales, no dullness  CARDIAC: RRR, no murmur appreciated  ABD:  Scant BS, soft -  colostomy, midline wound with VAC (saw wound at time of dressing change 6/29)  EXT:  No rash, no edema, no lesions - bilateral LE venous stasis  NEURO: No focal neurologic findings  LINES:  R IJ line, R radial a-line, peripheral iv       Data Review:  Lab Results   Component Value Date    WBC 21.5 (H) 06/30/2021    HGB 9.1 (L) 06/30/2021    HCT 27.7 (L) 06/30/2021    MCV 93.5 06/30/2021     06/30/2021     Lab Results   Component Value Date    CREATININE 2.2 (H) 06/30/2021    BUN 58 (H) 06/30/2021     (L) 06/30/2021    K 3.5 06/30/2021    CL 99 06/30/2021    CO2 21 06/30/2021       Hepatic Function Panel:   Lab Results   Component Value Date    ALKPHOS 127 06/30/2021    ALT 22 06/30/2021    AST 56 06/30/2021    PROT 4.8 06/30/2021    PROT 8.0 08/16/2012    BILITOT 2.3 06/30/2021    BILIDIR 1.4 06/30/2021    IBILI 0.9 06/30/2021    LABALBU 2.1 06/30/2021       Cultures:   6/24     UC >100k ESBL K pneumoniae  Klebsiella pneumoniae (esbl)   Antibiotic Interpretation JENNIFER   amikacin Sensitive <=8 mcg/mL   amoxicillin-clavulanate Resistant >16/8 mcg/mL   ampicillin Resistant >16 mcg/mL   ceFAZolin Resistant >16 mcg/mL   cefepime Resistant >16 mcg/mL   cefTRIAXone Resistant >32 mcg/mL   cefuroxime Resistant >16 mcg/mL   ciprofloxacin Resistant >2 mcg/mL   ertapenem Resistant >1 mcg/mL   gentamicin Sensitive <=4 mcg/mL   meropenem Intermediate 8 mcg/mL   nitrofurantoin Resistant >64 mcg/mL   trimethoprim-sulfamethoxazole Resistant >2/38 mcg/mL   cefTAZidime-avibactam Sensitive 1 ug/ml       6/26     C diff indeterminate - toxin B positive                6/26     BC x1 Acinetobacter baumannii / haemolyticus        Radiology Review:  All pertinent images / reports were reviewed as a part of this visit. CT abd 6/27/21  Impression   Patchy bibasilar infiltrates or atelectasis, left greater than right.       Postoperative changes noted from prior distal colonic resection with left lower quadrant diverting colostomy and Troncoso's pouch.       Percutaneous surgically placed drainage catheter seen in the lower abdomen.       Small amount of ascites is seen with fluid seen in the right subphrenic space.       Colon is relatively decompressed but shows no significant distention or definite wall thickening.       There are distended loops of small bowel most suggestive of ileus.       Focally distended bowel loop is seen in the left flank region anterior to the colon in an area of previous seen noted distended loop. There is areas of mixed air-fluid attenuation seen. This likely represents luminal contents.  However, the possibility of    an immediately adjacent developing abscess would be difficult to exclude entirely and correlation with repeat evaluation with oral contrast in this area would be helpful to better evaluate.       There is otherwise no evidence of free air or extraluminal gas collection with no significant free pelvic fluid.       Focal area of strandy opacity of the left flank region in the subcutaneous fat may indicate focal anasarca or cellulitis.       Otherwise no acute process seen     CT abd 6/17/21  Impression   Free intraperitoneal air, predominantly in the upper abdomen and right upper quadrant. Site of perforation is not definitely identified.       Distended proximal to mid small bowel. Distal small bowel not distended, consistent with small bowel obstruction.       Stool throughout the colon. Distal colonic diverticulosis.       Small fat-containing umbilical hernia. Air within the hernia, with a possible skin defect anteriorly at the umbilicus. Correlate clinically.        Scheduled Meds:   sodium chloride  1,000 mL Intravenous Once    insulin regular  0-18 Units Subcutaneous Q4H    ceftazidime-acibactam (AVYCAZ) infusion  0.94 g Intravenous Q12H    anidulafungin (ERAXIS) 100 mg IVPB  100 mg Intravenous Daily    vancomycin  250 mg Per NG tube 4 times per day    metroNIDAZOLE  500 mg Intravenous Q8H    heparin (porcine)  5,000 Units Subcutaneous 3 times per day    metoprolol  5 mg Intravenous Q6H    pantoprazole  40 mg Intravenous BID    sodium chloride flush  5-40 mL Intravenous 2 times per day    OLANZapine zydis  5 mg Oral Nightly    sodium chloride flush  5-40 mL Intravenous 2 times per day       Continuous Infusions:   norepinephrine 14 mcg/min (06/30/21 1410)    fat emulsion      PN-Adult Premix 5/15 - Standard Electrolytes - Central Line      electrolyte-A 50 mL/hr at 06/30/21 0730    dextrose      sodium chloride      sodium chloride         PRN Meds:  glucose, dextrose, glucagon (rDNA), dextrose, HYDROmorphone **OR** HYDROmorphone, sodium chloride flush, sodium chloride, ipratropium-albuterol, sodium chloride flush, sodium chloride, ondansetron **OR** ondansetron, phenol      Assessment:     Obesity (BMI 43), HTN, COPD, dementia, PUD, HCV    Admit 6/17, acute abd / free air  OR 6/17, Nick's procedure  Resp failure - resolved     Ileus  Encephalopathy   Hypotension / lactic acidosis  LAVINIA - Cr down  Leukocytosis     UTI vs bacteriuria, cult + Klebsiella ESBL (intermediate to meropenem)  + BC 1 set with Acinetobacter baumannii (no GPC, Micro report corrected)  C diff positive - toxin indeterminate,  toxin B PCR positive, colitis on CT       Plan:     Cont Ceftazidime-avibactam (Avacaz), Metronidazole, Anidulafungin  Cont PO Vancomycin 250 qid - will need to clamp NGT (30 min prior / 1 hr after doses)  May need to add rectal Vanco   Await updated BC results - await Acinetobacter sensitivity    Wound care / postop care     Medical Decision Making:   The following items were considered in medical decision making:  Discussion of patient care with other providers  Reviewed clinical lab tests  Reviewed radiology tests  Reviewed other diagnostic tests/interventions  Independent review of radiologic images - reviewed with Radiologist on 6/29  Microbiology cultures and other micro tests reviewed      Spend 26 minutes on visit    Discussed with pt, RN  Eudora Brittle, MD

## 2021-06-30 NOTE — PLAN OF CARE
Problem: Pain:  Description: Pain management should include both nonpharmacologic and pharmacologic interventions. Goal: Pain level will decrease  Description: Pain level will decrease  Outcome: Ongoing  Note: Pt given Dilaudid 0.5 mg x1 this shift for R arm pain. Goal: Control of acute pain  Description: Control of acute pain  Outcome: Ongoing  Goal: Control of chronic pain  Description: Control of chronic pain  Outcome: Ongoing     Problem: Non-Violent Restraints  Goal: Removal from restraints as soon as assessed to be safe  Outcome: Ongoing  Goal: No harm/injury to patient while restraints in use  Outcome: Ongoing  Goal: Patient's dignity will be maintained  Outcome: Ongoing     Problem: Skin Integrity:  Goal: Will show no infection signs and symptoms  Description: Will show no infection signs and symptoms  Outcome: Ongoing  Goal: Absence of new skin breakdown  Description: Absence of new skin breakdown  Outcome: Ongoing  Note: BLE bertin, weeping with dry flaky patches. Pt turned & repositioned q2h, bilateral heel offloading boots & low air loss alternating pressure mattress in use. Sacral dressing in place for prophylaxis.        Problem: Falls - Risk of:  Goal: Will remain free from falls  Description: Will remain free from falls  Outcome: Ongoing  Goal: Absence of physical injury  Description: Absence of physical injury  Outcome: Ongoing     Problem: OXYGENATION/RESPIRATORY FUNCTION  Goal: Patient will achieve/maintain normal respiratory rate/effort  Outcome: Ongoing     Problem: MOBILITY  Goal: Early mobilization is achieved  Outcome: Not Met This Shift     Problem: Confusion - Acute:  Goal: Absence of continued neurological deterioration signs and symptoms  Description: Absence of continued neurological deterioration signs and symptoms  Outcome: Ongoing  Goal: Mental status will be restored to baseline  Description: Mental status will be restored to baseline  Outcome: Not Met This Shift     Problem: Discharge Planning:  Goal: Ability to perform activities of daily living will improve  Description: Ability to perform activities of daily living will improve  Outcome: Not Met This Shift  Goal: Participates in care planning  Description: Participates in care planning  Outcome: Not Met This Shift     Problem: Injury - Risk of, Physical Injury:  Goal: Will remain free from falls  Description: Will remain free from falls  Outcome: Ongoing  Goal: Absence of physical injury  Description: Absence of physical injury  Outcome: Ongoing     Problem: Mood - Altered:  Goal: Mood stable  Description: Mood stable  Outcome: Ongoing  Goal: Absence of abusive behavior  Description: Absence of abusive behavior  Outcome: Ongoing  Goal: Verbalizations of feeling emotionally comfortable while being cared for will increase  Description: Verbalizations of feeling emotionally comfortable while being cared for will increase  Outcome: Ongoing     Problem: Psychomotor Activity - Altered:  Goal: Absence of psychomotor disturbance signs and symptoms  Description: Absence of psychomotor disturbance signs and symptoms  Outcome: Ongoing     Problem: Sensory Perception - Impaired:  Goal: Demonstrations of improved sensory functioning will increase  Description: Demonstrations of improved sensory functioning will increase  Outcome: Ongoing  Goal: Decrease in sensory misperception frequency  Description: Decrease in sensory misperception frequency  Outcome: Ongoing  Goal: Able to refrain from responding to false sensory perceptions  Description: Able to refrain from responding to false sensory perceptions  Outcome: Ongoing  Goal: Demonstrates accurate environmental perceptions  Description: Demonstrates accurate environmental perceptions  Outcome: Ongoing  Goal: Able to distinguish between reality-based and nonreality-based thinking  Description: Able to distinguish between reality-based and nonreality-based thinking  Outcome: Ongoing  Goal: Able to interrupt nonreality-based thinking  Description: Able to interrupt nonreality-based thinking  Outcome: Ongoing     Problem: Sleep Pattern Disturbance:  Goal: Appears well-rested  Description: Appears well-rested  Outcome: Not Met This Shift     Problem: Nutrition  Goal: Optimal nutrition therapy  Outcome: Ongoing  Note: TPN continued     Problem: Nausea/Vomiting:  Goal: Absence of nausea/vomiting  Description: Absence of nausea/vomiting  Outcome: Met This Shift  Goal: Able to drink  Description: Able to drink  Outcome: Not Met This Shift  Goal: Able to eat  Description: Able to eat  Outcome: Not Met This Shift  Goal: Ability to achieve adequate nutritional intake will improve  Description: Ability to achieve adequate nutritional intake will improve  Outcome: Not Met This Shift

## 2021-06-30 NOTE — PLAN OF CARE
Will remain free from falls  Description: Will remain free from falls  6/29/2021 2157 by Mitch Boeck, RN  Outcome: Ongoing  6/29/2021 1958 by Mary Guerrero RN  Outcome: Ongoing  Goal: Absence of physical injury  Description: Absence of physical injury  6/29/2021 2157 by Mitch Boeck, RN  Outcome: Ongoing  6/29/2021 1958 by Mary Guerrero RN  Outcome: Ongoing     Problem: OXYGENATION/RESPIRATORY FUNCTION  Goal: Patient will achieve/maintain normal respiratory rate/effort  6/29/2021 2157 by Mitch Boeck, RN  Outcome: Ongoing  6/29/2021 1958 by Mary Guerrero RN  Outcome: Ongoing     Problem: MOBILITY  Goal: Early mobilization is achieved  6/29/2021 2157 by Mitch Boeck, RN  Outcome: Ongoing  6/29/2021 1958 by Mary Guerrero RN  Outcome: Not Met This Shift     Problem: Confusion - Acute:  Goal: Absence of continued neurological deterioration signs and symptoms  Description: Absence of continued neurological deterioration signs and symptoms  6/29/2021 2157 by Mitch Boeck, RN  Outcome: Ongoing  6/29/2021 1958 by Mary Guerrero RN  Outcome: Ongoing  Goal: Mental status will be restored to baseline  Description: Mental status will be restored to baseline  6/29/2021 2157 by Mitch Boeck, RN  Outcome: Ongoing  6/29/2021 1958 by Mary Guerrero RN  Outcome: Not Met This Shift     Problem: Discharge Planning:  Goal: Ability to perform activities of daily living will improve  Description: Ability to perform activities of daily living will improve  6/29/2021 2157 by Mitch Boeck, RN  Outcome: Ongoing  6/29/2021 1958 by Mary Guerrero RN  Outcome: Not Met This Shift  Goal: Participates in care planning  Description: Participates in care planning  6/29/2021 2157 by Mitch Boeck, RN  Outcome: Ongoing  6/29/2021 1958 by Mary Guerrero RN  Outcome: Not Met This Shift     Problem: Mood - Altered:  Goal: Mood stable  Description: Mood stable  6/29/2021 2157 by Mitch Boeck, RN  Outcome: Ongoing  6/29/2021 1958 by Teofilo Alvarez RN  Outcome: Ongoing  Goal: Absence of abusive behavior  Description: Absence of abusive behavior  6/29/2021 2157 by Luis Enrique Farris RN  Outcome: Ongoing  6/29/2021 1958 by Teofilo Alvarez RN  Outcome: Ongoing  Goal: Verbalizations of feeling emotionally comfortable while being cared for will increase  Description: Verbalizations of feeling emotionally comfortable while being cared for will increase  6/29/2021 2157 by Luis Enrique Farris RN  Outcome: Ongoing  6/29/2021 1958 by Teofilo Alvarez RN  Outcome: Ongoing

## 2021-07-01 PROBLEM — R65.21 SEPTIC SHOCK (HCC): Status: ACTIVE | Noted: 2021-01-01

## 2021-07-01 NOTE — CONSULTS
[facility]  Palliative Medicine Consultation Note      Date Of Admission:6/17/2021  Date of consult: 07/01/21  Seen by [facility] in the past:  No    Recommendations:        Met with the pt and son Joanna Cerda at the bedside, introduced palliative care. The pt is drowsy and unable to participate in a goals of care discussion. Discussed Sumeet's understanding of pt's medical conditions. He understands that pt is critically ill, although remains hopeful for recovery. We discussed code status in depth. He reports pt would be OK with short term intubation, escalation of pressors but would not want chest compressions or shocks if she were to go into a cardiac arrest. Updated pt's code status to [facility]. Pt's son Joanna Cerda is her legal guardian, verified with [facility] court. 1. Goals of Care/Advanced Care planning/Code status: DNRCCA, discussed in depth today. Continue with aggressive management, pt's son remains hopeful for recovery and is open to further discussion if pt declines. 2. Pain: Pt unable to state, does not appear to be uncomfortable. Has orders for dilaudid prn.    3. SOB: Currently on 5LNC  4. AMS 2/2 septic shock: Per pt's son she is usually alert and oriented to self, time and place, conversational, slight forgetfulness. Pt is lethargic on exam, able to speak one to two words at a time. 5. Disposition: TBD pending hospital course    Reason for Consult:         [x]  Goals of Care  [x]  Code Status Discussion/Advanced Care Planning   [x]  Psychosocial/Family Support  []  Symptom Management  []  Other (Specify)    Requesting Physician: Dr. Nadir Kraft:  ABD Pain    History Obtained From:  electronic medical record    History of Present Illness:          Narayan Munoz is a 65 y.o. female with PMH of HTN, emphysema, Hep C, anxiety, gastritis, PUD, recurrent cellulitis, dementia and recent hospitalization for sepsis secondary to pseudomonas bacteremia  who presented with abdominal pain from [facility] long term care. CT scan in the ED revealed pneumoperitoneum without identifiable source for perforation. She was taken to OR for exploratory laparotomy, found to have perforated diverticulitis s/p Nick's procedure. Since admission pt has been diagnosed with c diff colitis, UTI, blood culture positive for acinetobacter. Subjective:         Past Medical History:        Diagnosis Date    Anxiety     Back pain     Bronchitis     C. difficile colitis 06/26/2021    Cancer ([facility] [address]     malignant neoplasm of bronchus and lung    Chronic pain     Dementia ([facility] [address]     Drug-seeking behavior     Emphysema lung ([facility] [address]     ESBL (extended spectrum beta-lactamase) producing bacteria infection 06/24/2021    left leg wound (Acinetobacter, Pseudomonas, and Klebsiella) on 5/19/2021; urine 6/24/2021 (Klebsiella)    GERD (gastroesophageal reflux disease)     Hepatitis C     HTN (hypertension)     Insomnia     Lumbar disc disease     Multiple drug resistant organism (MDRO) culture positive 05/19/2021    left leg wound (Acinetobacter, Pseudomonas, and Klebsiella)    Osteoporosis     Spinal stenosis        Past Surgical History:        Procedure Laterality Date    CHOLECYSTECTOMY  2/2011    [facility]    LAPAROTOMY N/A 6/17/2021    1. exploratory laparotomy 2. Nick's procedure performed by Vijaya Manzano MD at [address]       Current Medications:    Medications Prior to Admission: gabapentin (NEURONTIN) 100 MG capsule, Take 100 mg by mouth 3 times daily. furosemide (LASIX) 40 MG tablet, Take 40 mg by mouth 2 times daily  potassium chloride (KLOR-CON M) 20 MEQ extended release tablet, Take 1 tablet by mouth daily  traMADol (ULTRAM) 50 MG tablet, Take 50 mg by mouth every 6 hours.   metoprolol tartrate (LOPRESSOR) 25 MG tablet, Take 0.5 tablets by mouth 2 times daily  polyethylene glycol (GLYCOLAX) 17 g packet, Take 17 g by mouth three times a week Every Monday, Wednesday and Friday for bowel regimen  famotidine (PEPCID) 20 MG tablet, Take 40 mg by mouth daily   hydrOXYzine (ATARAX) 25 MG tablet, Take 25 mg by mouth daily  lactulose (CHRONULAC) 10 GM/15ML solution, Take 30 mLs by mouth daily  melatonin 5 MG TABS tablet, Take 5 mg by mouth nightly as needed (insomnia)   OLANZapine (ZYPREXA) 5 MG tablet, Take 5 mg by mouth nightly  carboxymethylcellulose (REFRESH TEARS) 0.5 % SOLN ophthalmic solution, Place 1 drop into both eyes every 6 hours as needed (dry, red eyes)   senna-docusate (SENNA-PLUS) 8.6-50 MG per tablet, Take 2 tablets by mouth daily as needed for Constipation  spironolactone (ALDACTONE) 50 MG tablet, Take 50 mg by mouth daily  Multiple Vitamins-Minerals (THERAPEUTIC MULTIVITAMIN-MINERALS) tablet, Take 1 tablet by mouth daily  Cholecalciferol (VITAMIN D3) 1.25 MG (61454 UT) CAPS, Take 1 capsule by mouth every 30 days Every Month on the 15th  Balsam [place]-Castor Oil (VENELEX) OINT ointment, Apply topically 2 times daily    Allergies:  Cyclobenzaprine, Penicillins, Acetaminophen, Codeine, Diphenhydramine hcl, Ketorolac, Naproxen, Diphenhydramine, Ketorolac tromethamine, Ketorolac tromethamine, and Sulfamethoxazole-trimethoprim    Social History:    · TOBACCO: reports that she has been smoking cigarettes. She has a 24.50 pack-year smoking history. She has never used smokeless tobacco.  · ETOH:   reports no history of alcohol use. · Patient currently lives in a nursing home    Review of Systems -   Review of Systems   Unable to perform ROS: Mental status change       Objective:          Physical Exam  Constitutional:       Appearance: She is ill-appearing. Cardiovascular:      Rate and Rhythm: Normal rate and regular rhythm. Heart sounds: Normal heart sounds. Pulmonary:      Effort: Pulmonary effort is normal.      Breath sounds: Normal breath sounds.    Abdominal:      Comments: Wound vac present, ostomy with yellow/clear fluid in bag   Musculoskeletal:      Right lower leg: Edema present. Left lower leg: Edema present. Skin:     General: Skin is warm and dry. Neurological:      Comments: lethargic          Palliative Performance Scale:  [] 60% Ambulation reduced; Significant disease; Can't do hobbies/housework; intake normal or reduced; occasional assist; LOC full/confusion  [] 50% Mainly sit/lie; Extensive disease; Can't do any work; Considerable assist; intake normal  Or reduced; LOC full/confusion  [] 40% Mainly in bed; Extensive disease; Mainly assist; intake normal or reduced; occasional assist; LOC full/confusion  [x] 30% Bed Bound; Extensive disease; Total care; intake reduced; LOC full/confusion  [] 20% Bed Bound; Extensive disease; Total care; intake minimal; Drowsy/coma  [] 10% Bed Bound; Extensive disease; Total care; Mouth care only; Drowsy/coma  [] 0% Death    PPS: 30    Vitals:    BP (!) 110/50   Pulse 95   Temp 97 °F (36.1 °C) (Axillary)   Resp 20   Ht 5' 6\" (1.676 m)   Wt 292 lb 15.9 oz (132.9 kg)   SpO2 (!) 88%   BMI 47.29 kg/m²     Labs:    BMP:   Recent Labs     06/30/21  1045 06/30/21 2005 07/01/21  0300   * 131* 132*   K 3.5 3.6 3.7   CL 99 99 97*   CO2 21 21 21   BUN 58* 58* 64*   CREATININE 2.2* 2.4* 2.8*   GLUCOSE 246* 239* 221*     CBC:   Recent Labs     06/29/21  0850 06/30/21  0350 07/01/21  0300   WBC 16.0* 21.5* 24.4*   HGB 8.9* 9.1* 8.8*   HCT 26.6* 27.7* 26.7*    174 147       LFT's:   Recent Labs     06/29/21  0342 06/30/21  0350 07/01/21  0550   AST 65* 56* 49*   ALT 14 22 18   BILITOT 2.5* 2.3* 2.5*   ALKPHOS 104 127 116     Troponin: No results for input(s): TROPONINI in the last 72 hours. BNP: No results for input(s): BNP in the last 72 hours.   ABGs:   Recent Labs     07/01/21  0811   PHART 7.458*   LOG4RAV 30.4*   PO2ART 63.8*     INR:   Recent Labs     06/29/21  1335 07/01/21  0550   INR 2.97* 2.44*       U/A:No results for input(s): NITRITE, COLORU, PHUR, LABCAST, WBCUA, RBCUA, MUCUS, TRICHOMONAS, YEAST, BACTERIA, CLARITYU, SPECGRAV, LEUKOCYTESUR, UROBILINOGEN, BILIRUBINUR, BLOODU, GLUCOSEU, AMORPHOUS in the last 72 hours. Invalid input(s): KETONESU    XR CHEST PORTABLE   Final Result      Stable findings in the chest.      CT ABDOMEN PELVIS WO CONTRAST Additional Contrast? Oral   Final Result      No evidence of abscess or significant bowel wall thickening. No significant change in distention of small bowel loops suggestive of ileus. Extensive asymmetric anasarca in the left lateral abdominal wall again seen. Small volume perihepatic ascites again seen. Mild inflammatory fat stranding adjacent to the urinary bladder which could represent sequela of infection/inflammation. CT ABDOMEN PELVIS WO CONTRAST Additional Contrast? None   Final Result      Patchy bibasilar infiltrates or atelectasis, left greater than right. Postoperative changes noted from prior distal colonic resection with left lower quadrant diverting colostomy and Troncoso's pouch. Percutaneous surgically placed drainage catheter seen in the lower abdomen. Small amount of ascites is seen with fluid seen in the right subphrenic space. Colon is relatively decompressed but shows no significant distention or definite wall thickening. There are distended loops of small bowel most suggestive of ileus. Focally distended bowel loop is seen in the left flank region anterior to the colon in an area of previous seen noted distended loop. There is areas of mixed air-fluid attenuation seen. This likely represents luminal contents. However, the possibility of    an immediately adjacent developing abscess would be difficult to exclude entirely and correlation with repeat evaluation with oral contrast in this area would be helpful to better evaluate. There is otherwise no evidence of free air or extraluminal gas collection with no significant free pelvic fluid.       Focal area of strandy opacity of the left flank region in the subcutaneous fat may indicate focal anasarca or cellulitis. Otherwise no acute process seen. XR CHEST PORTABLE   Final Result      1. No acute process or consolidation   2. Right IJ central venous catheter appreciated with the tip in the right atrium. NG tube in place with the tip in the upper stomach               XR ABDOMEN (KUB) (SINGLE AP VIEW)   Final Result      AP abdomen shows extensive gastric distention. XR CHEST PORTABLE   Final Result      Mild left basilar infiltrate or atelectasis. XR CHEST PORTABLE   Final Result      Low level of inspiration with more prominent bibasilar and right perihilar opacities felt to be most likely atelectasis. CT ABDOMEN PELVIS W IV CONTRAST Additional Contrast? Oral   Final Result      1. 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 2.  Interval development of mild perihepatic ascites with scattered small fluid collections within the abdomen and pelvis. No discrete findings for abscess formation on the current exam.      3.  Mildly distended proximal small bowel loops containing contrast, favoring ileus-type pattern. The degree of small bowel distention has improved when compared to prior exam.      4.  Diffuse body wall edema most pronounced over the left lateral abdomen, progressed from prior exam.      5.  Bibasilar airspace disease consistent with underlying atelectasis and/or infiltrate. XR CHEST PORTABLE   Final Result      Hyperexpanded lungs with bibasilar minimal atelectasis, unchanged. XR CHEST 1 VIEW   Final Result   1. Low lung volumes with persistent bilateral lower lobe atelectasis   2. Nasogastric tube in stomach      XR CHEST PORTABLE   Final Result      Free intraperitoneal air. No acute cardiopulmonary findings. Attending in the ED was notified of the free air from the patient's CT study performed earlier.          CT ABDOMEN PELVIS W IV CONTRAST Additional Contrast? None   Final Result      Free intraperitoneal air, predominantly in the upper abdomen and right upper quadrant. Site of perforation is not definitely identified. Distended proximal to mid small bowel. Distal small bowel not distended, consistent with small bowel obstruction. Stool throughout the colon. Distal colonic diverticulosis. Small fat-containing umbilical hernia. Air within the hernia, with a possible skin defect anteriorly at the umbilicus. Correlate clinically. Critical result findings were called to Lori Burris in the ED at 2150 hours on 6/17/2021. Conclusion/Time spent:         Recommendations see above    Time spent with patient and/or family: 40  Time reviewing records: 10 min   Time communicating with staff: 5 min     A total of 55 minutes spent with the patient and family on unit greater than 50% in counseling regarding palliative care and in goals of care for the patient. Thank you to Dr. Mary Moreno for this consultation. We will continue to follow Ms. Mcdowell's care as needed.     [address] [facility]  Inpatient Palliative Care  [phone]

## 2021-07-01 NOTE — PROGRESS NOTES
Nephrology Note                                                                                                                                                                                                                                                                                                                                                               Office : [phone]     Fax :[phone]              Patient's Name: Ephraim Vo    Renal function stable    UO decent   On pressors   On IVF       Past Medical History:   Diagnosis Date    Anxiety     Back pain     Bronchitis     C. difficile colitis 06/26/2021    Cancer ([facility]     malignant neoplasm of bronchus and lung    Chronic pain     Dementia ([facility]     Drug-seeking behavior     Emphysema lung ([facility]     ESBL (extended spectrum beta-lactamase) producing bacteria infection 06/24/2021    left leg wound (Acinetobacter, Pseudomonas, and Klebsiella) on 5/19/2021; urine 6/24/2021 (Klebsiella)    GERD (gastroesophageal reflux disease)     Hepatitis C     HTN (hypertension)     Insomnia     Lumbar disc disease     Multiple drug resistant organism (MDRO) culture positive 05/19/2021    left leg wound (Acinetobacter, Pseudomonas, and Klebsiella)    Osteoporosis     Spinal stenosis        Past Surgical History:   Procedure Laterality Date    CHOLECYSTECTOMY  2/2011    [place]    LAPAROTOMY N/A 6/17/2021    1. exploratory laparotomy 2. Nick's procedure performed by Nena Stevens MD at [facility] OR       Family History   Problem Relation Age of Onset    High Blood Pressure Father     Heart Disease Father         reports that she has been smoking cigarettes. She has a 24.50 pack-year smoking history. She has never used smokeless tobacco. She reports that she does not drink alcohol and does not use drugs.     Allergies:  Cyclobenzaprine, Penicillins, Acetaminophen, Codeine, Diphenhydramine hcl, Ketorolac, Naproxen, Diphenhydramine, Ketorolac tromethamine, Ketorolac tromethamine, and Sulfamethoxazole-trimethoprim    Current Medications:    0.9 % sodium chloride bolus, Once  insulin regular (HUMULIN R;NOVOLIN R) injection 0-18 Units, Q4H  ceftazidime-avibactam (AVYCAZ) 0.94 g in sodium chloride 0.9 % 100 mL IVPB, Q12H  norepinephrine (LEVOPHED) 16 mg in sodium chloride 0.9 % 250 mL infusion, Continuous  PN-Adult Premix 5/15 - Standard Electrolytes - Central Line, Continuous TPN  anidulafungin (ERAXIS) 100 mg in sodium chloride 0.9 % 130 mL IVPB, Daily  electrolyte-A (PLASMALYTE-A) solution, Continuous  glucose (GLUTOSE) 40 % oral gel 15 g, PRN  dextrose 50 % IV solution, PRN  glucagon (rDNA) injection 1 mg, PRN  dextrose 5 % solution, PRN  vancomycin (VANCOCIN) oral solution 250 mg, 4 times per day  HYDROmorphone (DILAUDID) injection 0.25 mg, Q3H PRN   Or  HYDROmorphone (DILAUDID) injection 0.5 mg, Q3H PRN  metronidazole (FLAGYL) 500 mg in NaCl 100 mL IVPB premix, Q8H  heparin (porcine) injection 5,000 Units, 3 times per day  metoprolol (LOPRESSOR) injection 5 mg, Q6H  pantoprazole (PROTONIX) injection 40 mg, BID  sodium chloride flush 0.9 % injection 5-40 mL, 2 times per day  sodium chloride flush 0.9 % injection 5-40 mL, PRN  0.9 % sodium chloride infusion, PRN  ipratropium-albuterol (DUONEB) nebulizer solution 1 ampule, Q4H PRN  OLANZapine zydis (ZYPREXA) disintegrating tablet 5 mg, Nightly  sodium chloride flush 0.9 % injection 5-40 mL, 2 times per day  sodium chloride flush 0.9 % injection 5-40 mL, PRN  0.9 % sodium chloride infusion, PRN  ondansetron (ZOFRAN-ODT) disintegrating tablet 4 mg, Q8H PRN   Or  ondansetron (ZOFRAN) injection 4 mg, Q6H PRN  phenol 1.4 % mouth spray 1 spray, Q2H PRN        Physical exam:     Vitals:  BP (!) 103/53   Pulse 107   Temp 97.5 °F (36.4 °C) (Axillary)   Resp 28   Ht 5' 6\" (1.676 m)   Wt 292 lb 15.9 oz (132.9 kg)   SpO2 90%   BMI 47.29 kg/m²   Constitutional:  AA  Skin: no rash, turgor wnl  Heent: eomi, mmm  Neck: no bruits or jvd noted  Cardiovascular:  S1, S2 without m/r/g  Respiratory: CTA B without w/r/r  Abdomen:  +bs, soft, ttp   Ext: + lower extremity edema  Psychiatric: mood and affect flat   Musculoskeletal:  Rom, muscular strength intact    Data:   Labs:  CBC:   Recent Labs     06/29/21  0850 06/30/21  0350 07/01/21  0300   WBC 16.0* 21.5* 24.4*   HGB 8.9* 9.1* 8.8*    174 147     BMP:    Recent Labs     06/30/21  1045 06/30/21 2005 07/01/21  0300   * 131* 132*   K 3.5 3.6 3.7   CL 99 99 97*   CO2 21 21 21   BUN 58* 58* 64*   CREATININE 2.2* 2.4* 2.8*   GLUCOSE 246* 239* 221*     Ca/Mg/Phos:   Recent Labs     06/29/21  0342 06/29/21  0850 06/30/21  0350 06/30/21  0350 06/30/21  1045 06/30/21 2005 07/01/21  0300   CALCIUM  --    < > 7.8*   < > 7.2* 7.3* 7.7*   MG 2.40  --  2.40  --   --   --  2.50*   PHOS  --    < > 3.8   < > 3.9 4.0 4.3    < > = values in this interval not displayed. Hepatic:   Recent Labs     06/29/21  0342 06/30/21  0350   AST 65* 56*   ALT 14 22   BILITOT 2.5* 2.3*   ALKPHOS 104 127     Troponin: No results for input(s): TROPONINI in the last 72 hours. BNP: No results for input(s): BNP in the last 72 hours. Lipids:   Recent Labs     06/29/21  0342   TRIG 113     ABGs:   No results for input(s): PHART, PO2ART, ULT4PHD in the last 72 hours. INR:   Recent Labs     06/29/21  1335   INR 2.97*     UA:No results for input(s): Natali Bun, GLUCOSEU, BILIRUBINUR, Stalin Karla, BLOODU, PHUR, PROTEINU, UROBILINOGEN, NITRU, LEUKOCYTESUR, Tonnie Boer in the last 72 hours. Urine Microscopic: No results for input(s): LABCAST, BACTERIA, COMU, HYALCAST, WBCUA, RBCUA, EPIU in the last 72 hours. Urine Culture: No results for input(s): LABURIN in the last 72 hours. Urine Chemistry:   No results for input(s): Dwayne Vieira, PROTEINUR, NAUR in the last 72 hours.           IMAGING:  XR CHEST PORTABLE   Final Result      Stable findings in the chest.      CT venous catheter appreciated with the tip in the right atrium. NG tube in place with the tip in the upper stomach               XR ABDOMEN (KUB) (SINGLE AP VIEW)   Final Result      AP abdomen shows extensive gastric distention. XR CHEST PORTABLE   Final Result      Mild left basilar infiltrate or atelectasis. XR CHEST PORTABLE   Final Result      Low level of inspiration with more prominent bibasilar and right perihilar opacities felt to be most likely atelectasis. CT ABDOMEN PELVIS W IV CONTRAST Additional Contrast? Oral   Final Result      1. 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 2.  Interval development of mild perihepatic ascites with scattered small fluid collections within the abdomen and pelvis. No discrete findings for abscess formation on the current exam.      3.  Mildly distended proximal small bowel loops containing contrast, favoring ileus-type pattern. The degree of small bowel distention has improved when compared to prior exam.      4.  Diffuse body wall edema most pronounced over the left lateral abdomen, progressed from prior exam.      5.  Bibasilar airspace disease consistent with underlying atelectasis and/or infiltrate. XR CHEST PORTABLE   Final Result      Hyperexpanded lungs with bibasilar minimal atelectasis, unchanged. XR CHEST 1 VIEW   Final Result   1. Low lung volumes with persistent bilateral lower lobe atelectasis   2. Nasogastric tube in stomach      XR CHEST PORTABLE   Final Result      Free intraperitoneal air. No acute cardiopulmonary findings. Attending in the ED was notified of the free air from the patient's CT study performed earlier. CT ABDOMEN PELVIS W IV CONTRAST Additional Contrast? None   Final Result      Free intraperitoneal air, predominantly in the upper abdomen and right upper quadrant. Site of perforation is not definitely identified.       Distended proximal to mid small bowel. Distal small bowel not distended, consistent with small bowel obstruction. Stool throughout the colon. Distal colonic diverticulosis. Small fat-containing umbilical hernia. Air within the hernia, with a possible skin defect anteriorly at the umbilicus. Correlate clinically. Critical result findings were called to Jaden Pride in the ED at 2150 hours on 6/17/2021. Assessment/Plan   1. LAVINIA     2. HTN    3. Anemia    4. Acid- base/ Electrolyte imbalance - acidosis     5.  Perforated Sigmoid Diverticulitis s/p Ex-lap & Marin Lorenzoer    Plan   - dec IVF   - Bicarb better   - Pressors   - abx   - ID following   - Ur studies - reviewed   - monitor lytes   - No need for CRRT at this point   - will follow closely       Recommend to dose adjust all medications  based on renal functions  Maintain SBP> 90 mmHg   Daily weights   AVOID NSAIDs  Avoid Nephrotoxins  Monitor Intake/Output  Call if significant decrease in urine output                Thank you for allowing us to participate in care of Ifeanyi Chandra MD  Feel free to contact me   [facility] [address]  Office : [phone]  Fax :[phone]

## 2021-07-01 NOTE — CARE COORDINATION
From [facility]. and able to return at d/c. She remains on Levophed and WBC and renal trending up. TPN started. Asked about possible LTACH placement due to needs but will need to see how she progresses.      Electronically signed by Esau Juarez RN on 7/1/2021 at [address] [facility]  [phone]

## 2021-07-01 NOTE — PROGRESS NOTES
ICU Progress Note    Admit Date: 6/17/2021  Day: 14  Vent Day: None  IV Access:Peripheral, CVC (placed 6/26)  IV Fluids: plasmalyte 50ml/hr  Vasopressors: Levophed (MAP goal >60)                Antibiotics: Avycaz, flagyl, oral vancomycin, avidulafungin  Diet: Diet NPO Exceptions are: Ice Chips  PN-Adult Premix 5/15 - Standard Electrolytes - Central Line  PN-Adult Premix 5/15 - Standard Electrolytes - Central Line    CC: abdominal pain    Interval history:  - levophed increased to 10 overnight, brought back down to 8 this AM: MAP within goal >60  - patient is responsive but only speaks 1-2 words at a time  - on Avycaz, flagyl, anidulafungin, flagyl and oral vanc for multiple infection issues (UTI, bacteremia, C diff) with ID following  - a-line kinked      Medications:     Scheduled Meds:   sodium chloride  1,000 mL Intravenous Once    insulin regular  0-18 Units Subcutaneous Q4H    ceftazidime-acibactam (AVYCAZ) infusion  0.94 g Intravenous Q12H    anidulafungin (ERAXIS) 100 mg IVPB  100 mg Intravenous Daily    vancomycin  250 mg Per NG tube 4 times per day    metroNIDAZOLE  500 mg Intravenous Q8H    heparin (porcine)  5,000 Units Subcutaneous 3 times per day    pantoprazole  40 mg Intravenous BID    sodium chloride flush  5-40 mL Intravenous 2 times per day    OLANZapine zydis  5 mg Oral Nightly    sodium chloride flush  5-40 mL Intravenous 2 times per day     Continuous Infusions:   fat emulsion      PN-Adult Premix 5/15 - Standard Electrolytes - Central Line      norepinephrine 6 mcg/min (07/01/21 1023)    PN-Adult Premix 5/15 - Standard Electrolytes - Central Line 70 mL/hr at 06/30/21 1852    dextrose      sodium chloride      sodium chloride       PRN Meds:glucose, dextrose, glucagon (rDNA), dextrose, HYDROmorphone **OR** HYDROmorphone, sodium chloride flush, sodium chloride, ipratropium-albuterol, sodium chloride flush, sodium chloride, ondansetron **OR** ondansetron, phenol    Objective: Vitals:   T-max:  Patient Vitals for the past 8 hrs:   BP Temp Temp src Pulse Resp SpO2 Weight   07/01/21 1230      (!) 88 %    07/01/21 1215      90 %    07/01/21 1200 (!) 110/50   95 20     07/01/21 1145 (!) 94/56   106 (!) 35     07/01/21 1130 (!) 103/59   104 30 (!) 89 %    07/01/21 1115 99/61   110 (!) 42 (!) 86 %    07/01/21 1100 101/65   106 (!) 38 90 %    07/01/21 1045 (!) 100/57   109 (!) 37 91 %    07/01/21 1030 (!) 77/64   104 (!) 38 91 %    07/01/21 1015 (!) 105/54   105 (!) 32 90 %    07/01/21 1000 (!) 97/53   103 (!) 31 92 %    07/01/21 0945 (!) 109/58   104 (!) 33 92 %    07/01/21 0930 (!) 93/53   106 (!) 31 91 %    07/01/21 0915 (!) 97/52   101 (!) 34 92 %    07/01/21 0900 (!) 92/52   104 (!) 37 91 %    07/01/21 0845 (!) 96/52   106 (!) 34 90 %    07/01/21 0830 (!) 108/58   107 (!) 36 90 %    07/01/21 0815 (!) 102/57   105 (!) 41 (!) 89 %    07/01/21 0800 (!) 103/56 97 °F (36.1 °C) Axillary 109 (!) 36 90 %    07/01/21 0745 (!) 98/53   108 (!) 36 90 %    07/01/21 0730 (!) 94/52   108 (!) 36 90 %    07/01/21 0715 (!) 97/57   108 (!) 35 90 %    07/01/21 0700 (!) 105/54   108 (!) 35 92 %    07/01/21 0645 (!) 109/55   105 (!) 34 91 %    07/01/21 0630 (!) 97/55   105 (!) 37 90 %    07/01/21 0615 (!) 100/55   104 28 92 %    07/01/21 0600 (!) 103/53   107 28 90 %    07/01/21 0545 (!) 101/56   104 (!) 32     07/01/21 0530 110/67   103 30     07/01/21 0515 (!) 94/43   108 (!) 32     07/01/21 0500 (!) 108/58   108 (!) 34  292 lb 15.9 oz (132.9 kg)       Intake/Output Summary (Last 24 hours) at 7/1/2021 1248  Last data filed at 7/1/2021 1200  Gross per 24 hour   Intake 6137 ml   Output 1417 ml   Net 4720 ml       Review of Systems   Constitutional: Positive for activity change and fatigue. Decreased movement and energy. Cardiovascular: Positive for leg swelling. Negative for chest pain. Gastrointestinal: Positive for diarrhea. Negative for abdominal distention. Genitourinary: Positive for decreased urine volume. Skin: Positive for wound. Negative for color change and pallor. Neurological: Positive for weakness. Psychiatric/Behavioral:        Decreased mentation: Will speak only 1-2 word sentences   All other systems reviewed and are negative. Physical Exam  Constitutional:       Appearance: She is ill-appearing. Comments: Arousable to voice and touch. Oriented to self but not place or time. HENT:      Mouth/Throat:      Pharynx: Oropharynx is clear. Eyes:      Extraocular Movements: Extraocular movements intact. Conjunctiva/sclera: Conjunctivae normal.      Pupils: Pupils are equal, round, and reactive to light. Cardiovascular:      Rate and Rhythm: Normal rate and regular rhythm. Pulses: Normal pulses. Heart sounds: Normal heart sounds. Pulmonary:      Breath sounds: No wheezing or rhonchi. Comments: CTA. Difficult to assess lower lung fields due to positioning in bed  Abdominal:      General: Bowel sounds are normal.      Tenderness: There is no abdominal tenderness. Comments: Mild abdominal distention   Musculoskeletal:      Right lower leg: Edema present. Left lower leg: Edema present. Comments: 2+ pitting edema bilaterally   Skin:     General: Skin is warm. Comments: R LE erythemetaous wound from ankle detention to knee   Neurological:      Mental Status: She is disoriented. Comments: Arousable to voice and touch.  Oriented to self but not place or time   Psychiatric:         Mood and Affect: Mood normal.           LABS:    CBC:   Recent Labs     06/29/21  0850 06/30/21  0350 07/01/21  0300   WBC 16.0* 21.5* 24.4*   HGB 8.9* 9.1* 8.8*   HCT 26.6* 27.7* 26.7*    174 147   MCV 92.4 93.5 92.1     Renal:    Recent Labs     06/29/21  0342 06/29/21  0850 06/30/21  0350 06/30/21  0350 06/30/21  1045 06/30/21 2005 07/01/21  0300   NA  --    < > 131*   < > 132* 131* 132*   K  --    < > 3.5   < > 3.5 3.6 3.7   CL  --    < > 96*   < > 99 99 97*   CO2  --    < > 22   < > 21 21 21   BUN  --    < > 58*   < > 58* 58* 64*   CREATININE  --    < > 2.6*   < > 2.2* 2.4* 2.8*   GLUCOSE  --    < > 236*   < > 246* 239* 221*   CALCIUM  --    < > 7.8*   < > 7.2* 7.3* 7.7*   MG 2.40  --  2.40  --   --   --  2.50*   PHOS  --    < > 3.8   < > 3.9 4.0 4.3   ANIONGAP  --    < > 13   < > 12 11 14    < > = values in this interval not displayed. Hepatic:   Recent Labs     06/29/21  0342 06/29/21  0850 06/30/21  0350 06/30/21  1045 06/30/21 2005 07/01/21  0300 07/01/21  0550   AST 65*  --  56*  --   --   --  49*   ALT 14  --  22  --   --   --  18   BILITOT 2.5*  --  2.3*  --   --   --  2.5*   BILIDIR 1.5*  --  1.4*  --   --   --  1.4*   PROT 4.9*  --  4.8*  --   --   --  4.6*   LABALBU 2.2*   < > 2.0*   < > 2.1* 2.1* 2.0*   ALKPHOS 104  --  127  --   --   --  116    < > = values in this interval not displayed. Troponin: No results for input(s): TROPONINI in the last 72 hours. BNP: No results for input(s): BNP in the last 72 hours. Lipids: No results for input(s): CHOL, HDL in the last 72 hours. Invalid input(s): LDLCALCU, TRIGLYCERIDE  ABGs:    Recent Labs     07/01/21  0811   PHART 7.458*   CHQ5DCM 30.4*   PO2ART 63.8*   EWU6OIE 21.6   BEART -2   [id number] 94   TSW4RQL 23       INR:   Recent Labs     06/29/21  1335 07/01/21  0550   INR 2.97* 2.44*     Lactate:   Recent Labs     07/01/21  0811   LACTATE 2.51*     Cultures:  -----------------------------------------------------------------  RAD:   XR CHEST PORTABLE   Final Result      Stable findings in the chest.      CT ABDOMEN PELVIS WO CONTRAST Additional Contrast? Oral   Final Result      No evidence of abscess or significant bowel wall thickening. No significant change in distention of small bowel loops suggestive of ileus.       Extensive asymmetric anasarca in the left lateral abdominal wall again seen. Small volume perihepatic ascites again seen. Mild inflammatory fat stranding adjacent to the urinary bladder which could represent sequela of infection/inflammation. CT ABDOMEN PELVIS WO CONTRAST Additional Contrast? None   Final Result      Patchy bibasilar infiltrates or atelectasis, left greater than right. Postoperative changes noted from prior distal colonic resection with left lower quadrant diverting colostomy and Troncoso's pouch. Percutaneous surgically placed drainage catheter seen in the lower abdomen. Small amount of ascites is seen with fluid seen in the right subphrenic space. Colon is relatively decompressed but shows no significant distention or definite wall thickening. There are distended loops of small bowel most suggestive of ileus. Focally distended bowel loop is seen in the left flank region anterior to the colon in an area of previous seen noted distended loop. There is areas of mixed air-fluid attenuation seen. This likely represents luminal contents. However, the possibility of    an immediately adjacent developing abscess would be difficult to exclude entirely and correlation with repeat evaluation with oral contrast in this area would be helpful to better evaluate. There is otherwise no evidence of free air or extraluminal gas collection with no significant free pelvic fluid. Focal area of strandy opacity of the left flank region in the subcutaneous fat may indicate focal anasarca or cellulitis. Otherwise no acute process seen. XR CHEST PORTABLE   Final Result      1. No acute process or consolidation   2. Right IJ central venous catheter appreciated with the tip in the right atrium. NG tube in place with the tip in the upper stomach               XR ABDOMEN (KUB) (SINGLE AP VIEW)   Final Result      AP abdomen shows extensive gastric distention.       XR CHEST PORTABLE   Final Result      Mild left basilar infiltrate or atelectasis. XR CHEST PORTABLE   Final Result      Low level of inspiration with more prominent bibasilar and right perihilar opacities felt to be most likely atelectasis. CT ABDOMEN PELVIS W IV CONTRAST Additional Contrast? Oral   Final Result      1. 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 2.  Interval development of mild perihepatic ascites with scattered small fluid collections within the abdomen and pelvis. No discrete findings for abscess formation on the current exam.      3.  Mildly distended proximal small bowel loops containing contrast, favoring ileus-type pattern. The degree of small bowel distention has improved when compared to prior exam.      4.  Diffuse body wall edema most pronounced over the left lateral abdomen, progressed from prior exam.      5.  Bibasilar airspace disease consistent with underlying atelectasis and/or infiltrate. XR CHEST PORTABLE   Final Result      Hyperexpanded lungs with bibasilar minimal atelectasis, unchanged. XR CHEST 1 VIEW   Final Result   1. Low lung volumes with persistent bilateral lower lobe atelectasis   2. Nasogastric tube in stomach      XR CHEST PORTABLE   Final Result      Free intraperitoneal air. No acute cardiopulmonary findings. Attending in the ED was notified of the free air from the patient's CT study performed earlier. CT ABDOMEN PELVIS W IV CONTRAST Additional Contrast? None   Final Result      Free intraperitoneal air, predominantly in the upper abdomen and right upper quadrant. Site of perforation is not definitely identified. Distended proximal to mid small bowel. Distal small bowel not distended, consistent with small bowel obstruction. Stool throughout the colon. Distal colonic diverticulosis. Small fat-containing umbilical hernia. Air within the hernia, with a possible skin defect anteriorly at the umbilicus.

## 2021-07-01 NOTE — PROGRESS NOTES
VSS, afebrile. Pt able to tell me her name and follow commands. Levo infusing at 15 mcg/min to keep MAP above 60. TPN and lipids infusing. NG hooked up to int wall suction. BS hypoactive x4. Midline wound vac in place, dressing is CDI. Hannon draining clemencia clear urine, see I&Os. Will continue to monitor.

## 2021-07-01 NOTE — PLAN OF CARE
Problem: Nutrition  Goal: Optimal nutrition therapy  7/1/2021 1353 by Isabell Yap RD, LD  Outcome: Ongoing  Note: Nutrition Problem #1: Inadequate oral intake  Intervention: Food and/or Nutrient Delivery: Continue NPO, Continue Current Parenteral Nutrition  Nutritional Goals: Pt to meet >50% of nutritional requirements from diet and ONS   7/1/2021 1353 by Isabell Yap RD, LD  Outcome: Ongoing  7/1/2021 0049 by Esau Corona RN  Outcome: Ongoing

## 2021-07-01 NOTE — PLAN OF CARE
Care plan reviewed.    Problem: Pain:  Goal: Pain level will decrease  Description: Pain level will decrease  7/1/2021 0049 by Charlie Lovelace RN  Outcome: Ongoing     Problem: Non-Violent Restraints  Goal: Removal from restraints as soon as assessed to be safe  7/1/2021 0049 by Charlie Lovelace RN  Outcome: Ongoing     Problem: Non-Violent Restraints  Goal: No harm/injury to patient while restraints in use  7/1/2021 0049 by Charlie Lovelace RN  Outcome: Ongoing     Problem: Non-Violent Restraints  Goal: Patient's dignity will be maintained  7/1/2021 0049 by Charlie Lovelace RN  Outcome: Ongoing     Problem: Skin Integrity:  Goal: Will show no infection signs and symptoms  Description: Will show no infection signs and symptoms  7/1/2021 0049 by Charlie Lovelace RN  Outcome: Ongoing     Problem: Falls - Risk of:  Goal: Will remain free from falls  Description: Will remain free from falls  7/1/2021 0049 by Charlie Lovelace RN  Outcome: Ongoing     Problem: OXYGENATION/RESPIRATORY FUNCTION  Goal: Patient will achieve/maintain normal respiratory rate/effort  7/1/2021 0049 by Charlie Lovelace RN  Outcome: Ongoing     Problem: MOBILITY  Goal: Early mobilization is achieved  7/1/2021 0049 by Charlie Lovelace RN  Outcome: Ongoing     Problem: Confusion - Acute:  Goal: Absence of continued neurological deterioration signs and symptoms  Description: Absence of continued neurological deterioration signs and symptoms  7/1/2021 0049 by Charlie Lovelace RN  Outcome: Ongoing     Problem: Discharge Planning:  Goal: Ability to perform activities of daily living will improve  Description: Ability to perform activities of daily living will improve  7/1/2021 0049 by Charlie Lovelace RN  Outcome: Ongoing     Problem: Nutrition  Goal: Optimal nutrition therapy  7/1/2021 0049 by Charlie Lovelace RN  Outcome: Ongoing     Problem: Nausea/Vomiting:  Goal: Absence of nausea/vomiting  Description: Absence of nausea/vomiting  7/1/2021 0049 by Dina Jon RN  Outcome: Ongoing     Problem:  Bowel/Gastric:  Goal: Occurrences of diarrhea will decrease  Description: Occurrences of diarrhea will decrease  Outcome: Ongoing     Problem: Physical Regulation:  Goal: Prevent transmision of infection  Description: Prevent transmision of infection  Outcome: Ongoing

## 2021-07-01 NOTE — PROGRESS NOTES
Clinical Pharmacy Progress Note  Pharmacy to dose PN    Admit date: 2021     Subjective/Objective:  Patient is a [place] Jude old female from [facility] with a PMHx significant for HTN, COPD, HCV, possible cirrhosis, PUD, and dementia. Admitted with perf sigmoid diverticulitis, now s/p Troncoso's procedure on 3/39, complicated by post-op ileus, UTI, and bactermia with MDRO. Interval Update:  Scr back up to 2.8 with decreased UOP. Surgery planning to irrigate the ostomy as there is low output. Pharmacy is consulted to dose PN per Dr. Colin Beebe and asked to convert all IVs to NS per Dr. Tavares Amezcua. Today is PN day #4  Current diet order:  NPO    Antibiotic regimen:    (-)  Metronidazole 500mg IV q8h (-, resumed - )  (-)  Ceftazidime/avibactam 0.94g IV q12h (-- ) -- day #5  Anidulafungin 200mg IV x1, then 100mg IV q24h (-- ) -- day #5  Vancomycin ORAL 125mg per NG tube Q6h (-- ) -- day #3    Height:  5' 6\" (167.6 cm)  Weight: 292 lb 15.9 oz (132.9 kg)    Recent Labs     21  0300   * 132*   K 3.6 3.7   CL 99 97*   CO2    BUN 58* 64*   CREATININE 2.4* 2.8*   GLUCOSE 239* 221*     Calcium 7.7   Albumin 2.0   Corrected Calcium 9.3  Phosphorus 4.3  Magnesium 2.5    Glucoses since TPN bag [phone] (48 units SS insulin)      Recent Labs     21  0350 21  0300   WBC 21.5* 24.4*   HGB 9.1* 8.8*    147           Triglycerides 113     Micro:   Blood x2 = NGTD   Urine = Klebsiella ESBL -- Intermediate to meropenem, Sensitive to amikacin and ceftazidime/avibactam   Blood #1 = Acinetobacter calcoac baumannii - sensitivity pending.   Acinetobacter baumannii/haemolyticus - sensitivity pending   C.diff toxin moleculuar = positive    Prophylaxis:   · VTE: heparin SQ  · SUP: pantoprazole IV BID      Assessment/Plan:  1)  Parenteral Nutrition:  Pharmacy to dose -- Day #4  · Ordered Clinimix E 5/15 @ 70mL/hr (total volume = 1680mL/day), at goal per dietary recommendations. Ordered Lipids 20% 250mL to infuse over 12 hrs. Regimen provides AA 70g, Dextrose 252g, Lipids 50g. Total nutrition provides 1693 kcal per day. Appreciate dietitian recommendations. · Clinimix-E includes standard electrolyte formulation. No additional electrolytes added. Will monitor electrolytes daily and will replete with supplemental IVPBs as needed. · Patient received the following electrolytes additionally outside of TPN bag today:  · KCl 30 mEq IV x1 dose   Glucose control: All BG elevated in the 200s. Recommend continuing SSI. Will increase to 40 units of insulin in today's bag. Will monitor.  Patient will receive MVI 10 mL/day on MWF only (due to national shortage) & Trace Elements 1 mL/day with PN daily.  Labs will be monitored daily and PN will be re-ordered daily. Weekly triglyceride ordered per PN protocol. 2) All IVs in NS for hyperglycemia  · The following IVs have been adjusted to NS:  · Methocarbamol  · Metronidazole (already in NS)  · Norepinephrine  · Ceftazidime-avibactam  · Anidulafungin       Thank you, please call with questions.    Sandip Everett PharmD., BCPS   7/1/2021 10:13 AM  Wireless: [phone]

## 2021-07-01 NOTE — PROGRESS NOTES
Pt UOP decreased overnight to 15-20 ml/hr. Creat elevated to 2.5. 500 ml bolus started per surgery. nephro paged via perfect serve.

## 2021-07-01 NOTE — PLAN OF CARE
Problem: Pain:  Description: Pain management should include both nonpharmacologic and pharmacologic interventions.   Goal: Pain level will decrease  Description: Pain level will decrease  Outcome: Ongoing  Goal: Control of acute pain  Description: Control of acute pain  Outcome: Ongoing  Goal: Control of chronic pain  Description: Control of chronic pain  Outcome: Ongoing     Problem: Skin Integrity:  Goal: Will show no infection signs and symptoms  Description: Will show no infection signs and symptoms  Outcome: Ongoing  Goal: Absence of new skin breakdown  Description: Absence of new skin breakdown  Outcome: Ongoing     Problem: Falls - Risk of:  Goal: Will remain free from falls  Description: Will remain free from falls  Outcome: Ongoing  Goal: Absence of physical injury  Description: Absence of physical injury  Outcome: Ongoing     Problem: OXYGENATION/RESPIRATORY FUNCTION  Goal: Patient will achieve/maintain normal respiratory rate/effort  Outcome: Ongoing     Problem: MOBILITY  Goal: Early mobilization is achieved  Outcome: Ongoing     Problem: Confusion - Acute:  Goal: Absence of continued neurological deterioration signs and symptoms  Description: Absence of continued neurological deterioration signs and symptoms  Outcome: Ongoing  Goal: Mental status will be restored to baseline  Description: Mental status will be restored to baseline  Outcome: Ongoing     Problem: Discharge Planning:  Goal: Ability to perform activities of daily living will improve  Description: Ability to perform activities of daily living will improve  Outcome: Ongoing  Goal: Participates in care planning  Description: Participates in care planning  Outcome: Ongoing     Problem: Injury - Risk of, Physical Injury:  Goal: Will remain free from falls  Description: Will remain free from falls  Outcome: Ongoing  Goal: Absence of physical injury  Description: Absence of physical injury  Outcome: Ongoing     Problem: Mood - Altered:  Goal: Mood stable  Description: Mood stable  Outcome: Ongoing  Goal: Absence of abusive behavior  Description: Absence of abusive behavior  Outcome: Ongoing  Goal: Verbalizations of feeling emotionally comfortable while being cared for will increase  Description: Verbalizations of feeling emotionally comfortable while being cared for will increase  Outcome: Ongoing     Problem: Psychomotor Activity - Altered:  Goal: Absence of psychomotor disturbance signs and symptoms  Description: Absence of psychomotor disturbance signs and symptoms  Outcome: Ongoing     Problem: Sensory Perception - Impaired:  Goal: Demonstrations of improved sensory functioning will increase  Description: Demonstrations of improved sensory functioning will increase  Outcome: Ongoing  Goal: Decrease in sensory misperception frequency  Description: Decrease in sensory misperception frequency  Outcome: Ongoing  Goal: Able to refrain from responding to false sensory perceptions  Description: Able to refrain from responding to false sensory perceptions  Outcome: Ongoing  Goal: Demonstrates accurate environmental perceptions  Description: Demonstrates accurate environmental perceptions  Outcome: Ongoing  Goal: Able to distinguish between reality-based and nonreality-based thinking  Description: Able to distinguish between reality-based and nonreality-based thinking  Outcome: Ongoing  Goal: Able to interrupt nonreality-based thinking  Description: Able to interrupt nonreality-based thinking  Outcome: Ongoing     Problem: Sleep Pattern Disturbance:  Goal: Appears well-rested  Description: Appears well-rested  Outcome: Ongoing     Problem: Nutrition  Goal: Optimal nutrition therapy  Outcome: Ongoing     Problem: Nausea/Vomiting:  Goal: Absence of nausea/vomiting  Description: Absence of nausea/vomiting  Outcome: Ongoing  Goal: Able to drink  Description: Able to drink  Outcome: Ongoing  Goal: Able to eat  Description: Able to eat  Outcome: Ongoing  Goal:

## 2021-07-01 NOTE — CONSULTS
NUTRITION ASSESSMENT  Admission Date: 6/17/2021     Type and Reason for Visit: Reassess, Consult    NUTRITION RECOMMENDATIONS:   Diet- continue NPO per MD  Clinimix   1. Continue PN Clinimix 5/15E to goal rate 70 mL per hour to provide 1680 mL total volume, 1193 calories, 84 g protein, 252 g dextrose and 1.6 mg/kg/min GIR   3. Provide 250 ml of 20% lipids daily to provide 50 g lipids and 500 calories per day. Total Nutrition provided =1693 kcal; 84g protein, 50 g lipids and 252 g dextrose. (100 % kcal and 80 % protein needs met). 4. Obtain Labs: (Phos,Mg,K+) to monitor risk of refeeding syndrome. Weekly TG recommended. 3. Pharmacy to adjust electrolytes, MVI and Trace Elements as appropriate. NUTRITION ASSESSMENT:  Nutritional summary & status: TPn currently running at goal and pt remains NPO. NGT in place to [facility]. Requiring levo at 7.5 this am. +BM via colostomy, IVF administerd this am as well. Will continue to monitor nutritional status.      Patient admitted d/t emergent GI surgery     PMH significant for: hypertension, emphysema, Hepatitis C, anxiety, gastritis, recurrent cellulitis, dementia, and recent hospitalization for sepsis secondary     MALNUTRITION ASSESSMENT  Context of Malnutrition: Acute Illness   Malnutrition Status: Severe malnutrition  Findings of the 6 clinical characteristics of malnutrition (Minimum of 2 out of 6 clinical characteristics is required to make the diagnosis of moderate or severe Protein Calorie Malnutrition based on AND/ASPEN Guidelines):  Energy Intake: Less than/equal to 50% of estimated energy requirements    Energy Intake Time: Greater than or equal to 7 days    Weight Loss %: 7.5% loss or greater    Weight loss Time: Greater than or equal to 1 month    Body Fat Loss: No significant loss    Body Fat Location: No Significant   Body Muscle Loss: No significant loss    Body Muscle Loss Location: No significant    Fluid Accumulation: Unable to assess    Fluid Accumulation Location: Unable to assess     Strength: Not Performed; Not Measured     NUTRITION DIAGNOSIS  Problem: Problem #1: Inadequate oral intake   Etiology: Altered GI function  Signs & Symptoms: NPO status due to medical condition    NUTRITION INTERVENTION  Food and/or Nutrient Delivery: Continue NPO, Continue Current Parenteral Nutrition   Nutrition Education/Counseling: No recommendation at this time   Coordination of Nutrition Care: Continue to monitor while inpatient     NUTRITION MONITORING & EVALUATION:  Evaluation:Progressing towards goal   Goals: Pt to meet >50% of nutritional requirements from diet and ONS   Monitoring: Meal Intake , Supplement Intake  or Wound Healing      OBJECTIVE DATA: Significant to nutrition assessment  · Nutrition-Focused Physical Findings: colostomy +BM 7/1. Generalized non-pitting edema   · Labs: Reviewed;  Na 132, glucose 232  · Meds: Reviewed;   · Wounds Surgical Wound  and Wound Vac     ANTHROPOMETRICS  Current Height: 5' 6\" (167.6 cm)  Current Weight: 292 lb 15.9 oz (132.9 kg)    Admission weight: 252 lb 13.9 oz (114.7 kg)  Ideal Bodyweight 130 lb   Usual Bodyweight CARLOS   Weight Changes 8% loss within past month       BMI BMI (Calculated): 47.4    Wt Readings from Last 50 Encounters:   06/21/21 259 lb 7.7 oz (117.7 kg)   05/26/21 285 lb (129.3 kg)   05/26/21 260 lb (117.9 kg)   05/25/21 283 lb 15.2 oz (128.8 kg)   05/04/21 267 lb (121.1 kg)     COMPARATIVE STANDARDS  Estimated Total Kcals/Day : 11-14  Admission Bodyweight  (118 kg) 7000-0212 kcal/day    Estimated Total Protein (g/day) : 1.8-2.0 Ideal Bodyweight  (59 kg) 106-118 g/day  Estimated Daily Total Fluid (ml/day): >1500 mL per day     Food / Nutrition-Related History  Pre-Admission / Home Diet:  Pre-Admission/Home Diet: General   Home Supplements / Herbals:    none noted  Food Restrictions / Cultural Requests:    none noted    Current Nutrition Therapies   Diet NPO Exceptions are: Ice Chips  PN-Adult Premix 5/15 - Standard Electrolytes - Central Line  PN-Adult Premix 5/15 - Standard Electrolytes - Central Line     PO Intake: NPO  PO Supplement: Standard High Calorie   NPO   PO Supplement Intake: NPO  IVF: Bolus 1L NS this am    NUTRITION RISK LEVEL: Risk Level:  Moderate     Maribel Murphy RD, LD  [place]:  [phone]  Office:  [phone]

## 2021-07-01 NOTE — PROGRESS NOTES
5658.9 [I.V.:4879.1; NG/GT:205]  Out: 2107 [Urine:497; Emesis/NG output:900; Drains:100; Stool:100]    Diet: Diet NPO Exceptions are: Ice Chips  PN-Adult Premix 5/15 - Standard Electrolytes - Central Line      Physical Examination:   General appearance: elderly, ill -appearing female, lying in bed, in NAD  HEENT: no scleral icterus, trachea midline, no JVD, R IJ CVC in place, NGT in place with bilious output  Chest/Lungs: mildly increased work of breathing, equal chest rise  Cardiovascular: Tachycardic, regular rhythm  Abdomen: obese, distended abdomen, midline wound vac intact with adequate suction, colostomy pink and viable with loose/liquid stool in bag. Old MIKE drain site c/d/i   Skin: warm and dry, no rashes  Extremities: 2+ bilateral LE pitting edema, no cyanosis, R radial arterial line in place  Neuro: alert, oriented to self only    Labs:  CBC:   Recent Labs     06/29/21  0850 06/30/21  0350 07/01/21  0300   WBC 16.0* 21.5* 24.4*   HGB 8.9* 9.1* 8.8*   HCT 26.6* 27.7* 26.7*    174 147       BMP:   Recent Labs     06/30/21  1045 06/30/21 2005 07/01/21  0300   * 131* 132*   K 3.5 3.6 3.7   CL 99 99 97*   CO2 21 21 21   BUN 58* 58* 64*   CREATININE 2.2* 2.4* 2.8*   GLUCOSE 246* 239* 221*     LFT's:   Recent Labs     06/29/21  0342 06/30/21  0350   AST 65* 56*   ALT 14 22   BILITOT 2.5* 2.3*   ALKPHOS 104 127     Troponin: No results for input(s): TROPONINI in the last 72 hours. BNP: No results for input(s): BNP in the last 72 hours. ABGs: No results for input(s): PHART, RRS6GDT, PO2ART in the last 72 hours. INR:   Recent Labs     06/29/21  1335   INR 2.97*       U/A:No results for input(s): NITRITE, COLORU, PHUR, LABCAST, WBCUA, RBCUA, MUCUS, TRICHOMONAS, YEAST, BACTERIA, CLARITYU, SPECGRAV, LEUKOCYTESUR, UROBILINOGEN, BILIRUBINUR, BLOODU, GLUCOSEU, AMORPHOUS in the last 72 hours.     Invalid input(s): Elida Fleming       ASSESSMENT AND PLAN:   Erica Street is a 65 y.o. female with perforated sigmoid diverticulits s/p exploratory laparotomy and Nick's procedure 6/17. Post-operative course complicated by ileus type picture, UTI, and increasing leukocytosis. Neuro:   - Robaxin; Dilaudid pain panel  - Zyprexa 5mg qhs        Cardiovascular:   Septic versus hypovolemic shock in the setting of rapid fluid shifts  - Requiring Levophed - currently on 8. Continue to wean pressors.  - MAP goal > 60  - Septic shock unresponsive to fluid and unable to wean pressor requirements  - IV Lopressor in place with hold orders given current pressor requirements  - EKG 6/29 with sinus tachycardia and PVCs. - Cortisol level-20          Pulmonary:  - CXR 06/30 without new consolidation   - High risk for aspiration PNA  - HOB to remain elevated >30 at all times  - Aggressive pulmonary toilet: Acapella, EZPap, IS  - Prn DuoNebs        FEN/GI:  Perforated Diverticulitis s/p Nick's procedure (06/17)  - CT scan on 06/27 without concern for leak or abscess  - Wound vac in place - changes MWF   - NGT with 350 mL output over 24 hrs. Continue to CLW suction. - MIKE drain pulled and wound closed (06/28)  - Ostomy with minimal loose output - will plan to irrigate today as patient with decreased ostomy output  - Continue TPN; follow dietician recommendation. :  LAVINIA  - Cr 2.8 from 2.4 (baseline of 0.9); BUN 64  - Hannon placed for strict I/Os  - Oliguric (15cc/hr overnight)  - Nephrology onboard; continue to follow up recommendations         Hem/ID:   Anemia  - Hgb 8.8 from 9.1, stable  - no source of active bleeding      Leukocytosis  - WBC 24.4 from 21.5  - Remains afebrile  - Urinalysis (06/24): positive for Klebsiella pneumoniae  - Blood culture (06/26): positive for Acinetobacter calcoac baumannii  - C diff toxin positive.    - ID following; follow up recommendations              - currently on Ceftazidime/Avibactam, Anidulafungin, Metronidazole, Vancomycin PO    - will discuss starting rectal vanc with ID Endo  - Persistent hyperglycemia , likely 2/2 sepsis  - No hx DM  - Receiving high dose sliding scale q4hr       Access:  Central Access: R IJ CVC (06/26)  Peripheral Access: L forearm midline (06/24), PIV L forearm 06/24  Arterial Line Access: R radial (06/26)                                Hannon Date placed: 06/26  NGT Date placed: 06/26     Prophylaxis:  SQH     Mobility:  Bedrest until more stable     Dispo:   ICU    Code Status: Full Code  -----------------------------  Marjorie Ray MD  7/1/2021 6:46 AM   Pager [phone]      I performed a history and physical examination of the patient and discussed management with the resident. I reviewed the resident's note and agree with the documented findings and plan of care.     Antonette Hernandez M.D.  7/1/21   12:46 PM

## 2021-07-01 NOTE — PROGRESS NOTES
Nephrology Note                                                                                                                                                                                                                                                                                                                                                               Office : [phone]     Fax :[phone]              Patient's Name: Greard Rand    Renal function worse   Poor UO   On pressors   On IVF       Past Medical History:   Diagnosis Date    Anxiety     Back pain     Bronchitis     C. difficile colitis 06/26/2021    Cancer ([facility] [address]     malignant neoplasm of bronchus and lung    Chronic pain     Dementia ([facility] [address]     Drug-seeking behavior     Emphysema lung ([facility] [address]     ESBL (extended spectrum beta-lactamase) producing bacteria infection 06/24/2021    left leg wound (Acinetobacter, Pseudomonas, and Klebsiella) on 5/19/2021; urine 6/24/2021 (Klebsiella)    GERD (gastroesophageal reflux disease)     Hepatitis C     HTN (hypertension)     Insomnia     Lumbar disc disease     Multiple drug resistant organism (MDRO) culture positive 05/19/2021    left leg wound (Acinetobacter, Pseudomonas, and Klebsiella)    Osteoporosis     Spinal stenosis        Past Surgical History:   Procedure Laterality Date    CHOLECYSTECTOMY  2/2011    [place]    LAPAROTOMY N/A 6/17/2021    1. exploratory laparotomy 2. Nick's procedure performed by Sigrid Silverman MD at [facility] [place] [facility]       Family History   Problem Relation Age of Onset    High Blood Pressure Father     Heart Disease Father         reports that she has been smoking cigarettes. She has a 24.50 pack-year smoking history. She has never used smokeless tobacco. She reports that she does not drink alcohol and does not use drugs.     Allergies:  Cyclobenzaprine, Penicillins, Acetaminophen, Codeine, Diphenhydramine hcl, Ketorolac, Naproxen, Diphenhydramine, Ketorolac tromethamine, Ketorolac tromethamine, and Sulfamethoxazole-trimethoprim    Current Medications:    fat emulsion 20 % infusion 250 mL, Continuous TPN  PN-Adult Premix 5/15 - Standard Electrolytes - Central Line, Continuous TPN  albumin human 25 % IV solution 25 g, Once  furosemide (LASIX) injection 80 mg, Once  0.9 % sodium chloride bolus, Once  insulin regular (HUMULIN R;NOVOLIN R) injection 0-18 Units, Q4H  ceftazidime-avibactam (AVYCAZ) 0.94 g in sodium chloride 0.9 % 100 mL IVPB, Q12H  norepinephrine (LEVOPHED) 16 mg in sodium chloride 0.9 % 250 mL infusion, Continuous  PN-Adult Premix 5/15 - Standard Electrolytes - Central Line, Continuous TPN  anidulafungin (ERAXIS) 100 mg in sodium chloride 0.9 % 130 mL IVPB, Daily  glucose (GLUTOSE) 40 % oral gel 15 g, PRN  dextrose 50 % IV solution, PRN  glucagon (rDNA) injection 1 mg, PRN  dextrose 5 % solution, PRN  vancomycin (VANCOCIN) oral solution 250 mg, 4 times per day  HYDROmorphone (DILAUDID) injection 0.25 mg, Q3H PRN   Or  HYDROmorphone (DILAUDID) injection 0.5 mg, Q3H PRN  metronidazole (FLAGYL) 500 mg in NaCl 100 mL IVPB premix, Q8H  heparin (porcine) injection 5,000 Units, 3 times per day  pantoprazole (PROTONIX) injection 40 mg, BID  sodium chloride flush 0.9 % injection 5-40 mL, 2 times per day  sodium chloride flush 0.9 % injection 5-40 mL, PRN  0.9 % sodium chloride infusion, PRN  ipratropium-albuterol (DUONEB) nebulizer solution 1 ampule, Q4H PRN  OLANZapine zydis (ZYPREXA) disintegrating tablet 5 mg, Nightly  sodium chloride flush 0.9 % injection 5-40 mL, 2 times per day  sodium chloride flush 0.9 % injection 5-40 mL, PRN  0.9 % sodium chloride infusion, PRN  ondansetron (ZOFRAN-ODT) disintegrating tablet 4 mg, Q8H PRN   Or  ondansetron (ZOFRAN) injection 4 mg, Q6H PRN  phenol 1.4 % mouth spray 1 spray, Q2H PRN        Physical exam:     Vitals:  BP 90/64   Pulse 93   Temp 97.9 °F (36.6 °C) (Axillary)   Resp 20   Ht 5' 6\" (1.676 m)   Wt 292 lb 15.9 oz (132.9 kg)   SpO2 (!) 89%   BMI 47.29 kg/m²   Constitutional:  AA  Skin: no rash, turgor wnl  Heent:  eomi, mmm  Neck: no bruits or jvd noted  Cardiovascular:  S1, S2 without m/r/g  Respiratory: CTA B without w/r/r  Abdomen:  +bs, soft, ttp   Ext: + lower extremity edema  Psychiatric: mood and affect flat   Musculoskeletal:  Rom, muscular strength intact    Data:   Labs:  CBC:   Recent Labs     06/29/21  0850 06/30/21 0350 07/01/21  0300   WBC 16.0* 21.5* 24.4*   HGB 8.9* 9.1* 8.8*    174 147     BMP:    Recent Labs     06/30/21  1045 06/30/21 2005 07/01/21  0300   * 131* 132*   K 3.5 3.6 3.7   CL 99 99 97*   CO2 21 21 21   BUN 58* 58* 64*   CREATININE 2.2* 2.4* 2.8*   GLUCOSE 246* 239* 221*     Ca/Mg/Phos:   Recent Labs     06/30/21  0350 06/30/21  0350 06/30/21  1045 06/30/21 2005 07/01/21  0300 07/01/21  1640   CALCIUM 7.8*   < > 7.2* 7.3* 7.7*  --    MG 2.40  --   --   --  2.50* 2.50*   PHOS 3.8   < > 3.9 4.0 4.3  --     < > = values in this interval not displayed. Hepatic:   Recent Labs     06/30/21  0350 07/01/21  0550 07/01/21  1640   AST 56* 49* 64*   ALT 22 18 22   BILITOT 2.3* 2.5* 3.1*   ALKPHOS 127 116 103     Troponin: No results for input(s): TROPONINI in the last 72 hours. BNP: No results for input(s): BNP in the last 72 hours. Lipids:   Recent Labs     06/29/21  0342   TRIG 113     ABGs:   Recent Labs     07/01/21  0811   PHART 7.458*   PO2ART 63.8*   NPT9RRT 30.4*     INR:   Recent Labs     06/29/21  1335 07/01/21  0550   INR 2.97* 2.44*     UA:No results for input(s): Eun Rahman, GLUCOSEU, BILIRUBINUR, [place], BLOODU, PHUR, PROTEINU, UROBILINOGEN, NITRU, LEUKOCYTESUR, [place] in the last 72 hours. Urine Microscopic: No results for input(s): LABCAST, BACTERIA, COMU, HYALCAST, WBCUA, RBCUA, EPIU in the last 72 hours. Urine Culture: No results for input(s): LABURIN in the last 72 hours.   Urine Chemistry:   No results for input(s): CLUR, [place], NAUR in the last 72 hours. IMAGING:  XR CHEST PORTABLE   Final Result      Stable findings in the chest.      CT ABDOMEN PELVIS WO CONTRAST Additional Contrast? Oral   Final Result      No evidence of abscess or significant bowel wall thickening. No significant change in distention of small bowel loops suggestive of ileus. Extensive asymmetric anasarca in the left lateral abdominal wall again seen. Small volume perihepatic ascites again seen. Mild inflammatory fat stranding adjacent to the urinary bladder which could represent sequela of infection/inflammation. CT ABDOMEN PELVIS WO CONTRAST Additional Contrast? None   Final Result      Patchy bibasilar infiltrates or atelectasis, left greater than right. Postoperative changes noted from prior distal colonic resection with left lower quadrant diverting colostomy and Troncoso's pouch. Percutaneous surgically placed drainage catheter seen in the lower abdomen. Small amount of ascites is seen with fluid seen in the right subphrenic space. Colon is relatively decompressed but shows no significant distention or definite wall thickening. There are distended loops of small bowel most suggestive of ileus. Focally distended bowel loop is seen in the left flank region anterior to the colon in an area of previous seen noted distended loop. There is areas of mixed air-fluid attenuation seen. This likely represents luminal contents. However, the possibility of    an immediately adjacent developing abscess would be difficult to exclude entirely and correlation with repeat evaluation with oral contrast in this area would be helpful to better evaluate. There is otherwise no evidence of free air or extraluminal gas collection with no significant free pelvic fluid. Focal area of strandy opacity of the left flank region in the subcutaneous fat may indicate focal anasarca or cellulitis. Otherwise no acute process seen. XR CHEST PORTABLE   Final Result      1. No acute process or consolidation   2. Right IJ central venous catheter appreciated with the tip in the right atrium. NG tube in place with the tip in the upper stomach               XR ABDOMEN (KUB) (SINGLE AP VIEW)   Final Result      AP abdomen shows extensive gastric distention. XR CHEST PORTABLE   Final Result      Mild left basilar infiltrate or atelectasis. XR CHEST PORTABLE   Final Result      Low level of inspiration with more prominent bibasilar and right perihilar opacities felt to be most likely atelectasis. CT ABDOMEN PELVIS W IV CONTRAST Additional Contrast? Oral   Final Result      1. 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 2.  Interval development of mild perihepatic ascites with scattered small fluid collections within the abdomen and pelvis. No discrete findings for abscess formation on the current exam.      3.  Mildly distended proximal small bowel loops containing contrast, favoring ileus-type pattern. The degree of small bowel distention has improved when compared to prior exam.      4.  Diffuse body wall edema most pronounced over the left lateral abdomen, progressed from prior exam.      5.  Bibasilar airspace disease consistent with underlying atelectasis and/or infiltrate. XR CHEST PORTABLE   Final Result      Hyperexpanded lungs with bibasilar minimal atelectasis, unchanged. XR CHEST 1 VIEW   Final Result   1. Low lung volumes with persistent bilateral lower lobe atelectasis   2. Nasogastric tube in stomach      XR CHEST PORTABLE   Final Result      Free intraperitoneal air. No acute cardiopulmonary findings. Attending in the ED was notified of the free air from the patient's CT study performed earlier.          CT ABDOMEN PELVIS W IV CONTRAST Additional Contrast? None   Final Result      Free intraperitoneal air, predominantly in the upper abdomen and right upper quadrant. Site of perforation is not definitely identified. Distended proximal to mid small bowel. Distal small bowel not distended, consistent with small bowel obstruction. Stool throughout the colon. Distal colonic diverticulosis. Small fat-containing umbilical hernia. Air within the hernia, with a possible skin defect anteriorly at the umbilicus. Correlate clinically. Critical result findings were called to Nicolle Hawk in the ED at 2150 hours on 6/17/2021. Assessment/Plan   1. LAVINIA     2. HTN    3. Anemia    4. Acid- base/ Electrolyte imbalance - acidosis     5.  Perforated Sigmoid Diverticulitis s/p Ex-lap & Aletta Dues    Plan   - dc IVF   - try albumin + lasix diuresis   - Bicarb better   - Pressors   - abx   - ID following   - Ur studies - reviewed   - monitor lytes   - No need for CRRT at this point   - will follow closely       Recommend to dose adjust all medications  based on renal functions  Maintain SBP> 90 mmHg   Daily weights   AVOID NSAIDs  Avoid Nephrotoxins  Monitor Intake/Output  Call if significant decrease in urine output                Thank you for allowing us to participate in care of Jeannine Lombard, MD  Feel free to contact me   [facility] [address]  Office : [phone]  Fax :[phone]

## 2021-07-02 NOTE — PROGRESS NOTES
**OR** calcium gluconate **OR** calcium gluconate **OR** calcium gluconate, magnesium sulfate, sodium chloride, IVPB builder, glucose, dextrose, glucagon (rDNA), dextrose, HYDROmorphone **OR** HYDROmorphone, sodium chloride flush, sodium chloride, ipratropium-albuterol, sodium chloride flush, sodium chloride, ondansetron **OR** ondansetron, phenol    Objective:   Vitals:   T-max:  Patient Vitals for the past 8 hrs:   BP Temp Temp src Pulse Resp SpO2   07/02/21 0800  96.8 °F (36 °C) Axillary   94 %   07/02/21 0715 (!) 99/56   99 30 95 %   07/02/21 0700 (!) 84/57   100 (!) 32    07/02/21 0645 (!) 99/57   101 (!) 37    07/02/21 0630 (!) 99/56   102 (!) 32 94 %   07/02/21 0615 (!) 110/91   101 (!) 33 94 %   07/02/21 0600 (!) 110/55   99 (!) 34 95 %   07/02/21 0545 (!) 103/51   101 (!) 34 94 %   07/02/21 0530 101/60   103 (!) 35 95 %   07/02/21 0515 95/65   102 27 95 %   07/02/21 0500 (!) 112/54   101 (!) 31 95 %   07/02/21 0445 (!) 104/51   101 (!) 33 95 %   07/02/21 0430 (!) 103/59   103 (!) 33 95 %   07/02/21 0415 (!) 106/58   99 30    07/02/21 0400 (!) 118/55   95 26    07/02/21 0345 (!) 108/56   95 30    07/02/21 0330 (!) 106/56   97 30    07/02/21 0315 (!) 93/50   100 30        Intake/Output Summary (Last 24 hours) at 7/2/2021 1100  Last data filed at 7/2/2021 1036  Gross per 24 hour   Intake 5101 ml   Output 3591 ml   Net 1510 ml       Review of Systems   Constitutional: Positive for activity change and fatigue. Decreased movement and energy. Cardiovascular: Positive for leg swelling. Negative for chest pain. Gastrointestinal: Positive for diarrhea. Negative for abdominal distention. Genitourinary: Positive for decreased urine volume. Skin: Positive for wound. Negative for color change and pallor. Neurological: Positive for weakness.    Psychiatric/Behavioral:        Decreased mentation: Will speak only 1-2 word sentences   All other systems reviewed and are negative. Physical Exam  Constitutional:       Appearance: She is ill-appearing. Comments: Arousable to voice and touch. Oriented to self but not place or time. HENT:      Mouth/Throat:      Pharynx: Oropharynx is clear. Eyes:      Extraocular Movements: Extraocular movements intact. Conjunctiva/sclera: Conjunctivae normal.      Pupils: Pupils are equal, round, and reactive to light. Cardiovascular:      Rate and Rhythm: Normal rate and regular rhythm. Pulses: Normal pulses. Heart sounds: Normal heart sounds. Pulmonary:      Breath sounds: No wheezing or rhonchi. Comments: CTA. Difficult to assess lower lung fields due to positioning in bed  Abdominal:      General: Bowel sounds are normal. There is distension. Tenderness: There is abdominal tenderness. Comments: Mild abdominal distention   Musculoskeletal:      Right lower leg: Edema present. Left lower leg: Edema present. Comments: 2+ pitting edema bilaterally   Skin:     General: Skin is warm. Comments: R LE erythemetaous wound from ankle skilled nursing to knee   Neurological:      Mental Status: She is disoriented. Comments: Arousable to voice and touch.  Oriented to self but not place or time   Psychiatric:         Mood and Affect: Mood normal.           LABS:    CBC:   Recent Labs     06/30/21  0350 07/01/21  0300 07/02/21  0415   WBC 21.5* 24.4* 20.1*   HGB 9.1* 8.8* 8.5*   HCT 27.7* 26.7* 25.7*    147 104*   MCV 93.5 92.1 92.9     Renal:    Recent Labs     07/01/21  0300 07/01/21  0300 07/01/21  1640 07/01/21  2334 07/02/21  0415 07/02/21  0930   *   < >  --  129* 128* 128*   K 3.7   < >  --  3.8 3.8 3.9   CL 97*   < >  --  96* 96* 96*   CO2 21   < >  --  22 21 21   BUN 64*   < >  --  72* 74* 77*   CREATININE 2.8*   < >  --  2.9* 3.0* 3.1*   GLUCOSE 221*   < >  --  230* 209* 183*   CALCIUM 7.7*   < >  --  7.9* 7.8* 8.1*   MG 2.50*  --  2.50*  --  2.30  --    PHOS 4.3   < >  --  4.8 5. 0* 5.3*   ANIONGAP 14   < >  --  11 11 11    < > = values in this interval not displayed. Hepatic:   Recent Labs     07/01/21  0550 07/01/21  0550 07/01/21  1640 07/01/21  1640 07/01/21  2334 07/02/21  0415 07/02/21  0930   AST 49*  --  64*  --   --  38*  --    ALT 18  --  22  --   --  15  --    BILITOT 2.5*  --  3.1*  --   --  3.0*  --    BILIDIR 1.4*  --  1.2*  --   --  1.7*  --    PROT 4.6*  --  5.1*  --   --  4.5*  --    LABALBU 2.0*   < > 2.5*   < > 2.3* 2.2* 2.2*   ALKPHOS 116  --  103  --   --  87  --     < > = values in this interval not displayed. Troponin: No results for input(s): TROPONINI in the last 72 hours. BNP: No results for input(s): BNP in the last 72 hours. Lipids: No results for input(s): CHOL, HDL in the last 72 hours. Invalid input(s): LDLCALCU, TRIGLYCERIDE  ABGs:    Recent Labs     07/01/21  0811   PHART 7.458*   AVR2LIA 30.4*   PO2ART 63.8*   SYD7YAX 21.6   BEART -2   [id number] 94   GNQ3KOW 23       INR:   Recent Labs     06/29/21  1335 07/01/21  0550 07/02/21  0854   INR 2.97* 2.44* 2.55*     Lactate:   Recent Labs     07/01/21  0811   LACTATE 2.51*     Cultures:  -----------------------------------------------------------------  RAD:   XR ABDOMEN (KUB) (SINGLE AP VIEW)   Final Result   Impression: Nonspecific bowel gas pattern. XR CHEST PORTABLE   Final Result      Stable findings in the chest.      CT ABDOMEN PELVIS WO CONTRAST Additional Contrast? Oral   Final Result      No evidence of abscess or significant bowel wall thickening. No significant change in distention of small bowel loops suggestive of ileus. Extensive asymmetric anasarca in the left lateral abdominal wall again seen. Small volume perihepatic ascites again seen. Mild inflammatory fat stranding adjacent to the urinary bladder which could represent sequela of infection/inflammation.       CT ABDOMEN PELVIS WO CONTRAST Additional Contrast? None   Final Result      Patchy bibasilar infiltrates or atelectasis, left greater than right. Postoperative changes noted from prior distal colonic resection with left lower quadrant diverting colostomy and Troncoso's pouch. Percutaneous surgically placed drainage catheter seen in the lower abdomen. Small amount of ascites is seen with fluid seen in the right subphrenic space. Colon is relatively decompressed but shows no significant distention or definite wall thickening. There are distended loops of small bowel most suggestive of ileus. Focally distended bowel loop is seen in the left flank region anterior to the colon in an area of previous seen noted distended loop. There is areas of mixed air-fluid attenuation seen. This likely represents luminal contents. However, the possibility of    an immediately adjacent developing abscess would be difficult to exclude entirely and correlation with repeat evaluation with oral contrast in this area would be helpful to better evaluate. There is otherwise no evidence of free air or extraluminal gas collection with no significant free pelvic fluid. Focal area of strandy opacity of the left flank region in the subcutaneous fat may indicate focal anasarca or cellulitis. Otherwise no acute process seen. XR CHEST PORTABLE   Final Result      1. No acute process or consolidation   2. Right IJ central venous catheter appreciated with the tip in the right atrium. NG tube in place with the tip in the upper stomach               XR ABDOMEN (KUB) (SINGLE AP VIEW)   Final Result      AP abdomen shows extensive gastric distention. XR CHEST PORTABLE   Final Result      Mild left basilar infiltrate or atelectasis. XR CHEST PORTABLE   Final Result      Low level of inspiration with more prominent bibasilar and right perihilar opacities felt to be most likely atelectasis. CT ABDOMEN PELVIS W IV CONTRAST Additional Contrast? Oral   Final Result      1.   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 2.  Interval development of mild perihepatic ascites with scattered small fluid collections within the abdomen and pelvis. No discrete findings for abscess formation on the current exam.      3.  Mildly distended proximal small bowel loops containing contrast, favoring ileus-type pattern. The degree of small bowel distention has improved when compared to prior exam.      4.  Diffuse body wall edema most pronounced over the left lateral abdomen, progressed from prior exam.      5.  Bibasilar airspace disease consistent with underlying atelectasis and/or infiltrate. XR CHEST PORTABLE   Final Result      Hyperexpanded lungs with bibasilar minimal atelectasis, unchanged. XR CHEST 1 VIEW   Final Result   1. Low lung volumes with persistent bilateral lower lobe atelectasis   2. Nasogastric tube in stomach      XR CHEST PORTABLE   Final Result      Free intraperitoneal air. No acute cardiopulmonary findings. Attending in the ED was notified of the free air from the patient's CT study performed earlier. CT ABDOMEN PELVIS W IV CONTRAST Additional Contrast? None   Final Result      Free intraperitoneal air, predominantly in the upper abdomen and right upper quadrant. Site of perforation is not definitely identified. Distended proximal to mid small bowel. Distal small bowel not distended, consistent with small bowel obstruction. Stool throughout the colon. Distal colonic diverticulosis. Small fat-containing umbilical hernia. Air within the hernia, with a possible skin defect anteriorly at the umbilicus. Correlate clinically. Critical result findings were called to Sheryl Perales in the ED at 2150 hours on 6/17/2021.                   Assessment/Plan:     Ashwini Johansen is a 65 y.o. female who was admitted with perforated sigmoid diverticulitis    Perforated sigmoid diverticulitis s/p ex-lap and Troncoso's procedure (6/27):  - wound vac in place  - primary (GS) following    Post-op ileus  - NGT in place: clamped  - primary (GS) following    Septic shock 2/2 diverticulitis, C diff, Aceinetobacter bacteremia, and ESBL Klebsiella UTI: not yet responsive to continuous fluid resuscitation despite significant repletion. Likely third-spacing fluid. Plans for CRRT considering barely responsive to lasix  - ID following: considering rectal and ostomy vancomycin  - continue anidulafungin 100mg IV, Avycaz 0.94g, flagyl 500mg q8h, oral vancomycin 250mg 6hr  - continue Levophed: MAP goal >60  - hold steroids  - holding fluids    AGMA 2/2 lactic acidosis likely secondary to demand ischemia: previously resolved with lactate 2.51 today suggesting insufficient perfusion  - resolved    Acute metabolic encephalopathy 2/2 septic shock: patient usually able to hold conversation and now can barely move in bed and speaks 1-2 word sentences    LAVINIA: likely prerenal etiology 2/2 septic shock and hypovolemia. Last FeNa 0.5%  - nephrology following: plan for CRRT  - cont levophed  - restrict fluid intake  - hold nephrotoxic medications and lasix  - strict I/Os    Coagulopathy: elevated INR unresponsive to vit K replacement. Factor VII elevated suggesting likely production issue 2/2 cirrhosis  - Heme following  - If acute bleeding, replace FFP    Thrombocytopenia: likely 2/2 cirrhosis but will r/o HIT. 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  Likely stress-induced  - MDSSI    R LE Cellulitis: recurrent per PMHx  - wound care    Code Status: Full code  FEN: TPN  PPX: Heparin, protonix  DISPO: ICU    Judy Lopez MD, PGY-1  07/02/21  11:00 AM    This patient has been staffed and

## 2021-07-02 NOTE — PROCEDURES
Temporary Hemodialysis Catheter Placement    Date/Time: 7/2/2021 3:55 PM  Performed by: Sigrid Herbert MD  Authorized by: Sigrid Herbert MD   Consent: Written consent obtained. Risks and benefits: risks, benefits and alternatives were discussed  Consent given by: family. Patient identity confirmed: hospital-assigned identification number  Time out: Immediately prior to procedure a \"time out\" was called to verify the correct patient, procedure, equipment, support staff and site/side marked as required. Indications: Hemodialysis. Anesthesia: local infiltration    Anesthesia:  Local Anesthetic: lidocaine 1% without epinephrine  Anesthetic total: 3 mL  Preparation: skin prepped with 2% chlorhexidine  Skin prep agent dried: skin prep agent completely dried prior to procedure  Sterile barriers: all five maximum sterile barriers used - cap, mask, sterile gown, sterile gloves, and large sterile sheet  Hand hygiene: hand hygiene performed prior to central venous catheter insertion  Location details: left internal jugular  Patient position: Trendelenburg  Catheter type: triple lumen  Catheter size: 13 Fr. Pre-procedure: landmarks identified  Ultrasound guidance: yes  Sterile ultrasound techniques: sterile gel and sterile probe covers were used  Number of attempts: 1  Successful placement: yes  Post-procedure: line sutured and dressing applied  Assessment: blood return through all ports and free fluid flow  Patient tolerance: patient tolerated the procedure well with no immediate complications  Comments: EBL: <5cc  Non-tunneled line  XR with catheter tip at the mid [address].  Elizabeth Su MD  PGY-2, General Surgery  07/02/21  3:58 PM  [phone]

## 2021-07-02 NOTE — CARE COORDINATION
From [facility] and is able to return at d/c. Her son has some concerns about her returning to this facility and I gave him some resources on other options for her. She remains critically ill at this time. I did mention the possibility of LTACH if she meets when closer to d/c. She remains on Levo drip and TPN.      Electronically signed by Ann Llanos RN on 7/2/2021 at 9:25 AM  [phone]

## 2021-07-02 NOTE — PLAN OF CARE
Problem: Falls - Risk of:  Goal: Absence of physical injury  Description: Absence of physical injury  Outcome: Ongoing  Note: Will continue to monitor and manage patient safety goals and measures to ensure absence of new or acute physical injury till 1900; ongoing

## 2021-07-02 NOTE — PROGRESS NOTES
R radial arterial line removed by surgical resident. New line placed by surgical resident. Will monitor.

## 2021-07-02 NOTE — PLAN OF CARE
Care plan reviewed.       Problem: Pain:  Goal: Pain level will decrease  Description: Pain level will decrease  Outcome: Ongoing     Problem: Non-Violent Restraints  Goal: Removal from restraints as soon as assessed to be safe  Outcome: Ongoing     Problem: Skin Integrity:  Goal: Will show no infection signs and symptoms  Description: Will show no infection signs and symptoms  Outcome: Ongoing     Problem: Falls - Risk of:  Goal: Will remain free from falls  Description: Will remain free from falls  Outcome: Ongoing

## 2021-07-02 NOTE — PROGRESS NOTES
Nephrology Note                                                                                                                                                                                                                                                                                                                                                               Office : [phone]     Fax :[phone]              Patient's Name: Ephraim Vo    Renal function worse   Poor UO   On pressors   On TPN         Past Medical History:   Diagnosis Date    Anxiety     Back pain     Bronchitis     C. difficile colitis 06/26/2021    Cancer ([facility] [address]     malignant neoplasm of bronchus and lung    Chronic pain     Dementia ([facility] [address]     Drug-seeking behavior     Emphysema lung ([facility] [address]     ESBL (extended spectrum beta-lactamase) producing bacteria infection 06/24/2021    left leg wound (Acinetobacter, Pseudomonas, and Klebsiella) on 5/19/2021; urine 6/24/2021 (Klebsiella)    GERD (gastroesophageal reflux disease)     Hepatitis C     HTN (hypertension)     Insomnia     Lumbar disc disease     Multiple drug resistant organism (MDRO) culture positive 06/26/2021    Acinetobacter in blood; (5/19: left leg wound (Acinetobacter, Pseudomonas, and Klebsiella))    Osteoporosis     Spinal stenosis        Past Surgical History:   Procedure Laterality Date    CHOLECYSTECTOMY  2/2011    [place]    LAPAROTOMY N/A 6/17/2021    1. exploratory laparotomy 2. Nick's procedure performed by Nean Stevens MD at [facility] OR       Family History   Problem Relation Age of Onset    High Blood Pressure Father     Heart Disease Father         reports that she has been smoking cigarettes. She has a 24.50 pack-year smoking history. She has never used smokeless tobacco. She reports that she does not drink alcohol and does not use drugs.     Allergies:  Cyclobenzaprine, Penicillins, Acetaminophen, Codeine, Diphenhydramine hcl, Ketorolac, Naproxen, Diphenhydramine, Ketorolac tromethamine, Ketorolac tromethamine, and Sulfamethoxazole-trimethoprim    Current Medications:    PN-Adult Premix 5/15 - Central, Continuous TPN  fat emulsion 20 % infusion 250 mL, Continuous TPN  Vancomycin Oral solution 500mg -- to give via Colostomy, 4 times per day  cefepime (MAXIPIME) 1000 mg IVPB minibag in NS, Q12H  prismaSATE BGK 4/2.5 dialysis solution, Continuous  prismaSATE BGK 4/2.5 dialysis solution, Continuous  prismaSATE BGK 4/2.5 dialysis solution, Continuous  sodium phosphate 6 mmol in dextrose 5 % 250 mL IVPB, PRN   Or  sodium phosphate 12 mmol in dextrose 5 % 250 mL IVPB, PRN   Or  sodium phosphate 18 mmol in dextrose 5 % 500 mL IVPB, PRN   Or  sodium phosphate 24 mmol in dextrose 5 % 500 mL IVPB, PRN  calcium gluconate 1000 mg in dextrose 5% 100 mL IVPB, PRN   Or  calcium gluconate 2,000 mg in dextrose 5 % 100 mL IVPB, PRN   Or  calcium gluconate 3000 mg in dextrose 5% 100 mL IVPB, PRN   Or  calcium gluconate 4000 mg in dextrose 5% 100 mL IVPB, PRN  magnesium sulfate 1000 mg in dextrose 5% 100 mL IVPB, PRN  potassium chloride 20 mEq/50 mL IVPB (Central Line), PRN  PN-Adult Premix 5/15 - Standard Electrolytes - Central Line, Continuous TPN  0.9 % sodium chloride bolus, Once  insulin regular (HUMULIN R;NOVOLIN R) injection 0-18 Units, Q4H  norepinephrine (LEVOPHED) 16 mg in sodium chloride 0.9 % 250 mL infusion, Continuous  anidulafungin (ERAXIS) 100 mg in sodium chloride 0.9 % 130 mL IVPB, Daily  glucose (GLUTOSE) 40 % oral gel 15 g, PRN  dextrose 50 % IV solution, PRN  glucagon (rDNA) injection 1 mg, PRN  dextrose 5 % solution, PRN  vancomycin (VANCOCIN) oral solution 250 mg, 4 times per day  HYDROmorphone (DILAUDID) injection 0.25 mg, Q3H PRN   Or  HYDROmorphone (DILAUDID) injection 0.5 mg, Q3H PRN  metronidazole (FLAGYL) 500 mg in NaCl 100 mL IVPB premix, Q8H  heparin (porcine) injection 5,000 Units, 3 times per day  pantoprazole (PROTONIX) injection 40 mg, BID  sodium chloride flush 0.9 % injection 5-40 mL, 2 times per day  sodium chloride flush 0.9 % injection 5-40 mL, PRN  0.9 % sodium chloride infusion, PRN  ipratropium-albuterol (DUONEB) nebulizer solution 1 ampule, Q4H PRN  OLANZapine zydis (ZYPREXA) disintegrating tablet 5 mg, Nightly  sodium chloride flush 0.9 % injection 5-40 mL, 2 times per day  sodium chloride flush 0.9 % injection 5-40 mL, PRN  0.9 % sodium chloride infusion, PRN  ondansetron (ZOFRAN-ODT) disintegrating tablet 4 mg, Q8H PRN   Or  ondansetron (ZOFRAN) injection 4 mg, Q6H PRN  phenol 1.4 % mouth spray 1 spray, Q2H PRN        Physical exam:     Vitals:  BP (!) 99/56   Pulse 99   Temp 96.8 °F (36 °C) (Axillary)   Resp 30   Ht 5' 6\" (1.676 m)   Wt 292 lb 15.9 oz (132.9 kg)   SpO2 94%   BMI 47.29 kg/m²   Constitutional:  lethargic   Skin: no rash, turgor wnl  Heent:  eomi, mmm  Neck: no bruits or jvd noted  Cardiovascular:  S1, S2 without m/r/g  Respiratory: CTA B without w/r/r  Abdomen:  +bs, soft, ttp   Ext: + lower extremity edema       Data:   Labs:  CBC:   Recent Labs     06/30/21  0350 07/01/21  0300 07/02/21  0415   WBC 21.5* 24.4* 20.1*   HGB 9.1* 8.8* 8.5*    147 104*     BMP:    Recent Labs     07/01/21  0300 07/01/21  2334 07/02/21  0415   * 129* 128*   K 3.7 3.8 3.8   CL 97* 96* 96*   CO2 21 22 21   BUN 64* 72* 74*   CREATININE 2.8* 2.9* 3.0*   GLUCOSE 221* 230* 209*     Ca/Mg/Phos:   Recent Labs     07/01/21  0300 07/01/21  1640 07/01/21  2334 07/02/21  0415   CALCIUM 7.7*  --  7.9* 7.8*   MG 2.50* 2.50*  --  2.30   PHOS 4.3  --  4.8 5.0*     Hepatic:   Recent Labs     07/01/21  0550 07/01/21  1640 07/02/21  0415   AST 49* 64* 38*   ALT 18 22 15   BILITOT 2.5* 3.1* 3.0*   ALKPHOS 116 103 87     Troponin: No results for input(s): TROPONINI in the last 72 hours. BNP: No results for input(s): BNP in the last 72 hours. Lipids:   No results for input(s): CHOL, TRIG, HDL, LDLCALC, LABVLDL in the last 72 hours.   ABGs: Recent Labs     07/01/21  0811   PHART 7.458*   PO2ART 63.8*   JIK6CBO 30.4*     INR:   Recent Labs     06/29/21  1335 07/01/21  0550 07/02/21  0854   INR 2.97* 2.44* 2.55*     UA:No results for input(s): Mickiel Broach, GLUCOSEU, BILIRUBINUR, Jearl Budds, BLOODU, PHUR, PROTEINU, UROBILINOGEN, NITRU, LEUKOCYTESUR, Christie Snooks in the last 72 hours. Urine Microscopic: No results for input(s): LABCAST, BACTERIA, COMU, HYALCAST, WBCUA, RBCUA, EPIU in the last 72 hours. Urine Culture: No results for input(s): LABURIN in the last 72 hours. Urine Chemistry:   No results for input(s): Tiajuana Neas, PROTEINUR, NAUR in the last 72 hours. IMAGING:  XR ABDOMEN (KUB) (SINGLE AP VIEW)   Final Result   Impression: Nonspecific bowel gas pattern. XR CHEST PORTABLE   Final Result      Stable findings in the chest.      CT ABDOMEN PELVIS WO CONTRAST Additional Contrast? Oral   Final Result      No evidence of abscess or significant bowel wall thickening. No significant change in distention of small bowel loops suggestive of ileus. Extensive asymmetric anasarca in the left lateral abdominal wall again seen. Small volume perihepatic ascites again seen. Mild inflammatory fat stranding adjacent to the urinary bladder which could represent sequela of infection/inflammation. CT ABDOMEN PELVIS WO CONTRAST Additional Contrast? None   Final Result      Patchy bibasilar infiltrates or atelectasis, left greater than right. Postoperative changes noted from prior distal colonic resection with left lower quadrant diverting colostomy and Troncoso's pouch. Percutaneous surgically placed drainage catheter seen in the lower abdomen. Small amount of ascites is seen with fluid seen in the right subphrenic space. Colon is relatively decompressed but shows no significant distention or definite wall thickening.       There are distended loops of small bowel most suggestive of ileus. Focally distended bowel loop is seen in the left flank region anterior to the colon in an area of previous seen noted distended loop. There is areas of mixed air-fluid attenuation seen. This likely represents luminal contents. However, the possibility of    an immediately adjacent developing abscess would be difficult to exclude entirely and correlation with repeat evaluation with oral contrast in this area would be helpful to better evaluate. There is otherwise no evidence of free air or extraluminal gas collection with no significant free pelvic fluid. Focal area of strandy opacity of the left flank region in the subcutaneous fat may indicate focal anasarca or cellulitis. Otherwise no acute process seen. XR CHEST PORTABLE   Final Result      1. No acute process or consolidation   2. Right IJ central venous catheter appreciated with the tip in the right atrium. NG tube in place with the tip in the upper stomach               XR ABDOMEN (KUB) (SINGLE AP VIEW)   Final Result      AP abdomen shows extensive gastric distention. XR CHEST PORTABLE   Final Result      Mild left basilar infiltrate or atelectasis. XR CHEST PORTABLE   Final Result      Low level of inspiration with more prominent bibasilar and right perihilar opacities felt to be most likely atelectasis. CT ABDOMEN PELVIS W IV CONTRAST Additional Contrast? Oral   Final Result      1. 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 2.  Interval development of mild perihepatic ascites with scattered small fluid collections within the abdomen and pelvis. No discrete findings for abscess formation on the current exam.      3.  Mildly distended proximal small bowel loops containing contrast, favoring ileus-type pattern.   The degree of small bowel distention has improved when compared to prior exam.      4.  Diffuse body wall edema most pronounced over the left lateral abdomen, progressed from prior exam.      5.  Bibasilar airspace disease consistent with underlying atelectasis and/or infiltrate. XR CHEST PORTABLE   Final Result      Hyperexpanded lungs with bibasilar minimal atelectasis, unchanged. XR CHEST 1 VIEW   Final Result   1. Low lung volumes with persistent bilateral lower lobe atelectasis   2. Nasogastric tube in stomach      XR CHEST PORTABLE   Final Result      Free intraperitoneal air. No acute cardiopulmonary findings. Attending in the ED was notified of the free air from the patient's CT study performed earlier. CT ABDOMEN PELVIS W IV CONTRAST Additional Contrast? None   Final Result      Free intraperitoneal air, predominantly in the upper abdomen and right upper quadrant. Site of perforation is not definitely identified. Distended proximal to mid small bowel. Distal small bowel not distended, consistent with small bowel obstruction. Stool throughout the colon. Distal colonic diverticulosis. Small fat-containing umbilical hernia. Air within the hernia, with a possible skin defect anteriorly at the umbilicus. Correlate clinically. Critical result findings were called to Chidi Guzman in the ED at 2150 hours on 6/17/2021. Assessment/Plan   1. LAVINIA     2. HTN    3. Anemia    4. Acid- base/ Electrolyte imbalance - acidosis     5.  Perforated Sigmoid Diverticulitis s/p Ex-lap & Pearletha Deem    Plan   - start CRRT   - pt is very vol overloaded   - UF as iraj   - failed albumin + lasix diuresis   - Bicarb better   - Pressors   - abx   - ID following   - Ur studies - reviewed   - monitor lytes   - will follow closely       Recommend to dose adjust all medications  based on renal functions  Maintain SBP> 90 mmHg   Daily weights   AVOID NSAIDs  Avoid Nephrotoxins  Monitor Intake/Output  Call if significant decrease in urine output                Thank you for allowing us to participate in care of Susan JOBY Kelly Bonilla MD  Feel free to contact me   [facility] [address]  Office : [phone]  Fax :[phone]

## 2021-07-02 NOTE — PROGRESS NOTES
ICU DAILY PROGRESS NOTE  PGY-1  CC:  SUBJECTIVE:   Interval Hx: Afebrile . On 6L oxygen via NC. On 4 of Levo down from 8. No nausea or vomiting. Pain controlled. LINES/ACCESS:   Central Access: R IJ CVC (06/27)  Peripheral Access: L forearm midline (06/24), PIV L forearm 06/24  Arterial Line Access: R radial (06/26)                                Hannon Date placed: 06/26  NGT Date placed: 06/26    Continuous Infusions:    PN-Adult Premix 5/15 - Standard Electrolytes - Central Line 70 mL/hr at 07/01/21 1817    norepinephrine 6 mcg/min (07/02/21 0220)    dextrose      sodium chloride      sodium chloride         Scheduled Meds:    sodium chloride  1,000 mL Intravenous Once    insulin regular  0-18 Units Subcutaneous Q4H    ceftazidime-acibactam (AVYCAZ) infusion  0.94 g Intravenous Q12H    anidulafungin (ERAXIS) 100 mg IVPB  100 mg Intravenous Daily    vancomycin  250 mg Per NG tube 4 times per day    metroNIDAZOLE  500 mg Intravenous Q8H    heparin (porcine)  5,000 Units Subcutaneous 3 times per day    pantoprazole  40 mg Intravenous BID    sodium chloride flush  5-40 mL Intravenous 2 times per day    OLANZapine zydis  5 mg Oral Nightly    sodium chloride flush  5-40 mL Intravenous 2 times per day        PRN Meds: glucose, dextrose, glucagon (rDNA), dextrose, HYDROmorphone **OR** HYDROmorphone, sodium chloride flush, sodium chloride, ipratropium-albuterol, sodium chloride flush, sodium chloride, ondansetron **OR** ondansetron, phenol    OBJECTIVE:   Vitals: BP (!) 103/51   Pulse 99   Temp 97 °F (36.1 °C) (Axillary)   Resp (!) 34   Ht 5' 6\" (1.676 m)   Wt 292 lb 15.9 oz (132.9 kg)   SpO2 95%   BMI 47.29 kg/m²     I/O:     Intake/Output Summary (Last 24 hours) at 7/2/2021 0626  Last data filed at 7/2/2021 0600  Gross per 24 hour   Intake 5101 ml   Output 3481 ml   Net 1620 ml     I/O last 3 completed shifts:   In: 6670 [I.V.:5190; NG/GT:25]  Out: 2988 [Urine:488; Emesis/NG output:750; Drains:100; BARBIE:7825]    Diet: Diet NPO Exceptions are: Ice Chips  PN-Adult Premix 5/15 - Standard Electrolytes - Central Line      Physical Examination:   General appearance: elderly, ill -appearing female, lying in bed, in NAD  HEENT: no scleral icterus, trachea midline, no JVD, R IJ CVC in place, NGT in place with bilious output  Chest/Lungs: mildly increased work of breathing, equal chest rise  Cardiovascular: Tachycardic, regular rhythm  Abdomen: obese, distended abdomen, midline wound vac intact with adequate suction, colostomy pink and viable with loose/liquid stool in bag. Old MIKE drain site c/d/i ; pitting edema of the abdomen with positive fluid wave  Skin: warm and dry, no rashes  Extremities: 2+ bilateral LE pitting edema, no cyanosis, R radial arterial line in place  Neuro: alert, oriented to self only    Labs:  CBC:   Recent Labs     06/30/21  0350 07/01/21  0300 07/02/21  0415   WBC 21.5* 24.4* 20.1*   HGB 9.1* 8.8* 8.5*   HCT 27.7* 26.7* 25.7*    147 104*       BMP:   Recent Labs     07/01/21  0300 07/01/21  2334 07/02/21  0415   * 129* 128*   K 3.7 3.8 3.8   CL 97* 96* 96*   CO2 21 22 21   BUN 64* 72* 74*   CREATININE 2.8* 2.9* 3.0*   GLUCOSE 221* 230* 209*     LFT's:   Recent Labs     07/01/21  0550 07/01/21  1640 07/02/21  0415   AST 49* 64* 38*   ALT 18 22 15   BILITOT 2.5* 3.1* 3.0*   ALKPHOS 116 103 87     Troponin: No results for input(s): TROPONINI in the last 72 hours. BNP: No results for input(s): BNP in the last 72 hours. ABGs:   Recent Labs     07/01/21  0811   PHART 7.458*   NXC5ANN 30.4*   PO2ART 63.8*     INR:   Recent Labs     06/29/21  1335 07/01/21  0550   INR 2.97* 2.44*       U/A:No results for input(s): NITRITE, COLORU, PHUR, LABCAST, WBCUA, RBCUA, MUCUS, TRICHOMONAS, YEAST, BACTERIA, CLARITYU, SPECGRAV, LEUKOCYTESUR, UROBILINOGEN, BILIRUBINUR, BLOODU, GLUCOSEU, AMORPHOUS in the last 72 hours.     Invalid input(s): KETONESU       ASSESSMENT AND PLAN:   Pranav Robles is a 65 y.o. female with perforated sigmoid diverticulits s/p exploratory laparotomy and Nick's procedure 6/17. Post-operative course complicated by ileus type picture, UTI, and increasing leukocytosis. Neuro:   - Robaxin; Dilaudid pain panel  - Zyprexa 5mg qhs        Cardiovascular:   Septic versus hypovolemic shock in the setting of rapid fluid shifts  - Requiring Levophed - currently on 4. Continue to wean pressors.  - MAP goal > 60  - Septic shock unresponsive to fluid and unable to wean pressor requirements  - EKG 6/29 with sinus tachycardia and PVCs. - Cortisol level-20; no need for stress-dose steroids          Pulmonary:  - CXR 06/30 without new consolidation   - High risk for aspiration PNA  - HOB to remain elevated >30 at all times  - Aggressive pulmonary toilet: Acapella, EZPap, IS  - Prn DuoNebs  -Wean O2; spO2>90        FEN/GI:  Perforated Diverticulitis s/p Nick's procedure (06/17)  - CT scan on 06/27 without concern for leak or abscess  - Wound vac in place - changes MWF; plan to change today  - NGT with 350 mL output over 24 hrs. Continue to CLW suction. - MIKE drain pulled and wound closed (06/28)  - Ostomy with minimal loose output - ostomy irrigated 7/1; will continue to monitor output  - Continue TPN; follow dietician recommendation. :  LAVINIA  - Cr 3 from 2.9 (baseline of 0.9); BUN 74  - Hannon placed for strict I/Os  - Oliguric (15cc/hr overnight)  - Nephrology onboard; continue to follow up recommendations   -Lasix and albumin challenge 7/1; likely repeat today pending nephrology reccomendations        Hem/ID:   Anemia  - Hgb 8.5 from 8.8, stable  - no source of active bleeding      Leukocytosis  - WBC 20.1 from 24.4  - Remains afebrile  - Urinalysis (06/24): positive for Klebsiella pneumoniae  - Blood culture (06/26): positive for Acinetobacter calcoac baumannii  - C diff toxin positive.    - ID following; follow up recommendations              - currently on Ceftazidime/Avibactam, Anidulafungin, Metronidazole, Vancomycin PO     Endo  - Persistent hyperglycemia , likely 2/2 sepsis  - No hx DM  - Receiving high dose sliding scale q4hr       Access:  Central Access: R IJ CVC (06/26)  Peripheral Access: L forearm midline (06/24), PIV L forearm 06/24  Arterial Line Access: R radial (06/26)                                Hannon Date placed: 06/26  NGT Date placed: 06/26     Prophylaxis:  SQH     Mobility:  Bedrest until more stable     Dispo:   ICU    Code Status: DNR-CCA  -----------------------------  Dona Richmond DO  7/2/2021 6:26 AM   Pager [phone]    I performed a history and physical examination of the patient on 7-2-2021 and discussed management with the resident. I reviewed the resident's note and agree with the documented findings and plan of care.     Osei Navarro M.D.  7/12/21   9:03 AM

## 2021-07-02 NOTE — PROGRESS NOTES
Clinical Pharmacy Progress Note  Pharmacy to dose PN    Admit date: 2021     Subjective/Objective:  Patient is a [place] old female from [facility] with a PMHx significant for HTN, COPD, HCV, possible cirrhosis, PUD, and dementia. Admitted with perf sigmoid diverticulitis, now s/p Troncoso's procedure on , complicated by post-op ileus, UTI, and bactermia with MDRO. Interval Update:  Oliguric overnight. .      Pharmacy is consulted to dose PN per Dr. Mirian Larios and asked to convert all IVs to NS per Dr. Camacho Kendrick. Today is PN day #5  Current diet order:  NPO    Antibiotic regimen:    (-)  Metronidazole 500mg IV q8h (-, resumed - )  (-)  Ceftazidime/avibactam 0.94g IV q12h (-- ) -- day #6  Anidulafungin 200mg IV x1, then 100mg IV q24h (-- ) -- day #6  Vancomycin ORAL 125mg per NG tube Q6h (-- ) -- day #    Height:  5' 6\" (167.6 cm)  Weight: 292 lb 15.9 oz (132.9 kg)    Recent Labs     21  2334 21  0415   * 128*   K 3.8 3.8   CL 96* 96*   CO2    BUN 72* 74*   CREATININE 2.9* 3.0*   GLUCOSE 230* 209*     Calcium 7.8   Albumin 2.2   Corrected Calcium 9.2  Phosphorus 5  Magnesium 2.3    Glucoses since TPN bag [phone] (36 units SS insulin)      Recent Labs     21  0300 21  0415   WBC 24.4* 20.1*   HGB 8.8* 8.5*    104*           Triglycerides 113     Micro:   Blood x2 = NGTD   Urine = Klebsiella ESBL -- Intermediate to meropenem, Sensitive to amikacin and ceftazidime/avibactam   Blood #1 = Acinetobacter calcoac baumannii - R to Cipro, Unasyn, Bactrim. Sensitive to cefepime, ceftazidime, amikacin, tobramycin.  C.diff toxin moleculuar = positive    Prophylaxis:   · VTE: heparin SQ  · SUP: pantoprazole IV BID      Assessment/Plan:  1)  Parenteral Nutrition:  Pharmacy to dose -- Day #5  · Patient has been receiving Clinimix E 5/15 with standard electrolytes.   However, Phos today = 5, and Na has been dropping, at 128 today. Will change to Clinimix 5/15 with custom electrolytes. · Will order Clinimix 5/15 @ 70mL/hr (total volume = 1680mL/day), at goal rate per dietary recommendations. Ordered Lipids 20% 250mL to infuse over 12 hrs. Regimen provides AA 70g, Dextrose 252g, Lipids 50g. Total nutrition provides 1693 kcal per day. Appreciate dietitian recommendations. · No additional electrolytes have been ordered for today outside of TPN bag. · Electrolytes adjusted as appropriate. Today's PN contains:    Desired Amount per Day (mEq, mmoL) Amount To Add to Bag (mEq, mmoL)   Sodium Acetate 50 60   Sodium Chloride 60 71   Sodium Phosphate 0 0   Potassium Acetate 10 12   Potassium Phosphate 0 0   Potassium Chloride 30 36   Calcium Gluconate 9.6 11   Magnesium Sulfate 4 5   MVI (mL) 10 11.9   Trace (mL) 1 1.2   Insulin (units) 55 65      Glucose control: All BG elevated in the 200s. Recommend continuing SSI. Will increase to 55 units of insulin in today's bag from 40 units. Will monitor.  Patient will receive MVI 10 mL/day on MWF only (due to national shortage) & Trace Elements 1 mL/day with PN daily.  Labs will be monitored daily and PN will be re-ordered daily. Weekly triglyceride ordered per PN protocol. Thank you, please call with questions.    Annie Mahoney PharmD., BCPS   7/2/2021 8:31 AM  Wireless: [phone]

## 2021-07-02 NOTE — PLAN OF CARE
Problem: Non-Violent Restraints  Goal: No harm/injury to patient while restraints in use  Outcome: Ongoing  Note: Pt remains in bilateral soft wrist restraints due poor safety awareness and pulling at lines/tubes/equipments. Visual checks every  hour and restraint need/assessment re-evaluated every 2 hours per hospital policy. See restraint flowsheet. Goal is for patient to remain free of physical, harm/injury. Will continue to monitor.

## 2021-07-02 NOTE — PLAN OF CARE
Problem: Pain:  Goal: Control of acute pain  Description: Control of acute pain  Outcome: Ongoing  Note: Will continue to monitor and manage pain; in alignment with CPOT evaluations; targeting patient comfort goal

## 2021-07-02 NOTE — PROGRESS NOTES
Surgical resident at bedside to place LIJ vas cath. Chest xray obtained. Verbal order from Dr. Chris Newman that vas cath placement has been confirmed. CRRT started. Pt tolerating well so far, mild increase in levophed requirement. Will monitor.

## 2021-07-02 NOTE — PROGRESS NOTES
colostomy, midline wound with VAC (saw wound at time of dressing change 6/29)  EXT:  No rash, no edema, no lesions - bilateral LE venous stasis  NEURO: No focal neurologic findings  LINES:  R IJ line, R radial a-line, peripheral iv       Data Review:  Lab Results   Component Value Date    WBC 20.1 (H) 07/02/2021    HGB 8.5 (L) 07/02/2021    HCT 25.7 (L) 07/02/2021    MCV 92.9 07/02/2021     (L) 07/02/2021     Lab Results   Component Value Date    CREATININE 3.0 (H) 07/02/2021    BUN 74 (H) 07/02/2021     (L) 07/02/2021    K 3.8 07/02/2021    CL 96 (L) 07/02/2021    CO2 21 07/02/2021       Hepatic Function Panel:   Lab Results   Component Value Date    ALKPHOS 87 07/02/2021    ALT 15 07/02/2021    AST 38 07/02/2021    PROT 4.5 07/02/2021    PROT 8.0 08/16/2012    BILITOT 3.0 07/02/2021    BILIDIR 1.7 07/02/2021    IBILI 1.3 07/02/2021    LABALBU 2.2 07/02/2021       Cultures:   6/26     C diff indeterminate - toxin B positive                6/26     BC x1 Acinetobacter baumannii / haemolyticus   Acinetobacter calcoaceticus-baumannii complex  Antibiotic Interpretation JENNIFER   amikacin Sensitive <=8 mcg/mL   ampicillin-sulbactam Resistant >16/8 mcg/mL   cefepime Sensitive 8 mcg/mL   cefTAZidime Sensitive 8 mcg/mL   cefTRIAXone Intermediate 32 mcg/mL   ciprofloxacin Resistant >2 mcg/mL   gentamicin Sensitive <=4 mcg/mL   tobramycin Sensitive <=4 mcg/mL   trimethoprim-sulfamethoxazole Resistant >2/38 mcg     6/24     UC >100k ESBL K pneumoniae  Klebsiella pneumoniae (esbl)   Antibiotic Interpretation JENNIFER   amikacin Sensitive <=8 mcg/mL   amoxicillin-clavulanate Resistant >16/8 mcg/mL   ampicillin Resistant >16 mcg/mL   ceFAZolin Resistant >16 mcg/mL   cefepime Resistant >16 mcg/mL   cefTRIAXone Resistant >32 mcg/mL   cefuroxime Resistant >16 mcg/mL   ciprofloxacin Resistant >2 mcg/mL   ertapenem Resistant >1 mcg/mL   gentamicin Sensitive <=4 mcg/mL   meropenem Intermediate 8 mcg/mL   nitrofurantoin Resistant >64 mcg/mL   trimethoprim-sulfamethoxazole Resistant >2/38 mcg/mL   cefTAZidime-avibactam Sensitive 1 ug/ml       RADIOLOGY:  7/2 KUB 'Nonspecific bowel gas pattern'    CT abd 6/27/21  Impression   Patchy bibasilar infiltrates or atelectasis, left greater than right.       Postoperative changes noted from prior distal colonic resection with left lower quadrant diverting colostomy and Troncoso's pouch.       Percutaneous surgically placed drainage catheter seen in the lower abdomen.       Small amount of ascites is seen with fluid seen in the right subphrenic space.       Colon is relatively decompressed but shows no significant distention or definite wall thickening.       There are distended loops of small bowel most suggestive of ileus.       Focally distended bowel loop is seen in the left flank region anterior to the colon in an area of previous seen noted distended loop. There is areas of mixed air-fluid attenuation seen. This likely represents luminal contents. However, the possibility of    an immediately adjacent developing abscess would be difficult to exclude entirely and correlation with repeat evaluation with oral contrast in this area would be helpful to better evaluate.       There is otherwise no evidence of free air or extraluminal gas collection with no significant free pelvic fluid.       Focal area of strandy opacity of the left flank region in the subcutaneous fat may indicate focal anasarca or cellulitis.       Otherwise no acute process seen     CT abd 6/17/21  Impression   Free intraperitoneal air, predominantly in the upper abdomen and right upper quadrant. Site of perforation is not definitely identified.       Distended proximal to mid small bowel. Distal small bowel not distended, consistent with small bowel obstruction.       Stool throughout the colon. Distal colonic diverticulosis.       Small fat-containing umbilical hernia.  Air within the hernia, with a possible skin defect anteriorly at the umbilicus. Correlate clinically.        Scheduled Meds:   sodium chloride  1,000 mL Intravenous Once    insulin regular  0-18 Units Subcutaneous Q4H    ceftazidime-acibactam (AVYCAZ) infusion  0.94 g Intravenous Q12H    anidulafungin (ERAXIS) 100 mg IVPB  100 mg Intravenous Daily    vancomycin  250 mg Per NG tube 4 times per day    metroNIDAZOLE  500 mg Intravenous Q8H    heparin (porcine)  5,000 Units Subcutaneous 3 times per day    pantoprazole  40 mg Intravenous BID    sodium chloride flush  5-40 mL Intravenous 2 times per day    OLANZapine zydis  5 mg Oral Nightly    sodium chloride flush  5-40 mL Intravenous 2 times per day       Continuous Infusions:   PN-Adult Premix 5/15 - Central      fat emulsion      PN-Adult Premix 5/15 - Standard Electrolytes - Central Line 70 mL/hr at 07/01/21 1817    norepinephrine 3 mcg/min (07/02/21 0728)    dextrose      sodium chloride      sodium chloride         PRN Meds:  glucose, dextrose, glucagon (rDNA), dextrose, HYDROmorphone **OR** HYDROmorphone, sodium chloride flush, sodium chloride, ipratropium-albuterol, sodium chloride flush, sodium chloride, ondansetron **OR** ondansetron, phenol      Assessment:     Obesity (BMI 43), HTN, COPD, dementia, PUD, HCV    Admit 6/17, acute abd / free air  OR 6/17, Nick's procedure  Resp failure - resolved     Ileus  Encephalopathy   Hypotension / lactic acidosis  LAVINIA - Cr up  Leukocytosis     UTI vs bacteriuria, cult + Klebsiella ESBL (intermediate to meropenem)    + BC 1 set with Acinetobacter baumannii (no GPC, Micro report corrected)    C diff positive - toxin indeterminate,  toxin B PCR positive, colitis on CT (not on reading yet reviewed CT with radiologist and colon wall thickening evident)     Plan:     Start cefepime   D/c Ceftazidime-avibactam (Avacaz)  Cont iv  Metronidazole  Cont  Anidulafungin  Cont PO Vancomycin 250 qid -  clamp NGT 30 min prior / 1 hr after doses  Add enteral Vanco per colostomy Wound care / postop care   Plan to start CRRT     Medical Decision Making:   The following items were considered in medical decision making:  Discussion of patient care with other providers  Reviewed clinical lab tests  Reviewed radiology tests  Reviewed other diagnostic tests/interventions  Independent review of radiologic images - reviewed with Radiologist on 6/29  Microbiology cultures and other micro tests reviewed      Spend 38 minutes on visit    Discussed with pt, RN, ICU team  Cece Sweet MD

## 2021-07-03 NOTE — SIGNIFICANT EVENT
Significant Event    Dr. Kristin Buck, Dr. Mike Corey, and myself had discussion with patient's son regarding Balta Chimes care. Patient's son had discussed with general surgery team possibility of hospice care if she does not improve. We discussed poor prognosis considering worsening WBC, pressor requirement, and multi-organ failure. Plan is to keep son updated regarding if an additional pressor appears to be required or if she shows signs of further decompensation (he was ok with a call anytime including throughout the night. He vocalized he plans to consider hospice care if that occurs or if she does not have improvement within the next 24-48 hours. He vocalized understanding of her medical condition and the different options for medical and hospice care.     Niranjan Mathis MD, PGY-1  07/03/21  3:19 PM

## 2021-07-03 NOTE — PROGRESS NOTES
IV Metoprolol given for elevated HR. MD at bedside during administration. BP unchanged. HR now in 70's. Will cont to monitor.

## 2021-07-03 NOTE — PROGRESS NOTES
Pt remains alert to self, will open her eyes when spoken too, follows minimal commands. 8 HFNC. Lungs diminished. Bowels active. Wound vac in place. NG to LIS. Hannon with little to no UO. Edema throughout all extremities. CRRT continuing. Not able to remove any fluid due to pressor support. Nephro aware. Levo/Vaso on for pressor support. Critical lactic's reported to surgery. Son, Va Delarosa, at bedside, updated throughout the day. Per son he requested to talk to all the doctors on his mothers case about the steps going forward, would like to know options regarding hospice.

## 2021-07-03 NOTE — PROGRESS NOTES
General Surgery  Interval Note:    Patient initiated on CRRT this afternoon with Levophed rate of 8 to maintain MAP goal of 60 prior to therapy. Patient gradually became more tachycardic with continuing UF removal up to 130s140s. CRRT changed to net neutral given this change; however, pressor requirements also gradually increased throughout the evening in order to maintain MAP goal up to a maximum of 20 mcg/min at 22:30. EKG obtained per CCU with display of sinus tachycardia. Given these changes, 5 mg IV metoprolol given with reduction in heart rate to 70s to 80s without change in pressor requirements or MAP. Plan:    -Reinitiating scheduled IV metoprolol 5 mg every 6 hours with hold parameters.  -Should increased pressor requirements be required, will anticipate initiating vasopressin rather than increasing Levophed over 20.  -Family updated on changes, therapeutic plan. -Patient, plan discussed with nursing, CCU team, and surgical chief resident on-call. Clarence Fatima MD  PGY-2, General Surgery  07/02/21  11:51 PM  [phone]

## 2021-07-03 NOTE — PROGRESS NOTES
Clinical Pharmacy Progress Note  Pharmacy to dose PN    Admit date: 6/17/2021     Subjective/Objective:  Patient is a [place] Knickerbocker old female from [facility] with a PMHx significant for HTN, COPD, HCV, possible cirrhosis, PUD, and dementia. Admitted with perf sigmoid diverticulitis, now s/p Troncoso's procedure on 5/71, complicated by post-op ileus, C.diff colitis,  UTI, and bactermia with MDRO. Interval Update:  Started on CRRT yesterday. Tachycardic last night - responded to Metoprolol. .  .      Pharmacy is consulted to dose PN per Dr. Nohemy Beck and asked to convert all IVs to NS per Dr. Karlyn Cowden. Today is PN day #6  Current diet order:  NPO    Antibiotic regimen:    (6/17-6/26)  Metronidazole 500mg IV q8h (6/17-6/26, resumed 6/27- )  (6/26-6/27)  Ceftazidime/avibactam 0.94g IV q12h (6/27--7/2)  Anidulafungin 200mg IV x1, then 100mg IV q24h (6/27-- ) -- day #7  Vancomycin ORAL 125mg per NG tube Q6h (6/29-- ) -- day #5  Vancomycin PER COLOSTOMY 500mg q6h (7/2-- ) -- day #2  Cefepime (7/2-- ) -- day #2   1000mg IV q12h (7/2-7/3)   2000mg IV q12h (7/3-current)    Height:  5' 6\" (167.6 cm)  Weight: 286 lb 13.1 oz (130.1 kg)    Recent Labs     07/03/21  0050 07/03/21  1000   * 133*   K 4.5 4.5   CL 99 100   CO2 19* 20*   BUN 58* 44*   CREATININE 2.4* 1.8*   GLUCOSE 139* 131*     Calcium 8   Albumin 2.1   Corrected Calcium 9.5  Phosphorus 3.3  Magnesium 2.3    Glucoses since TPN hung yesterday:  113 - 150 - 140 - 139 - 131  Used 6 units SSI      Recent Labs     07/02/21  0415 07/03/21  0452   WBC 20.1* 37.1*   HGB 8.5* 9.9*   * 129*        6/29   Triglycerides 113     Micro:  6/24 Blood x2 = NGTD  6/24 Urine = Klebsiella ESBL -- Intermediate to meropenem, Sensitive to amikacin and ceftazidime/avibactam  6/26 Blood #1 = Acinetobacter calcoac baumannii - R to Cipro, Unasyn, Bactrim. Sensitive to cefepime, ceftazidime, amikacin, tobramycin.     6/26 C.diff toxin moleculuar = positive    Prophylaxis: · VTE: heparin SQ  · SUP: pantoprazole IV BID      Assessment/Plan:  1)  Parenteral Nutrition:  Pharmacy to dose -- Day #6  · Now on CRRT and phos back in normal range - ok to change back to Clinimix-E with standard electrolytes. · Will order Clinimix-E 5/15 @ 70mL/hr (total volume = 1680mL/day), at goal rate per dietary recommendations. Ordered Lipids 20% 250mL to infuse over 12 hrs. Regimen provides AA 70g, Dextrose 252g, Lipids 50g. Total nutrition provides 1693 kcal per day. Appreciate dietitian recommendations.  Clinimix-E includes standard electrolyte formulation. No additional electrolytes added. Will monitor electrolytes daily and will replete with supplemental IVPBs as needed.  Glucose control:   · Glucoses much lower since hanging TPN last evening (range 113 - 150). Will decrease insulin in TPN slightly to 50 units / day to prevent hypoglycemia.  Patient will receive MVI 10 mL/day on MWF only (due to national shortage) & Trace Elements 1 mL/day with PN daily.  Labs will be monitored daily and PN will be re-ordered daily. Weekly triglyceride ordered per PN protocol. 2)  CRRT:  · Since patient is now on CRRT, Cefepime dose is adjusted to 2000mg IV q12h per [facility] Renal Dose Adjustment Policy. · Will continue to monitor and adjust as needed.     Please call with questions--  Thanks--  Naomi King, PharmD, [address]  [id number] ([facility])   7/3/2021 11:50 AM

## 2021-07-03 NOTE — PROGRESS NOTES
ICU Progress Note    Admit Date: 6/17/2021  Day: 16  Vent Day: None  IV Access:Peripheral, CVC (placed 6/26), triple lumen HD cath (7/2)  IV Fluids: none  Vasopressors: Levophed, vasopressin (MAP goal >60)                Antibiotics: Avycaz, flagyl, oral vancomycin, avidulafungin  Diet: Diet NPO Exceptions are: Ice Chips  PN-Adult Premix 5/15 - Central    CC: abdominal pain    Interval history:  - temp HD cath placed: had sinus tachy (130s-140s) during CRRT requiring metoprolol, did not tolerate fluid removal well  - required increase in vasopressors: levophed 26, vasopressin 0.03 to maintain MAP  - received calcium gluconate for low ionized Ca    - on cefepime, off avycaz  - on oral and ostomy vanc    - now DNR-CCA      Medications:     Scheduled Meds:   cefepime (MAXIPIME) 1000 mg IVPB minibag in NS  1,000 mg Intravenous Q12H    vancomycin 500 mg in 0.9% sodium chloride 250 mL  500 mg Rectal 4 times per day    heparin (porcine)  1,000 Units Intracatheter Once    metoprolol  5 mg Intravenous Q6H    sodium chloride  1,000 mL Intravenous Once    insulin regular  0-18 Units Subcutaneous Q4H    anidulafungin (ERAXIS) 100 mg IVPB  100 mg Intravenous Daily    vancomycin  250 mg Per NG tube 4 times per day    metroNIDAZOLE  500 mg Intravenous Q8H    heparin (porcine)  5,000 Units Subcutaneous 3 times per day    pantoprazole  40 mg Intravenous BID    sodium chloride flush  5-40 mL Intravenous 2 times per day    OLANZapine zydis  5 mg Oral Nightly    sodium chloride flush  5-40 mL Intravenous 2 times per day     Continuous Infusions:   vasopressin (Septic Shock) infusion 0.03 Units/min (07/03/21 0213)    PN-Adult Premix 5/15 - Central 70 mL/hr at 07/02/21 1806    fat emulsion 250 mL (07/02/21 1806)    prismaSATE BGK 4/2.5 1,000 mL/hr at 07/03/21 0312    prismaSATE BGK 4/2.5 1,000 mL/hr at 07/03/21 0312    prismaSATE BGK 4/2.5 1,000 mL/hr at 07/03/21 0312    sodium chloride      norepinephrine 22 mcg/min (07/03/21 0125)    dextrose      sodium chloride      sodium chloride       PRN Meds:sodium phosphate IVPB **OR** sodium phosphate IVPB **OR** sodium phosphate IVPB **OR** sodium phosphate IVPB, calcium gluconate **OR** calcium gluconate **OR** calcium gluconate **OR** calcium gluconate, magnesium sulfate, sodium chloride, IVPB builder, glucose, dextrose, glucagon (rDNA), dextrose, HYDROmorphone **OR** HYDROmorphone, sodium chloride flush, sodium chloride, ipratropium-albuterol, sodium chloride flush, sodium chloride, ondansetron **OR** ondansetron, phenol    Objective:   Vitals:   T-max:  Patient Vitals for the past 8 hrs:   BP Temp Temp src Pulse Resp SpO2   07/03/21 0500    96 26    07/03/21 0445    88 (!) 33    07/03/21 0430    102 26    07/03/21 0415    98 24    07/03/21 0400 (!) 115/59 97.3 °F (36.3 °C) Axillary 98 (!) 33 94 %   07/03/21 0345    91 25    07/03/21 0330    89 25    07/03/21 0315    91 29 92 %   07/03/21 0300 98/60   91 30 92 %   07/03/21 0245    91 30 93 %   07/03/21 0230    89 28    07/03/21 0215    95 30    07/03/21 0200 94/61   97 (!) 31    07/03/21 0145    89 26    07/03/21 0130    91 28    07/03/21 0115    93 (!) 31    07/03/21 0100 (!) 78/47   94 (!) 32    07/03/21 0045    90 29    07/03/21 0030    93 30    07/03/21 0015    92 (!) 31    07/03/21 0000 (!) 91/42 97.1 °F (36.2 °C) Axillary 89 (!) 34 93 %   07/02/21 2345    82 27    07/02/21 2330    79 27    07/02/21 2315    144 (!) 35    07/02/21 2300 93/72   147 (!) 40    07/02/21 2245    137 30    07/02/21 2230    137 (!) 34    07/02/21 2215    136 (!) 32    07/02/21 2200 109/81   144 (!) 36    07/02/21 2145    135 (!) 37    07/02/21 2130    133 (!) 33    07/02/21 2115    133 (!) 33        Intake/Output Summary (Last 24 hours) at 7/3/2021 0509  Last data filed at 7/3/2021 0500  Gross per 24 hour   Intake 4694 ml Output 2409 ml   Net 2285 ml       Review of Systems   Constitutional: Positive for activity change and fatigue. Decreased movement and energy. Respiratory: Positive for wheezing. Negative for apnea. Cardiovascular: Positive for leg swelling. Negative for chest pain. Gastrointestinal: Positive for abdominal pain and diarrhea. Negative for abdominal distention. Genitourinary: Positive for decreased urine volume. Skin: Positive for wound. Negative for color change and pallor. Neurological: Positive for weakness. Psychiatric/Behavioral:        Decreased mentation: Will speak only 1-2 word sentences   All other systems reviewed and are negative. Physical Exam  Constitutional:       Appearance: She is ill-appearing. Comments: Arousable to voice and touch. Oriented to self but not place or time. HENT:      Mouth/Throat:      Pharynx: Oropharynx is clear. Eyes:      Extraocular Movements: Extraocular movements intact. Conjunctiva/sclera: Conjunctivae normal.      Pupils: Pupils are equal, round, and reactive to light. Cardiovascular:      Rate and Rhythm: Normal rate and regular rhythm. Pulses: Normal pulses. Heart sounds: Normal heart sounds. Pulmonary:      Breath sounds: No wheezing or rhonchi. Comments: CTA. Difficult to assess lower lung fields due to positioning in bed  Abdominal:      General: Bowel sounds are normal. There is distension. Tenderness: There is abdominal tenderness. Comments: Mild abdominal distention   Musculoskeletal:      Right lower leg: Edema present. Left lower leg: Edema present. Comments: 2+ pitting edema bilaterally   Skin:     General: Skin is warm. Comments: R LE erythemetaous wound from ankle residential to knee   Neurological:      Mental Status: She is disoriented. Comments: Arousable to voice and touch.  Oriented to self but not place or time   Psychiatric:         Mood and Affect: Mood normal. LABS:    CBC:   Recent Labs     07/01/21  0300 07/02/21 0415   WBC 24.4* 20.1*   HGB 8.8* 8.5*   HCT 26.7* 25.7*    104*   MCV 92.1 92.9     Renal:    Recent Labs     07/01/21  1640 07/01/21  2334 07/02/21 0415 07/02/21 0415 07/02/21 0930 07/02/21  1840 07/03/21  0050   NA  --    < > 128*   < > 128* 130* 130*   K  --    < > 3.8   < > 3.9 4.2 4.5   CL  --    < > 96*   < > 96* 96* 99   CO2  --    < > 21   < > 21 21 19*   BUN  --    < > 74*   < > 77* 75* 58*   CREATININE  --    < > 3.0*   < > 3.1* 2.9* 2.4*   GLUCOSE  --    < > 209*   < > 183* 150* 139*   CALCIUM  --    < > 7.8*   < > 8.1* 8.3 7.7*   MG 2.50*  --  2.30  --   --  2.50*  --    PHOS  --    < > 5.0*   < > 5.3* 5.4* 4.0   ANIONGAP  --    < > 11   < > 11 13 12    < > = values in this interval not displayed. Hepatic:   Recent Labs     07/01/21  0550 07/01/21  0550 07/01/21 1640 07/01/21 2334 07/02/21 0415 07/02/21 0415 07/02/21  0930 07/02/21  1840 07/03/21  0050   AST 49*   < > 64*  --  38*  --   --  50*  --    ALT 18   < > 22  --  15  --   --  17  --    BILITOT 2.5*   < > 3.1*  --  3.0*  --   --  3.9*  --    BILIDIR 1.4*  --  1.2*  --  1.7*  --   --   --   --    PROT 4.6*   < > 5.1*  --  4.5*  --   --  5.1*  --    LABALBU 2.0*   < > 2.5*   < > 2.2*   < > 2.2* 2.4* 2.2*   ALKPHOS 116   < > 103  --  87  --   --  139*  --     < > = values in this interval not displayed. Troponin: No results for input(s): TROPONINI in the last 72 hours. BNP: No results for input(s): BNP in the last 72 hours. Lipids: No results for input(s): CHOL, HDL in the last 72 hours.     Invalid input(s): LDLCALCU, TRIGLYCERIDE  ABGs:    Recent Labs     07/01/21  0811   PHART 7.458*   GCA3VHJ 30.4*   PO2ART 63.8*   FNK1WKF 21.6   BEART -2   [id number] 94   [id number] 23       INR:   Recent Labs     07/01/21  0550 07/02/21  0854 07/02/21  1228   INR 2.44* 2.55* 1.81*     Lactate:   Recent Labs     07/01/21  0811   LACTATE 2.51* better evaluate. There is otherwise no evidence of free air or extraluminal gas collection with no significant free pelvic fluid. Focal area of strandy opacity of the left flank region in the subcutaneous fat may indicate focal anasarca or cellulitis. Otherwise no acute process seen. XR CHEST PORTABLE   Final Result      1. No acute process or consolidation   2. Right IJ central venous catheter appreciated with the tip in the right atrium. NG tube in place with the tip in the upper stomach               XR ABDOMEN (KUB) (SINGLE AP VIEW)   Final Result      AP abdomen shows extensive gastric distention. XR CHEST PORTABLE   Final Result      Mild left basilar infiltrate or atelectasis. XR CHEST PORTABLE   Final Result      Low level of inspiration with more prominent bibasilar and right perihilar opacities felt to be most likely atelectasis. CT ABDOMEN PELVIS W IV CONTRAST Additional Contrast? Oral   Final Result      1. 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 2.  Interval development of mild perihepatic ascites with scattered small fluid collections within the abdomen and pelvis. No discrete findings for abscess formation on the current exam.      3.  Mildly distended proximal small bowel loops containing contrast, favoring ileus-type pattern. The degree of small bowel distention has improved when compared to prior exam.      4.  Diffuse body wall edema most pronounced over the left lateral abdomen, progressed from prior exam.      5.  Bibasilar airspace disease consistent with underlying atelectasis and/or infiltrate. XR CHEST PORTABLE   Final Result      Hyperexpanded lungs with bibasilar minimal atelectasis, unchanged. XR CHEST 1 VIEW   Final Result   1. Low lung volumes with persistent bilateral lower lobe atelectasis   2.  Nasogastric tube in stomach      XR CHEST PORTABLE   Final Result      Free intraperitoneal air.      No acute cardiopulmonary findings. Attending in the ED was notified of the free air from the patient's CT study performed earlier. CT ABDOMEN PELVIS W IV CONTRAST Additional Contrast? None   Final Result      Free intraperitoneal air, predominantly in the upper abdomen and right upper quadrant. Site of perforation is not definitely identified. Distended proximal to mid small bowel. Distal small bowel not distended, consistent with small bowel obstruction. Stool throughout the colon. Distal colonic diverticulosis. Small fat-containing umbilical hernia. Air within the hernia, with a possible skin defect anteriorly at the umbilicus. Correlate clinically. Critical result findings were called to Audley Hamman in the ED at 2150 hours on 6/17/2021. Assessment/Plan:     Bishnu Sorto is a 65 y.o. female who was admitted with perforated sigmoid diverticulitis    Acute hypoxemic respiratory failure-  -gradually worsening and now on 7 L which is not unexpected given her septic shock with multisystem organ failure. X-rays amazing that her lungs are not worse than they are.     Perforated sigmoid diverticulitis s/p ex-lap and Troncoso's procedure (6/27):  - wound vac in place  - primary (GS) following    Post-op ileus  - NGT in place  - primary (GS) following    Septic shock 2/2 diverticulitis, C diff, Aceinetobacter bacteremia, and ESBL Klebsiella UTI: Hemodynamics worse since initiation of CRRT and attempts at volume removal. WBC now up to 37 from 20 yesterday  - ID following: on rectal and ostomy vancomycin  - continue anidulafungin 100mg IV, Cefepime 1000 BID, flagyl 500mg q8h, oral vancomycin 250mg 6hr  - continue Levophed and vasopressin for MAP goal >60: consider phenylephrine if maxed out  -  Steroids will likely be more harm than good  - consider 500ml fluid bolus considering increased pressure requirement to see if volume responsive at this point juan luis since volume was removed yesterday and  requirement worsened    AGMA 2/2 lactic acidosis likely secondary to demand ischemia: lactate re-elevated to 5.1  - plan per above  - q6 lactate    Acute metabolic encephalopathy 2/2 septic shock: patient usually able to hold conversation and now can barely move in bed and speaks 1-2 word sentences    LAVINIA: fluid overload vs. Prerenal etiology 2/2 septic shock and hypovolemia. Fluid overload more likely considering lack of urine and unresponsiveness to fluids  - nephrology following: CRRT  - cont levophed and vasopressin  - restrict fluid intake  - hold nephrotoxic medications and lasix  - strict I/Os  - renal panel q6    Hypocalcemia: likely 2/2 CRRT  - Ca gluc infusions as needed  - ionized Ca q6    Coagulopathy: 2/2 cirrhosis consider factor studies  - Heme following  - If acute bleeding, replace FFP    Thrombocytopenia: likely 2/2 cirrhosis. 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 Likely stress-induced  - MDSSI    R LE Cellulitis: recurrent per PMHx  - wound care    Code Status: Full code  FEN: TPN  PPX: Heparin, protonix  DISPO: ICU    Niranjan Mathis MD, PGY-1  07/03/21  5:09 AM    This patient has been staffed and discussed with Khalif South MD.     THE [facility] [place]     Patient seen and examined. I agree with Dr. Guerrero's history, physical, lab findings, assessment and plan.     Patient has significantly worsened in the last 24 hours in regards to vasopressor requirement is Levophed is now up to 26 from 3 yesterday. In addition she is on vasopressin. White blood cell count has spiked to 37 from 20.   CRRT was initiated yesterday and initially 100 mL an hour attempted to be removed but she did not tolerate this. She remains lethargic but is still arousable and complains of abdominal pain. She currently is on 7 L and is tachypneic at 30. Venous pH this morning is 7.5     -HIT panel checked due to thrombocytopenia and was negative   -Continue Levophed and vasopressin with goal map of 65  -Continue oral vancomycin D#4 and vanc AL D#2 per ID  -Continue other antibiotics per Dr. Ana Sidhu  - Pt DNR_CCA -son is interested in talking about when hospice would be appropriate  -Continue CRRT  -1 L volume challenge was ordered to see if this would help with hemodynamics given worsening vasopressor requirement, worsening lactic acidemia, and worsening C. difficile colitis in the setting of attempted volume removal via CRRT. Surgical service replace 1 L NS volume challenge with 100 milliliters of albumin which will likely not help define if she is actually volume responsive  - Palliative care following     Prognosis guarded and unfortunately I think this is turning out to be a nonreversible process. I do think hospice is very appropriate     Pt has a high probability of imminent or life-threatening deterioration requiring close monitoring, and highly complex decision-making and/or interventions of high intensity to assess, manipulate, and support his critical organ systems to prevent a likely inevitable decline which could occur if left untreated.      A total critical care time 31 minutes was used. This includes but not limited to examining patient, collaborating with other physicians, monitoring vital signs, telemetry, continuous pulse oximetry, and clinical response to IV medications, documentation time, review and interpretation of laboratory and radiological data, review of nursing notes and old record review.  This time excludes any time that may have been spent performing procedures for life threatening organ failure.        Quinn Soliman MD

## 2021-07-03 NOTE — PROGRESS NOTES
ID Follow-up NOTE    CC:   Peritonitis secondary to perforated sigmoid diverticulitis, UTI, bacteremia, C diff  Antibiotics: Colostomy Vancomycin, Cefepime, Metronidazole, Anidulafungin    Admit Date: 6/17/2021   Hospital Day: 17    Subjective:     Patient is awake, able to answer questions   Has diffuse abd pain with exam    + stool output - 100 m; today / 700 ml over night from colostomy   + NGT to suction      Objective:     Patient Vitals for the past 8 hrs:   BP Temp Temp src Pulse Resp SpO2 Weight   07/03/21 1030    100 (!) 32     07/03/21 1015    99 (!) 31     07/03/21 1000    97 (!) 33     07/03/21 0945    94 (!) 32     07/03/21 0930    96 (!) 33     07/03/21 0915    112 (!) 38     07/03/21 0900    101 (!) 32     07/03/21 0845    88 (!) 31     07/03/21 0830    106 (!) 36     07/03/21 0815    95 (!) 32     07/03/21 0800  97.2 °F (36.2 °C) Axillary 97 (!) 36 99 %    07/03/21 0745    92 (!) 31     07/03/21 0730    92 30 92 %    07/03/21 0715    98 (!) 33 93 %    07/03/21 0700    98 (!) 35 93 %    07/03/21 0645    103 28 94 %    07/03/21 0630    95 30 93 %    07/03/21 0615    100 (!) 33 93 %    07/03/21 0600 93/68   104 (!) 35  286 lb 13.1 oz (130.1 kg)   07/03/21 0545    106 (!) 35     07/03/21 0530    98 (!) 31     07/03/21 0515    100 (!) 32     07/03/21 0500 (!) 122/57   96 26     07/03/21 0445    88 (!) 33     07/03/21 0430    102 26       I/O last 3 completed shifts: In: 5635 [I.V.:2302; Blood:529; NG/GT:70]  Out: 6362 [Urine:170; Emesis/NG output:700; Drains:50; DYEPR:1756]  I/O this shift: In: 0   Out: 2 [Urine:5]    EXAM:  GENERAL: No apparent distress. 8L hi flow.  Levofed 28 / vasopressin 0.03  HEENT: Membranes dry, no oral lesion - NGT on suction  NECK:  Supple, no lymphadenopathy  LUNGS: Clear b/l, no rales, no dullness  CARDIAC: RRR, no murmur appreciated  ABD:  No BS, soft, diffuse nitrofurantoin Resistant >64 mcg/mL   trimethoprim-sulfamethoxazole Resistant >2/38 mcg/mL   cefTAZidime-avibactam Sensitive 1 ug/ml       RADIOLOGY:  7/2 KUB 'Nonspecific bowel gas pattern'    CT abd 6/27/21  Impression   Patchy bibasilar infiltrates or atelectasis, left greater than right.       Postoperative changes noted from prior distal colonic resection with left lower quadrant diverting colostomy and Troncoso's pouch.       Percutaneous surgically placed drainage catheter seen in the lower abdomen.       Small amount of ascites is seen with fluid seen in the right subphrenic space.       Colon is relatively decompressed but shows no significant distention or definite wall thickening.       There are distended loops of small bowel most suggestive of ileus.       Focally distended bowel loop is seen in the left flank region anterior to the colon in an area of previous seen noted distended loop. There is areas of mixed air-fluid attenuation seen. This likely represents luminal contents. However, the possibility of    an immediately adjacent developing abscess would be difficult to exclude entirely and correlation with repeat evaluation with oral contrast in this area would be helpful to better evaluate.       There is otherwise no evidence of free air or extraluminal gas collection with no significant free pelvic fluid.       Focal area of strandy opacity of the left flank region in the subcutaneous fat may indicate focal anasarca or cellulitis.       Otherwise no acute process seen     CT abd 6/17/21  Impression   Free intraperitoneal air, predominantly in the upper abdomen and right upper quadrant. Site of perforation is not definitely identified.       Distended proximal to mid small bowel. Distal small bowel not distended, consistent with small bowel obstruction.       Stool throughout the colon. Distal colonic diverticulosis.       Small fat-containing umbilical hernia.  Air within the hernia, with a possible skin defect anteriorly at the umbilicus. Correlate clinically.        Scheduled Meds:   albumin human  25 g Intravenous Once    cefepime  2,000 mg Intravenous Q12H    vancomycin 500 mg in 0.9% sodium chloride 250 mL  500 mg Rectal 4 times per day    heparin (porcine)  1,000 Units Intracatheter Once    metoprolol  5 mg Intravenous Q6H    sodium chloride  1,000 mL Intravenous Once    insulin regular  0-18 Units Subcutaneous Q4H    anidulafungin (ERAXIS) 100 mg IVPB  100 mg Intravenous Daily    metroNIDAZOLE  500 mg Intravenous Q8H    heparin (porcine)  5,000 Units Subcutaneous 3 times per day    pantoprazole  40 mg Intravenous BID    sodium chloride flush  5-40 mL Intravenous 2 times per day    OLANZapine zydis  5 mg Oral Nightly    sodium chloride flush  5-40 mL Intravenous 2 times per day       Continuous Infusions:   vasopressin (Septic Shock) infusion 0.03 Units/min (07/03/21 1211)    PN-Adult Premix 5/15 - Standard Electrolytes - Central Line      fat emulsion      PN-Adult Premix 5/15 - Central 70 mL/hr at 07/02/21 1806    prismaSATE BGK 4/2.5 1,000 mL/hr at 07/03/21 0820    prismaSATE BGK 4/2.5 1,000 mL/hr at 07/03/21 0820    prismaSATE BGK 4/2.5 1,000 mL/hr at 07/03/21 0820    sodium chloride      norepinephrine 28 mcg/min (07/03/21 1008)    dextrose      sodium chloride      sodium chloride         PRN Meds:  sodium phosphate IVPB **OR** sodium phosphate IVPB **OR** sodium phosphate IVPB **OR** sodium phosphate IVPB, calcium gluconate **OR** calcium gluconate **OR** calcium gluconate **OR** calcium gluconate, magnesium sulfate, sodium chloride, IVPB builder, glucose, dextrose, glucagon (rDNA), dextrose, HYDROmorphone **OR** HYDROmorphone, sodium chloride flush, sodium chloride, ipratropium-albuterol, sodium chloride flush, sodium chloride, ondansetron **OR** ondansetron, phenol      Assessment:     Obesity (BMI 43), HTN, COPD, dementia, PUD, HCV    Admit 6/17, acute abd / free air  OR 6/17, Nick's procedure  Resp failure - resolved     Ileus  Encephalopathy   Hypotension / lactic acidosis  LAVINIA - Cr up  Leukocytosis     UTI vs bacteriuria, cult + Klebsiella ESBL (intermediate to meropenem)    + BC 1 set with Acinetobacter baumannii (no GPC, Micro report corrected)    C diff positive - toxin indeterminate,  toxin B PCR positive, colitis on CT (not on reading yet reviewed CT with radiologist and colon wall thickening evident)     7/3 - pressors up/ on CRRT / WBC up    Plan:     Cont cefepime   Cont iv  Metronidazole  Cont  Anidulafungin  Cont enteral Vanco per colostomy     Wound care / postop care   Critical care - pressors, CRRT, O2, meds     Medical Decision Making: The following items were considered in medical decision making:  Discussion of patient care with other providers  Reviewed clinical lab tests  Reviewed radiology tests  Reviewed other diagnostic tests/interventions  Independent review of radiologic images - reviewed with Radiologist on 6/29  Microbiology cultures and other micro tests reviewed      Spend 28 minutes on visit    Discussed with pt, RN, son   Discussed with son - overall status, prognosis.   He asked about end of life planning  Rosa Maria Valencia MD

## 2021-07-03 NOTE — PROGRESS NOTES
ICU DAILY PROGRESS NOTE  PGY-1  CC:  SUBJECTIVE:   Interval Hx: TMax 97.3F. On 7 L oxygen via high flow NC. On 28 of Levo (0600, 7/3) from 22 (0100, 7/3) from 14 (2100, 7/2) and vaso following initiation of CRRT last night. The patient's HR went into the 130's and 140's, at which point her at-home metoprolol was restarted and the HR responded to the 90's and low 100's. MAPs have been 57-90's. Alert to self.     LINES/ACCESS:   Central Access: R IJ CVC (06/27)  Peripheral Access: L forearm midline (06/24), PIV L forearm 06/24  Arterial Line Access: R radial (06/26)                                Hannon Date placed: 06/26  NGT Date placed: 06/26    Continuous Infusions:    vasopressin (Septic Shock) infusion 0.03 Units/min (07/03/21 0213)    PN-Adult Premix 5/15 - Central 70 mL/hr at 07/02/21 1806    prismaSATE BGK 4/2.5 1,000 mL/hr at 07/03/21 0312    prismaSATE BGK 4/2.5 1,000 mL/hr at 07/03/21 0312    prismaSATE BGK 4/2.5 1,000 mL/hr at 07/03/21 6175    sodium chloride      norepinephrine 28 mcg/min (07/03/21 0648)    dextrose      sodium chloride      sodium chloride         Scheduled Meds:    cefepime (MAXIPIME) 1000 mg IVPB minibag in NS  1,000 mg Intravenous Q12H    vancomycin 500 mg in 0.9% sodium chloride 250 mL  500 mg Rectal 4 times per day    heparin (porcine)  1,000 Units Intracatheter Once    metoprolol  5 mg Intravenous Q6H    sodium chloride  1,000 mL Intravenous Once    insulin regular  0-18 Units Subcutaneous Q4H    anidulafungin (ERAXIS) 100 mg IVPB  100 mg Intravenous Daily    vancomycin  250 mg Per NG tube 4 times per day    metroNIDAZOLE  500 mg Intravenous Q8H    heparin (porcine)  5,000 Units Subcutaneous 3 times per day    pantoprazole  40 mg Intravenous BID    sodium chloride flush  5-40 mL Intravenous 2 times per day    OLANZapine zydis  5 mg Oral Nightly    sodium chloride flush  5-40 mL Intravenous 2 times per day        PRN Meds: sodium phosphate IVPB **OR** sodium phosphate IVPB **OR** sodium phosphate IVPB **OR** sodium phosphate IVPB, calcium gluconate **OR** calcium gluconate **OR** calcium gluconate **OR** calcium gluconate, magnesium sulfate, sodium chloride, IVPB builder, glucose, dextrose, glucagon (rDNA), dextrose, HYDROmorphone **OR** HYDROmorphone, sodium chloride flush, sodium chloride, ipratropium-albuterol, sodium chloride flush, sodium chloride, ondansetron **OR** ondansetron, phenol    OBJECTIVE:   Vitals: BP 93/68   Pulse 103   Temp 97.3 °F (36.3 °C) (Axillary)   Resp 28   Ht 5' 6\" (1.676 m)   Wt 286 lb 13.1 oz (130.1 kg)   SpO2 94%   BMI 46.29 kg/m²     I/O:     Intake/Output Summary (Last 24 hours) at 7/3/2021 0719  Last data filed at 7/3/2021 0700  Gross per 24 hour   Intake 3537 ml   Output 3198 ml   Net 339 ml     I/O last 3 completed shifts: In: 2918 [I.V.:2302; Blood:529; NG/GT:70]  Out: 5314 [Urine:170; Emesis/NG output:700; Drains:50; RNLTY:7281]    Diet: Diet NPO Exceptions are: Ice Chips  PN-Adult Premix 5/15 - Central      Physical Examination:   General appearance: elderly, ill -appearing female, lying in bed, in NAD  HEENT: no scleral icterus, trachea midline, no JVD, R IJ CVC in place, NGT in place with bilious output, L IJ in place  Chest/Lungs: mildly increased work of breathing, equal chest rise, 7L NC  Cardiovascular: Tachycardic, regular rhythm  Abdomen: obese, distended abdomen, midline wound vac intact with adequate suction, colostomy pink and viable with loose/liquid stool in bag.  Old MIKE drain site c/d/i ; pitting edema of the abdomen with positive fluid wave  Skin: warm and dry, no rashes  Extremities: 2+ bilateral LE pitting edema, no cyanosis, R radial arterial line in place  Neuro: alert, oriented to self only    Labs:  CBC:   Recent Labs     07/01/21  0300 07/02/21  0415 07/03/21  0452   WBC 24.4* 20.1* 37.1*   HGB 8.8* 8.5* 9.9*   HCT 26.7* 25.7* 29.9*    104* 129*       BMP:   Recent Labs     07/02/21  0930 07/02/21  1840 07/03/21  0050   * 130* 130*   K 3.9 4.2 4.5   CL 96* 96* 99   CO2 21 21 19*   BUN 77* 75* 58*   CREATININE 3.1* 2.9* 2.4*   GLUCOSE 183* 150* 139*     LFT's:   Recent Labs     07/02/21  0415 07/02/21  1840 07/03/21  0452   AST 38* 50* 48*   ALT 15 17 11   BILITOT 3.0* 3.9* 4.2*   ALKPHOS 87 139* 108     Troponin: No results for input(s): TROPONINI in the last 72 hours. BNP: No results for input(s): BNP in the last 72 hours. ABGs:   Recent Labs     07/01/21  0811   PHART 7.458*   OXF0HHS 30.4*   PO2ART 63.8*     INR:   Recent Labs     07/02/21  0854 07/02/21  1228 07/03/21  0452   INR 2.55* 1.81* 2.50*       U/A:No results for input(s): NITRITE, COLORU, PHUR, LABCAST, WBCUA, RBCUA, MUCUS, TRICHOMONAS, YEAST, BACTERIA, CLARITYU, SPECGRAV, LEUKOCYTESUR, UROBILINOGEN, BILIRUBINUR, BLOODU, GLUCOSEU, AMORPHOUS in the last 72 hours. Invalid input(s): Aurora Garcia       ASSESSMENT AND PLAN:   Clif Mills is a 65 y.o. female with perforated sigmoid diverticulits s/p exploratory laparotomy and Nick's procedure 6/17. Post-operative course complicated by ileus type picture, UTI, and increasing leukocytosis. Neuro:   - Robaxin; Dilaudid pain panel  - Zyprexa 5mg qhs        Cardiovascular:   Septic versus hypovolemic shock in the setting of rapid fluid shifts  - Requiring Levophed - currently on 28, and 0.03 Vasopressin, will ween pressors as tolerated for a MAP goal >60    Sinus tachycardia:  - EKG 7/2 with sinus tachycardia  -Tachycardia likely 2/2 to reflex tachycardia from beta blocker being held  -Continue IV lp[ressor 5mg q6hr with hold parameters         Pulmonary:  - CXR 06/30 without new consolidation   - High risk for aspiration PNA  - HOB to remain elevated >30 at all times  - Aggressive pulmonary toilet as tolerated:  Acapella, EZPap, IS  - Prn DuoNebs  -Wean O2; spO2>90, currently on high flow NC        FEN/GI:  Perforated Diverticulitis s/p Nick's procedure (06/17)  - CT scan on 06/27 without concern for leak or abscess  - Wound vac in place - changes MWF  - NGT with 700 mL output over 24 hrs. Continue to CLW suction. - MIKE drain pulled and wound closed (06/28)  - Ostomy with large output, 1675 over last 24 hours - ostomy irrigated 7/1; will continue to closely monitor output  - Continue TPN; follow dietitian recommendation. :  LAVINIA  - Cr 2.4 from 3  (baseline of 0.9); BUN 58  - Hannon placed for strict I/Os  -CRRT initiated per nephrology recommendations, currently patient is to remain positive as blood pressure is unable to tolerate fluid removal        Hem/ID:   Anemia  - Hgb 9.9 from 8.5 after CRRT  - no source of active bleeding      Leukocytosis  - WBC 37.1 from 20.1 patient appears to be hemo concentrated on CBC  - Remains afebrile  - Urinalysis (06/24): positive for Klebsiella pneumoniae  - Blood culture (06/26): positive for Acinetobacter calcoac baumannii  - C diff toxin positive. - ID following; follow up recommendations              - currently on Cefepime, Anidulafungin, Metronidazole, Vancomycin PO     Endo  - Persistent hyperglycemia , likely 2/2 sepsis  - No hx DM  - Receiving high dose sliding scale q4hr       Access:  Central Access: R IJ CVC (06/26), L IJ (7/2)  Peripheral Access: L forearm midline (06/24), PIV L forearm 06/24  Arterial Line Access: R radial (07/02)                                Hannon Date placed: 06/26  NGT Date placed: 06/26     Prophylaxis:  SQH     Mobility:  Bedrest until more stable     Dispo:   ICU, patient severely ill, will speak with family today and update on status as she is clinically worsening      Code Status: DNR-CCA  -----------------------------  Vinicius Adames DO  7/3/2021 7:19 AM   Pager [phone]    I have personally performed the medical history, physical exam and medical decision making and agree with all pertinent clinical information unless otherwise noted.      Discussed with JAMIN Thakkar MD  Surgery Attending

## 2021-07-03 NOTE — PLAN OF CARE
Problem: Pain:  Goal: Pain level will decrease  Description: Pain level will decrease  Outcome: Ongoing     Problem: Skin Integrity:  Goal: Will show no infection signs and symptoms  Description: Will show no infection signs and symptoms  Outcome: Ongoing     Problem: Falls - Risk of:  Goal: Will remain free from falls  Description: Will remain free from falls  Outcome: Ongoing     Problem: Nutrition  Goal: Optimal nutrition therapy  Outcome: Ongoing     Problem: Non-Violent Restraints  Goal: Removal from restraints as soon as assessed to be safe  Outcome: Ongoing

## 2021-07-03 NOTE — PROGRESS NOTES
Reached out to Nephrology per Surgical teams request d/t pt increasing pressor requirement. Per Dr Nathalia Buck, decrease blood flow rate to 150, and keep pt positive 25ml/hr.

## 2021-07-03 NOTE — PROGRESS NOTES
Pt seen on CRRT twice   BP low   On 2 pressors   Lytes stable     UO poor     Unable to remove Fluid sec to Hypotension    D/w JAMIN Love MD

## 2021-07-04 NOTE — PLAN OF CARE
Problem: Pain:  Goal: Pain level will decrease  Description: Pain level will decrease  Outcome: Ongoing     Problem: Skin Integrity:  Goal: Will show no infection signs and symptoms  Description: Will show no infection signs and symptoms  Outcome: Ongoing  Pt turned and repositioned frequently in bed. Pillows for positioning. Heels elevated in prevalon boots. Scattered bruising and abrasions noted. Sacral heart to coccyx for prevention. Will cont to monitor skin for needs.     Problem: Falls - Risk of:  Goal: Will remain free from falls  Description: Will remain free from falls  Outcome: Ongoing    Problem: Nutrition  Goal: Optimal nutrition therapy  Outcome: Ongoing     Problem: Non-Violent Restraints  Goal: Removal from restraints as soon as assessed to be safe  Outcome: Ongoing

## 2021-07-04 NOTE — PLAN OF CARE
Problem: Pain:  Goal: Control of acute pain  Description: Control of acute pain  7/4/2021 0916 by Tawnya Aviles RN  Outcome: Ongoing  Note: Pt has not had any c/o pain thus far this shift. Pt more lethargic today. MD aware. Will monitor. Problem: Non-Violent Restraints  Goal: Removal from restraints as soon as assessed to be safe  7/4/2021 0916 by Tawnya Aviles RN  Outcome: Ongoing  Note: Pt remains in bilateral soft wrist restraints due poor safety awareness and pulling at lines/tubes/equipments. Visual checks every  hour and restraint need/assessment re-evaluated every 2 hours per hospital policy. See restraint flowsheet. Goal is for patient to remain free of physical, harm/injury. Will continue to monitor. Problem: Skin Integrity:  Goal: Absence of new skin breakdown  Description: Absence of new skin breakdown  7/4/2021 0916 by Tawnya Aviles RN  Outcome: Ongoing  Note: Pt at risk for skin breakdown. See Cam score. Pt remains on bedrest. Unable to reposition self in bed. Heels elevated off bed. Sacral heart mepilex intact to protect, site inspected and intact underneath. Will continue to turn and reposition patient every two hours and as needed. Will continue to keep patient clean and dry, applying skin care cream as needed. Pillows used for repositioning q2hs. Will continue to monitor and assess for skin breakdown. Problem: Falls - Risk of:  Goal: Will remain free from falls  Description: Will remain free from falls  7/4/2021 0916 by Tawnya Aviles RN  Outcome: Ongoing  Note: Pt is a fall risk. Fall risk protocol in place. See [facility] [place] Fall Score. Pt bed is in low position, bed alarm is on, side rails up, fall risk bracelet applied. , non-skid footwear in use. Patient/family educated on fall risk protocol, instructed to call for assistance when needed and belongings are in reach. assistance.  Will continue with hourly rounds for po intake, pain needs, toileting and repositioning as

## 2021-07-04 NOTE — PROGRESS NOTES
POC:    Long discussion with the family reviewing acute events that occurred yesterday and progress today. This a difficult situation with a guarded prognosis. The family is having difficulty making a decision on where to go from here. Discussed with them the different options. They are still leaning towards hospice and comfort care but they are not ready to make the decision at this point. All questions answered and encouraged family to reach out to surgical team if they need anything or have any questions.      Roberto Carlos Chowdhury DO  07/04/21  11:45 AM  [phone]

## 2021-07-04 NOTE — PROGRESS NOTES
ICU Progress Note    Admit Date: 6/17/2021  Day: 17  Vent Day: None  IV Access:Peripheral  IV Fluids:None  Vasopressors:None                Antibiotics: None  Diet: Diet NPO Exceptions are: Ice Chips  PN-Adult Premix 5/15 - Standard Electrolytes - Central Line  PN-Adult Premix 5/15 - Standard Electrolytes - Central Line    CC: abdominal pain    Interval history:   - patient continues on CRRT  - CBC: patient still has hyponatremia (Na 129 today)  - Lactic Acid 7.7 today (slightly down from yesterday 7.9)  Heme/Coag:   - WBC 35  - aPTT 60.2 (up from 46.6 yesterday)   - PT 40.4 (up from 29.4 yesterday)   - INR 3.4 (up from 2.5)   - remains on 2 pressors: Levophed and Vasopressin  - remains on C diff protocol: rectal vanco, IV metronidazole  - remains on anadulafungin and cefepime      Medications:     Scheduled Meds:   cefepime  2,000 mg Intravenous Q12H    vancomycin 500 mg in 0.9% sodium chloride 250 mL  500 mg Rectal 4 times per day    heparin (porcine)  1,000 Units Intracatheter Once    metoprolol  5 mg Intravenous Q6H    sodium chloride  1,000 mL Intravenous Once    insulin regular  0-18 Units Subcutaneous Q4H    anidulafungin (ERAXIS) 100 mg IVPB  100 mg Intravenous Daily    metroNIDAZOLE  500 mg Intravenous Q8H    heparin (porcine)  5,000 Units Subcutaneous 3 times per day    pantoprazole  40 mg Intravenous BID    sodium chloride flush  5-40 mL Intravenous 2 times per day    OLANZapine zydis  5 mg Oral Nightly    sodium chloride flush  5-40 mL Intravenous 2 times per day     Continuous Infusions:   fat emulsion      PN-Adult Premix 5/15 - Standard Electrolytes - Central Line      vasopressin (Septic Shock) infusion 0.03 Units/min (07/03/21 0190)    PN-Adult Premix 5/15 - Standard Electrolytes - Central Line 70 mL/hr at 07/03/21 1818    prismaSATE BGK 4/2.5 1,000 mL/hr at 07/04/21 0653    prismaSATE BGK 4/2.5 1,000 mL/hr at 07/04/21 0653    prismaSATE BGK 4/2.5 1,000 mL/hr at 07/04/21 1017    sodium chloride      norepinephrine 26 mcg/min (07/04/21 0854)    dextrose      sodium chloride      sodium chloride       PRN Meds:sodium phosphate IVPB **OR** sodium phosphate IVPB **OR** sodium phosphate IVPB **OR** sodium phosphate IVPB, calcium gluconate **OR** calcium gluconate **OR** calcium gluconate **OR** calcium gluconate, magnesium sulfate, sodium chloride, IVPB builder, glucose, dextrose, glucagon (rDNA), dextrose, HYDROmorphone **OR** HYDROmorphone, sodium chloride flush, sodium chloride, ipratropium-albuterol, sodium chloride flush, sodium chloride, ondansetron **OR** ondansetron, phenol    Objective:   Vitals:   T-max:  Patient Vitals for the past 8 hrs:   BP Temp Temp src Pulse Resp SpO2 Weight   07/04/21 0815    90 27     07/04/21 0800  97.9 °F (36.6 °C) Axillary 88 29 94 %    07/04/21 0745    90 (!) 32 (!) 83 %    07/04/21 0730    90 30     07/04/21 0715    92 (!) 35 98 %    07/04/21 0700    100 (!) 32 97 %    07/04/21 0645    113 (!) 33 95 %    07/04/21 0630    100 30 95 %    07/04/21 0615    106 29 96 %    07/04/21 0600    100 30 94 %    07/04/21 0545    100 (!) 34 95 %    07/04/21 0538       291 lb 7.2 oz (132.2 kg)   07/04/21 0530    107 (!) 31 96 %    07/04/21 0515    105 28 95 %    07/04/21 0500    107 (!) 31 95 %    07/04/21 0445    113 (!) 34 95 %    07/04/21 0430    105 (!) 32 97 %    07/04/21 0415    101 25 96 %    07/04/21 0400 (!) 93/49 97.7 °F (36.5 °C) Axillary 101 29 96 %    07/04/21 0345    103 29 96 %    07/04/21 0330    99 28 97 %    07/04/21 0315    97 27 (!) 89 %    07/04/21 0300    101 (!) 32 (!) 88 %    07/04/21 0245    101 29 91 %    07/04/21 0230    99 29 91 %    07/04/21 0215    101 28 90 %        Intake/Output Summary (Last 24 hours) at 7/4/2021 1000  Last data filed at 7/4/2021 0900  Gross per 24 hour   Intake 3055 ml   Output 1276 ml   Net 1779 ml       Review of Systems   Physical Exam     Appearance: Ill-appearing  CV: RRR, no murmurs appreciated  Pulmonary: No wheezing appreciated in upper lung fields  Abdominal: distension  Extremities: Edema present        LABS:    CBC:   Recent Labs     07/03/21 0452 07/03/21 1628 07/04/21 0415   WBC 37.1* 35.8* 35.0*   HGB 9.9* 9.0* 9.0*   HCT 29.9* 27.6* 27.8*   * 101* 82*   MCV 92.0 93.8 94.1     Renal:    Recent Labs     07/03/21 0452 07/03/21 1000 07/03/21  1628 07/03/21 2210 07/04/21 0415   NA  --    < > 130* 129* 129*   K  --    < > 4.6 4.9 4.9   CL  --    < > 100 98* 99   CO2  --    < > 19* 17* 18*   BUN  --    < > 39* 34* 34*   CREATININE  --    < > 1.8* 1.5* 1.5*   GLUCOSE  --    < > 141* 159* 182*   CALCIUM  --    < > 8.0* 8.2* 8.3   MG 2.30  --  2.30  --  2.40   PHOS  --    < > 3.2 3.3 3.4   ANIONGAP  --    < > 11 14 12    < > = values in this interval not displayed. Hepatic:   Recent Labs     07/02/21 0415 07/02/21  0930 07/02/21  1840 07/03/21  0050 07/03/21 0452 07/03/21 1000 07/03/21 1628 07/03/21 2210 07/04/21 0415   AST 38*  --  50*  --  48*  --   --   --  64*   ALT 15  --  17  --  11  --   --   --  15   BILITOT 3.0*  --  3.9*  --  4.2*  --   --   --  5.6*   BILIDIR 1.7*  --   --   --  2.4*  --   --   --  3.4*   PROT 4.5*  --  5.1*  --  4.7*  --   --   --  4.6*   LABALBU 2.2*   < > 2.4*   < > 2.2*   < > 2.3* 2.2* 2.2*   ALKPHOS 87  --  139*  --  108  --   --   --  131*    < > = values in this interval not displayed. Troponin: No results for input(s): TROPONINI in the last 72 hours. BNP: No results for input(s): BNP in the last 72 hours. Lipids: No results for input(s): CHOL, HDL in the last 72 hours. Invalid input(s): LDLCALCU, TRIGLYCERIDE  ABGs:  No results for input(s): PHART, RHG0VHM, PO2ART, NWE8WGJ, BEART, THGBART, I3POTZLY, FVS6SDH in the last 72 hours.     INR:   Recent Labs     07/02/21  1228 07/03/21  0452 07/04/21  0415   INR 1.81* 2.50* 3.40*     Lactate: No results for input(s): LACTATE in the last 72 hours. Cultures:  -----------------------------------------------------------------  RAD:   XR CHEST PORTABLE   Final Result      1. Mild airspace disease left base. XR ABDOMEN (KUB) (SINGLE AP VIEW)   Final Result      1. Nonspecific bowel gas pattern, not obstructive in appearance. XR CHEST PORTABLE   Final Result      Limited evaluation due to underpenetration      No pneumothorax post catheter placement on the left      XR ABDOMEN (KUB) (SINGLE AP VIEW)   Final Result   Impression: Nonspecific bowel gas pattern. XR CHEST PORTABLE   Final Result      Stable findings in the chest.      CT ABDOMEN PELVIS WO CONTRAST Additional Contrast? Oral   Final Result      No evidence of abscess or significant bowel wall thickening. No significant change in distention of small bowel loops suggestive of ileus. Extensive asymmetric anasarca in the left lateral abdominal wall again seen. Small volume perihepatic ascites again seen. Mild inflammatory fat stranding adjacent to the urinary bladder which could represent sequela of infection/inflammation. CT ABDOMEN PELVIS WO CONTRAST Additional Contrast? None   Final Result      Patchy bibasilar infiltrates or atelectasis, left greater than right. Postoperative changes noted from prior distal colonic resection with left lower quadrant diverting colostomy and Troncoso's pouch. Percutaneous surgically placed drainage catheter seen in the lower abdomen. Small amount of ascites is seen with fluid seen in the right subphrenic space. Colon is relatively decompressed but shows no significant distention or definite wall thickening. There are distended loops of small bowel most suggestive of ileus. Focally distended bowel loop is seen in the left flank region anterior to the colon in an area of previous seen noted distended loop.  There is areas of mixed air-fluid attenuation seen. This likely represents luminal contents. However, the possibility of    an immediately adjacent developing abscess would be difficult to exclude entirely and correlation with repeat evaluation with oral contrast in this area would be helpful to better evaluate. There is otherwise no evidence of free air or extraluminal gas collection with no significant free pelvic fluid. Focal area of strandy opacity of the left flank region in the subcutaneous fat may indicate focal anasarca or cellulitis. Otherwise no acute process seen. XR CHEST PORTABLE   Final Result      1. No acute process or consolidation   2. Right IJ central venous catheter appreciated with the tip in the right atrium. NG tube in place with the tip in the upper stomach               XR ABDOMEN (KUB) (SINGLE AP VIEW)   Final Result      AP abdomen shows extensive gastric distention. XR CHEST PORTABLE   Final Result      Mild left basilar infiltrate or atelectasis. XR CHEST PORTABLE   Final Result      Low level of inspiration with more prominent bibasilar and right perihilar opacities felt to be most likely atelectasis. CT ABDOMEN PELVIS W IV CONTRAST Additional Contrast? Oral   Final Result      1. 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 2.  Interval development of mild perihepatic ascites with scattered small fluid collections within the abdomen and pelvis. No discrete findings for abscess formation on the current exam.      3.  Mildly distended proximal small bowel loops containing contrast, favoring ileus-type pattern. The degree of small bowel distention has improved when compared to prior exam.      4.  Diffuse body wall edema most pronounced over the left lateral abdomen, progressed from prior exam.      5.  Bibasilar airspace disease consistent with underlying atelectasis and/or infiltrate.       XR CHEST PORTABLE   Final Result      Hyperexpanded lungs with bibasilar minimal atelectasis, unchanged. XR CHEST 1 VIEW   Final Result   1. Low lung volumes with persistent bilateral lower lobe atelectasis   2. Nasogastric tube in stomach      XR CHEST PORTABLE   Final Result      Free intraperitoneal air. No acute cardiopulmonary findings. Attending in the ED was notified of the free air from the patient's CT study performed earlier. CT ABDOMEN PELVIS W IV CONTRAST Additional Contrast? None   Final Result      Free intraperitoneal air, predominantly in the upper abdomen and right upper quadrant. Site of perforation is not definitely identified. Distended proximal to mid small bowel. Distal small bowel not distended, consistent with small bowel obstruction. Stool throughout the colon. Distal colonic diverticulosis. Small fat-containing umbilical hernia. Air within the hernia, with a possible skin defect anteriorly at the umbilicus. Correlate clinically. Critical result findings were called to Dangelo Pearl in the ED at 2150 hours on 6/17/2021. Assessment/Plan:   Mikie Ramirez is a 64 y/o F who was admitted with perforated sigmoid diverticulitis. 1. Perforated sigmoid diverticulitis s/p ex-lap and Troncoso's procedure (6/27):   - wound vac in place  2. Septic Shock 2/2 diverticulitis and C. Diff, UTI  - rectal and ostomy vacomycin, ID following  - continue anidulafungin 100mg IV, Cefepime 1000 BID, flagyl 500mg q8h, oral vancomycin 250mg 6hr  - continue Levophed and vasopressin for MAP goal >60  3. Lactic Acidosis  - q6 lactate  4. LAVINIA  - nephrology following  - CRRT  - renal panel q6  - ionized Ca q6    Family (and surgery) would like to be notified if patient's pressor requirements increase. Patient's family is considering hospice care.      Code Status: Full Code  FEN: TPN  PPX: Heparin, protonix  DISPO: ICU    Kartik Cason MD, PGY 1  07/04/21  10:00 AM    This patient has been staffed and discussed with Suha Reyna MD.

## 2021-07-04 NOTE — PROGRESS NOTES
Clinical Pharmacy Progress Note  Pharmacy to dose PN    Admit date: 6/17/2021     Subjective/Objective:  Patient is a Katie Grapes old female from [facility] with a PMHx significant for HTN, COPD, HCV, possible cirrhosis, PUD, and dementia. Admitted with perf sigmoid diverticulitis, now s/p Troncoso's procedure on 2/06, complicated by post-op ileus, C.diff colitis,  UTI, and bactermia with MDRO. Interval Update:  Continues on CRRT. Discussing goals of care with family. Pharmacy is consulted to dose PN per Dr. Db Saunders and asked to convert all IVs to NS per Dr. Dandre Pelaez. Today is PN day #7  Current diet order:  NPO    Antibiotic regimen:    (6/17-6/26)  Metronidazole 500mg IV q8h (6/17-6/26, resumed 6/27- )  (6/26-6/27)   (6/27--7/2)  Anidulafungin 200mg IV x1, then 100mg IV q24h (6/27-- ) -- day #8  Vancomycin ORAL 125mg per NG tube Q6h (6/29-- ) -- day #6  Vancomycin PER COLOSTOMY 500mg q6h (7/2-- ) -- day #3  Cefepime (7/2-- ) -- day #3   1000mg IV q12h (7/2-7/3)   2000mg IV q12h (7/3-current)    Height:  5' 6\" (167.6 cm)  Weight: 291 lb 7.2 oz (132.2 kg)    Recent Labs     07/03/21  2210 07/04/21  0415   * 129*   K 4.9 4.9   CL 98* 99   CO2 17* 18*   BUN 34* 34*   CREATININE 1.5* 1.5*   GLUCOSE 159* 182*     Calcium 8.3   Albumin 2.2   Corrected Calcium 9.7  Phosphorus 3.4  Magnesium 2.4    Glucoses since TPN hung yesterday:  148 - 162 - 152  Used 12 units SSI      Recent Labs     07/03/21  1628 07/04/21  0415   WBC 35.8* 35.0*   HGB 9.0* 9.0*   * 82*        6/29   Triglycerides 113     Micro:  6/24 Blood x2 = NGTD  6/24 Urine = Klebsiella ESBL -- Intermediate to meropenem, Sensitive to amikacin and ceftazidime/avibactam  6/26 Blood #1 = Acinetobacter calcoac baumannii - R to Cipro, Unasyn, Bactrim. Sensitive to cefepime, ceftazidime, amikacin, tobramycin.     6/26 C.diff toxin moleculuar = positive    Prophylaxis:   · VTE: heparin SQ  · SUP: pantoprazole IV BID      Assessment/Plan:  1) Parenteral Nutrition:  Pharmacy to dose -- Day #7  · Will order Clinimix-E 5/15 @ 70mL/hr (total volume = 1680mL/day), at goal rate per dietary recommendations. Ordered Lipids 20% 250mL to infuse over 12 hrs. Regimen provides AA 70g, Dextrose 252g, Lipids 50g. Total nutrition provides 1693 kcal per day. Appreciate dietitian recommendations.  Clinimix-E includes standard electrolyte formulation. No additional electrolytes added. Will monitor electrolytes daily and will replete with supplemental IVPBs as needed.  Glucose control:   · Glucoses remain < 180. Will continue insulin in TPN at 50 units / day. Will monitor closely.  Patient will receive MVI 10 mL/day on MWF only (due to national shortage) & Trace Elements 1 mL/day with PN daily.  Labs will be monitored daily and PN will be re-ordered daily. Weekly triglyceride ordered per PN protocol.       Please call with questions--  Thanks--  Alma Cuello, PharmD, [id number]  [id number] ([facility])   7/4/2021 9:21 AM

## 2021-07-04 NOTE — PROGRESS NOTES
ICU DAILY PROGRESS NOTE  CC: Hinchey class IV diverticulitis  SUBJECTIVE:   Interval Hx:   Patient able to rest overnight. Lactic acid peaked overnight. Pressor requirements remained stable overnight with slight decrease in levo. Extensive discussion with family about goals of care and family is discussing hospice at this time.     LINES/ACCESS:   Central Access: R IJ CVC (06/27)  Peripheral Access: L forearm midline (06/24), PIV L forearm 06/24  Arterial Line Access: R radial (06/26)                                Hannon Date placed: 06/26  NGT Date placed: 06/26    Continuous Infusions:    vasopressin (Septic Shock) infusion 0.03 Units/min (07/03/21 3711)    PN-Adult Premix 5/15 - Standard Electrolytes - Central Line 70 mL/hr at 07/03/21 1818    prismaSATE BGK 4/2.5 1,000 mL/hr at 07/04/21 0039    prismaSATE BGK 4/2.5 1,000 mL/hr at 07/04/21 0039    prismaSATE BGK 4/2.5 1,000 mL/hr at 07/04/21 0039    sodium chloride      norepinephrine 24 mcg/min (07/03/21 2219)    dextrose      sodium chloride      sodium chloride         Scheduled Meds:    cefepime  2,000 mg Intravenous Q12H    vancomycin 500 mg in 0.9% sodium chloride 250 mL  500 mg Rectal 4 times per day    heparin (porcine)  1,000 Units Intracatheter Once    metoprolol  5 mg Intravenous Q6H    sodium chloride  1,000 mL Intravenous Once    insulin regular  0-18 Units Subcutaneous Q4H    anidulafungin (ERAXIS) 100 mg IVPB  100 mg Intravenous Daily    metroNIDAZOLE  500 mg Intravenous Q8H    heparin (porcine)  5,000 Units Subcutaneous 3 times per day    pantoprazole  40 mg Intravenous BID    sodium chloride flush  5-40 mL Intravenous 2 times per day    OLANZapine zydis  5 mg Oral Nightly    sodium chloride flush  5-40 mL Intravenous 2 times per day        PRN Meds: sodium phosphate IVPB **OR** sodium phosphate IVPB **OR** sodium phosphate IVPB **OR** sodium phosphate IVPB, calcium gluconate **OR** calcium gluconate **OR** calcium gluconate **OR** calcium gluconate, magnesium sulfate, sodium chloride, IVPB builder, glucose, dextrose, glucagon (rDNA), dextrose, HYDROmorphone **OR** HYDROmorphone, sodium chloride flush, sodium chloride, ipratropium-albuterol, sodium chloride flush, sodium chloride, ondansetron **OR** ondansetron, phenol    OBJECTIVE:   Vitals: BP (!) 93/49   Pulse 100   Temp 97.7 °F (36.5 °C) (Axillary)   Resp 30   Ht 5' 6\" (1.676 m)   Wt 291 lb 7.2 oz (132.2 kg)   SpO2 95%   BMI 47.04 kg/m²     I/O:     Intake/Output Summary (Last 24 hours) at 7/4/2021 0644  Last data filed at 7/4/2021 0600  Gross per 24 hour   Intake 3423 ml   Output 1497 ml   Net 1926 ml     I/O last 3 completed shifts: In: 5218 [I.V.:3005; NG/GT:110]  Out: 1989 [Urine:25; Emesis/NG output:950; Drains:100; Stool:900]    Diet: Diet NPO Exceptions are: Ice Chips  PN-Adult Premix 5/15 - Standard Electrolytes - Central Line      Physical Examination:   General appearance: elderly, ill -appearing female, lying in bed, in NAD  HEENT: no scleral icterus, trachea midline, no JVD, R IJ CVC in place, NGT in place with bilious output, L IJ in place  Chest/Lungs: mildly increased work of breathing, equal chest rise, 7L NC  Cardiovascular: Tachycardic, regular rhythm  Abdomen: obese, distended abdomen, midline wound vac intact with adequate suction, colostomy pink and viable with loose/liquid stool in bag.  Old MIKE drain site c/d/i ; pitting edema of the abdomen with positive fluid wave  Skin: warm and dry, no rashes  Extremities: 2+ bilateral LE pitting edema, no cyanosis, R radial arterial line in place  Neuro: alert, oriented to self only    Labs:  CBC:   Recent Labs     07/03/21  0452 07/03/21  1628 07/04/21  0415   WBC 37.1* 35.8* 35.0*   HGB 9.9* 9.0* 9.0*   HCT 29.9* 27.6* 27.8*   * 101* 82*       BMP:   Recent Labs     07/03/21  1628 07/03/21  2210 07/04/21  0415   * 129* 129*   K 4.6 4.9 4.9    98* 99   CO2 19* 17* 18*   BUN 39* 34* 34* CREATININE 1.8* 1.5* 1.5*   GLUCOSE 141* 159* 182*     LFT's:   Recent Labs     07/02/21  1840 07/03/21  0452 07/04/21  0415   AST 50* 48* 64*   ALT 17 11 15   BILITOT 3.9* 4.2* 5.6*   ALKPHOS 139* 108 131*     Troponin: No results for input(s): TROPONINI in the last 72 hours. BNP: No results for input(s): BNP in the last 72 hours. ABGs:   Recent Labs     07/01/21  0811   PHART 7.458*   [id number] 30.4*   PO2ART 63.8*     INR:   Recent Labs     07/02/21  1228 07/03/21  0452 07/04/21  0415   INR 1.81* 2.50* 3.40*       U/A:No results for input(s): NITRITE, COLORU, PHUR, LABCAST, WBCUA, RBCUA, MUCUS, TRICHOMONAS, YEAST, BACTERIA, CLARITYU, SPECGRAV, LEUKOCYTESUR, UROBILINOGEN, BILIRUBINUR, BLOODU, GLUCOSEU, AMORPHOUS in the last 72 hours. Invalid input(s): Bijan Hoang       ASSESSMENT AND PLAN:   Siddharth Jerome is a 65 y.o. female with perforated sigmoid diverticulits s/p exploratory laparotomy and Nick's procedure 6/17. Post-operative course complicated by ileus type picture, UTI, and increasing leukocytosis. Neuro:   - Robaxin; Dilaudid pain panel  - Zyprexa 5mg qhs        Cardiovascular:   Septic versus hypovolemic shock in the setting of rapid fluid shifts  - Requiring Levophed - currently on 24, and 0.03 Vasopressin, will wean pressors as tolerated for a MAP goal >60    Sinus tachycardia:  - EKG 7/2 with sinus tachycardia  - Tachycardia likely 2/2 to reflex tachycardia from beta blocker being held  - Continue IV lopressor 5mg q6hr with hold parameters         Pulmonary:  - CXR 7/3 without new consolidation   - High risk for aspiration PNA  - HOB to remain elevated >30 at all times  - Aggressive pulmonary toilet as tolerated:  Acapella, EZPap, IS  - Prn DuoNebs  -Wean O2; spO2>90, currently on high flow NC at 7 L         FEN/GI:  Perforated Diverticulitis s/p Nick's procedure (06/17)  - CT scan on 06/27 without concern for leak or abscess  - Wound vac in place - changes MWF  - NGT with 1050 mL output over 24 hrs. Continue to CLW suction. - MIKE drain pulled and wound closed (06/28)  - Ostomy with output, 300 over last 24 hours - ostomy irrigated 7/1; will continue to closely monitor output  - Continue TPN; follow dietitian recommendation. :  LAVINIA  - Cr 1.5 from 1.5 (baseline of 0.9); BUN 34  - Hannon placed for strict I/Os  -CRRT initiated per nephrology recommendations, currently patient is to remain positive as blood pressure is unable to tolerate fluid removal        Hem/ID:   Anemia  - Hgb 9.0 from 9.9 after CRRT  - No source of active bleeding      Leukocytosis  - WBC 35 from 35.8  - Remains afebrile  - Urinalysis (06/24): positive for Klebsiella pneumoniae  - Blood culture (06/26): positive for Acinetobacter calcoac baumannii  - C diff toxin positive. - ID following; follow up recommendations              - currently on Cefepime, Anidulafungin, Metronidazole, Vancomycin enema via ostomy     Endo  - Persistent hyperglycemia , likely 2/2 sepsis  - No hx DM  - Receiving high dose sliding scale q4hr       Access:  Central Access: R IJ CVC (06/26), L IJ (7/2)  Peripheral Access: L forearm midline (06/24), PIV L forearm 06/24  Arterial Line Access: R radial (07/02)                                Hannon Date placed: 06/26  NGT Date placed: 06/26     Prophylaxis:  SQH     Mobility:  Bedrest until more stable     Dispo:   ICU, patient severely ill with some improvement in hemodynamics overnight with still a guarded prognosis will continue family discussions on goals of care. Code Status: DNR-CCA  -----------------------------  Je Moreno DO  7/4/2021 6:44 AM   Pager [phone]  Please contact Christy Mendoza with questions as I am off campus today    I have personally performed the medical history, physical exam and medical decision making and agree with all pertinent clinical information unless otherwise noted. Overnight events noted.   Helping family in establishing care goals    Wero Ida Cooper MD  Surgery Attending

## 2021-07-04 NOTE — PROGRESS NOTES
Oncology / Hematology Care      Patient name:   Dawit Crowell   Date:     7/3/2021           Interval History:    Following up, diagnostic work-up for possible factor deficiency    Patient's INR initially improved, but then became prolonged, then PTT prolonged as patient began bacteremic and then septic with C. difficile and Klebsiella. Further testing was obtained. Events noted over the last couple of days, discussed with nursing. Patient is currently experiencing increased pressor support, with increased lactic acid. Allergies:     Allergies   Allergen Reactions    Cyclobenzaprine Shortness Of Breath and Other (See Comments)     \"cramping\"      Penicillins Hives, Shortness Of Breath, Itching and Swelling     Patient tolerates cephalosporins (cefazolin)    Acetaminophen     Codeine Itching and Hives     Itching      Diphenhydramine Hcl Other (See Comments)     Heart racing    Ketorolac Itching and Nausea Only     Pt tolerated dose without side effects    Naproxen Nausea Only, Other (See Comments) and Itching     Stomach pain    Bothers her stomach  Bothers her stomach      Diphenhydramine Nausea Only, Hives and Palpitations     Heart racing  Also states it makes her heart race      Ketorolac Tromethamine Nausea And Vomiting    Ketorolac Tromethamine Itching, Nausea Only and Nausea And Vomiting    Sulfamethoxazole-Trimethoprim      Abdominal pain          Medications:    Current Facility-Administered Medications   Medication Dose Route Frequency Provider Last Rate Last Admin    vasopressin 20 Units in sodium chloride 0.9 % 100 mL infusion  0.01-0.03 Units/min Intravenous Continuous Myrna Washington MD 9 mL/hr at 07/03/21 1211 0.03 Units/min at 07/03/21 1211    PN-Adult Premix 5/15 - Standard Electrolytes - Central Line   Intravenous Continuous TPN Bjorn Cushing, DO 70 mL/hr at 07/03/21 1818 New Bag at 07/03/21 1818    fat emulsion 20 % infusion 250 mL  250 mL Intravenous Continuous TPN Selma Garcia, DO 21 mL/hr at 07/03/21 1818 250 mL at 07/03/21 1818    cefepime (MAXIPIME) 2000 mg IVPB minibag  2,000 mg Intravenous Q12H Antonio Stanford MD   Stopped at 07/03/21 1439    Neo Crockett 4/2.5 dialysis solution   Dialysis Continuous Josseline Ashton MD 1,000 mL/hr at 07/03/21 1932 New Bag at 07/03/21 1932    Neo Crockett 4/2.5 dialysis solution   Dialysis Continuous Josseline Ashton MD 1,000 mL/hr at 07/03/21 1932 New Bag at 07/03/21 1932    Neo Crockett 4/2.5 dialysis solution   Dialysis Continuous Josseline Ashton MD 1,000 mL/hr at 07/03/21 1932 New Bag at 07/03/21 1932    sodium phosphate 6 mmol in sodium chloride 0.9 % 250 mL IVPB  6 mmol Intravenous PRN Josseline Ashton MD        Or    sodium phosphate 12 mmol in sodium chloride 0.9 % 250 mL IVPB  12 mmol Intravenous PRN Josseline Ashton MD        Or    sodium phosphate 18 mmol in sodium chloride 0.9 % 500 mL IVPB  18 mmol Intravenous PRN Josseline Ashton MD        Or    sodium phosphate 24 mmol in sodium chloride 0.9 % 500 mL IVPB  24 mmol Intravenous PRN Josseline Ashton MD        calcium gluconate 1,000 mg in sodium chloride 0.9 % 100 mL IVPB  1,000 mg Intravenous PRN Josseline Ashton MD   Stopped at 07/03/21 1926    Or    calcium gluconate 2,000 mg in sodium chloride 0.9 % 100 mL IVPB  2,000 mg Intravenous PRN Josseline Ashton MD        Or    calcium gluconate 3,000 mg in sodium chloride 0.9 % 100 mL IVPB  3,000 mg Intravenous PRN Josseline Ashton MD        Or    calcium gluconate 4,000 mg in sodium chloride 0.9 % 100 mL IVPB  4,000 mg Intravenous PRN Josseline Ashton MD        magnesium sulfate 1,000 mg in sodium chloride 0.9 % 100 mL IVPB  1,000 mg Intravenous PRN Josseline Ashton MD        0.9 % sodium chloride infusion   Intravenous PRN Eduardo Ashton MD        potassium chloride 20 mEq in sodium chloride 0.9 % 50 mL IVPB (Central Line)   Intravenous PRN Josseline Ashton MD        vancomycin 500 mg in sodium chloride 0.9% 100 mL enema - give by colostomy  500 mg Rectal 4 times per day Dorothea Gutierrez MD   500 mg at 07/03/21 1818    heparin (porcine) injection 1,000 Units  1,000 Units Intracatheter Once Srinivasa Rosenberg MD        metoprolol (LOPRESSOR) injection 5 mg  5 mg Intravenous Q6H Srinivasa Rosenberg MD        0.9 % sodium chloride bolus  1,000 mL Intravenous Once Anna Ngo DO        insulin regular (HUMULIN R;NOVOLIN R) injection 0-18 Units  0-18 Units Subcutaneous Q4H Jyothi Clinton DO   2 Units at 07/03/21 1820    norepinephrine (LEVOPHED) 16 mg in sodium chloride 0.9 % 250 mL infusion  2-100 mcg/min Intravenous Continuous Rochelle Kerry DO 26.3 mL/hr at 07/03/21 1932 28 mcg/min at 07/03/21 1932    anidulafungin (ERAXIS) 100 mg in sodium chloride 0.9 % 130 mL IVPB  100 mg Intravenous Daily Seth Rivera MD   Stopped at 07/03/21 1147    glucose (GLUTOSE) 40 % oral gel 15 g  15 g Oral PRN Enrico Fish MD        dextrose 50 % IV solution  12.5 g Intravenous PRN Enrico Fish MD        glucagon (rDNA) injection 1 mg  1 mg Intramuscular PRN Enrico Fish MD        dextrose 5 % solution  100 mL/hr Intravenous PRN Enrico Fish MD        HYDROmorphone (DILAUDID) injection 0.25 mg  0.25 mg Intravenous Q3H PRN Srinivasa Rosenberg MD   0.25 mg at 07/03/21 1532    Or    HYDROmorphone (DILAUDID) injection 0.5 mg  0.5 mg Intravenous Q3H PRN Srinivasa Rosenberg MD   0.5 mg at 07/01/21 1419    metronidazole (FLAGYL) 500 mg in NaCl 100 mL IVPB premix  500 mg Intravenous Q8H Seth Rivera MD   Stopped at 07/03/21 1720    heparin (porcine) injection 5,000 Units  5,000 Units Subcutaneous 3 times per day Verónica Silvestre MD   5,000 Units at 07/03/21 1413    pantoprazole (PROTONIX) injection 40 mg  40 mg Intravenous BID Emily Lama DO   40 mg at 07/03/21 0205    sodium chloride flush 0.9 % injection 5-40 mL  5-40 mL Intravenous 2 times per day Sreeo Lipoma, APRN - CNP   10 mL at 07/03/21 4685  sodium chloride flush 0.9 % injection 5-40 mL  5-40 mL Intravenous PRN Amada Carolina, APRN - CNP        0.9 % sodium chloride infusion  25 mL Intravenous PRN Amada Aleah, APRN - CNP        ipratropium-albuterol (DUONEB) nebulizer solution 1 ampule  1 ampule Inhalation Q4H PRN Sigrid Silverman MD   1 ampule at 06/26/21 1425    OLANZapine zydis (ZYPREXA) disintegrating tablet 5 mg  5 mg Oral Nightly Rochelle Kerry, DO   5 mg at 07/02/21 2201    sodium chloride flush 0.9 % injection 5-40 mL  5-40 mL Intravenous 2 times per day Helena Manges, DO   10 mL at 07/02/21 2203    sodium chloride flush 0.9 % injection 5-40 mL  5-40 mL Intravenous PRN Rochelle Kerry, DO        0.9 % sodium chloride infusion  25 mL Intravenous PRN Helena Manges, DO        ondansetron (ZOFRAN-ODT) disintegrating tablet 4 mg  4 mg Oral Q8H PRN Helena Manges, DO        Or    ondansetron (ZOFRAN) injection 4 mg  4 mg Intravenous Q6H PRN Rohcelle Kerry, DO   4 mg at 06/27/21 1858    phenol 1.4 % mouth spray 1 spray  1 spray Mouth/Throat Q2H PRN Helena Manges, DO              Review of Systems:    · History obtained from patient, otherwise, further 10 point ROS is negative        Physical Exam:    BP (!) 107/54   Pulse 111   Temp 97.8 °F (36.6 °C) (Axillary)   Resp (!) 32   Ht 5' 6\" (1.676 m)   Wt 286 lb 13.1 oz (130.1 kg)   SpO2 (!) 89%   BMI 46.29 kg/m²     General appearance: alert and cooperative; no acute distress  Head: NCAT, PERRLA, EOMI; oral and nasopharynx without lesions, dentition adequate repair  Neck: no JVD or thyromegaly  Lungs: clear to auscultation bilaterally; no audible rhonchi, rales, wheezes or crackles  Heart: regular rate and rhythm, S1, S2 normal; no murmurs, rubs or gallops  Abdomen: soft, non-tender and non-distended; normoactive, tympanic bowel sounds; no hepatosplenomegaly  Extremities: no cyanosis, clubbing, edema or asymmetry  Skin: no jaundice, purpura or petechiae  Lymph Nodes:  no auricular, cervical, supraclavicular, axillary, inguinal or popliteal lymphadenopathy  Neurologic:  CN 2-12 intact; no focal motor, sensory or cerebellar deficits        Laboratory Evaluation:      CBC:   Lab Results   Component Value Date    WBC 35.8 07/03/2021    NEUTROABS 27.9 07/03/2021    HGB 9.0 07/03/2021    MCV 93.8 07/03/2021     07/03/2021       BMP:   Lab Results   Component Value Date     07/03/2021    K 4.6 07/03/2021    K 4.2 07/02/2021    CO2 19 07/03/2021    BUN 39 07/03/2021    CREATININE 1.8 07/03/2021    MG 2.30 07/03/2021    TSH 3.97 04/06/2011       HEPATIC:  Lab Results   Component Value Date    AST 48 07/03/2021    ALT 11 07/03/2021    ALKPHOS 108 07/03/2021    PROT 4.7 07/03/2021    PROT 8.0 08/16/2012    BILITOT 4.2 07/03/2021    BILIDIR 2.4 07/03/2021           Radiology Evaluation:    Reviewed      Assessment / Plan:    INR increased - acquired:  Differential DIC vs factor deficiency such as factor 7    Factor VII level at 8%, factor V and factor X levels but decreased    Patient may be acquiring factor deficiencies due to underlying liver disease as well as current DIC picture from believe sepsis and bacteremia    Patient's condition is guarded, as discussed with staff. Should patient acutely bleed, NovoSeven or FFP can be administered or transfused. There is a discussion about possible transfer to hospice, with comfort care. As always, thank you for allowing me the opportunity to participate in the care of your patient. Please do not hesitate to contact me with questions or concerns regarding further management. Mario Connor DO, MS  Oncology/Hematology Care    Please contact via:  1. [employer]  2.   Cell Phone:  [phone]    7/3/2021   8:16 PM

## 2021-07-04 NOTE — PROGRESS NOTES
 sodium chloride      norepinephrine 26 mcg/min (07/04/21 0854)    dextrose      sodium chloride      sodium chloride       PRN Meds:sodium phosphate IVPB **OR** sodium phosphate IVPB **OR** sodium phosphate IVPB **OR** sodium phosphate IVPB, calcium gluconate **OR** calcium gluconate **OR** calcium gluconate **OR** calcium gluconate, magnesium sulfate, sodium chloride, IVPB builder, glucose, dextrose, glucagon (rDNA), dextrose, HYDROmorphone **OR** HYDROmorphone, sodium chloride flush, sodium chloride, ipratropium-albuterol, sodium chloride flush, sodium chloride, ondansetron **OR** ondansetron, phenol    Objective:   Vitals:   T-max:  Patient Vitals for the past 8 hrs:   Temp Temp src Pulse Resp SpO2   07/04/21 1530   81 21 92 %   07/04/21 1515   82 22 90 %   07/04/21 1500   83 22 90 %   07/04/21 1445   75 21 92 %   07/04/21 1430   74 22 92 %   07/04/21 1415   90 23 90 %   07/04/21 1400   86 22 97 %   07/04/21 1354    25 98 %   07/04/21 1345   81 23 99 %   07/04/21 1330   88 23 98 %   07/04/21 1315   85 20 99 %   07/04/21 1300   94 22 97 %   07/04/21 1245   99 22 97 %   07/04/21 1230   97 25 97 %   07/04/21 1215   96 22 97 %   07/04/21 1200 97.3 °F (36.3 °C) Axillary 93 25 98 %   07/04/21 1145   105 (!) 31 98 %   07/04/21 1130   97 30    07/04/21 1115   91 (!) 31    07/04/21 1100   91 (!) 31    07/04/21 1045   89 25    07/04/21 1030   92 30    07/04/21 1015   91 25    07/04/21 1009    29 91 %   07/04/21 1000   90 30    07/04/21 0945   100 27 96 %   07/04/21 0930   94 27 96 %   07/04/21 0915   95 29 97 %   07/04/21 0900   92 27 97 %   07/04/21 0845   88 26 97 %   07/04/21 0830   92 30    07/04/21 0815   90 27    07/04/21 0800 97.9 °F (36.6 °C) Axillary 88 29 94 %       Intake/Output Summary (Last 24 hours) at 7/4/2021 1559  Last data filed at 7/4/2021 1500  Gross per 24 hour   Intake 3336 ml   Output 2713 ml   Net 623 ml       Review of Systems   Physical Exam     Appearance: Ill-appearing  CV: RRR, no murmurs appreciated  Pulmonary: No wheezing appreciated in upper lung fields  Abdominal: distension  Extremities: Edema present        LABS:    CBC:   Recent Labs     07/03/21 0452 07/03/21 1628 07/04/21 0415   WBC 37.1* 35.8* 35.0*   HGB 9.9* 9.0* 9.0*   HCT 29.9* 27.6* 27.8*   * 101* 82*   MCV 92.0 93.8 94.1     Renal:    Recent Labs     07/03/21  0452 07/03/21  1000 07/03/21  1628 07/03/21  1628 07/03/21 2210 07/04/21 0415 07/04/21  1053   NA  --    < > 130*   < > 129* 129* 132*   K  --    < > 4.6   < > 4.9 4.9 4.7   CL  --    < > 100   < > 98* 99 103   CO2  --    < > 19*   < > 17* 18* 20*   BUN  --    < > 39*   < > 34* 34* 29*   CREATININE  --    < > 1.8*   < > 1.5* 1.5* 1.3*   GLUCOSE  --    < > 141*   < > 159* 182* 165*   CALCIUM  --    < > 8.0*   < > 8.2* 8.3 8.0*   MG 2.30  --  2.30  --   --  2.40  --    PHOS  --    < > 3.2   < > 3.3 3.4 3.1   ANIONGAP  --    < > 11   < > 14 12 9    < > = values in this interval not displayed. Hepatic:   Recent Labs     07/02/21  0415 07/02/21  0930 07/02/21  1840 07/03/21  0050 07/03/21  0452 07/03/21  1000 07/03/21 2210 07/04/21 0415 07/04/21  1053   AST 38*  --  50*  --  48*  --   --  64*  --    ALT 15  --  17  --  11  --   --  15  --    BILITOT 3.0*  --  3.9*  --  4.2*  --   --  5.6*  --    BILIDIR 1.7*  --   --   --  2.4*  --   --  3.4*  --    PROT 4.5*  --  5.1*  --  4.7*  --   --  4.6*  --    LABALBU 2.2*   < > 2.4*   < > 2.2*   < > 2.2* 2.2* 1.9*   ALKPHOS 87  --  139*  --  108  --   --  131*  --     < > = values in this interval not displayed. Troponin: No results for input(s): TROPONINI in the last 72 hours. BNP: No results for input(s): BNP in the last 72 hours. Lipids: No results for input(s): CHOL, HDL in the last 72 hours.     Invalid input(s): LDLCALCU, TRIGLYCERIDE  ABGs:  No results for input(s): PHART, BTR7VCR, PO2ART, SUT2BXI, BEART, THGBART, C4WAMGSR, EXI7SUF in the last 72 hours. INR:   Recent Labs     07/02/21  1228 07/03/21  0452 07/04/21  0415   INR 1.81* 2.50* 3.40*     Lactate: No results for input(s): LACTATE in the last 72 hours. Cultures:  -----------------------------------------------------------------  RAD:   XR CHEST PORTABLE   Final Result      1. Mild airspace disease left base. XR ABDOMEN (KUB) (SINGLE AP VIEW)   Final Result      1. Nonspecific bowel gas pattern, not obstructive in appearance. XR CHEST PORTABLE   Final Result      Limited evaluation due to underpenetration      No pneumothorax post catheter placement on the left      XR ABDOMEN (KUB) (SINGLE AP VIEW)   Final Result   Impression: Nonspecific bowel gas pattern. XR CHEST PORTABLE   Final Result      Stable findings in the chest.      CT ABDOMEN PELVIS WO CONTRAST Additional Contrast? Oral   Final Result      No evidence of abscess or significant bowel wall thickening. No significant change in distention of small bowel loops suggestive of ileus. Extensive asymmetric anasarca in the left lateral abdominal wall again seen. Small volume perihepatic ascites again seen. Mild inflammatory fat stranding adjacent to the urinary bladder which could represent sequela of infection/inflammation. CT ABDOMEN PELVIS WO CONTRAST Additional Contrast? None   Final Result      Patchy bibasilar infiltrates or atelectasis, left greater than right. Postoperative changes noted from prior distal colonic resection with left lower quadrant diverting colostomy and Troncoso's pouch. Percutaneous surgically placed drainage catheter seen in the lower abdomen. Small amount of ascites is seen with fluid seen in the right subphrenic space. Colon is relatively decompressed but shows no significant distention or definite wall thickening. There are distended loops of small bowel most suggestive of ileus.       Focally distended bowel loop is seen in the left flank region anterior to the colon in an area of previous seen noted distended loop. There is areas of mixed air-fluid attenuation seen. This likely represents luminal contents. However, the possibility of    an immediately adjacent developing abscess would be difficult to exclude entirely and correlation with repeat evaluation with oral contrast in this area would be helpful to better evaluate. There is otherwise no evidence of free air or extraluminal gas collection with no significant free pelvic fluid. Focal area of strandy opacity of the left flank region in the subcutaneous fat may indicate focal anasarca or cellulitis. Otherwise no acute process seen. XR CHEST PORTABLE   Final Result      1. No acute process or consolidation   2. Right IJ central venous catheter appreciated with the tip in the right atrium. NG tube in place with the tip in the upper stomach               XR ABDOMEN (KUB) (SINGLE AP VIEW)   Final Result      AP abdomen shows extensive gastric distention. XR CHEST PORTABLE   Final Result      Mild left basilar infiltrate or atelectasis. XR CHEST PORTABLE   Final Result      Low level of inspiration with more prominent bibasilar and right perihilar opacities felt to be most likely atelectasis. CT ABDOMEN PELVIS W IV CONTRAST Additional Contrast? Oral   Final Result      1. 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 2.  Interval development of mild perihepatic ascites with scattered small fluid collections within the abdomen and pelvis. No discrete findings for abscess formation on the current exam.      3.  Mildly distended proximal small bowel loops containing contrast, favoring ileus-type pattern.   The degree of small bowel distention has improved when compared to prior exam.      4.  Diffuse body wall edema most pronounced over the left lateral abdomen, progressed from prior exam.      5.  Bibasilar airspace disease consistent with underlying atelectasis and/or infiltrate. XR CHEST PORTABLE   Final Result      Hyperexpanded lungs with bibasilar minimal atelectasis, unchanged. XR CHEST 1 VIEW   Final Result   1. Low lung volumes with persistent bilateral lower lobe atelectasis   2. Nasogastric tube in stomach      XR CHEST PORTABLE   Final Result      Free intraperitoneal air. No acute cardiopulmonary findings. Attending in the ED was notified of the free air from the patient's CT study performed earlier. CT ABDOMEN PELVIS W IV CONTRAST Additional Contrast? None   Final Result      Free intraperitoneal air, predominantly in the upper abdomen and right upper quadrant. Site of perforation is not definitely identified. Distended proximal to mid small bowel. Distal small bowel not distended, consistent with small bowel obstruction. Stool throughout the colon. Distal colonic diverticulosis. Small fat-containing umbilical hernia. Air within the hernia, with a possible skin defect anteriorly at the umbilicus. Correlate clinically. Critical result findings were called to Que Arellano in the ED at 2150 hours on 6/17/2021. Assessment/Plan:   Elvis Arzate is a 64 y/o F who was admitted with perforated sigmoid diverticulitis. 1. Perforated sigmoid diverticulitis s/p ex-lap and Troncoso's procedure (6/27):   - wound vac in place  2. Septic Shock 2/2 diverticulitis and C. Diff, UTI  - ostomy vacomycin, ID following  - continue anidulafungin 100mg IV, Cefepime 1000 BID, flagyl 500mg q8h, oral vancomycin 250mg 6hr  - continue Levophed and vasopressin for MAP goal >60  3. Lactic Acidosis  - q6 lactate  4. LAVINIA  - nephrology following  - CRRT  - renal panel q6  - ionized Ca q6    Family (and surgery) would like to be notified if patient's pressor requirements increase. Patient's family is considering hospice care.      Code Status: Full Code  FEN: TPN  PPX: Heparin, protonix  DISPO: ICU    Kartik Cason MD, PGY 1  07/04/21  3:59 PM    This patient has been staffed and discussed with Maximus Jarrell MD.     [facility]     Patient seen and examined. I agree with Dr. Mccoy's history, physical, lab findings, assessment and plan.     Patient has significantly worsened in the last 48 hours with worsening septic shock and progressive multi-system organ failure despite aggressive treatment of infections with braod spectrum ABx/antifungals, septic shock with two vasopressors and renal failure with CRRT. LA is 6, pt is more encephalopathic and has progressive liver failure with worsening coagulopathy and hyperbilirubinemia. Oxygen requirement is at 6 L and I am surprised that her lungs are hanging in there as well as they are but surmise that it is only time before the lungs fail as well.     Angel Ji is present with some family and wanted my medical opinion on his Mom's situation. Given 4+ organ system failures her mortality is an excess of 80% and she continues to trend worse despite maximal medical therapy. I believe we have reached the point that this is an irreversible process and we are not doing things to Mathew Ochoa and not for her medical benefit. Enrico Moritz fully understands this and agrees that the comfort of his mother is very important to him. Patient is currently a DNR CCA and we talked about if patient continues to deteriorate the next decision point would be whether to intubate her. He does not want this and so CODE STATUS was changed to DNR/DNI. He indicates to me that he wants to talk with the rest of his family that he will likely want to transition to comfort care tomorrow. I reassured him that this was very reasonable and that we are here to answer any question he has and to help him through this process.         Prognosis guarded and unfortunately I think this is turning out to be a nonreversible process.   I do think hospice is very appropriate     Pt has a high probability of imminent or life-threatening deterioration requiring close monitoring, and highly complex decision-making and/or interventions of high intensity to assess, manipulate, and support his critical organ systems to prevent a likely inevitable decline which could occur if left untreated.      A total critical care time 31 minutes was used. This includes but not limited to examining patient, collaborating with other physicians, monitoring vital signs, telemetry, continuous pulse oximetry, and clinical response to IV medications, documentation time, review and interpretation of laboratory and radiological data, review of nursing notes and old record review.  This time excludes any time that may have been spent performing procedures for life threatening organ failure.        Isi Cardoza MD

## 2021-07-05 NOTE — PROGRESS NOTES
Patient gagging, nodding head to feeling like she is going to be sick. Irrigated NG with 30 ml of water to check patency, drained 150 ml of bile. PRN Zofran given per order. Patient nodding head to feeling better.

## 2021-07-05 NOTE — PROGRESS NOTES
(and even some laughter as they recalled memories together). They asked that I return to patient's room to place the statues of the saints from the back table so the patient could see them. After I returned they expressed their gratitude for my time, and I told them they and their mother/sister were in my prayers and she is fortunate to have such a kind and supportive family. I reiterated that they should reach out to on-call  tonight if they changed their mind. After leaving, I called Tali Mcdonald (next  on-call) to tell him what happened, and the plans for the 's arrival, as well as the possibility he might be called in for the patient and family tonight. This may need a follow-up tomorrow from the /spiritual care team if the family waits to withdraw life-support and needs support from the team. If there are any issues/questions please call me; hopefully this is comprehensive of everything that occurred. *Note: I have called this a \"progress note\" because that is 's default where my preceptor told me to title any note that way, hopefully this is ok for [facility]'s protocol.  Notes will be added in my email to the team.

## 2021-07-05 NOTE — PROGRESS NOTES
POC:     Discussed end of life planning with son. He has contacted family and ex- who are on their way to [place] from [facility]. They are expected to be here tomorrow AM. He would like to continue current treatment with limited code status until then.  has been called and pastoral care is provided.      Evelio Anthony MD  General Surgery Resident  07/05/21 1:33 PM  [phone]

## 2021-07-05 NOTE — PROGRESS NOTES
0.04 / epi  HEENT: Membranes dry, no oral lesion - NGT on suction  NECK:  Supple, no lymphadenopathy  LUNGS: Clear b/l, no rales, no dullness  CARDIAC: RRR, no murmur appreciated  ABD:  No BS, soft, diffuse tenderness -  colostomy, midline wound with VAC (saw wound at time of dressing change 6/29)  EXT:  No rash, no edema, no lesions - bilateral LE venous stasis  NEURO: No focal neurologic findings  LINES:  R IJ line, R radial a-line, peripheral iv       Data Review:  Lab Results   Component Value Date    WBC 37.8 (H) 07/05/2021    HGB 9.5 (L) 07/05/2021    HCT 28.8 (L) 07/05/2021    MCV 93.0 07/05/2021    PLT 77 (L) 07/05/2021     Lab Results   Component Value Date    CREATININE 1.1 07/05/2021    BUN 23 (H) 07/05/2021     (L) 07/05/2021    K 4.8 07/05/2021     07/05/2021    CO2 19 (L) 07/05/2021       Hepatic Function Panel:   Lab Results   Component Value Date    ALKPHOS 132 07/05/2021    ALT 26 07/05/2021     07/05/2021    PROT 4.7 07/05/2021    PROT 8.0 08/16/2012    BILITOT 6.7 07/05/2021    BILIDIR 4.2 07/05/2021    IBILI 2.5 07/05/2021    LABALBU 2.0 07/05/2021    LABALBU 2.2 07/05/2021       Cultures:   6/26     C diff indeterminate - toxin B positive                6/26     BC x1 Acinetobacter baumannii / haemolyticus   Acinetobacter calcoaceticus-baumannii complex  Antibiotic Interpretation JENNIFER   amikacin Sensitive <=8 mcg/mL   ampicillin-sulbactam Resistant >16/8 mcg/mL   cefepime Sensitive 8 mcg/mL   cefTAZidime Sensitive 8 mcg/mL   cefTRIAXone Intermediate 32 mcg/mL   ciprofloxacin Resistant >2 mcg/mL   gentamicin Sensitive <=4 mcg/mL   tobramycin Sensitive <=4 mcg/mL   trimethoprim-sulfamethoxazole Resistant >2/38 mcg     6/24     UC >100k ESBL K pneumoniae  Klebsiella pneumoniae (esbl)   Antibiotic Interpretation JENNIFER   amikacin Sensitive <=8 mcg/mL   amoxicillin-clavulanate Resistant >16/8 mcg/mL   ampicillin Resistant >16 mcg/mL   ceFAZolin Resistant >16 mcg/mL   cefepime Resistant >16 mcg/mL   cefTRIAXone Resistant >32 mcg/mL   cefuroxime Resistant >16 mcg/mL   ciprofloxacin Resistant >2 mcg/mL   ertapenem Resistant >1 mcg/mL   gentamicin Sensitive <=4 mcg/mL   meropenem Intermediate 8 mcg/mL   nitrofurantoin Resistant >64 mcg/mL   trimethoprim-sulfamethoxazole Resistant >2/38 mcg/mL   cefTAZidime-avibactam Sensitive 1 ug/ml       RADIOLOGY:  7/2 KUB 'Nonspecific bowel gas pattern'    CT abd 6/27/21  Impression   Patchy bibasilar infiltrates or atelectasis, left greater than right.       Postoperative changes noted from prior distal colonic resection with left lower quadrant diverting colostomy and Troncoso's pouch.       Percutaneous surgically placed drainage catheter seen in the lower abdomen.       Small amount of ascites is seen with fluid seen in the right subphrenic space.       Colon is relatively decompressed but shows no significant distention or definite wall thickening.       There are distended loops of small bowel most suggestive of ileus.       Focally distended bowel loop is seen in the left flank region anterior to the colon in an area of previous seen noted distended loop. There is areas of mixed air-fluid attenuation seen. This likely represents luminal contents. However, the possibility of    an immediately adjacent developing abscess would be difficult to exclude entirely and correlation with repeat evaluation with oral contrast in this area would be helpful to better evaluate.       There is otherwise no evidence of free air or extraluminal gas collection with no significant free pelvic fluid.       Focal area of strandy opacity of the left flank region in the subcutaneous fat may indicate focal anasarca or cellulitis.       Otherwise no acute process seen     CT abd 6/17/21  Impression   Free intraperitoneal air, predominantly in the upper abdomen and right upper quadrant. Site of perforation is not definitely identified.       Distended proximal to mid small bowel. Distal small bowel not distended, consistent with small bowel obstruction.       Stool throughout the colon. Distal colonic diverticulosis.       Small fat-containing umbilical hernia. Air within the hernia, with a possible skin defect anteriorly at the umbilicus. Correlate clinically.        Scheduled Meds:   cefepime  2,000 mg Intravenous Q12H    vancomycin 500 mg in 0.9% sodium chloride 250 mL  500 mg Rectal 4 times per day    heparin (porcine)  1,000 Units Intracatheter Once    metoprolol  5 mg Intravenous Q6H    insulin regular  0-18 Units Subcutaneous Q4H    anidulafungin (ERAXIS) 100 mg IVPB  100 mg Intravenous Daily    metroNIDAZOLE  500 mg Intravenous Q8H    heparin (porcine)  5,000 Units Subcutaneous 3 times per day    pantoprazole  40 mg Intravenous BID    sodium chloride flush  5-40 mL Intravenous 2 times per day    OLANZapine zydis  5 mg Oral Nightly    sodium chloride flush  5-40 mL Intravenous 2 times per day       Continuous Infusions:   PN-Adult Premix 5/15 - Standard Electrolytes - Central Line      fat emulsion      EPINEPHrine infusion      PN-Adult Premix 5/15 - Standard Electrolytes - Central Line 70 mL/hr at 07/04/21 1857    vasopressin (Septic Shock) infusion 0.04 Units/min (07/05/21 0835)    prismaSATE BGK 4/2.5 1,000 mL/hr at 07/05/21 0826    prismaSATE BGK 4/2.5 1,000 mL/hr at 07/05/21 0925    prismaSATE BGK 4/2.5 1,000 mL/hr at 07/05/21 0828    norepinephrine 38 mcg/min (07/05/21 0516)    dextrose      sodium chloride         PRN Meds:  sodium phosphate IVPB **OR** sodium phosphate IVPB **OR** sodium phosphate IVPB **OR** sodium phosphate IVPB, calcium gluconate **OR** calcium gluconate **OR** calcium gluconate **OR** calcium gluconate, magnesium sulfate, IVPB builder, glucose, dextrose, glucagon (rDNA), dextrose, HYDROmorphone **OR** HYDROmorphone, sodium chloride flush, sodium chloride, ipratropium-albuterol, sodium chloride flush, ondansetron **OR** ondansetron, phenol      Assessment:     Obesity (BMI 43), HTN, COPD, dementia, PUD, HCV    Admit 6/17, acute abd / free air  OR 6/17, Nick's procedure  Resp failure - resolved     Ileus  Encephalopathy   Hypotension / lactic acidosis  LAVINIA - Cr up  Leukocytosis     UTI vs bacteriuria, cult + Klebsiella ESBL (intermediate to meropenem)    + BC 1 set with Acinetobacter baumannii (no GPC, Micro report corrected)    C diff positive - toxin indeterminate,  toxin B PCR positive, colitis on CT (not on reading yet reviewed CT with radiologist and colon wall thickening evident)     7/3 - pressors up/ on CRRT / WBC up    Plan:     Cont cefepime   Cont iv  Metronidazole  Cont  Anidulafungin  Cont enteral Vanco per colostomy     Wound care / postop care   Critical care - pressors, CRRT, O2, meds     Medical Decision Making: The following items were considered in medical decision making:  Discussion of patient care with other providers  Reviewed clinical lab tests  Reviewed radiology tests  Reviewed other diagnostic tests/interventions  Independent review of radiologic images - reviewed with Radiologist on 6/29  Microbiology cultures and other micro tests reviewed      Spend 28 minutes on visit    Discussed with pt, RN, Catie Garrido / Rose Blow - Dr Naila Hutchison  Discussed with son on 7/3 - overall status, prognosis.   Discussed prognosis and end of life planning  Rosa Maria Valencia MD

## 2021-07-05 NOTE — PROGRESS NOTES
Patient awakens to verbal stimuli, denies pain at this time. BP requiring increase of 3rd pressor this morning, patient's VS do not tolerate turning of patient. SBP decreases to low 70s. Will attempt small changes in position to keep skin intact and patient comfortable this shift.

## 2021-07-05 NOTE — PLAN OF CARE
Problem: Non-Violent Restraints  Goal: Removal from restraints as soon as assessed to be safe  Outcome: Completed     Problem: Non-Violent Restraints  Goal: No harm/injury to patient while restraints in use  Outcome: Completed     Problem: Non-Violent Restraints  Goal: Patient's dignity will be maintained  Outcome: Completed

## 2021-07-05 NOTE — PROGRESS NOTES
Family has arrived from [place]. POA wanting to proceed with anointing of sick by , and then to make patient comfortable and stop CRRT and pressors making patient comfort care. [facility] paged and aware.

## 2021-07-05 NOTE — PROGRESS NOTES
ICU Progress Note    Admit Date: 6/17/2021  Day: 9  Vent Day: None  IV Access:Peripheral  IV Fluids:None  Vasopressors:None                Antibiotics: None  Diet: Diet NPO Exceptions are: Ice Chips  PN-Adult Premix 5/15 - Standard Electrolytes - Central Line    CC: Abdominal pain     Interval history:  No over night events  WBC  Increased to 37.8/ Pt- 39.6/INR -3.33/ appt-68.1  Pt is afebrile and is sleeping well. Now requiring 3 pressors  Lactic acid 6.04    HPI:   Pt is a [place] old female from [facility] with a PMHx significant for HTN, COPD, HCV, possible cirrhosis, PUD, and dementia. Admitted with perf sigmoid diverticulitis, now s/p Troncoso's procedure on 0/45, complicated by post-op ileus, UTI, and bactermia with MDRO.     Medications:     Scheduled Meds:   cefepime  2,000 mg Intravenous Q12H    vancomycin 500 mg in 0.9% sodium chloride 250 mL  500 mg Rectal 4 times per day    heparin (porcine)  1,000 Units Intracatheter Once    metoprolol  5 mg Intravenous Q6H    sodium chloride  1,000 mL Intravenous Once    insulin regular  0-18 Units Subcutaneous Q4H    anidulafungin (ERAXIS) 100 mg IVPB  100 mg Intravenous Daily    metroNIDAZOLE  500 mg Intravenous Q8H    heparin (porcine)  5,000 Units Subcutaneous 3 times per day    pantoprazole  40 mg Intravenous BID    sodium chloride flush  5-40 mL Intravenous 2 times per day    OLANZapine zydis  5 mg Oral Nightly    sodium chloride flush  5-40 mL Intravenous 2 times per day     Continuous Infusions:   PN-Adult Premix 5/15 - Standard Electrolytes - Central Line 70 mL/hr at 07/04/21 1857    EPINEPHrine infusion 0.04 mcg/kg/min (07/05/21 0437)    vasopressin (Septic Shock) infusion 0.04 Units/min (07/04/21 2216)    prismaSATE BGK 4/2.5 1,000 mL/hr at 07/05/21 0433    prismaSATE BGK 4/2.5 1,000 mL/hr at 07/05/21 0433    prismaSATE BGK 4/2.5 1,000 mL/hr at 07/05/21 0433    sodium chloride      norepinephrine 38 mcg/min (07/05/21 0516)  dextrose      sodium chloride      sodium chloride       PRN Meds:sodium phosphate IVPB **OR** sodium phosphate IVPB **OR** sodium phosphate IVPB **OR** sodium phosphate IVPB, calcium gluconate **OR** calcium gluconate **OR** calcium gluconate **OR** calcium gluconate, magnesium sulfate, sodium chloride, IVPB builder, glucose, dextrose, glucagon (rDNA), dextrose, HYDROmorphone **OR** HYDROmorphone, sodium chloride flush, sodium chloride, ipratropium-albuterol, sodium chloride flush, sodium chloride, ondansetron **OR** ondansetron, phenol    Objective:   Vitals:   T-max:  Patient Vitals for the past 8 hrs:   BP Temp Temp src Pulse Resp SpO2   07/05/21 0630 (!) 108/56   109 24 98 %   07/05/21 0615    112 24 95 %   07/05/21 0600 93/65 97.2 °F (36.2 °C) Axillary 113 20 96 %   07/05/21 0545    107 20 96 %   07/05/21 0530 (!) 84/59   107 18 97 %   07/05/21 0515    106 20 97 %   07/05/21 0500 107/65   105 19 96 %   07/05/21 0445    102 18 97 %   07/05/21 0430 (!) 107/52   105 19 96 %   07/05/21 0415    98 20 96 %   07/05/21 0400 (!) 117/32   104 18 99 %   07/05/21 0345    93 18 100 %   07/05/21 0330 (!) 76/40   98 20 97 %   07/05/21 0315    92 19 99 %   07/05/21 0300 97/64   88 19 99 %   07/05/21 0245 (!) 94/45   94 20 98 %   07/05/21 0230    86 18 97 %   07/05/21 0215    99 23 96 %   07/05/21 0200 104/66   93 18 98 %   07/05/21 0145    89 17 97 %   07/05/21 0130    96 19 97 %   07/05/21 0115    93 19 97 %   07/05/21 0100 (!) 94/50   90 18 97 %   07/05/21 0045    88 19 97 %   07/05/21 0030    85 18 96 %   07/05/21 0015    88 19 96 %   07/05/21 0000 (!) 79/59   89 19 96 %   07/04/21 2345    97 21 94 %   07/04/21 2330    97 23 100 %       Intake/Output Summary (Last 24 hours) at 7/5/2021 0718  Last data filed at 7/5/2021 0600  Gross per 24 hour   Intake 3388 ml   Output 3941 ml   Net -553 ml       Review of Systems   Unable to perform ROS: Dementia       Physical Exam  Constitutional:       Appearance: She is obese. She is ill-appearing. Cardiovascular:      Rate and Rhythm: Normal rate and regular rhythm. Pulses: Normal pulses. Heart sounds: Normal heart sounds. Pulmonary:      Breath sounds: Normal breath sounds. Comments: Difficult to hear due to body habitus  Abdominal:      General: Abdomen is flat. Tenderness: There is abdominal tenderness. There is guarding. Musculoskeletal:      Comments: Extensive pitting edema to bilateral lower extremities up to thigh bilateraly           LABS:    CBC:   Recent Labs     07/04/21 1639 07/04/21 2204 07/05/21  0359   WBC 35.5* 34.7* 37.8*   HGB 9.3* 9.0* 9.5*   HCT 28.6* 27.6* 28.8*   PLT 77* 76* 77*   MCV 94.0 93.1 93.0     Renal:    Recent Labs     07/04/21 1639 07/04/21 2204 07/05/21  0359   * 135* 134*   K 4.7 5.0 4.8    104 103   CO2 20* 20* 19*   BUN 26* 27* 23*   CREATININE 1.3* 1.2 1.1   GLUCOSE 169* 179* 161*   CALCIUM 8.0* 8.2* 8.0*   MG 2.40 2.40 2.40   PHOS 3.1 3.6 3.0   ANIONGAP 10 11 12     Hepatic:   Recent Labs     07/04/21  0415 07/04/21  1053 07/04/21 1639 07/04/21 2204 07/05/21  0359   AST 64*  --  88*  --  108*   ALT 15  --  24  --  26   BILITOT 5.6*  --  6.2*  --  6.7*   BILIDIR 3.4*  --  3.9*  --  4.2*   PROT 4.6*  --  4.7*  --  4.7*   LABALBU 2.2*   < > 2.1* 1.9* 2.2*  2.0*   ALKPHOS 131*  --  121  --  132*    < > = values in this interval not displayed. Troponin: No results for input(s): TROPONINI in the last 72 hours. BNP: No results for input(s): BNP in the last 72 hours. Lipids: No results for input(s): CHOL, HDL in the last 72 hours.     Invalid input(s): LDLCALCU, TRIGLYCERIDE  ABGs:    Recent Labs     07/04/21  2203 07/05/21  0405   PHART 7.395 7.428   FBG8AES 35.1 31.0*   PO2ART 82.5 67.5*   AEF9AJO 21.5 20.5*   BEART -3 -4*   R6YGKNRE 96 94   YMY7CQN 23 22       INR:   Recent Labs     07/03/21  0452 07/04/21  0415 07/05/21  0359 INR 2.50* 3.40* 3.33*     Lactate:   Recent Labs     07/04/21  2203 07/05/21  0405   LACTATE 5.29* 6.04*     Cultures:  -----------------------------------------------------------------  RAD:   XR CHEST PORTABLE   Final Result      1. Mild airspace disease left base. XR ABDOMEN (KUB) (SINGLE AP VIEW)   Final Result      1. Nonspecific bowel gas pattern, not obstructive in appearance. XR CHEST PORTABLE   Final Result      Limited evaluation due to underpenetration      No pneumothorax post catheter placement on the left      XR ABDOMEN (KUB) (SINGLE AP VIEW)   Final Result   Impression: Nonspecific bowel gas pattern. XR CHEST PORTABLE   Final Result      Stable findings in the chest.      CT ABDOMEN PELVIS WO CONTRAST Additional Contrast? Oral   Final Result      No evidence of abscess or significant bowel wall thickening. No significant change in distention of small bowel loops suggestive of ileus. Extensive asymmetric anasarca in the left lateral abdominal wall again seen. Small volume perihepatic ascites again seen. Mild inflammatory fat stranding adjacent to the urinary bladder which could represent sequela of infection/inflammation. CT ABDOMEN PELVIS WO CONTRAST Additional Contrast? None   Final Result      Patchy bibasilar infiltrates or atelectasis, left greater than right. Postoperative changes noted from prior distal colonic resection with left lower quadrant diverting colostomy and Troncoso's pouch. Percutaneous surgically placed drainage catheter seen in the lower abdomen. Small amount of ascites is seen with fluid seen in the right subphrenic space. Colon is relatively decompressed but shows no significant distention or definite wall thickening. There are distended loops of small bowel most suggestive of ileus.       Focally distended bowel loop is seen in the left flank region anterior to the colon in an area of previous seen noted distended loop. There is areas of mixed air-fluid attenuation seen. This likely represents luminal contents. However, the possibility of    an immediately adjacent developing abscess would be difficult to exclude entirely and correlation with repeat evaluation with oral contrast in this area would be helpful to better evaluate. There is otherwise no evidence of free air or extraluminal gas collection with no significant free pelvic fluid. Focal area of strandy opacity of the left flank region in the subcutaneous fat may indicate focal anasarca or cellulitis. Otherwise no acute process seen. XR CHEST PORTABLE   Final Result      1. No acute process or consolidation   2. Right IJ central venous catheter appreciated with the tip in the right atrium. NG tube in place with the tip in the upper stomach               XR ABDOMEN (KUB) (SINGLE AP VIEW)   Final Result      AP abdomen shows extensive gastric distention. XR CHEST PORTABLE   Final Result      Mild left basilar infiltrate or atelectasis. XR CHEST PORTABLE   Final Result      Low level of inspiration with more prominent bibasilar and right perihilar opacities felt to be most likely atelectasis. CT ABDOMEN PELVIS W IV CONTRAST Additional Contrast? Oral   Final Result      1. 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 2.  Interval development of mild perihepatic ascites with scattered small fluid collections within the abdomen and pelvis. No discrete findings for abscess formation on the current exam.      3.  Mildly distended proximal small bowel loops containing contrast, favoring ileus-type pattern. The degree of small bowel distention has improved when compared to prior exam.      4.  Diffuse body wall edema most pronounced over the left lateral abdomen, progressed from prior exam.      5.  Bibasilar airspace disease consistent with underlying atelectasis and/or infiltrate. XR CHEST PORTABLE   Final Result      Hyperexpanded lungs with bibasilar minimal atelectasis, unchanged. XR CHEST 1 VIEW   Final Result   1. Low lung volumes with persistent bilateral lower lobe atelectasis   2. Nasogastric tube in stomach      XR CHEST PORTABLE   Final Result      Free intraperitoneal air. No acute cardiopulmonary findings. Attending in the ED was notified of the free air from the patient's CT study performed earlier. CT ABDOMEN PELVIS W IV CONTRAST Additional Contrast? None   Final Result      Free intraperitoneal air, predominantly in the upper abdomen and right upper quadrant. Site of perforation is not definitely identified. Distended proximal to mid small bowel. Distal small bowel not distended, consistent with small bowel obstruction. Stool throughout the colon. Distal colonic diverticulosis. Small fat-containing umbilical hernia. Air within the hernia, with a possible skin defect anteriorly at the umbilicus. Correlate clinically. Critical result findings were called to Audley Hamman in the ED at 2150 hours on 6/17/2021. Assessment/Plan:   Jennifer Cole is a 64 yo F who was admitted for havingperforated sigmoid diverticulitis 2/2 to troncoso procedure 9/56 with complicated post op ileus, UTI and bacteremia with MDRO. 1. Perforated sigmoid diverticulitis s/p ex-lap and Troncoso's procedure (6/27)  - wound vac in place  - GS following and recs appreciated  - NGT CLW  - on TPN per dietary recommendations     2. Septic Shock 2/2 diverticulitis and C. Diff, UTI  - ostomy vacomycin, ID following  - continue anidulafungin 100mg IV, Cefepime 2000 BID, flagyl 500mg q8h, oral vancomycin 500mg 4hr  - Pressor requirements increasing. Now on 3 pressors for MAP goal >60    3. Lactic Acidosis  - Lactic slightly trending up. 5.29 -->6.04  - q6hr lactate    4.  LAVINIA  - nephrology following  - CRRT without ability to remove fluid duet to BP. Worsening pitting edema   - wooten catheter. Strict I&Os. 24hr UOP 6ml (+10L on admission)  - renal panel q6hr  - ionized Ca q6hr     Continue to monitor pressures and update Family and surgery team.     Code Status: Limited  FEN: TPN  PPX: Heparin, protonix  DISPO: ICU    Shira Marley MD  Internal Medicine PGY2  07/05/21  7:18 AM    This patient has been staffed and discussed with Dr. Abebe [facility]    THE [facility] [place]     Patient seen and examined. I agree with Dr. Roca's history, physical, lab findings, assessment and plan.     Patient has significantly worsened every day in the last 72 hours with worsening septic shock overnight, now on 3 vasopressors at high dosages to maintain a systolic blood pressure of 90,  and progressive multi-system organ failure despite aggressive treatment of infections with braod spectrum ABx/antifungals, volume resuscitation and renal failure with CRRT. LA is 10.7, pt is encephalopathic and has progressive liver failure with worsening coagulopathy with INR 3.33 and hyperbilirubinemia of 6.7 now . Oxygen requirement is at 7 L and I remain surprised that her lungs are hanging in there as well as they are but surmise that it is only time before the lungs fail as well.      Lalo Negrete wanted my medical opinion on his Mom's situation. Given 4+ organ system failures her mortality is an excess of 80% and she continues to trend worse despite maximal medical therapy. I believe we have reached the point that this is an irreversible process and we are not doing things to [place] and not for her medical benefit. Raymundo Thomas fully understands this and agrees that the comfort of his mother is very important to him. CODE STATUS was changed to DNR/DNI. I reassured him that this was very reasonable and that we are here to answer any question he has and to help him through this process.   Raymundo Thomas has contacted family and patient's ex  is on the way to [place] from the [facility].  They are expected to arrive here tomorrow. He would like to continue current treatment with limited CODE STATUS until all family has had a chance to see the patient     Prognosis guarded and unfortunately I think this is turning out to be a nonreversible process.  I do think hospice is very appropriate     Pt has a high probability of imminent or life-threatening deterioration requiring close monitoring, and highly complex decision-making and/or interventions of high intensity to assess, manipulate, and support his critical organ systems to prevent a likely inevitable decline which could occur if left untreated.      A total critical care time 33 minutes was used. This includes but not limited to examining patient, collaborating with other physicians, monitoring vital signs, telemetry, continuous pulse oximetry, and clinical response to IV medications, documentation time, review and interpretation of laboratory and radiological data, review of nursing notes and old record review.  This time excludes any time that may have been spent performing procedures for life threatening organ failure.        Dalia Gann MD

## 2021-07-05 NOTE — CONSULTS
NUTRITION ASSESSMENT  Admission Date: 6/17/2021     Type and Reason for Visit: Reassess    NUTRITION RECOMMENDATIONS:   Diet- continue NPO per MD  Clinimix   1. Continue PN Clinimix 5/15E to goal rate 70 mL per hour to provide 1680 mL total volume, 1193 calories, 84 g protein, 252 g dextrose and 1.6 mg/kg/min GIR   3. Provide 250 ml of 20% lipids daily to provide 50 g lipids and 500 calories per day. Total Nutrition provided =1693 kcal; 84g protein, 50 g lipids and 252 g dextrose. (100 % kcal and 80 % protein needs met). 4. Obtain Labs: (Phos,Mg,K+) to monitor risk of refeeding syndrome. Weekly TG recommended. 3. Pharmacy to adjust electrolytes, MVI and Trace Elements as appropriate. NUTRITION ASSESSMENT:  Nutritional summary & status: Patient currently nutritionally stabel as TPN is running at goal rate to provide 100% of nutrition needs. Patient is requiring three pressors at this time. Will continue to monitor nutritional status and need for further intervention.      Patient admitted d/t emergent GI surgery     PMH significant for: hypertension, emphysema, Hepatitis C, anxiety, gastritis, recurrent cellulitis, dementia, and recent hospitalization for sepsis secondary     MALNUTRITION ASSESSMENT  Context of Malnutrition: Acute Illness   Malnutrition Status: Severe malnutrition  Findings of the 6 clinical characteristics of malnutrition (Minimum of 2 out of 6 clinical characteristics is required to make the diagnosis of moderate or severe Protein Calorie Malnutrition based on AND/ASPEN Guidelines):  Energy Intake: No significant decrease in energy intake    Energy Intake Time: Greater than or equal to 5 days    Weight Loss %: 7.5% loss or greater    Weight loss Time: Greater than or equal to 1 month    Body Fat Loss: No significant loss    Body Fat Location: No Significant   Body Muscle Loss: No significant loss    Body Muscle Loss Location: No significant    Fluid Accumulation: Unable to assess    Fluid Accumulation Location: Unable to assess     Strength: Not Performed; Not Measured     NUTRITION DIAGNOSIS  Problem: Problem #1: Inadequate oral intake   Etiology: Altered GI function  Signs & Symptoms: NPO status due to medical condition    NUTRITION INTERVENTION  Food and/or Nutrient Delivery: Continue NPO, Continue Current Parenteral Nutrition   Nutrition Education/Counseling: No recommendation at this time   Coordination of Nutrition Care: Continue to monitor while inpatient     NUTRITION MONITORING & EVALUATION:  Evaluation:Progressing towards goal   Goals: Pt to meet >50% of nutritional requirements from diet and ONS   Monitoring: Meal Intake , Supplement Intake  or Wound Healing      OBJECTIVE DATA: Significant to nutrition assessment  · Nutrition-Focused Physical Findings: colostomy +BM 7/5; 700 ml output. Generalized non-pitting edema   · Labs: Reviewed; Na 134, glucose 148  · Meds: Reviewed;   · Wounds Surgical Wound  and Wound Vac     ANTHROPOMETRICS  Current Height: 5' 6\" (167.6 cm)  Current Weight: 291 lb 7.2 oz (132.2 kg)    Admission weight: 252 lb 13.9 oz (114.7 kg)  Ideal Bodyweight 130 lb   Usual Bodyweight CARLOS   Weight Changes 8% loss within past month       BMI BMI (Calculated): 47.1    Wt Readings from Last 50 Encounters:   06/21/21 259 lb 7.7 oz (117.7 kg)   05/26/21 285 lb (129.3 kg)   05/26/21 260 lb (117.9 kg)   05/25/21 283 lb 15.2 oz (128.8 kg)   05/04/21 267 lb (121.1 kg)     COMPARATIVE STANDARDS  Estimated Total Kcals/Day : 11-14  Admission Bodyweight  (118 kg) 0805-9132 kcal/day    Estimated Total Protein (g/day) : 1.8-2.0 Ideal Bodyweight  (59 kg) 106-118 g/day  Estimated Daily Total Fluid (ml/day): >1500 mL per day     Food / Nutrition-Related History  Pre-Admission / Home Diet:  Pre-Admission/Home Diet: General   Home Supplements / Herbals:    none noted  Food Restrictions / Cultural Requests:    none noted    Current Nutrition Therapies   Diet NPO Exceptions are:  Ice Chips  PN-Adult Premix 5/15 - Standard Electrolytes - Central Line  PN-Adult Premix 5/15 - Standard Electrolytes - Central Line     PO Intake: NPO  PO Supplement: NPO   PO Supplement Intake: NPO  IVF: N/A    NUTRITION RISK LEVEL: Risk Level:  Moderate     Owen Woods RD, ALYSSA  [place]:  [phone]  Office:  [phone]

## 2021-07-05 NOTE — PROGRESS NOTES
ICU DAILY PROGRESS NOTE  CC: Hinchey class IV diverticulitis  SUBJECTIVE:   Interval Hx:   Patient continues to go up on pressor requirements overnight. She is currently on 38 levo, 0.04 Vaso and 0.07 Epi. Lactic acid down-trended over the day yesterday however this morning is up to 6.04 from 5.29.      LINES/ACCESS:   Central Access: R IJ CVC (06/27)  Peripheral Access: L forearm midline (06/24), PIV L forearm 06/24  Arterial Line Access: R radial (06/26)                                Hannon Date placed: 06/26  NGT Date placed: 06/26    Continuous Infusions:    PN-Adult Premix 5/15 - Standard Electrolytes - Central Line 70 mL/hr at 07/04/21 1857    EPINEPHrine infusion 0.04 mcg/kg/min (07/05/21 0437)    vasopressin (Septic Shock) infusion 0.04 Units/min (07/04/21 2216)    prismaSATE BGK 4/2.5 1,000 mL/hr at 07/05/21 0433    prismaSATE BGK 4/2.5 1,000 mL/hr at 07/05/21 0433    prismaSATE BGK 4/2.5 1,000 mL/hr at 07/05/21 0433    sodium chloride      norepinephrine 38 mcg/min (07/05/21 0516)    dextrose      sodium chloride      sodium chloride         Scheduled Meds:    cefepime  2,000 mg Intravenous Q12H    vancomycin 500 mg in 0.9% sodium chloride 250 mL  500 mg Rectal 4 times per day    heparin (porcine)  1,000 Units Intracatheter Once    metoprolol  5 mg Intravenous Q6H    sodium chloride  1,000 mL Intravenous Once    insulin regular  0-18 Units Subcutaneous Q4H    anidulafungin (ERAXIS) 100 mg IVPB  100 mg Intravenous Daily    metroNIDAZOLE  500 mg Intravenous Q8H    heparin (porcine)  5,000 Units Subcutaneous 3 times per day    pantoprazole  40 mg Intravenous BID    sodium chloride flush  5-40 mL Intravenous 2 times per day    OLANZapine zydis  5 mg Oral Nightly    sodium chloride flush  5-40 mL Intravenous 2 times per day        PRN Meds: sodium phosphate IVPB **OR** sodium phosphate IVPB **OR** sodium phosphate IVPB **OR** sodium phosphate IVPB, calcium gluconate **OR** calcium gluconate **OR** calcium gluconate **OR** calcium gluconate, magnesium sulfate, sodium chloride, IVPB builder, glucose, dextrose, glucagon (rDNA), dextrose, HYDROmorphone **OR** HYDROmorphone, sodium chloride flush, sodium chloride, ipratropium-albuterol, sodium chloride flush, sodium chloride, ondansetron **OR** ondansetron, phenol    OBJECTIVE:   Vitals: BP (!) 108/56   Pulse 109   Temp 96 °F (35.6 °C) (Axillary)   Resp 24   Ht 5' 6\" (1.676 m)   Wt 291 lb 7.2 oz (132.2 kg)   SpO2 98%   BMI 47.04 kg/m²     I/O:     Intake/Output Summary (Last 24 hours) at 7/5/2021 0639  Last data filed at 7/5/2021 0600  Gross per 24 hour   Intake 3327 ml   Output 4072 ml   Net -745 ml     I/O last 3 completed shifts: In: 7208 [I.V.:3451]  Out: 6115 [Urine:36; Emesis/NG output:700; Stool:500]    Diet: Diet NPO Exceptions are: Ice Chips  PN-Adult Premix 5/15 - Standard Electrolytes - Central Line      Physical Examination:   General appearance: elderly, ill -appearing female, lying in bed, in NAD  HEENT: no scleral icterus, trachea midline, no JVD, R IJ CVC in place, NGT in place with bilious output, L IJ in place  Chest/Lungs: mildly increased work of breathing, equal chest rise, 6L NC  Cardiovascular: Tachycardic, regular rhythm  Abdomen: obese, distended abdomen, midline wound vac intact with adequate suction, colostomy pink and viable with loose/liquid stool in bag.  Old MIKE drain site c/d/i ; pitting edema of the abdomen with positive fluid wave  Skin: warm and dry, no rashes  Extremities: 2+ bilateral LE pitting edema, no cyanosis, R radial arterial line in place  Neuro: alert, oriented to self only    Labs:  CBC:   Recent Labs     07/04/21  1639 07/04/21  2204 07/05/21  0359   WBC 35.5* 34.7* 37.8*   HGB 9.3* 9.0* 9.5*   HCT 28.6* 27.6* 28.8*   PLT 77* 76* 77*       BMP:   Recent Labs     07/04/21  1639 07/04/21  2204 07/05/21  0359   * 135* 134*   K 4.7 5.0 4.8    104 103   CO2 20* 20* 19*   BUN 26* 27* 23* CREATININE 1.3* 1.2 1.1   GLUCOSE 169* 179* 161*     LFT's:   Recent Labs     07/04/21  0415 07/04/21  1639 07/05/21  0359   AST 64* 88* 108*   ALT 15 24 26   BILITOT 5.6* 6.2* 6.7*   ALKPHOS 131* 121 132*     Troponin: No results for input(s): TROPONINI in the last 72 hours. BNP: No results for input(s): BNP in the last 72 hours. ABGs:   Recent Labs     07/04/21  2203 07/05/21  0405   PHART 7.395 7.428   BZV0XYD 35.1 31.0*   PO2ART 82.5 67.5*     INR:   Recent Labs     07/03/21  0452 07/04/21 0415 07/05/21  0359   INR 2.50* 3.40* 3.33*       U/A:No results for input(s): NITRITE, COLORU, PHUR, LABCAST, WBCUA, RBCUA, MUCUS, TRICHOMONAS, YEAST, BACTERIA, CLARITYU, SPECGRAV, LEUKOCYTESUR, UROBILINOGEN, BILIRUBINUR, BLOODU, GLUCOSEU, AMORPHOUS in the last 72 hours. Invalid input(s): Shani Shay       ASSESSMENT AND PLAN:   Michelle Vaughan is a 65 y.o. female with perforated sigmoid diverticulits s/p exploratory laparotomy and Nick's procedure 6/17. Post-operative course complicated by ileus type picture, UTI, and increasing leukocytosis. Neuro:   - Robaxin; Dilaudid pain panel  - Zyprexa 5mg qhs        Cardiovascular:   Septic versus hypovolemic shock in the setting of rapid fluid shifts  - Additional pressor added last night, patient's pressor requirements continue to increase. Patient with multiorgan failure, at this point does not seem to be reversible. Sinus tachycardia:  - EKG 7/2 with sinus tachycardia  - Tachycardia likely 2/2 to reflex tachycardia from beta blocker being held  - Continue IV lopressor 5mg q6hr with hold parameters         Pulmonary:  - CXR 7/3 without new consolidation   - High risk for aspiration PNA  - HOB to remain elevated >30 at all times  - Aggressive pulmonary toilet as tolerated:  Acapella, EZPap, IS  - Prn DuoNebs  -Wean O2; spO2>90, currently on high flow NC at 6 L         FEN/GI:  Perforated Diverticulitis s/p Nick's procedure (06/17)  - CT scan on 06/27 without concern for leak or abscess  - Wound vac in place - changes MWF, will hold off on [place] change until discussion with family regarding goals of care.   - NGT with 300 mL output over 24 hrs. Continue to CLW suction. - MIKE drain pulled and wound closed (06/28)  - Ostomy with output, 700 over last 24 hours - ostomy irrigated 7/1; will continue to closely monitor output  - Continue TPN; follow dietitian recommendation. :  LAVINIA  - Cr 1.1 from 1.1 (baseline of 0.9); BUN 23 from 27  - Hannon placed for strict I/Os  -CRRT initiated per nephrology recommendations, currently CRRT is running even, blood pressure is unable to tolerate fluid removal        Hem/ID:   Anemia  - Hgb 9.5 from 9.0  - No source of active bleeding      Leukocytosis  - WBC 37.8 from 34.7  - Remains afebrile  - Urinalysis (06/24): positive for Klebsiella pneumoniae  - Blood culture (06/26): positive for Acinetobacter calcoac baumannii  - C diff toxin positive. - ID following; follow up recommendations              - currently on Cefepime, Anidulafungin, Metronidazole, Vancomycin enema via ostomy     Endo  - Persistent hyperglycemia , likely 2/2 sepsis  - No hx DM  - Receiving high dose sliding scale q4hr       Access:  Central Access: R IJ CVC (06/26), L IJ (7/2)  Peripheral Access: L forearm midline (06/24), PIV L forearm 06/24  Arterial Line Access: R radial (07/02)                                Hannon Date placed: 06/26  NGT Date placed: 06/26     Prophylaxis:  SQH     Mobility:  Bedrest until more stable     Dispo:   ICU, patient severely ill with increasing pressor requirements overnight, and now with multiorgan failure, liver continues to worsen, kidney function remains stable on CRRT. At this point, unlikely patient will recover from severe septic shock. Will discuss goals of care with family when they arrive this morning.        Code Status: Limited  -----------------------------  Elizabeth Carmen DO, MS  PGY1, General Surgery  07/05/21  6:44 AM  [phone]    I have personally performed the medical history, physical exam and medical decision making and agree with all pertinent clinical information unless otherwise noted.      Overall poor prognosis  Will continue discuss with family to establish care goals    Yessenia Silverio MD  Surgery Attending

## 2021-07-05 NOTE — PLAN OF CARE
Problem: Nutrition  Goal: Optimal nutrition therapy  7/5/2021 1035 by Cesar Younger RD, LD  Outcome: Ongoing  Note: Nutrition Problem #1: Inadequate oral intake  Intervention: Food and/or Nutrient Delivery: Continue NPO, Continue Current Parenteral Nutrition  Nutritional Goals: Pt to meet >50% of nutritional requirements from diet and ONS   7/5/2021 1035 by Cesar Younger RD, LD  Outcome: Ongoing

## 2021-07-05 NOTE — PROGRESS NOTES
Dr. Kim Gamez (nephrology) notified via [address] [place] [address] of patient being made comfort care and stopping CRRT tonight.

## 2021-07-05 NOTE — PROGRESS NOTES
Pt seen on CRRT twice   BP low   On 2 pressors   Lytes stable     UO poor     Unable to remove Fluid sec to Hypotension    D/w RN     Patrick Cox MD

## 2021-07-05 NOTE — PROGRESS NOTES
POC:     Family arrived. Family and son, POA, would like to proceed to comfort care. Ttreatment orders are discontinued including dialysis, TPN, pressors, and antibiotics. Appreciate critical team assistance with comfort care orders. Pastoral care are provided. Attending and chief resident are notified. Will continue to monitor.      Tiffani Ayala MD  General Surgery Resident  07/05/21 7:07 PM  [phone]

## 2021-07-05 NOTE — PROGRESS NOTES
Clinical Pharmacy Progress Note  Pharmacy to dose PN    Admit date: 6/17/2021     Subjective/Objective:  Patient is a Alveria Simper old female from [facility] with a PMHx significant for HTN, COPD, HCV, possible cirrhosis, PUD, and dementia. Admitted with perf sigmoid diverticulitis, now s/p Troncoso's procedure on 2/83, complicated by post-op ileus, C.diff colitis,  UTI, and bactermia with MDRO. Interval Update:  Continues on CRRT. Discussing goals of care with family. Pharmacy is consulted to dose PN per Dr. Abdoul Cat and asked to convert all IVs to NS per Dr. Ying Buckley. Today is PN day #8  Current diet order:  NPO    Antibiotic regimen:    (6/17-6/26)  Metronidazole 500mg IV q8h (6/17-6/26, resumed 6/27- )  (6/26-6/27)   (6/27--7/2)   (6/29--7/3)   (7/2)  Anidulafungin 200mg IV x1, then 100mg IV q24h (6/27-- ) -- day #9  Vancomycin PER COLOSTOMY 500mg q6h (7/2-- ) -- day #4  Cefepime (7/2-- ) -- day #4   1000mg IV q12h (7/2-7/3)   2000mg IV q12h (7/3-current)    Height:  5' 6\" (167.6 cm)  Weight: 291 lb 7.2 oz (132.2 kg)    Recent Labs     07/04/21 2204 07/05/21  0359   * 134*   K 5.0 4.8    103   CO2 20* 19*   BUN 27* 23*   CREATININE 1.2 1.1   GLUCOSE 179* 161*     Calcium 8   Albumin 2.2   Corrected Calcium 9.4  Phosphorus 3  Magnesium 2.4    Glucoses since TPN hung yesterday:  167 - 195 - 170 - 148  Used 17 units of correctional insulin      Recent Labs     07/04/21 2204 07/05/21  0359   WBC 34.7* 37.8*   HGB 9.0* 9.5*   PLT 76* 77*        6/29   Triglycerides 113     Micro:  6/24 Blood x2 = NGTD  6/24 Urine = Klebsiella ESBL -- Intermediate to meropenem, Sensitive to amikacin and ceftazidime/avibactam  6/26 Blood #1 = Acinetobacter calcoac baumannii - R to Cipro, Unasyn, Bactrim. Sensitive to cefepime, ceftazidime, amikacin, tobramycin.     6/26 C.diff toxin moleculuar = positive    Prophylaxis:   · VTE: heparin 5,000 units SQ TID  · SUP: pantoprazole 40 mg IV BID      Assessment/Plan:  1) Parenteral Nutrition:  Pharmacy to dose -- Day #8  · Will continue with Clinimix-E 5/15 @ 70mL/hr (total volume = 1680mL/day), at goal rate per dietary recommendations. Ordered Lipids 20% 250mL to infuse over 12 hrs. Regimen provides AA 84g, Dextrose 252g, Lipids 50g. Total nutrition provides 1693 kcal per day. Appreciate dietitian's recommendations.  Clinimix-E includes standard electrolyte formulation. No additional electrolytes added. Will monitor electrolytes daily and will replete with supplemental IVPBs as needed.  Glucose control:  · Glucoses remain < 180. Will continue insulin in TPN at 50 units / day and monitor closely.  Patient will receive MVI 10 mL/day on MWF only (due to national shortage) & Trace Elements 1 mL/day with PN daily.  Labs will be monitored daily and PN will be re-ordered daily. Weekly triglycerides ordered per PN protocol.       Please call with questions--  Thanks--  Rahel Roque, PharmD, BCPS  [id number] (\[facility] [place] [facility]\"")   7/5/2021 7:46 AM

## 2021-07-06 ENCOUNTER — TELEPHONE (OUTPATIENT)
Dept: SURGERY | Age: 71
End: 2021-07-06

## 2021-07-06 VITALS
DIASTOLIC BLOOD PRESSURE: 78 MMHG | TEMPERATURE: 98 F | SYSTOLIC BLOOD PRESSURE: 142 MMHG | OXYGEN SATURATION: 98 % | WEIGHT: 291.45 LBS | HEART RATE: 42 BPM | HEIGHT: 66 IN | BODY MASS INDEX: 46.84 KG/M2

## 2021-07-06 LAB
BLOOD CULTURE, ROUTINE: ABNORMAL
Lab: NORMAL
ORGANISM: ABNORMAL
ORGANISM: ABNORMAL
REPORT: NORMAL
THIS TEST SENT TO: NORMAL

## 2021-07-06 NOTE — TELEPHONE ENCOUNTER
Joleen with [address] called in wanting to know if Dr. Ibeth Felix would sign the patient's death certificate    Please contact Joleen at [phone]

## 2021-07-06 NOTE — DEATH NOTES
Death Pronouncement Note    Patient's Name: Cecily Lemos   Patient's YOB: 1950  MRN Number: [id number]    Admitting Provider: Nelly Mosquera MD  Attending Provider: Nelly Mosquera MD    Patient was examined and the following were absent: Pulses, Blood Pressure and Respiratory effort    I declared the patient dead on at 56    Preliminary Cause of Death: Septic shock, multiorgan failure    Attending and chief resident were notified.     Electronically signed by Nessa Wilkes MD on 7/5/21 at 9:56 PM EDT

## 2021-07-06 NOTE — PROGRESS NOTES
TOD 22:01 2021 Security came and picked up the pt to take to the [facility]. I called the son Paddy Jacques to make sure the  home contacted him and they did. Post mortem care completed and charted.

## 2023-02-07 NOTE — CONSULTS
Clinical Pharmacy Consult Note    ADMIT DATE: 4/28/2021     66 yo F with PMH of HTN, anxiety, GERD, spinal stenosis, lumbar disc disease, dementia and lung cancer who presented from SNF with AMS and cellulitis. No formal H&P documented at this time. Per ED MD notes, pt admitted with severe sepsis d/t cellulitis of LLE. Pharmacy has been consulted to dose vancomycin per Dr. Omer Combs and Dr. James Canela. PERTINENT MEDICATIONS:  Cefepime 1g IV q12h - day #1  Vancomycin - pharmacy to dose - day #1  Date Dose Vanc Level   4/28 1g IV x1  750 mg IV x1     4/29  Ordered          LABORATORY:  Recent Labs     04/28/21  0223   *   K 4.7   CL 91*   CO2 19*   BUN 62*   CREATININE 3.9*   GLUCOSE 160*     Estimated Creatinine Clearance: 17 mL/min (A) (based on SCr of 3.9 mg/dL (H)). Recent Labs     04/28/21  0223   WBC 16.9*   HGB 14.6        MICROBIOLOGY:  Blood (4/28): Sent x 2   COVID-19 (4/28): Sent    VITALS:  BP (!) 151/69   Pulse 110   Temp 98.3 °F (36.8 °C) (Oral)   Resp 20   Ht 5' 7\" (1.702 m)   Wt 261 lb 3.9 oz (118.5 kg)   SpO2 95%   BMI 40.92 kg/m²   No intake or output data in the 24 hours ending 04/28/21 0834    PROPHYLAXIS:  Stress ulcer: pantoprazole  VTE:  Enoxaparin 30 mg daily     ASSESSMENT/PLAN:  1)  Sepsis d/t LLE cellulitis: Vancomycin + Cefepime - Day #1  · Vancomycin--pharmacy to dose  · SCr 3.9 on admission - unsure of patient's baseline (no H&P documented at this time). Will monitor closely. · Will dose vancomycin based on intermittent levels for now. Have entered placeholder onto [facility] · Received 1g IV x1 this AM.  · Will give additional 750 mg IV x1 this AM for total of 1.75g IV (~20 mg/kg based on adjusted BW of 84 kg). · Random level ordered for 4/29 AM. Will re-dose as appropriate. · Clinical status will be monitored closely / repeat levels and doses will be ordered as appropriate. Thank you for the consult! Please call with any questions.   Dionne Akhtar, Lvm for patient to schedule echocardiogram and to f/u with cardiology. Also let her know that rx had been sent. PharmD, [id number]  Wireless: [id number]  or [phone]  4/28/2021 8:34 AM

## 2024-08-06 NOTE — PLAN OF CARE
Problem: Pain:  Goal: Pain level will decrease  Description: Pain level will decrease  Outcome: Ongoing     Problem: Skin Integrity:  Goal: Absence of new skin breakdown  Description: Absence of new skin breakdown  Outcome: Ongoing     Problem: Falls - Risk of:  Goal: Will remain free from falls  Description: Will remain free from falls  Outcome: Ongoing Quality 130: Documentation Of Current Medications In The Medical Record: Current Medications Documented Quality 47: Advance Care Plan: Advance care planning not documented, reason not otherwise specified. Quality 431: Preventive Care And Screening: Unhealthy Alcohol Use - Screening: Patient not identified as an unhealthy alcohol user when screened for unhealthy alcohol use using a systematic screening method Quality 226: Preventive Care And Screening: Tobacco Use: Screening And Cessation Intervention: Patient screened for tobacco use and is an ex/non-smoker Detail Level: Detailed

## (undated) DEVICE — DRAPE,LAP,CHOLE,W/TROUGHS,STERILE: Brand: MEDLINE

## (undated) DEVICE — SURGICAL SET UP - SURE SET: Brand: MEDLINE INDUSTRIES, INC.

## (undated) DEVICE — Z DISCONTINUED USE 2716238 PER MEDLINE STAPLER INT L40MM DIA4.7MM GRN CRV HD B FRM TECHNOLOGY DISP

## (undated) DEVICE — DRESSING WND VAC XLG GRANUFOAM SENSATRAC

## (undated) DEVICE — GOWN,SIRUS,POLYRNF,BRTHSLV,XLN/XXL,18/CS: Brand: MEDLINE

## (undated) DEVICE — SPONGE,LAP,18"X18",DLX,XR,ST,5/PK,40/PK: Brand: MEDLINE

## (undated) DEVICE — CANISTER NEG PRSS 1000ML W/ GEL INFOVAC

## (undated) DEVICE — GLOVE ORANGE PI 8   MSG9080

## (undated) DEVICE — RELOAD STPL L75MM OPN H3.8MM CLS 1.5MM WIRE DIA0.2MM REG

## (undated) DEVICE — Z DUPLICATE USE 2716240 STAPLER INT CUT LN L40MM STPL L51MM GRN CRV HD B FRM

## (undated) DEVICE — PENCIL ES ULT VAC W TELSCP NOSE EZ CLN BLDE 10FT

## (undated) DEVICE — GLOVE ORANGE PI 8 1/2   MSG9085

## (undated) DEVICE — COVER LT HNDL BLU PLAS

## (undated) DEVICE — APPLICATOR MEDICATED 26 CC SOLUTION HI LT ORNG CHLORAPREP

## (undated) DEVICE — 3M™ TEGADERM™ TRANSPARENT FILM DRESSING FRAME STYLE, 1626W, 4 IN X 4-3/4 IN (10 CM X 12 CM), 50/CT 4CT/CASE: Brand: 3M™ TEGADERM™

## (undated) DEVICE — SUTURE VCRL SZ 3-0 L18IN ABSRB UD L26MM SH 1/2 CIR J864D

## (undated) DEVICE — STAPLER INT L75MM CUT LN L73MM STPL LN L77MM BLU B FRM 8

## (undated) DEVICE — SUTURE PDS II SZ 0 L60IN ABSRB VLT L48MM CTX 1/2 CIR Z990G

## (undated) DEVICE — DRESSING KIT CVC SHLD BIOPATCH

## (undated) DEVICE — SURE SET-DOUBLE BASIN-LF: Brand: MEDLINE INDUSTRIES, INC.

## (undated) DEVICE — FLUID TRAP FOR MINIVAC ES EQUIP FLD TRAP

## (undated) DEVICE — INTENDED FOR TISSUE SEPARATION, AND OTHER PROCEDURES THAT REQUIRE A SHARP SURGICAL BLADE TO PUNCTURE OR CUT.: Brand: BARD-PARKER ® CARBON RIB-BACK BLADES

## (undated) DEVICE — SEALER ENDOSCP NANO COAT OPN DIV CRV L JAW LIGASURE IMPACT

## (undated) DEVICE — WOUND RETRACTOR AND PROTECTOR: Brand: ALEXIS WOUND PROTECTOR-RETRACTOR

## (undated) DEVICE — PLATE ES AD W 9FT CRD 2

## (undated) DEVICE — SOLUTION IV 1000ML 0.9% SOD CHL

## (undated) DEVICE — RESERVOIR,SUCTION,100CC,SILICONE: Brand: MEDLINE

## (undated) DEVICE — JEWISH HOSPITAL TURNOVER KIT: Brand: MEDLINE INDUSTRIES, INC.

## (undated) DEVICE — APPLIER LIG CLP M L11IN TI STR RNG HNDL FOR 20 CLP DISP

## (undated) DEVICE — SUTURE VCRL SZ 2-0 L18IN ABSRB UD CT-1 L36MM 1/2 CIR J839D

## (undated) DEVICE — 3M™ IOBAN™ 2 ANTIMICROBIAL INCISE DRAPE 6648EZ: Brand: IOBAN™ 2

## (undated) DEVICE — DRAIN,WOUND,15FR,3/16,FULL-FLUTED: Brand: MEDLINE

## (undated) DEVICE — SUTURE PERMA-HAND SZ 2-0 L30IN NONABSORBABLE BLK L26MM SH K833H